# Patient Record
Sex: MALE | Race: WHITE | Employment: UNEMPLOYED | URBAN - METROPOLITAN AREA
[De-identification: names, ages, dates, MRNs, and addresses within clinical notes are randomized per-mention and may not be internally consistent; named-entity substitution may affect disease eponyms.]

---

## 2017-01-27 DIAGNOSIS — E78.5 HYPERLIPIDEMIA WITH TARGET LDL LESS THAN 130: ICD-10-CM

## 2017-01-27 RX ORDER — PRAVASTATIN SODIUM 20 MG
TABLET ORAL
Qty: 30 TABLET | Refills: 0 | Status: SHIPPED | OUTPATIENT
Start: 2017-01-27 | End: 2017-02-08 | Stop reason: SDUPTHER

## 2017-02-06 ENCOUNTER — HOSPITAL ENCOUNTER (OUTPATIENT)
Dept: PHYSICAL THERAPY | Age: 47
Setting detail: THERAPIES SERIES
Discharge: HOME | End: 2017-02-06
Payer: MEDICARE

## 2017-02-06 PROCEDURE — 97113 AQUATIC THERAPY/EXERCISES: CPT

## 2017-02-06 ASSESSMENT — PAIN DESCRIPTION - ORIENTATION: ORIENTATION: RIGHT

## 2017-02-06 ASSESSMENT — PAIN SCALES - GENERAL: PAINLEVEL_OUTOF10: 5

## 2017-02-06 ASSESSMENT — PAIN DESCRIPTION - PAIN TYPE: TYPE: CHRONIC PAIN

## 2017-02-06 ASSESSMENT — PAIN DESCRIPTION - LOCATION: LOCATION: ANKLE;SHOULDER

## 2017-02-08 ENCOUNTER — OFFICE VISIT (OUTPATIENT)
Dept: FAMILY MEDICINE CLINIC | Facility: CLINIC | Age: 47
End: 2017-02-08

## 2017-02-08 VITALS
OXYGEN SATURATION: 97 % | HEART RATE: 114 BPM | TEMPERATURE: 98.1 F | HEIGHT: 71 IN | BODY MASS INDEX: 27.38 KG/M2 | SYSTOLIC BLOOD PRESSURE: 131 MMHG | DIASTOLIC BLOOD PRESSURE: 81 MMHG | WEIGHT: 195.6 LBS

## 2017-02-08 DIAGNOSIS — M25.511 CHRONIC RIGHT SHOULDER PAIN: ICD-10-CM

## 2017-02-08 DIAGNOSIS — E78.5 HYPERLIPIDEMIA WITH TARGET LDL LESS THAN 130: ICD-10-CM

## 2017-02-08 DIAGNOSIS — R73.9 HYPERGLYCEMIA: ICD-10-CM

## 2017-02-08 DIAGNOSIS — M25.571 CHRONIC PAIN OF RIGHT ANKLE: ICD-10-CM

## 2017-02-08 DIAGNOSIS — G89.29 CHRONIC RIGHT SHOULDER PAIN: ICD-10-CM

## 2017-02-08 DIAGNOSIS — K59.03 CONSTIPATION DUE TO PAIN MEDICATION: ICD-10-CM

## 2017-02-08 DIAGNOSIS — M79.7 FIBROMYALGIA MUSCLE PAIN: ICD-10-CM

## 2017-02-08 DIAGNOSIS — R00.0 TACHYCARDIA: ICD-10-CM

## 2017-02-08 DIAGNOSIS — R63.5 UNINTENDED WEIGHT GAIN: Primary | ICD-10-CM

## 2017-02-08 DIAGNOSIS — G89.29 CHRONIC PAIN OF RIGHT ANKLE: ICD-10-CM

## 2017-02-08 LAB — HBA1C MFR BLD: 5.3 %

## 2017-02-08 PROCEDURE — 83036 HEMOGLOBIN GLYCOSYLATED A1C: CPT | Performed by: FAMILY MEDICINE

## 2017-02-08 PROCEDURE — 99214 OFFICE O/P EST MOD 30 MIN: CPT | Performed by: FAMILY MEDICINE

## 2017-02-08 RX ORDER — PRAVASTATIN SODIUM 20 MG
TABLET ORAL
Qty: 90 TABLET | Refills: 3 | Status: SHIPPED | OUTPATIENT
Start: 2017-02-08 | End: 2017-11-13 | Stop reason: SDUPTHER

## 2017-02-08 RX ORDER — ARIPIPRAZOLE 20 MG/1
TABLET ORAL
Refills: 0 | COMMUNITY
Start: 2017-02-02 | End: 2018-11-12 | Stop reason: ALTCHOICE

## 2017-02-08 RX ORDER — ALPRAZOLAM 0.5 MG/1
TABLET ORAL
Refills: 0 | COMMUNITY
Start: 2017-01-11 | End: 2017-05-08 | Stop reason: ALTCHOICE

## 2017-02-08 RX ORDER — DOCUSATE SODIUM 100 MG/1
100 CAPSULE, LIQUID FILLED ORAL 2 TIMES DAILY PRN
Qty: 60 CAPSULE | Refills: 3 | Status: SHIPPED | OUTPATIENT
Start: 2017-02-08 | End: 2018-12-26 | Stop reason: SDUPTHER

## 2017-02-08 RX ORDER — ERYTHROMYCIN 20 MG/ML
SOLUTION TOPICAL
Refills: 0 | COMMUNITY
Start: 2017-01-23

## 2017-02-08 RX ORDER — ADAPALENE 3 MG/G
GEL TOPICAL
Refills: 1 | COMMUNITY
Start: 2017-01-24

## 2017-02-08 RX ORDER — BENZTROPINE MESYLATE 1 MG/1
1 TABLET ORAL DAILY
Refills: 0 | COMMUNITY
Start: 2016-12-19 | End: 2022-01-19 | Stop reason: ALTCHOICE

## 2017-02-08 RX ORDER — SULFAMETHOXAZOLE AND TRIMETHOPRIM 800; 160 MG/1; MG/1
TABLET ORAL
Refills: 0 | COMMUNITY
Start: 2017-01-23 | End: 2017-05-08

## 2017-02-08 RX ORDER — CYCLOBENZAPRINE HCL 10 MG
10 TABLET ORAL 3 TIMES DAILY PRN
Qty: 90 TABLET | Refills: 3 | Status: SHIPPED | OUTPATIENT
Start: 2017-02-08 | End: 2017-11-13 | Stop reason: SDUPTHER

## 2017-02-08 ASSESSMENT — ENCOUNTER SYMPTOMS
NAUSEA: 0
CHEST TIGHTNESS: 0
BACK PAIN: 1
ABDOMINAL DISTENTION: 0
SHORTNESS OF BREATH: 1
COUGH: 0
DIARRHEA: 0
ABDOMINAL PAIN: 0
CONSTIPATION: 1
VOMITING: 0
WHEEZING: 0

## 2017-02-08 ASSESSMENT — PATIENT HEALTH QUESTIONNAIRE - PHQ9
4. FEELING TIRED OR HAVING LITTLE ENERGY: 3
2. FEELING DOWN, DEPRESSED OR HOPELESS: 3
9. THOUGHTS THAT YOU WOULD BE BETTER OFF DEAD, OR OF HURTING YOURSELF: 0
7. TROUBLE CONCENTRATING ON THINGS, SUCH AS READING THE NEWSPAPER OR WATCHING TELEVISION: 1
6. FEELING BAD ABOUT YOURSELF - OR THAT YOU ARE A FAILURE OR HAVE LET YOURSELF OR YOUR FAMILY DOWN: 2
SUM OF ALL RESPONSES TO PHQ9 QUESTIONS 1 & 2: 6
1. LITTLE INTEREST OR PLEASURE IN DOING THINGS: 3
10. IF YOU CHECKED OFF ANY PROBLEMS, HOW DIFFICULT HAVE THESE PROBLEMS MADE IT FOR YOU TO DO YOUR WORK, TAKE CARE OF THINGS AT HOME, OR GET ALONG WITH OTHER PEOPLE: 1
5. POOR APPETITE OR OVEREATING: 3
3. TROUBLE FALLING OR STAYING ASLEEP: 1
SUM OF ALL RESPONSES TO PHQ QUESTIONS 1-9: 17
8. MOVING OR SPEAKING SO SLOWLY THAT OTHER PEOPLE COULD HAVE NOTICED. OR THE OPPOSITE, BEING SO FIGETY OR RESTLESS THAT YOU HAVE BEEN MOVING AROUND A LOT MORE THAN USUAL: 1

## 2017-02-08 ASSESSMENT — ANXIETY QUESTIONNAIRES
GAD7 TOTAL SCORE: 9
3. WORRYING TOO MUCH ABOUT DIFFERENT THINGS: 1-SEVERAL DAYS
1. FEELING NERVOUS, ANXIOUS, OR ON EDGE: 3-NEARLY EVERY DAY
2. NOT BEING ABLE TO STOP OR CONTROL WORRYING: 2-OVER HALF THE DAYS
6. BECOMING EASILY ANNOYED OR IRRITABLE: 1-SEVERAL DAYS
5. BEING SO RESTLESS THAT IT IS HARD TO SIT STILL: 0-NOT AT ALL SURE
7. FEELING AFRAID AS IF SOMETHING AWFUL MIGHT HAPPEN: 1-SEVERAL DAYS
4. TROUBLE RELAXING: 1-SEVERAL DAYS

## 2017-02-09 ENCOUNTER — HOSPITAL ENCOUNTER (OUTPATIENT)
Dept: PHYSICAL THERAPY | Age: 47
Setting detail: THERAPIES SERIES
Discharge: HOME | End: 2017-02-09
Payer: MEDICARE

## 2017-02-09 PROCEDURE — 97110 THERAPEUTIC EXERCISES: CPT

## 2017-02-09 ASSESSMENT — PAIN DESCRIPTION - LOCATION: LOCATION: SHOULDER;ANKLE

## 2017-02-09 ASSESSMENT — PAIN DESCRIPTION - FREQUENCY: FREQUENCY: CONTINUOUS

## 2017-02-09 ASSESSMENT — PAIN SCALES - GENERAL: PAINLEVEL_OUTOF10: 8

## 2017-02-09 ASSESSMENT — PAIN DESCRIPTION - ORIENTATION: ORIENTATION: RIGHT

## 2017-02-12 PROBLEM — R00.0 TACHYCARDIA: Status: ACTIVE | Noted: 2017-02-12

## 2017-02-12 PROBLEM — K59.03 CONSTIPATION DUE TO PAIN MEDICATION: Status: ACTIVE | Noted: 2017-02-12

## 2017-02-13 ENCOUNTER — HOSPITAL ENCOUNTER (OUTPATIENT)
Dept: PHYSICAL THERAPY | Age: 47
Setting detail: THERAPIES SERIES
Discharge: HOME | End: 2017-02-13
Payer: MEDICARE

## 2017-02-13 PROCEDURE — 97113 AQUATIC THERAPY/EXERCISES: CPT

## 2017-02-13 ASSESSMENT — PAIN DESCRIPTION - FREQUENCY: FREQUENCY: CONTINUOUS

## 2017-02-13 ASSESSMENT — PAIN DESCRIPTION - PAIN TYPE
TYPE: CHRONIC PAIN
TYPE_2: CHRONIC PAIN

## 2017-02-13 ASSESSMENT — PAIN DESCRIPTION - INTENSITY: RATING_2: 2

## 2017-02-13 ASSESSMENT — PAIN DESCRIPTION - LOCATION
LOCATION: ANKLE
LOCATION_2: SHOULDER

## 2017-02-13 ASSESSMENT — PAIN DESCRIPTION - ORIENTATION
ORIENTATION: RIGHT
ORIENTATION_2: RIGHT

## 2017-02-13 ASSESSMENT — PAIN SCALES - GENERAL: PAINLEVEL_OUTOF10: 8

## 2017-02-16 ENCOUNTER — HOSPITAL ENCOUNTER (OUTPATIENT)
Dept: PHYSICAL THERAPY | Age: 47
Setting detail: THERAPIES SERIES
Discharge: HOME | End: 2017-02-16
Payer: MEDICARE

## 2017-02-16 PROCEDURE — 97110 THERAPEUTIC EXERCISES: CPT

## 2017-02-16 ASSESSMENT — PAIN DESCRIPTION - LOCATION
LOCATION_2: SHOULDER
LOCATION: ANKLE

## 2017-02-16 ASSESSMENT — PAIN SCALES - GENERAL: PAINLEVEL_OUTOF10: 7

## 2017-02-16 ASSESSMENT — PAIN DESCRIPTION - FREQUENCY: FREQUENCY: CONTINUOUS

## 2017-02-16 ASSESSMENT — PAIN DESCRIPTION - INTENSITY: RATING_2: 4

## 2017-02-16 ASSESSMENT — PAIN DESCRIPTION - ORIENTATION
ORIENTATION: RIGHT
ORIENTATION_2: RIGHT

## 2017-02-16 ASSESSMENT — PAIN DESCRIPTION - PAIN TYPE
TYPE_2: CHRONIC PAIN
TYPE: CHRONIC PAIN

## 2017-02-16 ASSESSMENT — PAIN DESCRIPTION - DESCRIPTORS: DESCRIPTORS: ACHING;SHARP

## 2017-02-24 ENCOUNTER — HOSPITAL ENCOUNTER (OUTPATIENT)
Dept: PHYSICAL THERAPY | Age: 47
Setting detail: THERAPIES SERIES
Discharge: HOME | End: 2017-02-24
Payer: MEDICARE

## 2017-02-24 PROCEDURE — 97110 THERAPEUTIC EXERCISES: CPT

## 2017-02-24 ASSESSMENT — PAIN DESCRIPTION - FREQUENCY: FREQUENCY: CONTINUOUS

## 2017-02-24 ASSESSMENT — PAIN DESCRIPTION - ORIENTATION: ORIENTATION: RIGHT

## 2017-02-24 ASSESSMENT — PAIN SCALES - GENERAL: PAINLEVEL_OUTOF10: 5

## 2017-02-24 ASSESSMENT — PAIN DESCRIPTION - LOCATION: LOCATION: SHOULDER;ANKLE

## 2017-02-24 ASSESSMENT — PAIN DESCRIPTION - DESCRIPTORS: DESCRIPTORS: ACHING;DULL;CONSTANT

## 2017-05-01 ENCOUNTER — HOSPITAL ENCOUNTER (OUTPATIENT)
Age: 47
Discharge: HOME | End: 2017-05-01
Payer: MEDICARE

## 2017-05-01 DIAGNOSIS — Z51.81 MEDICATION MONITORING ENCOUNTER: ICD-10-CM

## 2017-05-01 DIAGNOSIS — R73.01 IFG (IMPAIRED FASTING GLUCOSE): ICD-10-CM

## 2017-05-01 DIAGNOSIS — Z11.4 SCREENING FOR HIV (HUMAN IMMUNODEFICIENCY VIRUS): ICD-10-CM

## 2017-05-01 DIAGNOSIS — R25.3 MUSCLE TWITCHING: ICD-10-CM

## 2017-05-01 DIAGNOSIS — E78.5 HYPERLIPIDEMIA WITH TARGET LDL LESS THAN 130: ICD-10-CM

## 2017-05-01 DIAGNOSIS — R73.9 HYPERGLYCEMIA: ICD-10-CM

## 2017-05-01 DIAGNOSIS — R63.5 UNINTENDED WEIGHT GAIN: ICD-10-CM

## 2017-05-01 LAB
ALBUMIN SERPL-MCNC: 4.7 G/DL (ref 3.5–5.2)
ALBUMIN/GLOBULIN RATIO: NORMAL (ref 1–2.5)
ALP BLD-CCNC: 68 U/L (ref 40–129)
ALT SERPL-CCNC: 37 U/L (ref 5–41)
ANION GAP SERPL CALCULATED.3IONS-SCNC: 15 MMOL/L (ref 9–17)
AST SERPL-CCNC: 29 U/L
BILIRUB SERPL-MCNC: 0.83 MG/DL (ref 0.3–1.2)
BUN BLDV-MCNC: 15 MG/DL (ref 6–20)
BUN/CREAT BLD: NORMAL (ref 9–20)
CALCIUM SERPL-MCNC: 9.2 MG/DL (ref 8.6–10.4)
CHLORIDE BLD-SCNC: 102 MMOL/L (ref 98–107)
CHOLESTEROL/HDL RATIO: 4.6
CHOLESTEROL: 204 MG/DL
CO2: 26 MMOL/L (ref 20–31)
CREAT SERPL-MCNC: 0.97 MG/DL (ref 0.7–1.2)
GFR AFRICAN AMERICAN: >60 ML/MIN
GFR NON-AFRICAN AMERICAN: >60 ML/MIN
GFR SERPL CREATININE-BSD FRML MDRD: NORMAL ML/MIN/{1.73_M2}
GFR SERPL CREATININE-BSD FRML MDRD: NORMAL ML/MIN/{1.73_M2}
GLUCOSE BLD-MCNC: 83 MG/DL (ref 70–99)
HDLC SERPL-MCNC: 44 MG/DL
HIV AG/AB: NONREACTIVE
LDL CHOLESTEROL: 124 MG/DL (ref 0–130)
POTASSIUM SERPL-SCNC: 4.3 MMOL/L (ref 3.7–5.3)
SODIUM BLD-SCNC: 143 MMOL/L (ref 135–144)
TOTAL CK: 134 U/L (ref 39–308)
TOTAL PROTEIN: 7.7 G/DL (ref 6.4–8.3)
TRIGL SERPL-MCNC: 179 MG/DL
TSH SERPL DL<=0.05 MIU/L-ACNC: 0.71 MIU/L (ref 0.3–5)
VLDLC SERPL CALC-MCNC: ABNORMAL MG/DL (ref 1–30)

## 2017-05-01 PROCEDURE — 82550 ASSAY OF CK (CPK): CPT

## 2017-05-01 PROCEDURE — 93005 ELECTROCARDIOGRAM TRACING: CPT

## 2017-05-01 PROCEDURE — 84443 ASSAY THYROID STIM HORMONE: CPT

## 2017-05-01 PROCEDURE — 36415 COLL VENOUS BLD VENIPUNCTURE: CPT

## 2017-05-01 PROCEDURE — 87389 HIV-1 AG W/HIV-1&-2 AB AG IA: CPT

## 2017-05-01 PROCEDURE — 80053 COMPREHEN METABOLIC PANEL: CPT

## 2017-05-01 PROCEDURE — 80061 LIPID PANEL: CPT

## 2017-05-02 LAB
EKG ATRIAL RATE: 96 BPM
EKG P AXIS: 40 DEGREES
EKG P-R INTERVAL: 138 MS
EKG Q-T INTERVAL: 346 MS
EKG QRS DURATION: 72 MS
EKG QTC CALCULATION (BAZETT): 437 MS
EKG R AXIS: 25 DEGREES
EKG T AXIS: 48 DEGREES
EKG VENTRICULAR RATE: 96 BPM

## 2017-05-08 ENCOUNTER — OFFICE VISIT (OUTPATIENT)
Dept: FAMILY MEDICINE CLINIC | Age: 47
End: 2017-05-08
Payer: MEDICARE

## 2017-05-08 VITALS
OXYGEN SATURATION: 98 % | TEMPERATURE: 97.7 F | HEART RATE: 94 BPM | HEIGHT: 71 IN | DIASTOLIC BLOOD PRESSURE: 84 MMHG | SYSTOLIC BLOOD PRESSURE: 137 MMHG | BODY MASS INDEX: 25.4 KG/M2 | WEIGHT: 181.4 LBS

## 2017-05-08 DIAGNOSIS — E78.5 HYPERLIPIDEMIA WITH TARGET LDL LESS THAN 130: ICD-10-CM

## 2017-05-08 DIAGNOSIS — J02.9 SORE THROAT: Primary | ICD-10-CM

## 2017-05-08 DIAGNOSIS — Z13.31 POSITIVE DEPRESSION SCREENING: ICD-10-CM

## 2017-05-08 DIAGNOSIS — F31.75 BIPOLAR DISORDER, IN PARTIAL REMISSION, MOST RECENT EPISODE DEPRESSED (HCC): ICD-10-CM

## 2017-05-08 DIAGNOSIS — H61.21 EXCESSIVE EAR WAX, RIGHT: ICD-10-CM

## 2017-05-08 DIAGNOSIS — K76.0 FATTY LIVER DISEASE, NONALCOHOLIC: ICD-10-CM

## 2017-05-08 LAB — S PYO AG THROAT QL: NORMAL

## 2017-05-08 PROCEDURE — G8431 POS CLIN DEPRES SCRN F/U DOC: HCPCS | Performed by: FAMILY MEDICINE

## 2017-05-08 PROCEDURE — 99214 OFFICE O/P EST MOD 30 MIN: CPT | Performed by: FAMILY MEDICINE

## 2017-05-08 PROCEDURE — 87880 STREP A ASSAY W/OPTIC: CPT | Performed by: FAMILY MEDICINE

## 2017-05-08 RX ORDER — PHENYLEPHRINE HCL 1 %
SPRAY, NON-AEROSOL (ML) NASAL
Refills: 0 | COMMUNITY
Start: 2017-03-14 | End: 2017-11-13 | Stop reason: SDUPTHER

## 2017-05-08 RX ORDER — DOXYCYCLINE HYCLATE 100 MG/1
CAPSULE ORAL
Refills: 0 | COMMUNITY
Start: 2017-04-27 | End: 2017-11-13 | Stop reason: ALTCHOICE

## 2017-05-08 RX ORDER — FLUTICASONE PROPIONATE 50 MCG
2 SPRAY, SUSPENSION (ML) NASAL DAILY
Qty: 1 BOTTLE | Refills: 3 | Status: SHIPPED | OUTPATIENT
Start: 2017-05-08 | End: 2020-01-10 | Stop reason: SDUPTHER

## 2017-05-08 RX ORDER — OXYCODONE HYDROCHLORIDE AND ACETAMINOPHEN 5; 325 MG/1; MG/1
TABLET ORAL
Refills: 0 | COMMUNITY
Start: 2017-04-12 | End: 2019-04-03 | Stop reason: ALTCHOICE

## 2017-05-08 RX ORDER — FEXOFENADINE HCL 180 MG/1
180 TABLET ORAL DAILY
Qty: 30 TABLET | Refills: 3 | Status: SHIPPED | OUTPATIENT
Start: 2017-05-08 | End: 2019-06-27 | Stop reason: SDUPTHER

## 2017-05-08 ASSESSMENT — ENCOUNTER SYMPTOMS
CONSTIPATION: 0
TROUBLE SWALLOWING: 1
COUGH: 0
ABDOMINAL PAIN: 0
VOMITING: 0
SHORTNESS OF BREATH: 0
RHINORRHEA: 0
CHEST TIGHTNESS: 0
SORE THROAT: 1
ABDOMINAL DISTENTION: 0
NAUSEA: 0
WHEEZING: 0
DIARRHEA: 0

## 2017-05-08 ASSESSMENT — PATIENT HEALTH QUESTIONNAIRE - PHQ9
1. LITTLE INTEREST OR PLEASURE IN DOING THINGS: 1
SUM OF ALL RESPONSES TO PHQ9 QUESTIONS 1 & 2: 2
8. MOVING OR SPEAKING SO SLOWLY THAT OTHER PEOPLE COULD HAVE NOTICED. OR THE OPPOSITE, BEING SO FIGETY OR RESTLESS THAT YOU HAVE BEEN MOVING AROUND A LOT MORE THAN USUAL: 0
4. FEELING TIRED OR HAVING LITTLE ENERGY: 1
6. FEELING BAD ABOUT YOURSELF - OR THAT YOU ARE A FAILURE OR HAVE LET YOURSELF OR YOUR FAMILY DOWN: 1
9. THOUGHTS THAT YOU WOULD BE BETTER OFF DEAD, OR OF HURTING YOURSELF: 0
10. IF YOU CHECKED OFF ANY PROBLEMS, HOW DIFFICULT HAVE THESE PROBLEMS MADE IT FOR YOU TO DO YOUR WORK, TAKE CARE OF THINGS AT HOME, OR GET ALONG WITH OTHER PEOPLE: 1
2. FEELING DOWN, DEPRESSED OR HOPELESS: 1
3. TROUBLE FALLING OR STAYING ASLEEP: 1
5. POOR APPETITE OR OVEREATING: 2
7. TROUBLE CONCENTRATING ON THINGS, SUCH AS READING THE NEWSPAPER OR WATCHING TELEVISION: 1
SUM OF ALL RESPONSES TO PHQ QUESTIONS 1-9: 8

## 2017-05-11 ENCOUNTER — TELEPHONE (OUTPATIENT)
Dept: FAMILY MEDICINE CLINIC | Age: 47
End: 2017-05-11

## 2017-05-11 DIAGNOSIS — J01.80 OTHER ACUTE SINUSITIS, RECURRENCE NOT SPECIFIED: Primary | ICD-10-CM

## 2017-05-11 RX ORDER — AMOXICILLIN AND CLAVULANATE POTASSIUM 875; 125 MG/1; MG/1
1 TABLET, FILM COATED ORAL 2 TIMES DAILY
Qty: 20 TABLET | Refills: 0 | Status: SHIPPED | OUTPATIENT
Start: 2017-05-11 | End: 2017-05-21

## 2017-08-14 ENCOUNTER — HOSPITAL ENCOUNTER (OUTPATIENT)
Dept: PHYSICAL THERAPY | Age: 47
Setting detail: THERAPIES SERIES
Discharge: HOME | End: 2017-08-14
Payer: MEDICARE

## 2017-08-14 PROCEDURE — 97161 PT EVAL LOW COMPLEX 20 MIN: CPT

## 2017-08-14 ASSESSMENT — PAIN DESCRIPTION - PROGRESSION: CLINICAL_PROGRESSION: GRADUALLY WORSENING

## 2017-08-14 ASSESSMENT — PAIN DESCRIPTION - ONSET: ONSET: UNABLE TO TELL

## 2017-08-14 ASSESSMENT — PAIN DESCRIPTION - ORIENTATION: ORIENTATION: RIGHT;ANTERIOR;POSTERIOR

## 2017-08-14 ASSESSMENT — PAIN SCALES - GENERAL: PAINLEVEL_OUTOF10: 6

## 2017-08-14 ASSESSMENT — PAIN DESCRIPTION - FREQUENCY: FREQUENCY: CONTINUOUS

## 2017-08-14 ASSESSMENT — PAIN DESCRIPTION - DESCRIPTORS: DESCRIPTORS: CONSTANT;DULL;ACHING

## 2017-08-14 ASSESSMENT — PAIN DESCRIPTION - PAIN TYPE: TYPE: CHRONIC PAIN

## 2017-08-14 ASSESSMENT — PAIN DESCRIPTION - LOCATION: LOCATION: SHOULDER

## 2017-08-17 ENCOUNTER — HOSPITAL ENCOUNTER (OUTPATIENT)
Dept: PHYSICAL THERAPY | Age: 47
Setting detail: THERAPIES SERIES
Discharge: HOME | End: 2017-08-17
Payer: MEDICARE

## 2017-08-17 ASSESSMENT — PAIN DESCRIPTION - DESCRIPTORS: DESCRIPTORS: CONSTANT;STABBING

## 2017-08-17 ASSESSMENT — PAIN DESCRIPTION - FREQUENCY: FREQUENCY: CONTINUOUS

## 2017-08-17 ASSESSMENT — PAIN DESCRIPTION - ORIENTATION: ORIENTATION: RIGHT;POSTERIOR

## 2017-08-17 ASSESSMENT — PAIN SCALES - GENERAL: PAINLEVEL_OUTOF10: 5

## 2017-08-17 ASSESSMENT — PAIN DESCRIPTION - LOCATION: LOCATION: ANKLE

## 2017-08-17 ASSESSMENT — PAIN DESCRIPTION - ONSET: ONSET: UNABLE TO TELL

## 2017-08-17 ASSESSMENT — PAIN DESCRIPTION - PROGRESSION: CLINICAL_PROGRESSION: GRADUALLY WORSENING

## 2017-08-17 ASSESSMENT — PAIN DESCRIPTION - PAIN TYPE: TYPE: CHRONIC PAIN

## 2017-08-22 ENCOUNTER — HOSPITAL ENCOUNTER (OUTPATIENT)
Dept: PHYSICAL THERAPY | Age: 47
Setting detail: THERAPIES SERIES
Discharge: HOME | End: 2017-08-22
Payer: MEDICARE

## 2017-08-22 PROCEDURE — 97113 AQUATIC THERAPY/EXERCISES: CPT

## 2017-08-22 ASSESSMENT — PAIN SCALES - GENERAL: PAINLEVEL_OUTOF10: 4

## 2017-08-22 ASSESSMENT — PAIN DESCRIPTION - PAIN TYPE
TYPE: CHRONIC PAIN
TYPE_2: CHRONIC PAIN

## 2017-08-22 ASSESSMENT — PAIN DESCRIPTION - ORIENTATION
ORIENTATION_2: RIGHT
ORIENTATION: RIGHT;POSTERIOR

## 2017-08-22 ASSESSMENT — PAIN DESCRIPTION - FREQUENCY: FREQUENCY: CONTINUOUS

## 2017-08-22 ASSESSMENT — PAIN DESCRIPTION - LOCATION
LOCATION_2: SHOULDER
LOCATION: ANKLE

## 2017-08-22 ASSESSMENT — PAIN DESCRIPTION - INTENSITY: RATING_2: 0

## 2017-08-22 ASSESSMENT — PAIN DESCRIPTION - DESCRIPTORS: DESCRIPTORS: CONSTANT;STABBING

## 2017-08-25 ENCOUNTER — HOSPITAL ENCOUNTER (OUTPATIENT)
Dept: PHYSICAL THERAPY | Age: 47
Setting detail: THERAPIES SERIES
Discharge: HOME | End: 2017-08-25
Payer: MEDICARE

## 2017-08-25 PROCEDURE — 97113 AQUATIC THERAPY/EXERCISES: CPT

## 2017-08-25 ASSESSMENT — PAIN DESCRIPTION - ORIENTATION: ORIENTATION: RIGHT

## 2017-08-25 ASSESSMENT — PAIN DESCRIPTION - LOCATION: LOCATION: SHOULDER;ANKLE

## 2017-08-25 ASSESSMENT — PAIN DESCRIPTION - PAIN TYPE: TYPE: CHRONIC PAIN

## 2017-08-25 ASSESSMENT — PAIN SCALES - GENERAL: PAINLEVEL_OUTOF10: 6

## 2017-08-29 ENCOUNTER — HOSPITAL ENCOUNTER (OUTPATIENT)
Dept: PHYSICAL THERAPY | Age: 47
Setting detail: THERAPIES SERIES
Discharge: HOME | End: 2017-08-29
Payer: MEDICARE

## 2017-08-29 PROCEDURE — 97113 AQUATIC THERAPY/EXERCISES: CPT

## 2017-08-29 ASSESSMENT — PAIN DESCRIPTION - FREQUENCY: FREQUENCY: CONTINUOUS

## 2017-08-29 ASSESSMENT — PAIN DESCRIPTION - PAIN TYPE
TYPE_2: CHRONIC PAIN
TYPE: CHRONIC PAIN

## 2017-08-29 ASSESSMENT — PAIN DESCRIPTION - LOCATION
LOCATION_2: SHOULDER
LOCATION: ANKLE

## 2017-08-29 ASSESSMENT — PAIN DESCRIPTION - INTENSITY: RATING_2: 4

## 2017-08-29 ASSESSMENT — PAIN DESCRIPTION - ORIENTATION
ORIENTATION_2: RIGHT
ORIENTATION: RIGHT

## 2017-08-29 ASSESSMENT — PAIN SCALES - GENERAL: PAINLEVEL_OUTOF10: 8

## 2017-08-29 ASSESSMENT — PAIN DESCRIPTION - DESCRIPTORS: DESCRIPTORS: CONSTANT;ACHING;SHARP;STABBING

## 2017-09-01 ENCOUNTER — HOSPITAL ENCOUNTER (OUTPATIENT)
Dept: PHYSICAL THERAPY | Age: 47
Setting detail: THERAPIES SERIES
Discharge: HOME | End: 2017-09-01
Payer: MEDICARE

## 2017-09-01 PROCEDURE — 97113 AQUATIC THERAPY/EXERCISES: CPT

## 2017-09-01 ASSESSMENT — PAIN DESCRIPTION - PAIN TYPE
TYPE: CHRONIC PAIN
TYPE_2: CHRONIC PAIN

## 2017-09-01 ASSESSMENT — PAIN DESCRIPTION - FREQUENCY: FREQUENCY: CONTINUOUS

## 2017-09-01 ASSESSMENT — PAIN DESCRIPTION - DESCRIPTORS: DESCRIPTORS: CONSTANT;ACHING

## 2017-09-01 ASSESSMENT — PAIN DESCRIPTION - LOCATION
LOCATION_2: SHOULDER
LOCATION: ANKLE

## 2017-09-01 ASSESSMENT — PAIN DESCRIPTION - ORIENTATION
ORIENTATION: RIGHT
ORIENTATION_2: RIGHT

## 2017-09-01 ASSESSMENT — PAIN DESCRIPTION - INTENSITY: RATING_2: 3

## 2017-09-01 ASSESSMENT — PAIN SCALES - GENERAL: PAINLEVEL_OUTOF10: 3

## 2017-09-05 ENCOUNTER — HOSPITAL ENCOUNTER (OUTPATIENT)
Dept: PHYSICAL THERAPY | Age: 47
Setting detail: THERAPIES SERIES
Discharge: HOME | End: 2017-09-05
Payer: MEDICARE

## 2017-09-05 PROCEDURE — 97113 AQUATIC THERAPY/EXERCISES: CPT

## 2017-09-05 ASSESSMENT — PAIN DESCRIPTION - PAIN TYPE
TYPE_2: CHRONIC PAIN
TYPE: CHRONIC PAIN

## 2017-09-05 ASSESSMENT — PAIN SCALES - GENERAL: PAINLEVEL_OUTOF10: 7

## 2017-09-05 ASSESSMENT — PAIN DESCRIPTION - INTENSITY: RATING_2: 4

## 2017-09-05 ASSESSMENT — PAIN DESCRIPTION - DESCRIPTORS: DESCRIPTORS: CONSTANT;ACHING

## 2017-09-05 ASSESSMENT — PAIN DESCRIPTION - ORIENTATION
ORIENTATION_2: RIGHT
ORIENTATION: RIGHT

## 2017-09-05 ASSESSMENT — PAIN DESCRIPTION - LOCATION
LOCATION_2: SHOULDER
LOCATION: ANKLE

## 2017-09-05 ASSESSMENT — PAIN DESCRIPTION - FREQUENCY: FREQUENCY: CONTINUOUS

## 2017-09-07 ENCOUNTER — APPOINTMENT (OUTPATIENT)
Dept: PHYSICAL THERAPY | Age: 47
End: 2017-09-07
Payer: MEDICARE

## 2017-09-12 ENCOUNTER — HOSPITAL ENCOUNTER (OUTPATIENT)
Dept: PHYSICAL THERAPY | Age: 47
Setting detail: THERAPIES SERIES
Discharge: HOME | End: 2017-09-12
Payer: MEDICARE

## 2017-09-12 PROCEDURE — 97113 AQUATIC THERAPY/EXERCISES: CPT

## 2017-09-12 ASSESSMENT — PAIN DESCRIPTION - LOCATION
LOCATION: FOOT
LOCATION_2: SHOULDER

## 2017-09-12 ASSESSMENT — PAIN DESCRIPTION - ORIENTATION
ORIENTATION_2: RIGHT
ORIENTATION: RIGHT

## 2017-09-12 ASSESSMENT — PAIN DESCRIPTION - PAIN TYPE
TYPE: CHRONIC PAIN
TYPE_2: CHRONIC PAIN

## 2017-09-12 ASSESSMENT — PAIN SCALES - GENERAL: PAINLEVEL_OUTOF10: 4

## 2017-09-12 ASSESSMENT — PAIN DESCRIPTION - INTENSITY: RATING_2: 2

## 2017-09-14 ENCOUNTER — HOSPITAL ENCOUNTER (OUTPATIENT)
Dept: PHYSICAL THERAPY | Age: 47
Setting detail: THERAPIES SERIES
Discharge: HOME | End: 2017-09-14
Payer: MEDICARE

## 2017-09-14 ENCOUNTER — APPOINTMENT (OUTPATIENT)
Dept: PHYSICAL THERAPY | Age: 47
End: 2017-09-14
Payer: MEDICARE

## 2017-09-14 PROCEDURE — 97113 AQUATIC THERAPY/EXERCISES: CPT

## 2017-09-14 PROCEDURE — 97110 THERAPEUTIC EXERCISES: CPT

## 2017-09-14 ASSESSMENT — PAIN DESCRIPTION - ORIENTATION
ORIENTATION: RIGHT
ORIENTATION_2: RIGHT
ORIENTATION: RIGHT
ORIENTATION_2: RIGHT

## 2017-09-14 ASSESSMENT — PAIN DESCRIPTION - DESCRIPTORS
DESCRIPTORS_2: CONSTANT;SHARP
DESCRIPTORS: ACHING;CONSTANT

## 2017-09-14 ASSESSMENT — PAIN DESCRIPTION - INTENSITY
RATING_2: 2
RATING_2: 7

## 2017-09-14 ASSESSMENT — PAIN DESCRIPTION - FREQUENCY: FREQUENCY: CONTINUOUS

## 2017-09-14 ASSESSMENT — PAIN DESCRIPTION - LOCATION
LOCATION: SHOULDER
LOCATION_2: ANKLE
LOCATION: FOOT
LOCATION_2: SHOULDER

## 2017-09-14 ASSESSMENT — PAIN SCALES - GENERAL
PAINLEVEL_OUTOF10: 5
PAINLEVEL_OUTOF10: 6

## 2017-09-14 ASSESSMENT — PAIN DESCRIPTION - PAIN TYPE
TYPE: CHRONIC PAIN
TYPE_2: CHRONIC PAIN

## 2017-09-14 ASSESSMENT — PAIN DESCRIPTION - DURATION: DURATION_2: CONTINUOUS

## 2017-09-19 ENCOUNTER — APPOINTMENT (OUTPATIENT)
Dept: PHYSICAL THERAPY | Age: 47
End: 2017-09-19
Payer: MEDICARE

## 2017-09-19 ENCOUNTER — HOSPITAL ENCOUNTER (OUTPATIENT)
Dept: PHYSICAL THERAPY | Age: 47
Setting detail: THERAPIES SERIES
Discharge: HOME | End: 2017-09-19
Payer: MEDICARE

## 2017-09-19 PROCEDURE — 97110 THERAPEUTIC EXERCISES: CPT

## 2017-09-19 ASSESSMENT — PAIN DESCRIPTION - ORIENTATION
ORIENTATION_2: RIGHT
ORIENTATION: RIGHT

## 2017-09-19 ASSESSMENT — PAIN SCALES - GENERAL: PAINLEVEL_OUTOF10: 6

## 2017-09-19 ASSESSMENT — PAIN DESCRIPTION - LOCATION
LOCATION: SHOULDER
LOCATION_2: ANKLE

## 2017-09-19 ASSESSMENT — PAIN DESCRIPTION - FREQUENCY: FREQUENCY: CONTINUOUS

## 2017-09-19 ASSESSMENT — PAIN DESCRIPTION - DESCRIPTORS
DESCRIPTORS_2: SHARP;CONSTANT
DESCRIPTORS: ACHING;DULL;CONSTANT

## 2017-09-19 ASSESSMENT — PAIN DESCRIPTION - DURATION: DURATION_2: CONTINUOUS

## 2017-09-19 ASSESSMENT — PAIN DESCRIPTION - INTENSITY: RATING_2: 4

## 2017-09-21 ENCOUNTER — HOSPITAL ENCOUNTER (OUTPATIENT)
Dept: PHYSICAL THERAPY | Age: 47
Setting detail: THERAPIES SERIES
Discharge: HOME | End: 2017-09-21
Payer: MEDICARE

## 2017-09-21 ENCOUNTER — APPOINTMENT (OUTPATIENT)
Dept: PHYSICAL THERAPY | Age: 47
End: 2017-09-21
Payer: MEDICARE

## 2017-09-21 PROCEDURE — 97110 THERAPEUTIC EXERCISES: CPT

## 2017-09-21 ASSESSMENT — PAIN DESCRIPTION - DURATION: DURATION_2: CONTINUOUS

## 2017-09-21 ASSESSMENT — PAIN DESCRIPTION - ORIENTATION
ORIENTATION_2: RIGHT
ORIENTATION: RIGHT

## 2017-09-21 ASSESSMENT — PAIN DESCRIPTION - LOCATION
LOCATION_2: ANKLE
LOCATION: SHOULDER

## 2017-09-21 ASSESSMENT — PAIN SCALES - GENERAL: PAINLEVEL_OUTOF10: 5

## 2017-09-21 ASSESSMENT — PAIN DESCRIPTION - FREQUENCY: FREQUENCY: CONTINUOUS

## 2017-09-21 ASSESSMENT — PAIN DESCRIPTION - INTENSITY: RATING_2: 3

## 2017-09-21 ASSESSMENT — PAIN DESCRIPTION - DESCRIPTORS
DESCRIPTORS_2: SHARP;CONSTANT
DESCRIPTORS: ACHING

## 2017-09-26 ENCOUNTER — HOSPITAL ENCOUNTER (OUTPATIENT)
Dept: PHYSICAL THERAPY | Age: 47
Setting detail: THERAPIES SERIES
Discharge: HOME | End: 2017-09-26
Payer: MEDICARE

## 2017-09-26 PROCEDURE — 97110 THERAPEUTIC EXERCISES: CPT

## 2017-09-26 ASSESSMENT — PAIN DESCRIPTION - DURATION: DURATION_2: CONTINUOUS

## 2017-09-26 ASSESSMENT — PAIN DESCRIPTION - LOCATION
LOCATION: ANKLE
LOCATION_2: SHOULDER

## 2017-09-26 ASSESSMENT — PAIN DESCRIPTION - ORIENTATION
ORIENTATION: RIGHT
ORIENTATION_2: RIGHT

## 2017-09-26 ASSESSMENT — PAIN SCALES - GENERAL: PAINLEVEL_OUTOF10: 4

## 2017-09-26 ASSESSMENT — PAIN DESCRIPTION - FREQUENCY: FREQUENCY: CONTINUOUS

## 2017-09-26 ASSESSMENT — PAIN DESCRIPTION - DESCRIPTORS
DESCRIPTORS: SHARP;CONSTANT
DESCRIPTORS_2: SHARP;CONSTANT

## 2017-09-26 ASSESSMENT — PAIN DESCRIPTION - INTENSITY: RATING_2: 6

## 2017-09-28 ENCOUNTER — HOSPITAL ENCOUNTER (OUTPATIENT)
Dept: PHYSICAL THERAPY | Age: 47
Setting detail: THERAPIES SERIES
Discharge: HOME | End: 2017-09-28
Payer: MEDICARE

## 2017-09-28 PROCEDURE — 97110 THERAPEUTIC EXERCISES: CPT

## 2017-09-28 ASSESSMENT — PAIN DESCRIPTION - INTENSITY: RATING_2: 4

## 2017-09-28 ASSESSMENT — PAIN DESCRIPTION - LOCATION
LOCATION_2: ANKLE
LOCATION: ANKLE

## 2017-09-28 ASSESSMENT — PAIN DESCRIPTION - ORIENTATION
ORIENTATION: RIGHT
ORIENTATION_2: RIGHT

## 2017-09-28 ASSESSMENT — PAIN DESCRIPTION - DESCRIPTORS
DESCRIPTORS_2: SHARP;CONSTANT
DESCRIPTORS: SHARP;CONSTANT

## 2017-09-28 ASSESSMENT — PAIN DESCRIPTION - DURATION: DURATION_2: CONTINUOUS

## 2017-09-28 ASSESSMENT — PAIN DESCRIPTION - FREQUENCY: FREQUENCY: CONTINUOUS

## 2017-09-28 ASSESSMENT — PAIN SCALES - GENERAL: PAINLEVEL_OUTOF10: 7

## 2017-10-05 ENCOUNTER — HOSPITAL ENCOUNTER (OUTPATIENT)
Dept: PHYSICAL THERAPY | Age: 47
Setting detail: THERAPIES SERIES
Discharge: HOME | End: 2017-10-05
Payer: MEDICARE

## 2017-10-05 PROCEDURE — 97110 THERAPEUTIC EXERCISES: CPT

## 2017-10-05 ASSESSMENT — PAIN DESCRIPTION - FREQUENCY: FREQUENCY: CONTINUOUS

## 2017-10-05 ASSESSMENT — PAIN DESCRIPTION - DESCRIPTORS: DESCRIPTORS: ACHING;DULL;CONSTANT

## 2017-10-05 ASSESSMENT — PAIN DESCRIPTION - ORIENTATION: ORIENTATION: RIGHT

## 2017-10-05 ASSESSMENT — PAIN SCALES - GENERAL: PAINLEVEL_OUTOF10: 3

## 2017-10-05 ASSESSMENT — PAIN DESCRIPTION - LOCATION: LOCATION: SHOULDER;ANKLE

## 2017-10-06 ENCOUNTER — HOSPITAL ENCOUNTER (OUTPATIENT)
Dept: PHYSICAL THERAPY | Age: 47
Setting detail: THERAPIES SERIES
Discharge: HOME | End: 2017-10-06
Payer: MEDICARE

## 2017-10-06 PROCEDURE — 97110 THERAPEUTIC EXERCISES: CPT

## 2017-10-06 ASSESSMENT — PAIN DESCRIPTION - ORIENTATION
ORIENTATION_2: RIGHT
ORIENTATION: RIGHT

## 2017-10-06 ASSESSMENT — PAIN DESCRIPTION - DESCRIPTORS
DESCRIPTORS: ACHING;DULL
DESCRIPTORS_2: SHARP;CONSTANT

## 2017-10-06 ASSESSMENT — PAIN DESCRIPTION - LOCATION
LOCATION_2: ANKLE
LOCATION: SHOULDER

## 2017-10-06 ASSESSMENT — PAIN DESCRIPTION - INTENSITY: RATING_2: 8

## 2017-10-06 ASSESSMENT — PAIN DESCRIPTION - FREQUENCY: FREQUENCY: INTERMITTENT

## 2017-10-06 ASSESSMENT — PAIN DESCRIPTION - DURATION: DURATION_2: CONTINUOUS

## 2017-10-06 ASSESSMENT — PAIN SCALES - GENERAL: PAINLEVEL_OUTOF10: 4

## 2017-10-06 NOTE — PROGRESS NOTES
Physical Therapy  Daily Treatment Note  Date: 10/6/2017  Patient Name: Tha Miranda  MRN: [id number]     :   1970    Subjective:      PT Visit Information  Onset Date: 08/15/17  PT Insurance Information: [place] Advantage   Total # of Visits Approved: 24  Total # of Visits to Date: 14  Subjective  Subjective: pain on R ankle/cassidy today  Pain Screening  Patient Currently in Pain: Yes  Pain Assessment  Pain Assessment: 0-10  Pain Level: 4  Pain Location: Shoulder  Pain Orientation: Right  Pain Descriptors: Aching;Dull  Pain Frequency: Intermittent  Pain 2  Pain Rating 2: 8  Pain Location 2: Ankle  Pain Orientation 2: Right  Pain Descriptors 2: Sharp; Constant  Pain Duration 2: Continuous  Vital Signs  Patient Currently in Pain: Yes       Treatment Activities:   Exercises  Exercise 1: walking 20# 3x  Exercise 2: slant board 3x30\"  Exercise 3: slant board DF/PF/Inv/Ever 1' each  Exercise 4: BAPS board L3 C/CC/DF/PF/Inv/ever 1'each  Exercise 5: step up 8'F/L 10x  Exercise 6: 23# rows/pull down 3x10  Exercise 7: chest press front/back 25# 3x10  Exercise 8: lat pull down 25# 3x10  Exercise 9: miguel ángel's R 10x 4 ways 2#  Exercise 10:  Total Gym L10 squat/heel raises 3x10  Exercise 11: towel stretch R 3x30\"  Exercise 12: leg press 35# BLE 3x10  Exercise 13: Green Tband BLE 4 way hip 10x     Assessment:   Conditions Requiring Skilled Therapeutic Intervention  Assessment: progress with ex  REQUIRES PT FOLLOW UP: Yes       Plan:    Plan  Times per week: 2X/WEEK  Current Treatment Recommendations: Strengthening, Patient/Caregiver Education & Training, Pain Management, Modalities, Home Exercise Program, ROM  Plan Comment: to continue PT per POC  Timed Code Treatment Minutes: 45 Minutes     Therapy Time   Individual Concurrent Group Co-treatment   Time In 0950         Time Out 1035         Minutes 45         Timed Code Treatment Minutes: 45 Minutes     Electronically signed by: Shelly Jara PT

## 2017-10-10 ENCOUNTER — HOSPITAL ENCOUNTER (OUTPATIENT)
Dept: PHYSICAL THERAPY | Age: 47
Setting detail: THERAPIES SERIES
Discharge: HOME | End: 2017-10-10
Payer: MEDICARE

## 2017-10-10 PROCEDURE — 97110 THERAPEUTIC EXERCISES: CPT

## 2017-10-10 ASSESSMENT — PAIN DESCRIPTION - DURATION: DURATION_2: CONTINUOUS

## 2017-10-10 ASSESSMENT — PAIN DESCRIPTION - LOCATION
LOCATION_2: ANKLE
LOCATION: SHOULDER

## 2017-10-10 ASSESSMENT — PAIN DESCRIPTION - DESCRIPTORS
DESCRIPTORS: ACHING;DULL
DESCRIPTORS_2: CONSTANT;SHARP

## 2017-10-10 ASSESSMENT — PAIN SCALES - GENERAL: PAINLEVEL_OUTOF10: 3

## 2017-10-10 ASSESSMENT — PAIN DESCRIPTION - FREQUENCY: FREQUENCY: CONTINUOUS

## 2017-10-10 ASSESSMENT — PAIN DESCRIPTION - ORIENTATION
ORIENTATION: RIGHT
ORIENTATION_2: RIGHT

## 2017-10-10 ASSESSMENT — PAIN DESCRIPTION - INTENSITY: RATING_2: 7

## 2017-10-10 NOTE — PROGRESS NOTES
Physical Therapy  Daily Treatment Note  Date: 10/10/2017  Patient Name: Valentina Wagner  MRN: [id number]     :   1970    Subjective:   General  Referring Practitioner: Delicia Tai MD  PT Visit Information  Onset Date: 08/15/17  PT Insurance Information: [place] Advantage   Total # of Visits Approved: 24  Total # of Visits to Date: 15  Subjective  Subjective: pain on R ankle/cassidy today  Pain Screening  Patient Currently in Pain: Yes  Pain Assessment  Pain Assessment: 0-10  Pain Level: 3  Pain Location: Shoulder  Pain Orientation: Right  Pain Descriptors: Aching;Dull  Pain Frequency: Continuous  Pain 2  Pain Rating 2: 7  Pain Location 2: Ankle  Pain Orientation 2: Right  Pain Descriptors 2: Constant; Sharp  Pain Duration 2: Continuous  Vital Signs  Patient Currently in Pain: Yes       Treatment Activities:      Exercises  Exercise 1: walking 20# 3x  Exercise 2: slant board 3x30\"  Exercise 3: slant board DF/PF/Inv/Ever 1' each  Exercise 4: BAPS board L3 C/CC/DF/PF/Inv/ever 1'each  Exercise 5: step up 8'F/L 10x  Exercise 6: 25# rows/pull down 3x10  Exercise 7: chest press front/back 35# 3x10  Exercise 8: lat pull down 35# 3x10  Exercise 9: miguel ángel's R 10x 4 ways 2#  Exercise 11: HEP-towel stretch R 3x30\"  Exercise 12: leg press/calf raise 35# BLE 3x10  Exercise 13:  HEP-Green Tband BLE 4 way hip 10x       Treatment Charges: Minutes Units   []  Ultrasound     []  Electrical-Stim     []  Iontophoresis     []  Traction     []  Massage       []  Eval     []  Gait     [x]  Ther Exercise 35  2   []  Manual Therapy       []  Ther Activities       []  Aquatics     []  Neuro Re-Ed       []  Other       Total Treatment Time: 35 2                   Assessment:   Conditions Requiring Skilled Therapeutic Intervention  Assessment: SHORT TERM GOALS MET 2/4 LONG TERM GOALS MET 1/4  Treatment Diagnosis: Difficulty Walking/(R) ankle apin   REQUIRES PT FOLLOW UP: Yes                                     Therapy Time   Individual Concurrent Group Co-treatment   Time In [address] [place]         Time Out 0940         Minutes [address] [facility] Elizabeth Sanchez, PT

## 2017-10-12 ENCOUNTER — HOSPITAL ENCOUNTER (OUTPATIENT)
Dept: PHYSICAL THERAPY | Age: 47
Setting detail: THERAPIES SERIES
Discharge: HOME | End: 2017-10-12
Payer: MEDICARE

## 2017-10-12 PROCEDURE — 97110 THERAPEUTIC EXERCISES: CPT

## 2017-10-12 ASSESSMENT — PAIN DESCRIPTION - INTENSITY: RATING_2: 3

## 2017-10-12 ASSESSMENT — PAIN DESCRIPTION - LOCATION
LOCATION_2: ANKLE
LOCATION: SHOULDER

## 2017-10-12 ASSESSMENT — PAIN DESCRIPTION - ORIENTATION
ORIENTATION: RIGHT
ORIENTATION_2: RIGHT

## 2017-10-12 ASSESSMENT — PAIN DESCRIPTION - DURATION: DURATION_2: CONTINUOUS

## 2017-10-12 ASSESSMENT — PAIN SCALES - GENERAL: PAINLEVEL_OUTOF10: 3

## 2017-10-12 ASSESSMENT — PAIN DESCRIPTION - FREQUENCY: FREQUENCY: CONTINUOUS

## 2017-10-12 ASSESSMENT — PAIN DESCRIPTION - DESCRIPTORS
DESCRIPTORS: ACHING;DULL
DESCRIPTORS_2: CONSTANT;SHARP

## 2017-10-12 NOTE — PROGRESS NOTES
Physical Therapy   [address]   Outpatient Physical Therapy  [address] [facility]. [address]  Phone: [phone]  Fax: [phone]  Daily Progress Note    Date: 10/12/17    Patient Name: Mitzi Bowman        MRN: [id number]  Account: [de-identified] : 1970      General Information:  Referring Practitioner: Cleo Anderson MD  Referral Date : 17  Diagnosis: Achilles tendinitis , (R) leg   Onset Date: 08/15/17  PT Insurance Information: [place] [employer]age   Total # of Visits Approved: 24  Total # of Visits to Date:     Subjective:  Subjective: Feeling better today     Pain:  Patient Currently in Pain: Yes  Pain Assessment: 0-10  Pain Level: 3  Pain Location: Shoulder  Pain Orientation: Right  Pain Descriptors: Aching;Dull  Pain Frequency: Continuous  Pain Rating 2: 3  Pain Location 2: Ankle  Pain Orientation 2: Right  Pain Descriptors 2: Constant; Sharp  Pain Duration 2: Continuous    Objective:  Exercise 1: walking 20# 3x  Exercise 2: slant board 3x30\"  Exercise 3: slant board DF/PF/Inv/Ever 1' each  Exercise 4: BAPS board L3 C/CC/DF/PF/Inv/ever 1'each  Exercise 5: step up 8'F/L 10x  Exercise 6: 35# rows/pull down 3x10  Exercise 7: chest press front/back 35# 3x10  Exercise 8: lat pull down 35# 3x10  Exercise 9: houghston's R 2x10 4 ways 2#  Exercise 12: leg press/calf raise 35# BLE 3x10    Assessment:     Activity Tolerance: Patient Tolerated treatment well  Comments: No problem with second set of Eliana's ex    Plan:  Plan: Continue with current plan    Therapy Time:  Time In: 930  Time Out: 1005  Minutes: 35       Treatment Charges: Minutes Units   []  Ultrasound     []  Electrical-Stim     []  Iontophoresis     []  Traction     []  Massage       []  Eval     []  Gait     [x]  Ther Exercise 35  2   []  Manual Therapy       []  Ther Activities       []  Aquatics     []  Neuro Re-Ed       []  Other       Total Treatment Time: 28 2        Edenilson Gatica, PT

## 2017-10-17 ENCOUNTER — HOSPITAL ENCOUNTER (OUTPATIENT)
Dept: PHYSICAL THERAPY | Age: 47
Setting detail: THERAPIES SERIES
Discharge: HOME | End: 2017-10-17
Payer: MEDICARE

## 2017-10-17 PROCEDURE — 97110 THERAPEUTIC EXERCISES: CPT

## 2017-10-17 ASSESSMENT — PAIN DESCRIPTION - LOCATION
LOCATION: SHOULDER
LOCATION_2: ANKLE

## 2017-10-17 ASSESSMENT — PAIN SCALES - GENERAL: PAINLEVEL_OUTOF10: 4

## 2017-10-17 ASSESSMENT — PAIN DESCRIPTION - DURATION: DURATION_2: CONTINUOUS

## 2017-10-17 ASSESSMENT — PAIN SCALES - WONG BAKER: WONGBAKER_NUMERICALRESPONSE: 0

## 2017-10-17 ASSESSMENT — PAIN DESCRIPTION - ORIENTATION
ORIENTATION_2: RIGHT
ORIENTATION: RIGHT

## 2017-10-17 ASSESSMENT — PAIN DESCRIPTION - DESCRIPTORS
DESCRIPTORS_2: ACHING
DESCRIPTORS: ACHING

## 2017-10-17 ASSESSMENT — PAIN DESCRIPTION - FREQUENCY: FREQUENCY: CONTINUOUS

## 2017-10-17 ASSESSMENT — PAIN DESCRIPTION - INTENSITY: RATING_2: 7

## 2017-10-17 NOTE — FLOWSHEET NOTE
[address]   Outpatient Physical Therapy  [address] [facility]. [address]  Phone: [phone]  Fax: [phone]  Daily Progress Note    Date: 10/17/17    Patient Name: Valentina Wagner        MRN: [id number]  Account: [de-identified] : 1970      General Information:  Referring Practitioner: Delicia Tai MD  Referral Date : 17  Diagnosis: Achilles tendinitis , (R) leg   Onset Date: 08/15/17  PT Insurance Information: [place] Advantage   Total # of Visits Approved: 24  Total # of Visits to Date:     Subjective:  Subjective: Patient unaware of activity that increased achilles pain      Pain:  Patient Currently in Pain: Yes  Pain Assessment: 0-10  Pain Level: 4  Elliott-Baker Pain Rating: No hurt  Pain Location: Shoulder  Pain Orientation: Right  Pain Descriptors: Aching  Pain Frequency: Continuous  Pain Rating 2: 7  Pain Location 2: Ankle  Pain Orientation 2: Right  Pain Descriptors 2: Aching  Pain Duration 2: Continuous    Objective:  Exercise 2: slant board 3x30\"  Exercise 3: slant board DF/PF/Inv/Ever 1' each  Exercise 4: BAPS board L3 C/CC/DF/PF/Inv/ever 1'each  Exercise 5: step up 8'F/L 10x  Exercise 6: 35# rows/pull down 3x10  Exercise 7: chest press front/back 35# 2x15  Exercise 8: lat pull down 35# 2x15  Exercise 9: houghston's R 2x15 4 ways 2#  Exercise 12: leg press/calf raise 35# BLE 3x10  Exercise 14: NuStep 6' L4  Exercise 15: SLS on foam B 3x30 sec            Assessment:     Activity Tolerance: Patient Tolerated treatment well  Comments: NuStep to increase blood flow. Advanced reps to 2 sets of 15 to increase muscular endurance with UE. SLS on foam pad introduced to improve balance and increase intrinsic muscles of foot.     Plan:  Plan: Continue with current plan    Therapy Time:    Time In: 1335  Time Out: 1410  Minutes: 35       Treatment Charges: Minutes Units   []  Ultrasound     []  Electrical-Stim     []  Iontophoresis     []  Traction     []  Massage       []  Eval

## 2017-10-19 ENCOUNTER — HOSPITAL ENCOUNTER (OUTPATIENT)
Dept: PHYSICAL THERAPY | Age: 47
Setting detail: THERAPIES SERIES
Discharge: HOME | End: 2017-10-19
Payer: MEDICARE

## 2017-10-19 PROCEDURE — 97110 THERAPEUTIC EXERCISES: CPT

## 2017-10-19 ASSESSMENT — PAIN DESCRIPTION - LOCATION: LOCATION: ANKLE;SHOULDER

## 2017-10-19 ASSESSMENT — PAIN DESCRIPTION - FREQUENCY: FREQUENCY: CONTINUOUS

## 2017-10-19 ASSESSMENT — PAIN SCALES - GENERAL: PAINLEVEL_OUTOF10: 3

## 2017-10-19 ASSESSMENT — PAIN DESCRIPTION - DESCRIPTORS: DESCRIPTORS: CONSTANT

## 2017-10-19 ASSESSMENT — PAIN DESCRIPTION - ORIENTATION: ORIENTATION: RIGHT

## 2017-10-19 NOTE — PROGRESS NOTES
Physical Therapy  Daily Treatment Note  Date: 10/19/2017  Patient Name: Jessica Farias  MRN: [id number]     :   1970    Subjective:      PT Visit Information  Onset Date: 08/15/17  PT Insurance Information: [place] Advantage   Total # of Visits Approved: 24  Total # of Visits to Date: 18  Subjective  Subjective: complains of pain on R cassidy/ankle today  Pain Screening  Patient Currently in Pain: Yes  Pain Assessment  Pain Assessment: 0-10  Pain Level: 3  Pain Location: Ankle; Shoulder  Pain Orientation: Right  Pain Descriptors: Constant  Pain Frequency: Continuous  Vital Signs  Patient Currently in Pain: Yes       Treatment Activities:   Exercises  Exercise 2: slant board 3x30\"  Exercise 3: slant board DF/PF/Inv/Ever 1' each  Exercise 4: BAPS board L3 C/CC/DF/PF/Inv/ever 1'each  Exercise 5: step up 8'F/L 10x  Exercise 6: 35# rows/pull down 3x10  Exercise 7: chest press front/back 35# 2x15  Exercise 8: lat pull down 35# 2x15  Exercise 9: houghston's R 2x15 4 ways 2#  Exercise 12: leg press/calf raise 35# BLE 3x10  Exercise 14: NuStep 6' L4  Exercise 15: SLS on foam B 3x30 sec     Assessment:   Conditions Requiring Skilled Therapeutic Intervention  REQUIRES PT FOLLOW UP: Yes                     Plan:    Plan  Times per week: 2X/WEEK  Current Treatment Recommendations: Strengthening, Patient/Caregiver Education & Training, Pain Management, Modalities, Home Exercise Program, ROM  Plan Comment: to continue PT per POC  Timed Code Treatment Minutes: 30 Minutes     Therapy Time   Individual Concurrent Group Co-treatment   Time In 0945         Time Out 1015         Minutes 30         Timed Code Treatment Minutes: 30 Minutes      Treatment Charges: Minutes Units   []  Ultrasound     []  Electrical-Stim     []  Iontophoresis     []  Traction     []  Massage       []  Eval     []  Gait     [x]  Ther Exercise 30  2    []  Manual Therapy       []  Ther Activities       []  Aquatics     []  Neuro Re-Ed       []  Other

## 2017-10-20 ENCOUNTER — HOSPITAL ENCOUNTER (OUTPATIENT)
Age: 47
Discharge: HOME | End: 2017-10-20
Payer: MEDICARE

## 2017-10-20 LAB
ALBUMIN SERPL-MCNC: 5.1 G/DL (ref 3.5–5.2)
ALBUMIN/GLOBULIN RATIO: ABNORMAL (ref 1–2.5)
ALP BLD-CCNC: 79 U/L (ref 40–129)
ALT SERPL-CCNC: 21 U/L (ref 5–41)
ANION GAP SERPL CALCULATED.3IONS-SCNC: 16 MMOL/L (ref 9–17)
AST SERPL-CCNC: 20 U/L
BILIRUB SERPL-MCNC: 0.59 MG/DL (ref 0.3–1.2)
BUN BLDV-MCNC: 12 MG/DL (ref 6–20)
BUN/CREAT BLD: ABNORMAL (ref 9–20)
CALCIUM SERPL-MCNC: 9.9 MG/DL (ref 8.6–10.4)
CHLORIDE BLD-SCNC: 98 MMOL/L (ref 98–107)
CHOLESTEROL/HDL RATIO: 5.1
CHOLESTEROL: 265 MG/DL
CO2: 23 MMOL/L (ref 20–31)
CREAT SERPL-MCNC: 0.94 MG/DL (ref 0.7–1.2)
ESTIMATED AVERAGE GLUCOSE: 100 MG/DL
GFR AFRICAN AMERICAN: >60 ML/MIN
GFR NON-AFRICAN AMERICAN: >60 ML/MIN
GFR SERPL CREATININE-BSD FRML MDRD: ABNORMAL ML/MIN/{1.73_M2}
GFR SERPL CREATININE-BSD FRML MDRD: ABNORMAL ML/MIN/{1.73_M2}
GLUCOSE BLD-MCNC: 116 MG/DL (ref 70–99)
HBA1C MFR BLD: 5.1 % (ref 4–6)
HCT VFR BLD CALC: 46.7 % (ref 41–53)
HDLC SERPL-MCNC: 52 MG/DL
HEMOGLOBIN: 16.1 G/DL (ref 13.5–17.5)
LDL CHOLESTEROL: 188 MG/DL (ref 0–130)
MCH RBC QN AUTO: 31.2 PG (ref 26–34)
MCHC RBC AUTO-ENTMCNC: 34.5 G/DL (ref 31–37)
MCV RBC AUTO: 90.5 FL (ref 80–100)
PDW BLD-RTO: 12.6 % (ref 11.5–14.9)
PLATELET # BLD: 209 K/UL (ref 150–450)
PMV BLD AUTO: 8.5 FL (ref 6–12)
POTASSIUM SERPL-SCNC: 4.5 MMOL/L (ref 3.7–5.3)
RBC # BLD: 5.17 M/UL (ref 4.5–5.9)
SODIUM BLD-SCNC: 137 MMOL/L (ref 135–144)
TOTAL PROTEIN: 8.4 G/DL (ref 6.4–8.3)
TRIGL SERPL-MCNC: 125 MG/DL
VLDLC SERPL CALC-MCNC: ABNORMAL MG/DL (ref 1–30)
WBC # BLD: 8.3 K/UL (ref 3.5–11)

## 2017-10-20 PROCEDURE — 83036 HEMOGLOBIN GLYCOSYLATED A1C: CPT

## 2017-10-20 PROCEDURE — 36415 COLL VENOUS BLD VENIPUNCTURE: CPT

## 2017-10-20 PROCEDURE — 85027 COMPLETE CBC AUTOMATED: CPT

## 2017-10-20 PROCEDURE — 80053 COMPREHEN METABOLIC PANEL: CPT

## 2017-10-20 PROCEDURE — 80061 LIPID PANEL: CPT

## 2017-10-24 ENCOUNTER — HOSPITAL ENCOUNTER (OUTPATIENT)
Dept: PHYSICAL THERAPY | Age: 47
Setting detail: THERAPIES SERIES
Discharge: HOME | End: 2017-10-24
Payer: MEDICARE

## 2017-10-24 PROCEDURE — 97110 THERAPEUTIC EXERCISES: CPT

## 2017-10-24 ASSESSMENT — PAIN DESCRIPTION - INTENSITY: RATING_2: 5

## 2017-10-24 ASSESSMENT — PAIN DESCRIPTION - LOCATION
LOCATION: ANKLE;SHOULDER
LOCATION_2: ANKLE

## 2017-10-24 ASSESSMENT — PAIN DESCRIPTION - ORIENTATION
ORIENTATION_2: RIGHT
ORIENTATION: RIGHT

## 2017-10-24 ASSESSMENT — PAIN DESCRIPTION - DURATION: DURATION_2: CONTINUOUS

## 2017-10-24 ASSESSMENT — PAIN DESCRIPTION - DESCRIPTORS
DESCRIPTORS_2: ACHING
DESCRIPTORS: CONSTANT

## 2017-10-24 ASSESSMENT — PAIN DESCRIPTION - FREQUENCY: FREQUENCY: CONTINUOUS

## 2017-10-24 ASSESSMENT — PAIN SCALES - GENERAL: PAINLEVEL_OUTOF10: 5

## 2017-10-24 NOTE — PROGRESS NOTES
Physical Therapy  Daily Treatment Note  Date: 10/24/2017  Patient Name: Will Sanabria  MRN: [id number]     :   1970    Subjective:   General  Referring Practitioner: Mayra Curry MD  PT Visit Information  Onset Date: 08/15/17  PT Insurance Information: [place] Advantage   Total # of Visits Approved: 24  Total # of Visits to Date:   Subjective  Subjective: Mild increased achiness in both ankle and shld  Pain Screening  Patient Currently in Pain: Yes  Pain Assessment  Pain Assessment: 0-10  Pain Level: 5  Pain Location: Ankle; Shoulder  Pain Orientation: Right  Pain Descriptors: Constant  Pain Frequency: Continuous  Pain 2  Pain Rating 2: 5  Pain Location 2: Ankle  Pain Orientation 2: Right  Pain Descriptors 2: Aching  Pain Duration 2: Continuous  Vital Signs  Patient Currently in Pain: Yes       Treatment Activities:      Exercises  Exercise 2: slant board 3x30\"  Exercise 3: slant board DF/PF/Inv/Ever 1' each  Exercise 4: BAPS board L3 C/CC/DF/PF/Inv/ever 1'each  Exercise 5: step up 8'F/L 10x  Exercise 6: 35# rows/pull down 3x10  Exercise 7: chest press front/back 35# 2x15  Exercise 8: lat pull down 35# 2x15  Exercise 9: houghston's R 2x15 4 ways 2#  Exercise 12: leg press/calf raise 35# BLE 3x10  Exercise 14: NuStep 6' L4  Exercise 15: SLS on foam B 3x30 sec      Assessment:   Conditions Requiring Skilled Therapeutic Intervention  REQUIRES PT FOLLOW UP: Yes  Activity Tolerance  Activity Tolerance: Patient Tolerated treatment well      Plan:  Cont with POC     Timed Code Treatment Minutes: 30 Minutes     Therapy Time   Individual Concurrent Group Co-treatment   Time In  9:50         Time Out  10:20         Minutes  30         Timed Code Treatment Minutes: Derrell Rolon 92, PTA

## 2017-10-28 ENCOUNTER — HOSPITAL ENCOUNTER (OUTPATIENT)
Dept: NON INVASIVE DIAGNOSTICS | Age: 47
Discharge: HOME | End: 2017-10-28
Payer: MEDICARE

## 2017-10-28 DIAGNOSIS — R00.0 TACHYCARDIA: ICD-10-CM

## 2017-10-28 LAB
LV EF: 55 %
LVEF MODALITY: NORMAL

## 2017-10-28 PROCEDURE — 93306 TTE W/DOPPLER COMPLETE: CPT

## 2017-10-31 ENCOUNTER — HOSPITAL ENCOUNTER (OUTPATIENT)
Dept: PHYSICAL THERAPY | Age: 47
Setting detail: THERAPIES SERIES
Discharge: HOME | End: 2017-10-31
Payer: MEDICARE

## 2017-10-31 PROCEDURE — 97110 THERAPEUTIC EXERCISES: CPT

## 2017-10-31 ASSESSMENT — PAIN DESCRIPTION - LOCATION
LOCATION: SHOULDER
LOCATION_2: ANKLE

## 2017-10-31 ASSESSMENT — PAIN DESCRIPTION - DESCRIPTORS
DESCRIPTORS_2: ACHING;CONSTANT
DESCRIPTORS: DULL;CONSTANT

## 2017-10-31 ASSESSMENT — PAIN SCALES - GENERAL: PAINLEVEL_OUTOF10: 8

## 2017-10-31 ASSESSMENT — PAIN DESCRIPTION - ORIENTATION
ORIENTATION_2: RIGHT
ORIENTATION: RIGHT

## 2017-10-31 ASSESSMENT — PAIN DESCRIPTION - FREQUENCY: FREQUENCY: CONTINUOUS

## 2017-10-31 ASSESSMENT — PAIN DESCRIPTION - INTENSITY: RATING_2: 3

## 2017-10-31 ASSESSMENT — PAIN DESCRIPTION - DURATION: DURATION_2: CONTINUOUS

## 2017-10-31 NOTE — PROGRESS NOTES
Physical Therapy Re-evaluation Note    Date: 10/31/2017  Patient Name: Jojo Garcia  MRN: [id number]  : 1970     Treatment Diagnosis: Difficulty Walking/(R) ankle apin     Subjective   General  Referring Practitioner: Gunjan Andres MD  Referral Date : 17  Diagnosis: Achilles tendinitis , (R) leg   PT Visit Information  Onset Date: 08/15/17  PT Insurance Information: [place] Advantage   Total # of Visits Approved: 24  Total # of Visits to Date: 20  Subjective  Subjective: increased pain R cassidy,less pain R ankle today  Pain Screening  Patient Currently in Pain: Yes  Pain Assessment  Pain Assessment: 0-10  Pain Level: 8  Pain Location: Shoulder  Pain Orientation: Right  Pain Descriptors: Dull;Constant  Pain Frequency: Continuous  Pain 2  Pain Rating 2: 3  Pain Location 2: Ankle  Pain Orientation 2: Right  Pain Descriptors 2: Aching;Constant  Pain Duration 2: Continuous  Vital Signs  Patient Currently in Pain: Yes  Objective  PROM RLE (degrees)  R Ankle Dorsiflexion 0-20: 20  R Ankle Plantar Flexion 0-45: 45  R Ankle Forefoot Inversion 0-40: 35  R Ankle Forefoot Eversion 0-20: 25  AROM RUE (degrees)  RUE General AROM: pain on end range R cassidy flex/ABD,tight on apley's IR  Strength RLE  R Ankle Dorsiflexion: 5/5  R Ankle Plantar flexion: 4+/5  R Ankle Inversion: 5/5  R Ankle Eversion: 5/5  Strength RUE  Comment: R scapular muscles 4-/5  R Shoulder Flexion: 5/5  R Shoulder Extension: 5/5  R Shoulder ABduction: 5/5  R Shoulder Internal Rotation: 5/5  R Shoulder External Rotation: 5/5  R Elbow Flexion: 5/5  R Elbow Extension: 5/5     Exercises  Exercise 1: walking 20# 3x  Exercise 2: slant board 3x30\"  Exercise 3: slant board DF/PF/Inv/Ever 1' each  Exercise 4: BAPS board L3 C/CC/DF/PF/Inv/ever 1'each  Exercise 5: step up 8'F/L 10x  Exercise 6: 35# rows/pull down 3x10  Exercise 7: chest press front/back 35# 2x15  Exercise 8: lat pull down 35# 2x15  Exercise 9: jimenez's R 2x15 4 ways 2#  Exercise 10: Total Gym L10 squat/heel raises 3x10  Exercise 12: leg press/calf raise 35# BLE 3x10  Exercise 14: NuStep 6' L4  Exercise 15: SLS on foam B 3x30 sec     Assessment   Conditions Requiring Skilled Therapeutic Intervention  Assessment: SHORT TERM GOALS MET 3/4 LONG TERM GOALS MET 1/4  Treatment Diagnosis: Difficulty Walking/(R) ankle apin   REQUIRES PT FOLLOW UP: Yes  Activity Tolerance  Activity Tolerance: Patient Tolerated treatment well         Plan   Plan  Times per week: 1X/WEEK  Current Treatment Recommendations: Strengthening, Patient/Caregiver Education & Training, Pain Management, Modalities, Home Exercise Program, ROM  Plan Comment: TO FINISH LAST 4 PT VISITS THEN DISCONTINUE PT WITH PATIENT TO CONTINUE HEP    Goals  Short term goals  Short term goal 1: No complaints of pain will be reported within the (R) shoulder/(R) ankle at rest/ADLs(not met)  Short term goal 2: Pt will be independent with his home exercise program (met)  Short term goal 3: Pt will be able to stand/ambulate 5-10 minutes without a change in symptoms (met)  Short term goal 4: Pt will demonstrate improved ROM within all involved planes of the ankle(met)   Long term goals  Long term goal 1: Pt will demonstrate a 5/5 MMT within all involved planes to assist with (I) function (partly met)  Long term goal 2: Pt will be able to perform all of his desired ADLs without pain/limitation. (not met)   Long term goal 3: Establish a normal gait pattern without a device (met)  Long term goal 4: Pt will demonstrate full ROM within the (R) ankle to assist with (I) function (partly met)     Treatment Charges: Minutes Units   []  Ultrasound     []  Electrical-Stim     []  Iontophoresis     []  Traction     []  Massage       []  Eval     []  Gait     [x]  Ther Exercise 45  3    []  Manual Therapy       []  Ther Activities       []  Aquatics     []  Neuro Re-Ed       []  Other       Total Treatment Time: 45 3        Therapy Time   Individual Concurrent Group Co-treatment   Time In 1000         Time Out 1045         Minutes 45         Timed Code Treatment Minutes: 39 Minutes    Electronically signed by: Wil Miranda, PT

## 2017-11-02 ENCOUNTER — HOSPITAL ENCOUNTER (OUTPATIENT)
Dept: PHYSICAL THERAPY | Age: 47
Setting detail: THERAPIES SERIES
Discharge: HOME | End: 2017-11-02
Payer: MEDICARE

## 2017-11-02 PROCEDURE — 97110 THERAPEUTIC EXERCISES: CPT

## 2017-11-02 ASSESSMENT — PAIN DESCRIPTION - FREQUENCY: FREQUENCY: CONTINUOUS

## 2017-11-02 ASSESSMENT — PAIN DESCRIPTION - LOCATION
LOCATION: SHOULDER
LOCATION_2: ANKLE

## 2017-11-02 ASSESSMENT — PAIN DESCRIPTION - ORIENTATION
ORIENTATION_2: RIGHT
ORIENTATION: RIGHT

## 2017-11-02 ASSESSMENT — PAIN DESCRIPTION - DESCRIPTORS
DESCRIPTORS_2: DULL;CONSTANT
DESCRIPTORS: CONSTANT;DULL

## 2017-11-02 ASSESSMENT — PAIN DESCRIPTION - INTENSITY: RATING_2: 3

## 2017-11-02 ASSESSMENT — PAIN DESCRIPTION - DURATION: DURATION_2: CONTINUOUS

## 2017-11-02 ASSESSMENT — PAIN SCALES - GENERAL: PAINLEVEL_OUTOF10: 5

## 2017-11-02 NOTE — PROGRESS NOTES
Physical Therapy  Daily Treatment Note  Date: 2017  Patient Name: Lc Rodriges  MRN: [id number]     :   1970    Subjective:      PT Visit Information  Onset Date: 08/15/17  PT Insurance Information: [place] Advantage   Total # of Visits Approved: 24  Total # of Visits to Date: 21  Subjective  Subjective: pain complained on R shoulder and ankle  Pain Screening  Patient Currently in Pain: Yes  Pain Assessment  Pain Assessment: 0-10  Pain Level: 5  Pain Location: Shoulder  Pain Orientation: Right  Pain Descriptors: Constant;Dull  Pain Frequency: Continuous  Pain 2  Pain Rating 2: 3  Pain Location 2: Ankle  Pain Orientation 2: Right  Pain Descriptors 2: Dull;Constant  Pain Duration 2: Continuous  Vital Signs  Patient Currently in Pain: Yes       Treatment Activities:   Exercises  Exercise 1: walking 20# 3x  Exercise 2: slant board 3x30\"  Exercise 3: slant board DF/PF/Inv/Ever 1' each  Exercise 4: BAPS board L3 C/CC/DF/PF/Inv/ever 1'each  Exercise 5: step up 8'F/L 10x  Exercise 6: 35# rows/pull down 3x10  Exercise 7: chest press front/back 35# 2x15  Exercise 8: lat pull down 35# 2x15  Exercise 9: houghston's R 2x15 4 ways 2#  Exercise 10:  Total Gym L10 squat/heel raises 3x10  Exercise 11: towel stretch R 3x30\"  Exercise 12: leg press/calf raise 35# BLE 3x10  Exercise 13: Green Tband BLE 4 way hip 10x  Exercise 14: NuStep 6' L4  Exercise 15: SLS on foam B 3x30 sec     Assessment:   Conditions Requiring Skilled Therapeutic Intervention  Assessment: progress with ex  REQUIRES PT FOLLOW UP: Yes      Plan:    Plan  Times per week: 1X/WEEK  Current Treatment Recommendations: Strengthening, Patient/Caregiver Education & Training, Pain Management, Modalities, Home Exercise Program, ROM  Plan Comment: to continue PT per POC  Timed Code Treatment Minutes: 45 Minutes     Therapy Time   Individual Concurrent Group Co-treatment   Time In 1000         Time Out 1045         Minutes 45         Timed Code Treatment Minutes:

## 2017-11-07 ENCOUNTER — HOSPITAL ENCOUNTER (OUTPATIENT)
Dept: PHYSICAL THERAPY | Age: 47
Setting detail: THERAPIES SERIES
Discharge: HOME | End: 2017-11-07
Payer: MEDICARE

## 2017-11-07 PROCEDURE — 97110 THERAPEUTIC EXERCISES: CPT

## 2017-11-07 ASSESSMENT — PAIN DESCRIPTION - ORIENTATION
ORIENTATION: RIGHT
ORIENTATION_2: RIGHT

## 2017-11-07 ASSESSMENT — PAIN DESCRIPTION - PAIN TYPE
TYPE_2: CHRONIC PAIN
TYPE: CHRONIC PAIN

## 2017-11-07 ASSESSMENT — PAIN DESCRIPTION - LOCATION
LOCATION: SHOULDER
LOCATION_2: ANKLE

## 2017-11-07 ASSESSMENT — PAIN DESCRIPTION - INTENSITY: RATING_2: 3

## 2017-11-07 ASSESSMENT — PAIN SCALES - GENERAL: PAINLEVEL_OUTOF10: 6

## 2017-11-07 NOTE — PROGRESS NOTES
Physical Therapy  [address]   Outpatient Physical Therapy  [address] [facility]  Phone: [phone]  Fax: [phone]  Daily Progress Note    Date: 17    Patient Name: Yusef Song        MRN: [id number]  Account: [de-identified] : 1970      General Information:  Referring Practitioner: Melissa Juarez MD  Referral Date : 17  Diagnosis: Achilles tendinitis , (R) leg   Other (Comment): To see  17  Onset Date: 08/15/17  PT Insurance Information: [place] Advantage   Total # of Visits Approved: 24  Total # of Visits to Date:     Subjective:  Subjective: Increased R shoulder pain today- feels he may have slept wrong. Does not recall any new activity. R Ankle is not feeling too bad today. Reports compliance with stretching at home. Difficulty with reaching overhead/lifting & carrying due to R shoulder pain. Feels limited with walking due to foot- can tolerated about 15-20' before pain increases     Pain:  Patient Currently in Pain: Yes  Pain Assessment: 0-10  Pain Level: 6  Pain Type: Chronic pain  Pain Location: Shoulder  Pain Orientation: Right  Pain Rating 2: 3  Pain Type 2: Chronic Pain  Pain Location 2: Ankle  Pain Orientation 2: Right    Objective:  Exercise 1: Free Motion 4-way Walking 20# 4x  Exercise 2: Slant Board Gastroc Stretch 3x30\"  Exercise 3: Slant board [place] DF/PF/Inv/Ever 1' each  Exercise 4: BAPS board L3 CW/CCW/DF/PF/Inv/ever 10x each (Both feet on board to increase WB with R LE)  Exercise 5: Step Up 8\" F/L 10x  Exercise 6: 35# rows/pull down 3x10  Exercise 7: Chest Press/Row Seated 35# 2x15 each  Exercise 8: Lat Pull Down Seated 35# 2x15  Exercise 9: miguel ángel's R 2x15 4 ways 2#  Exercise 10:  Total Gym L10 squat/heel raises 3x10  Exercise 11: Apley IR towel stretch R 3x30\"  Exercise 12: Leg Press/Calf Raise 35# BLE 3x10  Exercise 13: FreeMotion B LE 4 way hip 7# 10x each  Exercise 14: NuStep 6' L4  Exercise 15: SLS on Foam Eyes Closed B 3x30 sec  Exercise 16: Add Lateral Dips 6\"    Assessment: Body structures, Functions, Activity limitations: Decreased functional mobility ; Decreased ADL status; Decreased strength;Decreased ROM  Treatment Diagnosis: Difficulty Walking/(R) ankle apin   Prognosis: Excellent  REQUIRES PT FOLLOW UP: Yes  Activity Tolerance: Patient Tolerated treatment well  Comments: Emphasis on technique with exercise- progressed WB with R LE exercise on BAPS and with 4-way hip. Also challenged balance with eyes closed when SL on foam- increased difficulty.  Patient denied any increased pain    Plan:  Plan: Continue with current plan (Progress shoulder exercise/mobility overhead)    Therapy Time:  Time In: 1002  Time Out: 1057  Minutes: 55  Timed Code Treatment Minutes: 40 Minutes    Treatment Charges: Minutes Units   []  Ultrasound     []  Electrical-Stim     []  Iontophoresis     []  Traction     []  Massage       []  Eval     []  Gait     [x]  Ther Exercise 40  3   []  Manual Therapy       []  Ther Activities       []  Aquatics     []  Neuro Re-Ed       []  Other       Total Treatment Time: 36 [facility] [place]

## 2017-11-13 ENCOUNTER — OFFICE VISIT (OUTPATIENT)
Dept: FAMILY MEDICINE CLINIC | Age: 47
End: 2017-11-13
Payer: MEDICARE

## 2017-11-13 VITALS
DIASTOLIC BLOOD PRESSURE: 94 MMHG | WEIGHT: 175.8 LBS | SYSTOLIC BLOOD PRESSURE: 148 MMHG | TEMPERATURE: 98.6 F | BODY MASS INDEX: 24.61 KG/M2 | HEIGHT: 71 IN | OXYGEN SATURATION: 100 % | HEART RATE: 115 BPM

## 2017-11-13 DIAGNOSIS — R00.0 TACHYCARDIA: ICD-10-CM

## 2017-11-13 DIAGNOSIS — E78.5 HYPERLIPIDEMIA WITH TARGET LDL LESS THAN 130: ICD-10-CM

## 2017-11-13 DIAGNOSIS — M25.571 CHRONIC PAIN OF RIGHT ANKLE: ICD-10-CM

## 2017-11-13 DIAGNOSIS — Z23 NEEDS FLU SHOT: ICD-10-CM

## 2017-11-13 DIAGNOSIS — M79.7 FIBROMYALGIA MUSCLE PAIN: ICD-10-CM

## 2017-11-13 DIAGNOSIS — I10 ESSENTIAL HYPERTENSION: Primary | ICD-10-CM

## 2017-11-13 DIAGNOSIS — M75.21 BICIPITAL TENDINITIS, RIGHT SHOULDER: ICD-10-CM

## 2017-11-13 DIAGNOSIS — Z23 PNEUMOCOCCAL VACCINATION ADMINISTERED AT CURRENT VISIT: ICD-10-CM

## 2017-11-13 DIAGNOSIS — G89.29 CHRONIC PAIN OF RIGHT ANKLE: ICD-10-CM

## 2017-11-13 PROCEDURE — G8427 DOCREV CUR MEDS BY ELIG CLIN: HCPCS | Performed by: FAMILY MEDICINE

## 2017-11-13 PROCEDURE — 90471 IMMUNIZATION ADMIN: CPT | Performed by: FAMILY MEDICINE

## 2017-11-13 PROCEDURE — 1036F TOBACCO NON-USER: CPT | Performed by: FAMILY MEDICINE

## 2017-11-13 PROCEDURE — 90688 IIV4 VACCINE SPLT 0.5 ML IM: CPT | Performed by: FAMILY MEDICINE

## 2017-11-13 PROCEDURE — G8420 CALC BMI NORM PARAMETERS: HCPCS | Performed by: FAMILY MEDICINE

## 2017-11-13 PROCEDURE — G8484 FLU IMMUNIZE NO ADMIN: HCPCS | Performed by: FAMILY MEDICINE

## 2017-11-13 PROCEDURE — 90732 PPSV23 VACC 2 YRS+ SUBQ/IM: CPT | Performed by: FAMILY MEDICINE

## 2017-11-13 PROCEDURE — 99214 OFFICE O/P EST MOD 30 MIN: CPT | Performed by: FAMILY MEDICINE

## 2017-11-13 PROCEDURE — 90472 IMMUNIZATION ADMIN EACH ADD: CPT | Performed by: FAMILY MEDICINE

## 2017-11-13 RX ORDER — PRAVASTATIN SODIUM 40 MG
TABLET ORAL
Qty: 90 TABLET | Refills: 3 | Status: SHIPPED | OUTPATIENT
Start: 2017-11-13 | End: 2018-01-25 | Stop reason: SDUPTHER

## 2017-11-13 RX ORDER — BLOOD PRESSURE TEST KIT
KIT MISCELLANEOUS
Qty: 1 KIT | Refills: 0 | Status: SHIPPED | OUTPATIENT
Start: 2017-11-13 | End: 2018-01-19 | Stop reason: ALTCHOICE

## 2017-11-13 RX ORDER — CYCLOBENZAPRINE HCL 10 MG
10 TABLET ORAL 3 TIMES DAILY PRN
Qty: 90 TABLET | Refills: 3 | Status: SHIPPED | OUTPATIENT
Start: 2017-11-13 | End: 2018-07-24 | Stop reason: SDUPTHER

## 2017-11-13 RX ORDER — CLONIDINE HYDROCHLORIDE 0.1 MG/1
0.1 TABLET ORAL DAILY
Qty: 30 TABLET | Refills: 0 | Status: SHIPPED | OUTPATIENT
Start: 2017-11-13 | End: 2017-11-20 | Stop reason: SDUPTHER

## 2017-11-13 RX ORDER — FEXOFENADINE HCL 180 MG/1
180 TABLET ORAL DAILY
Qty: 30 TABLET | Refills: 3 | Status: CANCELLED | OUTPATIENT
Start: 2017-11-13

## 2017-11-13 ASSESSMENT — ENCOUNTER SYMPTOMS
DIARRHEA: 0
WHEEZING: 0
NAUSEA: 0
CONSTIPATION: 0
ABDOMINAL DISTENTION: 0
VOMITING: 0
CHEST TIGHTNESS: 0
ABDOMINAL PAIN: 0
COUGH: 0
SHORTNESS OF BREATH: 0

## 2017-11-13 NOTE — PATIENT INSTRUCTIONS
heartbeat that is faster than normal.  · A hard time focusing. Phobias  Symptoms may include:  · More fear than most people of being around an object, being in a situation, or doing an activity. You might also be stressed about the chance of being around the thing you fear. · Worry about losing control, panicking, fainting, or having physical symptoms like a faster heartbeat when you are around the situation or object. How are these disorders treated? Anxiety disorders can be treated with medicines or counseling. A combination of both may be used. Medicines may include:  · Antidepressants. These may help your symptoms by keeping chemicals in your brain in balance. · Benzodiazepines. These may give you short-term relief of your symptoms. Some people use cognitive-behavioral therapy. A therapist helps you learn to change stressful or bad thoughts into helpful thoughts. Lead a healthy lifestyle  A healthy lifestyle may help you feel better. · Get at least 30 minutes of exercise on most days of the week. Walking is a good choice. · Eat a healthy diet. Include fruits, vegetables, lean proteins, and whole grains in your diet each day. · Try to go to bed at the same time every night. Try for 8 hours of sleep a night. · Find ways to manage stress. Try relaxation exercises. · Avoid alcohol and illegal drugs. Follow-up care is a key part of your treatment and safety. Be sure to make and go to all appointments, and call your doctor if you are having problems. It's also a good idea to know your test results and keep a list of the medicines you take. Where can you learn more? Go to https://humphrey.[employer]. org and sign in to your [employer] account. Enter Y322 in the Spacebar box to learn more about \"Learning About Anxiety Disorders. \"     If you do not have an account, please click on the \"Sign Up Now\" link. Current as of:  May 3, 2017  Content Version: 11.3  © 2163-7048 [employer]. Care instructions adapted under license by [facility] ([facility]). If you have questions about a medical condition or this instruction, always ask your healthcare professional. [address] any warranty or liability for your use of this information. Patient Education        Fibromyalgia: Care Instructions  Your Care Instructions  Fibromyalgia is a painful condition that is not completely understood by medical experts. The cause of fibromyalgia is not known. It can make you feel tired and ache all over. It causes tender spots at specific points of the body that hurt only when you press on them. You may have trouble sleeping, as well as other symptoms. These problems can upset your work and home life. Symptoms tend to come and go, although they may never go away completely. Fibromyalgia does not harm your muscles, joints, or organs. Follow-up care is a key part of your treatment and safety. Be sure to make and go to all appointments, and call your doctor if you are having problems. It's also a good idea to know your test results and keep a list of the medicines you take. How can you care for yourself at home? · Exercise often. Walk, swim, or bike to help with pain and sleep problems and to make you feel better. · Try to get a good night's sleep. Go to bed and get up at the same time each day, whether you feel rested or not. Make sure you have a good mattress and pillow. · Reduce stress. Avoid things that cause you stress, if you can. If not, work at making them less stressful. Learn to use biofeedback, guided imagery, meditation, or other methods to relax. · Make healthy changes. Eat a balanced diet, quit smoking, and limit alcohol and caffeine. · Use a heating pad set on low or take warm baths or showers for pain. Using cold packs for up to 20 minutes at a time can also relieve pain. Put a thin cloth between the cold pack and your skin. A gentle massage might help too.   · Be canned, prepared, and packaged foods. Fast food and restaurant meals also are very high in sodium. Your doctor will probably limit your sodium to less than 2,000 milligrams (mg) a day. This limit counts all the sodium in prepared and packaged foods and any salt you add to your food. Follow-up care is a key part of your treatment and safety. Be sure to make and go to all appointments, and call your doctor if you are having problems. It's also a good idea to know your test results and keep a list of the medicines you take. How can you care for yourself at home? Read food labels  · Read labels on cans and food packages. The labels tell you how much sodium is in each serving. Make sure that you look at the serving size. If you eat more than the serving size, you have eaten more sodium. · Food labels also tell you the Percent Daily Value for sodium. Choose products with low Percent Daily Values for sodium. · Be aware that sodium can come in forms other than salt, including monosodium glutamate (MSG), sodium citrate, and sodium bicarbonate (baking soda). MSG is often added to Asian food. When you eat out, you can sometimes ask for food without MSG or added salt. Buy low-sodium foods  · Buy foods that are labeled \"unsalted\" (no salt added), \"sodium-free\" (less than 5 mg of sodium per serving), or \"low-sodium\" (less than 140 mg of sodium per serving). Foods labeled \"reduced-sodium\" and \"light sodium\" may still have too much sodium. Be sure to read the label to see how much sodium you are getting. · Buy fresh vegetables, or frozen vegetables without added sauces. Buy low-sodium versions of canned vegetables, soups, and other canned goods. Prepare low-sodium meals  · Cut back on the amount of salt you use in cooking. This will help you adjust to the taste. Do not add salt after cooking. One teaspoon of salt has about 2,300 mg of sodium. · Take the salt shaker off the table.   · Flavor your food with garlic, lemon Care instructions adapted under license by [facility] ([facility]). If you have questions about a medical condition or this instruction, always ask your healthcare professional. [address] any warranty or liability for your use of this information. Patient Education        How to Read a Food Label to Limit Sodium: Care Instructions  Your Care Instructions  Sodium causes your body to hold on to extra water. This can raise your blood pressure and force your heart and kidneys to work harder. In very serious cases, this could cause you to be put in the hospital. It might even be life-threatening. By limiting sodium, you will feel better and lower your risk of serious problems. Processed foods, fast food, and restaurant foods are the major sources of dietary sodium. The most common name for sodium is salt. Try to limit how much sodium you eat to less than 2,300 milligrams (mg) a day. If you limit your sodium to 1,500 mg a day, you can lower your blood pressure even more. This limit counts all the salt that you eat in foods you cook or in packaged foods. Keep a list of everything you eat and drink. Follow-up care is a key part of your treatment and safety. Be sure to make and go to all appointments, and call your doctor if you are having problems. It's also a good idea to know your test results and keep a list of the medicines you take. How can you care for yourself at home? Read ingredient lists on food labels  · Read the list of ingredients on food labels to help you find how much sodium is in a food. The label lists the ingredients in a food in descending order (from the most to the least). If salt or sodium is high on the list, there may be a lot of sodium in the food. · Know that sodium has different names. Sodium is also called monosodium glutamate (MSG, common in [employer] food), sodium citrate, sodium alginate, sodium hydroxide, and sodium phosphate.   Read Nutrition Facts labels  · On most foods, there is a Nutrition Facts label. This will tell you how much sodium is in one serving of food. Look at both the serving size and the sodium amount. The serving size is located at the top of the label, usually right under the \"Nutrition Facts\" title. The amount of sodium is given in the list under the title. It is given in milligrams (mg). ¨ Check the serving size carefully. A single serving is often very small, and you may eat more than one serving. If this is the case, you will eat more sodium than listed on the label. For example, if the serving size for a canned soup is 1 cup and the sodium amount is 470 mg, if you have 2 cups you will eat 940 mg of sodium. · The nutrition facts for fresh fruits and vegetables are not listed on the food. They may be listed somewhere in the store. These foods usually have no sodium or low sodium. · The Nutrition Facts label also gives you the Percent Daily Value for sodium. This is how much of the recommended amount of sodium a serving contains. The daily value for sodium is less than 2,300 mg. So if the Percent Daily Value says 50%, this means one serving is giving you half of this, or 1,150 mg. Buy low-sodium foods  · Look for foods that are made with less sodium. Watch for the following words on the label. ¨ \"Unsalted\" means there is no sodium added to the food. But there may be sodium already in the food naturally. ¨ \"Sodium-free\" means a serving has less than 5 mg of sodium. ¨ \"Very low sodium\" means a serving has 35 mg or less of sodium. ¨ \"Low-sodium\" means a serving has 140 mg or less of sodium. · \"Reduced-sodium\" means that there is 25% less sodium than what the food normally has. This is still usually too much sodium. Try not to buy foods with this on the label. · Buy fresh vegetables, or frozen vegetables without added sauces. Buy low-sodium versions of canned vegetables, soups, and other canned goods. Where can you learn more?   Go to [email or web address]://[email or web address].[email or web address] org and sign in to your [employer] account. Enter 26 [id number] in the [facility] box to learn more about \"How to Read a Food Label to Limit Sodium: Care Instructions. \"     If you do not have an account, please click on the \"Sign Up Now\" link. Current as of: July 26, 2016  Content Version: 11.3  © 5046-7488 [employer]. Care instructions adapted under license by [facility] ([facility]). If you have questions about a medical condition or this instruction, always ask your healthcare professional. [address] any warranty or liability for your use of this information.

## 2017-11-13 NOTE — PROGRESS NOTES
Chief Complaint   Patient presents with    Hyperlipidemia    Anxiety    Tachycardia    Shoulder Pain     right    Ankle Pain     right         Tha Miranda  here today for follow up on chronic medical problems, go over labs and/or diagnostic studies, and medication refills. Hyperlipidemia; Anxiety; Tachycardia; Shoulder Pain (right); and Ankle Pain (right)      HPI      Hypertension-NEW:   he is exercising and is not adherent to low salt diet. Blood pressure is not well controlled at psych appointments. Cardiac symptoms fatigue. Patient denies chest pain, chest pressure/discomfort, claudication, dyspnea, exertional chest pressure/discomfort, irregular heart beat, lower extremity edema, near-syncope, orthopnea, palpitations, paroxysmal nocturnal dyspnea, syncope and tachypnea. Cardiovascular risk factors: dyslipidemia, hypertension and male gender. Use of agents associated with hypertension: none. History of target organ damage: none. Had recent Echo 2-D on 10/28/17 which was within normal limits    Summary  Normal left ventricle size, wall thickness and function with an estimated EF  > 55%. No segmental wall motion abnormalities seen. Normal right ventricular size and function. No significant valvular regurgitation or stenosis seen. No significant pericardial effusion is seen. Had EKG done on 5/1/17 which was within normal limits      Uncontrolled BP. BP Readings from Last 3 Encounters:   11/13/17 (!) 148/94   05/08/17 137/84   02/08/17 131/81      tachycardia . He admits to not controlled anxiety  Pulse Readings from Last 3 Encounters:   11/13/17 115   05/08/17 94   02/08/17 114     Has been able to lose weight, 20 pounds in 9 months.     Wt Readings from Last 3 Encounters:   11/13/17 175 lb 12.8 oz (79.7 kg)   05/08/17 181 lb 6.4 oz (82.3 kg)   02/08/17 195 lb 9.6 oz (88.7 kg)       high BP and tachycardia noted, otherwise vitals are within normal limits  BP (!) 148/94   Pulse 115   Temp 98.6 °F (37 °C) (Tympanic)   Ht 5' 11\" (1.803 m)   Wt 175 lb 12.8 oz (79.7 kg)   SpO2 100% Comment: ra @ rest  BMI 24.52 kg/m²   Body mass index is 24.52 kg/m². Patient has been having chronic pain mostly in the right shoulder pain, right Achilles tendon and left upper back. He has known fibromyalgia. Had ruptured right Achilles tendon done in 2014. He denies any pain today, but pain can go up to 6 out of 10  Patient tells me he has been doing physical therapy. Initially he had a blood therapy, but then he was switched to land physical therapy which has been helping. She noticed improved range of motion in the right shoulder and has been able to walk more on his feet. Patient would like extension of his physical therapy. He needs a new referral.  He also continues to follow with pain management for his pain medications. Says that due to change in the weather he is not able to ride the bike     He has known bipolar disorder, PTSD, anxiety, depression. He believes that his anxiety is not well controlled. He is currently following with psychiatrist at [facility] who is adjusting his Abilify and Zoloft. Looks for a job through [facility]. Denies suicidal ideation, plan or intent. Patient reports that he was noticed to have high pulse and high blood pressure at multiple times at psychiatrist.  He also had recent labs done by psychiatrist showing worsening lipids. He reports compliance with pravastatin. PHQ Scores 5/8/2017 2/8/2017 11/8/2016   PHQ2 Score 2 6 4   PHQ9 Score 8 17 15      []1-4 = Minimal depression   [x]5-9 = Mild depression   []10-14 = Moderate depression   []15-19 = Moderately severe depression   []20-27 = Severe depression    Discussed testing with the patient and all questions fully answered.   Mild hyperglycemia, worsening lipids     Hospital Outpatient Visit on 10/20/2017   Component Date Value Ref Range Status    WBC 10/20/2017 8.3  3.5 - 11.0 k/uL Final    RBC 10/20/2017 5.17 GFR Staging 10/20/2017 NOT REPORTED   Final    Hemoglobin A1C 10/20/2017 5.1  4.0 - 6.0 % Final    Estimated Avg Glucose 10/20/2017 100  mg/dL Final    Comment: The ADA and AACC recommend providing the estimated average glucose result to   permit better patient understanding of their HBA1c result. Performed at [facility] [address] [facility], [address] [place] [address] [phone]      Cholesterol 10/20/2017 265* <200 mg/dL Final    Comment:    Cholesterol Guidelines:      <200  Desirable   200-240  Borderline      >240  Undesirable         HDL 10/20/2017 52  >40 mg/dL Final    Comment:    HDL Guidelines:    <40     Undesirable   40-59    Borderline    >59     Desirable         LDL Cholesterol 10/20/2017 188* 0 - 130 mg/dL Final    Comment:    LDL Guidelines:     <100    Desirable   100-129   Near to/above Desirable   130-159   Borderline      >159   Undesirable     Direct (measured) LDL and calculated LDL are not interchangeable tests.  Chol/HDL Ratio 10/20/2017 5.1* <5 Final    Triglycerides 10/20/2017 125  <150 mg/dL Final    Comment:    Triglyceride Guidelines:     <150   Desirable   150-199  Borderline   200-499  High     >499   Very high   Based on AHA Guidelines for fasting triglyceride, October 2012. Performed at [facility]: LAURA COONEY [address] [facility]   [phone]      VLDL 10/20/2017 NOT REPORTED  1 - 30 mg/dL Final           Lab Results   Component Value Date    TSH 0.71 05/01/2017       Hyperlipidemia . he  is already on statin, tolerates it well. Reports compliance with statin.        Lab Results   Component Value Date    CHOL 265 (H) 10/20/2017    CHOL 204 (H) 05/01/2017    CHOL 219 (H) 04/06/2016     Lab Results   Component Value Date    TRIG 125 10/20/2017    TRIG 179 (H) 05/01/2017    TRIG 196 (H) 04/06/2016     Lab Results   Component Value Date    HDL 52 10/20/2017    HDL 44 05/01/2017    HDL 52 04/06/2016     Lab Results   Component Value Date LDLCHOLESTEROL 188 (H) 10/20/2017    LDLCHOLESTEROL 124 05/01/2017    LDLCHOLESTEROL 128 04/06/2016       Lab Results   Component Value Date    CHOLHDLRATIO 5.1 (H) 10/20/2017    CHOLHDLRATIO 4.6 05/01/2017    CHOLHDLRATIO 4.2 04/06/2016       Lab Results   Component Value Date    LABA1C 5.1 10/20/2017       No results found for: FBTPJEAQ79    No results found for: FOLATE    Lab Results   Component Value Date    FERRITIN 240 07/30/2014       No results found for: VITD25          Current Outpatient Prescriptions   Medication Sig Dispense Refill    oxyCODONE-acetaminophen (PERCOCET) 5-325 MG per tablet take 1 tablet by mouth four times a day if needed  0    fexofenadine (ALLEGRA ALLERGY) 180 MG tablet Take 1 tablet by mouth daily 30 tablet 3    fluticasone (FLONASE) 50 MCG/ACT nasal spray 2 sprays by Nasal route daily 1 Bottle 3    Adapalene 0.3 % GEL   1    ARIPiprazole (ABILIFY) 20 MG tablet   0    benztropine (COGENTIN) 1 MG tablet   0    erythromycin with ethanol (THERAMYCIN) 2 % external solution apply topically as directed every morning  0    docusate sodium (COLACE) 100 MG capsule Take 1 capsule by mouth 2 times daily as needed for Constipation 60 capsule 3    pravastatin (PRAVACHOL) 20 MG tablet TAKE 1 TABLET BY MOUTH IN THE EVENING 90 tablet 3    cyclobenzaprine (FLEXERIL) 10 MG tablet Take 1 tablet by mouth 3 times daily as needed for Muscle spasms 90 tablet 3    clindamycin (CLEOCIN T) 1 % external solution       ibuprofen (ADVIL;MOTRIN) 800 MG tablet Take 1 tablet by mouth every 8 hours as needed for Pain 90 tablet 0    busPIRone (BUSPAR) 10 MG tablet Take 1 tablet by mouth 2 times daily as needed 60 tablet 0    mirtazapine (REMERON) 45 MG tablet Take 45 mg by mouth daily as needed  0    sertraline (ZOLOFT) 100 MG tablet Take 100 mg by mouth daily      Garlic (GARLIQUE PO) Take by mouth daily      aluminum chloride (DRYSOL) 20 % external solution Apply topically nightly.  37.5 mL 3    Omega-3 Fatty Acids (OMEGA-3 FISH OIL) 1200 MG CAPS Take  by mouth 2 times daily. No current facility-administered medications for this visit. Past Medical History:   Diagnosis Date    Allergic rhinitis     Anxiety 2011    [facility] disorder ([facility]     Bipolar disorder, in partial remission, most recent episode depressed ([facility] 11/8/2016    Chronic kidney disease     Fatty liver 11/29/15    per CT    Hiatal hernia 11/29/15     small HH, per CT    Hyperlipidemia     Kidney stone 11/29/15    passed, calcium oxalate crystals    PTSD (post-traumatic stress disorder)     as a child           Social History     Social History    Marital status: Single     Spouse name: N/A    Number of children: N/A    Years of education: N/A     Occupational History    Not on file. Social History Main Topics    Smoking status: Former Smoker     Quit date: 8/1/2002    Smokeless tobacco: Never Used    Alcohol use No    Drug use: No    Sexual activity: Yes     Partners: Female      Comment:      Other Topics Concern    Not on file     Social History Narrative    No narrative on file     Counseling given: Yes        Family History   Problem Relation Age of Onset    Cancer Mother 34     leukemia    Heart Disease Father 61     triple bipass    Breast Cancer Maternal Grandmother              -rest of complaints with corresponding details per ROS    The patient's past medical, surgical, social, and family history as well as his current medications and allergies were reviewed as documented in today's encounter. Review of Systems   Constitutional: Positive for fatigue. Negative for activity change, appetite change, chills, diaphoresis, fever and unexpected weight change. Respiratory: Negative for cough, chest tightness, shortness of breath and wheezing. Cardiovascular: Negative for chest pain, palpitations and leg swelling.    Gastrointestinal: Negative for abdominal distention, abdominal pain, constipation, diarrhea, nausea and vomiting. Endocrine: Negative for cold intolerance, heat intolerance, polydipsia, polyphagia and polyuria. Musculoskeletal: Positive for arthralgias (right shoulder, right ankle) and myalgias (related to fibromyalgia). Psychiatric/Behavioral: Positive for decreased concentration, dysphoric mood and sleep disturbance. Negative for self-injury and suicidal ideas. The patient is nervous/anxious. Physical Exam   Constitutional: He is oriented to person, place, and time. He appears well-developed and well-nourished. No distress. HENT:   Head: Normocephalic and atraumatic. Right Ear: External ear normal.   Left Ear: External ear normal.   Nose: Nose normal.   Mouth/Throat: Oropharynx is clear and moist.   Eyes: Conjunctivae and EOM are normal. Right eye exhibits no discharge. Left eye exhibits no discharge. No scleral icterus. Neck: Normal range of motion. Neck supple. No thyromegaly present. Cardiovascular: Regular rhythm, normal heart sounds and intact distal pulses. Tachycardia present. No murmur heard. Pulmonary/Chest: Effort normal and breath sounds normal. No respiratory distress. He has no wheezes. He has no rales. He exhibits no tenderness. Abdominal: Soft. Bowel sounds are normal. He exhibits no distension. There is no tenderness. Musculoskeletal: He exhibits tenderness. He exhibits no edema. Right shoulder: He exhibits decreased range of motion (very mild), tenderness (anterior and on top) and pain. He exhibits no bony tenderness and no spasm. Right ankle: He exhibits normal range of motion. Tenderness. Achilles tendon exhibits pain. Thoracic back: He exhibits decreased range of motion and tenderness (left trapezium muscle). Left foot: There is tenderness. Feet:    Right heel pain     Lymphadenopathy:     He has no cervical adenopathy. Neurological: He is alert and oriented to person, place, and time.  He has normal reflexes. Skin: Skin is warm and dry. No rash noted. He is not diaphoretic. Psychiatric: He has a normal mood and affect. Nursing note and vitals reviewed. ASSESSMENT AND PLAN      1. Essential hypertension  NEW  High blood pressure in multiple situations, at the psychiatrist office and in our office.  -start cloNIDine (CATAPRES) 0.1 MG tablet; Take 1 tablet by mouth daily  Dispense: 30 tablet; Refill: 0  - Blood Pressure KIT; Diagnosis: HTN. Needs to check blood pressure 1-2 times a day until stable, then once a day. Goal blood pressure less than 135/85, and above 110/60. Dispense: 1 kit; Refill: 0  Discussed low salt diet and BP and pulse monitoring daily, BP log given  Needs nurse visit appointment for BP check in 1 week      2. Tachycardia  -start cloNIDine (CATAPRES) 0.1 MG tablet; Take 1 tablet by mouth daily  Dispense: 30 tablet; Refill: 0  - Blood Pressure KIT; Diagnosis: HTN. Needs to check blood pressure 1-2 times a day until stable, then once a day. Goal blood pressure less than 135/85, and above 110/60. Dispense: 1 kit; Refill: 0    3. Fibromyalgia muscle pain  - cyclobenzaprine (FLEXERIL) 10 MG tablet; Take 1 tablet by mouth 3 times daily as needed for Muscle spasms  Dispense: 90 tablet; Refill: 3  - [facility]-I think he would benefit from having more physical therapy    4. Bicipital tendinitis, right shoulder  Improved from prior  Continue home exercises as well  - [address]    5. Chronic pain of right ankle  Improved from prior  Continue home exercises as well  - [address]    6. Hyperlipidemia with target LDL less than 130  Worsening  Patient reports compliance with pravastatin  He is on a high dose of Abilify, which was recently decreased from 25 to 20 mg  - Dose increased  pravastatin (PRAVACHOL) 40 MG tablet; TAKE 1 TABLET BY MOUTH IN THE EVENING  Dispense: 90 tablet; Refill: 3    7.  Needs flu shot    - INFLUENZA, QUADV, 3 YRS AND OLDER, IM, MDV, 0.5ML (FLUZONE QUADV)    8. Pneumococcal vaccination administered at current visit    - Pneumococcal polysaccharide vaccine 23-valent greater than or equal to 1yo subcutaneous/IM      Orders Placed This Encounter   Procedures    INFLUENZA, QUADV, 3 YRS AND OLDER, IM, MDV, 0.5ML (FLUZONE QUADV)    Pneumococcal polysaccharide vaccine 23-valent greater than or equal to 1yo subcutaneous/IM   [address]     Referral Priority:   Routine     Referral Type:   Eval and Treat     Referral Reason:   Specialty Services Required     Requested Specialty:   Physical Therapy     Number of Visits Requested:   1         Medications Discontinued During This Encounter   Medication Reason    doxycycline hyclate (VIBRAMYCIN) 100 MG capsule Discontinued by another clinician    Magic Mouthwash (MIRACLE MOUTHWASH) Therapy completed    RA ALLERGY 25 MG tablet Duplicate Order    cyclobenzaprine (FLEXERIL) 10 MG tablet Reorder    pravastatin (PRAVACHOL) 20 MG tablet Reorder       Izabela Loose received counseling on the following healthy behaviors: nutrition, exercise and medication adherence  Reviewed prior labs and health maintenance  Continue current medications, diet and exercise. Discussed use, benefit, and side effects of prescribed medications. Barriers to medication compliance addressed. Patient given educational materials - see patient instructions  Was a self-tracking handout given in paper form or via dateIITianst? Yes    Requested Prescriptions     Signed Prescriptions Disp Refills    cyclobenzaprine (FLEXERIL) 10 MG tablet 90 tablet 3     Sig: Take 1 tablet by mouth 3 times daily as needed for Muscle spasms    pravastatin (PRAVACHOL) 40 MG tablet 90 tablet 3     Sig: TAKE 1 TABLET BY MOUTH IN THE EVENING    cloNIDine (CATAPRES) 0.1 MG tablet 30 tablet 0     Sig: Take 1 tablet by mouth daily    Blood Pressure KIT 1 kit 0     Sig: Diagnosis: HTN.  Needs to check blood pressure 1-2 times a day until stable, then once a day. Goal blood pressure less than 135/85, and above 110/60. All patient questions answered. Patient voiced understanding. Quality Measures    Body mass index is 24.52 kg/m². Normal. Weight control planned discussed Healthy diet and regular exercise. BP: (!) 148/94 Blood pressure is high. Treatment plan consists of DASH Eating Plan, Dietary Sodium Restriction, Increased Physical Activity, Patient In-home Blood Pressure Monitoring and Antihypertensive Medication Started. Needs nurse visit appointment for BP check in 1 week . Hyperlipidemia worsening, dose of statin increased today  Lab Results   Component Value Date    LDLCHOLESTEROL 188 (H) 10/20/2017    (goal LDL reduction with dx if diabetes is 50% LDL reduction)        Patient has known bipolar disorder, Continue current treatment and follow up with psychiatrist and psychologist as scheduled. PHQ Scores 5/8/2017 2/8/2017 11/8/2016   PHQ2 Score 2 6 4   PHQ9 Score 8 17 15     Interpretation of Total Score Depression Severity: 1-4 = Minimal depression, 5-9 = Mild depression, 10-14 = Moderate depression, 15-19 = Moderately severe depression, 20-27 = Severe depression      The patient's past medical, surgical, social, and family history as well as his   current medications and allergies were reviewed as documented in today's encounter. Medications, labs, diagnostic studies, consultations and follow-up as documented in this encounter. Return in about 4 weeks (around 12/11/2017) for anxiety, FMG, HTN, tachycardia. Patient was seen with total face to face time of  25 minutes. More than 50% of this visit was counseling and education.        Future Appointments  Date Time Provider Maribell Duran   11/14/2017 11:30 AM BIANCA Meza PT   11/20/2017 1:15 PM SCHEDULE, MOE Denny RAFAELMONA   11/21/2017 11:30 AM Marlyn Montanez PT [facility]   12/5/2017 11:30 AM Ramonito Mansi Nissen, PT [facility]   12/13/2017 8:45 AM Neena Reyna MD [facility]   12/19/2017 11:30 AM Ramonito Mansi Nissen, PT [facility]     This note was completed by using the assistance of a speech-recognition program. However, inadvertent computerized transcription errors may be present. Although every effort was made to ensure accuracy, no guarantees can be provided that every mistake has been identified and corrected by editing.   Electronically signed by Neena Reyna MD on 11/13/2017  6:49 PM

## 2017-11-14 ENCOUNTER — HOSPITAL ENCOUNTER (OUTPATIENT)
Dept: PHYSICAL THERAPY | Age: 47
Setting detail: THERAPIES SERIES
Discharge: HOME | End: 2017-11-14
Payer: MEDICARE

## 2017-11-14 PROCEDURE — 97110 THERAPEUTIC EXERCISES: CPT

## 2017-11-14 ASSESSMENT — PAIN DESCRIPTION - FREQUENCY: FREQUENCY: CONTINUOUS

## 2017-11-14 ASSESSMENT — PAIN DESCRIPTION - DURATION: DURATION_2: CONTINUOUS

## 2017-11-14 ASSESSMENT — PAIN DESCRIPTION - LOCATION
LOCATION_2: ANKLE
LOCATION: SHOULDER

## 2017-11-14 ASSESSMENT — PAIN DESCRIPTION - INTENSITY: RATING_2: 4

## 2017-11-14 ASSESSMENT — PAIN DESCRIPTION - DESCRIPTORS
DESCRIPTORS: DULL;CONSTANT
DESCRIPTORS_2: DULL;CONSTANT

## 2017-11-14 ASSESSMENT — PAIN DESCRIPTION - ORIENTATION
ORIENTATION: RIGHT
ORIENTATION_2: RIGHT

## 2017-11-14 ASSESSMENT — PAIN SCALES - GENERAL: PAINLEVEL_OUTOF10: 7

## 2017-11-14 NOTE — PROGRESS NOTES
Minutes   Treatment Charges: Minutes Units   []  Ultrasound     []  Electrical-Stim     []  Iontophoresis     []  Traction     []  Massage       []  Eval     []  Gait     [x]  Ther Exercise 45  3    []  Manual Therapy       []  Ther Activities       []  Aquatics     []  Neuro Re-Ed       []  Other       Total Treatment Time: 45  3       Therapy Time   Individual Concurrent Group Co-treatment   Time In 1125         Time Out 1210         Minutes 45         Timed Code Treatment Minutes: 39 Minutes     Electronically signed by: Josefina Sung PT

## 2017-11-20 ENCOUNTER — NURSE ONLY (OUTPATIENT)
Dept: FAMILY MEDICINE CLINIC | Age: 47
End: 2017-11-20
Payer: MEDICARE

## 2017-11-20 VITALS — HEART RATE: 106 BPM | SYSTOLIC BLOOD PRESSURE: 132 MMHG | DIASTOLIC BLOOD PRESSURE: 88 MMHG

## 2017-11-20 DIAGNOSIS — I10 ESSENTIAL HYPERTENSION: Primary | ICD-10-CM

## 2017-11-20 DIAGNOSIS — R00.0 TACHYCARDIA: ICD-10-CM

## 2017-11-20 PROCEDURE — 99211 OFF/OP EST MAY X REQ PHY/QHP: CPT | Performed by: FAMILY MEDICINE

## 2017-11-20 RX ORDER — CLONIDINE HYDROCHLORIDE 0.1 MG/1
0.1 TABLET ORAL 2 TIMES DAILY
Qty: 60 TABLET | Refills: 1 | Status: SHIPPED | OUTPATIENT
Start: 2017-11-20 | End: 2018-01-19 | Stop reason: SDUPTHER

## 2017-11-20 NOTE — PROGRESS NOTES
Patient is here for blood pressure check. 1. Essential hypertension    - cloNIDine (CATAPRES) 0.1 MG tablet; Take 1 tablet by mouth 2 times daily  Dispense: 60 tablet; Refill: 1    2. Tachycardia    - cloNIDine (CATAPRES) 0.1 MG tablet; Take 1 tablet by mouth 2 times daily  Dispense: 60 tablet;  Refill: 1        UNcontrolled BP and tachycardia     Patient was advised by Ashley Gallegos to Increased frequency of  Clonidine to twice a day     BP Readings from Last 3 Encounters:   11/20/17 132/88   11/13/17 (!) 148/94   05/08/17 137/84       Pulse Readings from Last 3 Encounters:   11/20/17 106   11/13/17 115   05/08/17 94       Needs nurse visit appointment for BP check in 1 week      Future Appointments  Date Time Provider Maribell Duran   11/21/2017 11:30 AM Marlyn Reeves PT [facility] Daniels   11/28/2017 11:15 AM [facility]   12/5/2017 11:30 AM Marlyn Reeves PT WILMAR Daniels   12/13/2017 8:45 AM [facility]   12/19/2017 11:30 AM Marlyn Reeves PT WILMAR Daniels

## 2017-11-21 ENCOUNTER — HOSPITAL ENCOUNTER (OUTPATIENT)
Dept: PHYSICAL THERAPY | Age: 47
Setting detail: THERAPIES SERIES
Discharge: HOME | End: 2017-11-21
Payer: MEDICARE

## 2017-11-21 PROCEDURE — 97110 THERAPEUTIC EXERCISES: CPT

## 2017-11-21 ASSESSMENT — PAIN SCALES - GENERAL: PAINLEVEL_OUTOF10: 2

## 2017-11-21 ASSESSMENT — PAIN DESCRIPTION - DESCRIPTORS
DESCRIPTORS_2: DULL;CONSTANT
DESCRIPTORS: DULL;CONSTANT

## 2017-11-21 ASSESSMENT — PAIN DESCRIPTION - ORIENTATION
ORIENTATION: RIGHT
ORIENTATION_2: RIGHT

## 2017-11-21 ASSESSMENT — PAIN DESCRIPTION - LOCATION
LOCATION_2: ANKLE
LOCATION: SHOULDER

## 2017-11-21 ASSESSMENT — PAIN DESCRIPTION - DURATION: DURATION_2: CONTINUOUS

## 2017-11-21 ASSESSMENT — PAIN DESCRIPTION - FREQUENCY: FREQUENCY: CONTINUOUS

## 2017-11-21 ASSESSMENT — PAIN DESCRIPTION - INTENSITY: RATING_2: 7

## 2017-11-21 NOTE — PROGRESS NOTES
Treatment Charges: Minutes Units   []  Ultrasound     []  Electrical-Stim     []  Iontophoresis     []  Traction     []  Massage       []  Eval     []  Gait     [x]  Ther Exercise 42  3    []  Manual Therapy       []  Ther Activities       []  Aquatics     []  Neuro Re-Ed       []  Other       Total Treatment Time: 42 3        Therapy Time   Individual Concurrent Group Co-treatment   Time In 4891         Time Out 1228         Minutes 47         Timed Code Treatment Minutes: 43 Minutes     Electronically signed by: Kimberly Lopez PT

## 2017-11-28 ENCOUNTER — NURSE ONLY (OUTPATIENT)
Dept: FAMILY MEDICINE CLINIC | Age: 47
End: 2017-11-28
Payer: MEDICARE

## 2017-11-28 VITALS — DIASTOLIC BLOOD PRESSURE: 72 MMHG | SYSTOLIC BLOOD PRESSURE: 116 MMHG | HEART RATE: 96 BPM

## 2017-11-28 DIAGNOSIS — I10 ESSENTIAL HYPERTENSION: Primary | ICD-10-CM

## 2017-11-28 PROCEDURE — 99211 OFF/OP EST MAY X REQ PHY/QHP: CPT | Performed by: FAMILY MEDICINE

## 2017-11-28 NOTE — PROGRESS NOTES
HYPERTENSION visit     BP Readings from Last 3 Encounters:   11/28/17 116/72   11/20/17 132/88   11/13/17 (!) 148/94

## 2017-12-05 ENCOUNTER — HOSPITAL ENCOUNTER (OUTPATIENT)
Dept: PHYSICAL THERAPY | Age: 47
Setting detail: THERAPIES SERIES
Discharge: HOME | End: 2017-12-05
Payer: MEDICARE

## 2017-12-05 PROCEDURE — 97110 THERAPEUTIC EXERCISES: CPT

## 2017-12-05 ASSESSMENT — PAIN SCALES - GENERAL: PAINLEVEL_OUTOF10: 2

## 2017-12-05 ASSESSMENT — PAIN DESCRIPTION - ORIENTATION
ORIENTATION_2: RIGHT
ORIENTATION: RIGHT

## 2017-12-05 ASSESSMENT — PAIN DESCRIPTION - LOCATION
LOCATION_2: ANKLE
LOCATION: SHOULDER

## 2017-12-05 ASSESSMENT — PAIN DESCRIPTION - FREQUENCY: FREQUENCY: CONTINUOUS

## 2017-12-05 ASSESSMENT — PAIN DESCRIPTION - DESCRIPTORS
DESCRIPTORS_2: CONSTANT
DESCRIPTORS: CONSTANT

## 2017-12-05 ASSESSMENT — PAIN DESCRIPTION - DURATION: DURATION_2: CONTINUOUS

## 2017-12-05 ASSESSMENT — PAIN DESCRIPTION - INTENSITY: RATING_2: 5

## 2017-12-05 NOTE — PROGRESS NOTES
[]  Iontophoresis     []  Traction     []  Massage       []  Eval     []  Gait     [x]  Ther Exercise 45  3    []  Manual Therapy       []  Ther Activities       []  Aquatics     []  Neuro Re-Ed       []  Other       Total Treatment Time: 45 3        Therapy Time   Individual Concurrent Group Co-treatment   Time In 1130         Time Out 1215         Minutes 45         Timed Code Treatment Minutes: 39 Minutes     Electronically signed by: Alla Kemp PT

## 2017-12-06 ENCOUNTER — OFFICE VISIT (OUTPATIENT)
Dept: FAMILY MEDICINE CLINIC | Age: 47
End: 2017-12-06
Payer: MEDICARE

## 2017-12-06 VITALS
DIASTOLIC BLOOD PRESSURE: 78 MMHG | WEIGHT: 172 LBS | HEIGHT: 71 IN | TEMPERATURE: 97 F | BODY MASS INDEX: 24.08 KG/M2 | RESPIRATION RATE: 15 BRPM | HEART RATE: 86 BPM | SYSTOLIC BLOOD PRESSURE: 127 MMHG

## 2017-12-06 DIAGNOSIS — G89.29 CHRONIC PAIN OF RIGHT ANKLE: ICD-10-CM

## 2017-12-06 DIAGNOSIS — M75.21 BICIPITAL TENDINITIS, RIGHT SHOULDER: ICD-10-CM

## 2017-12-06 DIAGNOSIS — M25.511 CHRONIC RIGHT SHOULDER PAIN: Primary | ICD-10-CM

## 2017-12-06 DIAGNOSIS — G89.29 CHRONIC RIGHT SHOULDER PAIN: Primary | ICD-10-CM

## 2017-12-06 DIAGNOSIS — I10 ESSENTIAL HYPERTENSION: ICD-10-CM

## 2017-12-06 DIAGNOSIS — M79.7 FIBROMYALGIA MUSCLE PAIN: ICD-10-CM

## 2017-12-06 DIAGNOSIS — F31.75 BIPOLAR DISORDER, IN PARTIAL REMISSION, MOST RECENT EPISODE DEPRESSED (HCC): ICD-10-CM

## 2017-12-06 DIAGNOSIS — M25.571 CHRONIC PAIN OF RIGHT ANKLE: ICD-10-CM

## 2017-12-06 DIAGNOSIS — Z13.31 POSITIVE DEPRESSION SCREENING: ICD-10-CM

## 2017-12-06 DIAGNOSIS — S86.001S INJURY OF RIGHT ACHILLES TENDON, SEQUELA: ICD-10-CM

## 2017-12-06 PROCEDURE — G8431 POS CLIN DEPRES SCRN F/U DOC: HCPCS | Performed by: FAMILY MEDICINE

## 2017-12-06 PROCEDURE — G8420 CALC BMI NORM PARAMETERS: HCPCS | Performed by: FAMILY MEDICINE

## 2017-12-06 PROCEDURE — 99214 OFFICE O/P EST MOD 30 MIN: CPT | Performed by: FAMILY MEDICINE

## 2017-12-06 PROCEDURE — G8484 FLU IMMUNIZE NO ADMIN: HCPCS | Performed by: FAMILY MEDICINE

## 2017-12-06 PROCEDURE — G8427 DOCREV CUR MEDS BY ELIG CLIN: HCPCS | Performed by: FAMILY MEDICINE

## 2017-12-06 PROCEDURE — G0444 DEPRESSION SCREEN ANNUAL: HCPCS | Performed by: FAMILY MEDICINE

## 2017-12-06 PROCEDURE — 1036F TOBACCO NON-USER: CPT | Performed by: FAMILY MEDICINE

## 2017-12-06 ASSESSMENT — ENCOUNTER SYMPTOMS
ABDOMINAL PAIN: 0
VOMITING: 0
CONSTIPATION: 0
COUGH: 0
WHEEZING: 0
DIARRHEA: 0
ABDOMINAL DISTENTION: 0
CHEST TIGHTNESS: 0
NAUSEA: 0
SHORTNESS OF BREATH: 0

## 2017-12-06 ASSESSMENT — ANXIETY QUESTIONNAIRES
1. FEELING NERVOUS, ANXIOUS, OR ON EDGE: 2-OVER HALF THE DAYS
GAD7 TOTAL SCORE: 6
2. NOT BEING ABLE TO STOP OR CONTROL WORRYING: 2-OVER HALF THE DAYS
3. WORRYING TOO MUCH ABOUT DIFFERENT THINGS: 2-OVER HALF THE DAYS

## 2017-12-06 ASSESSMENT — PATIENT HEALTH QUESTIONNAIRE - PHQ9
10. IF YOU CHECKED OFF ANY PROBLEMS, HOW DIFFICULT HAVE THESE PROBLEMS MADE IT FOR YOU TO DO YOUR WORK, TAKE CARE OF THINGS AT HOME, OR GET ALONG WITH OTHER PEOPLE: 2
4. FEELING TIRED OR HAVING LITTLE ENERGY: 1
9. THOUGHTS THAT YOU WOULD BE BETTER OFF DEAD, OR OF HURTING YOURSELF: 1
7. TROUBLE CONCENTRATING ON THINGS, SUCH AS READING THE NEWSPAPER OR WATCHING TELEVISION: 0
2. FEELING DOWN, DEPRESSED OR HOPELESS: 2
3. TROUBLE FALLING OR STAYING ASLEEP: 2
1. LITTLE INTEREST OR PLEASURE IN DOING THINGS: 1
5. POOR APPETITE OR OVEREATING: 1
SUM OF ALL RESPONSES TO PHQ QUESTIONS 1-9: 10
8. MOVING OR SPEAKING SO SLOWLY THAT OTHER PEOPLE COULD HAVE NOTICED. OR THE OPPOSITE, BEING SO FIGETY OR RESTLESS THAT YOU HAVE BEEN MOVING AROUND A LOT MORE THAN USUAL: 1
6. FEELING BAD ABOUT YOURSELF - OR THAT YOU ARE A FAILURE OR HAVE LET YOURSELF OR YOUR FAMILY DOWN: 1
SUM OF ALL RESPONSES TO PHQ9 QUESTIONS 1 & 2: 3

## 2017-12-06 NOTE — LETTER
[address] [place] [address]. [facility]  Phone: [phone]  Fax: [phone]    MD Angelica Allison MD Reuel Sachs, KHANG                    December 6, 2017    [facility] [facility] [id number]      To whom it may concern:    Ayala Sands will have Functional capacity evaluation and psychiatry evaluation to determine if he is unemployable or not. Needs 3 weeks, which will be past the deadline. Please allow patient the needed time to have these assessments completed. If you have any questions or concerns, please don't hesitate to call.     Sincerely,        eSrge Mukherjee MD

## 2017-12-06 NOTE — PROGRESS NOTES
tablet Take 1 tablet by mouth 3 times daily as needed for Muscle spasms 90 tablet 3    pravastatin (PRAVACHOL) 40 MG tablet TAKE 1 TABLET BY MOUTH IN THE EVENING 90 tablet 3    Blood Pressure KIT Diagnosis: HTN. Needs to check blood pressure 1-2 times a day until stable, then once a day. Goal blood pressure less than 135/85, and above 110/60. 1 kit 0    oxyCODONE-acetaminophen (PERCOCET) 5-325 MG per tablet take 1 tablet by mouth four times a day if needed  0    fexofenadine (ALLEGRA ALLERGY) 180 MG tablet Take 1 tablet by mouth daily 30 tablet 3    fluticasone (FLONASE) 50 MCG/ACT nasal spray 2 sprays by Nasal route daily 1 Bottle 3    Adapalene 0.3 % GEL   1    ARIPiprazole (ABILIFY) 20 MG tablet   0    benztropine (COGENTIN) 1 MG tablet   0    erythromycin with ethanol (THERAMYCIN) 2 % external solution apply topically as directed every morning  0    docusate sodium (COLACE) 100 MG capsule Take 1 capsule by mouth 2 times daily as needed for Constipation 60 capsule 3    clindamycin (CLEOCIN T) 1 % external solution       ibuprofen (ADVIL;MOTRIN) 800 MG tablet Take 1 tablet by mouth every 8 hours as needed for Pain 90 tablet 0    busPIRone (BUSPAR) 10 MG tablet Take 1 tablet by mouth 2 times daily as needed 60 tablet 0    mirtazapine (REMERON) 45 MG tablet Take 45 mg by mouth daily as needed  0    sertraline (ZOLOFT) 100 MG tablet Take 100 mg by mouth daily      Garlic (GARLIQUE PO) Take by mouth daily      aluminum chloride (DRYSOL) 20 % external solution Apply topically nightly. 37.5 mL 3    Omega-3 Fatty Acids (OMEGA-3 FISH OIL) 1200 MG CAPS Take  by mouth 2 times daily. No current facility-administered medications for this visit.       Past Medical History:   Diagnosis Date    Allergic rhinitis     Anxiety 2011    Bipolar disorder ([facility] [address]     Bipolar disorder, in partial remission, most recent episode depressed ([facility] [address] 11/8/2016    Chronic kidney disease     Essential hypertension given in paper form or via [employer]? Yes    Requested Prescriptions      No prescriptions requested or ordered in this encounter       All patient questions answered. Patient voiced understanding. Quality Measures    Body mass index is 23.99 kg/m². Normal. Weight control planned discussed conventional weight loss. BP: 127/78 Blood pressure is normal. Treatment plan consists of No treatment change needed. Hyperlipidemia, I increased Pravastatin dose recently; psychiatrist is aware      Lab Results   Component Value Date    LDLCHOLESTEROL 188 (H) 10/20/2017    (goal LDL reduction with dx if diabetes is 50% LDL reduction)      Moderate depression . Has known  Bipolar Disorder. Continue current treatment and follow up with psychiatrist and psychologist as scheduled. PHQ Scores 12/6/2017 5/8/2017 2/8/2017 11/8/2016   PHQ2 Score 3 2 6 4   PHQ9 Score 10 8 17 15     Interpretation of Total Score Depression Severity: 1-4 = Minimal depression, 5-9 = Mild depression, 10-14 = Moderate depression, 15-19 = Moderately severe depression, 20-27 = Severe depression        The patient's past medical, surgical, social, and family history as well as his   current medications and allergies were reviewed as documented in today's encounter. Medications, labs, diagnostic studies, consultations and follow-up as documented in this encounter. Return in about 3 months (around 3/6/2018) for [address] PB. Patient was seen with total face to face time of  25 minutes. More than 50% of this visit was counseling and education.        Future Appointments  Date Time Provider Maribell Duran   12/12/2017 10:30 AM Ramonito Emogene Rajeev, PT [facility]   12/19/2017 11:30 AM Ramonito Emogene Rajeev, PT [facility]   12/27/2017 12:00 PM Ramonito Emogene Rajeev, PT [facility]   2/13/2018 8:30 AM Theo Dennis, PT [facility]   3/7/2018 7:30 AM Nickie Peters MD fp [facility] This note was completed by using the assistance of a speech-recognition program. However, inadvertent computerized transcription errors may be present. Although every effort was made to ensure accuracy, no guarantees can be provided that every mistake has been identified and corrected by editing.     Electronically signed by Maryan Kraft MD on 12/10/2017 at 5:52 PM

## 2017-12-11 ENCOUNTER — HOSPITAL ENCOUNTER (OUTPATIENT)
Age: 47
Discharge: HOME | End: 2017-12-11
Payer: MEDICARE

## 2017-12-11 LAB
ABSOLUTE EOS #: 0.1 K/UL (ref 0–0.4)
ABSOLUTE IMMATURE GRANULOCYTE: NORMAL K/UL (ref 0–0.3)
ABSOLUTE LYMPH #: 1.7 K/UL (ref 1–4.8)
ABSOLUTE MONO #: 0.4 K/UL (ref 0.1–1.3)
BASOPHILS # BLD: 1 % (ref 0–2)
BASOPHILS ABSOLUTE: 0 K/UL (ref 0–0.2)
DIFFERENTIAL TYPE: NORMAL
EOSINOPHILS RELATIVE PERCENT: 2 % (ref 0–4)
HCT VFR BLD CALC: 43 % (ref 41–53)
HEMOGLOBIN: 14.6 G/DL (ref 13.5–17.5)
IMMATURE GRANULOCYTES: NORMAL %
LYMPHOCYTES # BLD: 29 % (ref 24–44)
MCH RBC QN AUTO: 31.8 PG (ref 26–34)
MCHC RBC AUTO-ENTMCNC: 34 G/DL (ref 31–37)
MCV RBC AUTO: 93.4 FL (ref 80–100)
MONOCYTES # BLD: 7 % (ref 1–7)
PDW BLD-RTO: 13.5 % (ref 11.5–14.9)
PLATELET # BLD: 183 K/UL (ref 150–450)
PLATELET ESTIMATE: NORMAL
PMV BLD AUTO: 8.7 FL (ref 6–12)
RBC # BLD: 4.6 M/UL (ref 4.5–5.9)
RBC # BLD: NORMAL 10*6/UL
SEG NEUTROPHILS: 61 % (ref 36–66)
SEGMENTED NEUTROPHILS ABSOLUTE COUNT: 3.5 K/UL (ref 1.3–9.1)
WBC # BLD: 5.8 K/UL (ref 3.5–11)
WBC # BLD: NORMAL 10*3/UL

## 2017-12-11 PROCEDURE — 36415 COLL VENOUS BLD VENIPUNCTURE: CPT

## 2017-12-11 PROCEDURE — 85025 COMPLETE CBC W/AUTO DIFF WBC: CPT

## 2017-12-12 ENCOUNTER — HOSPITAL ENCOUNTER (OUTPATIENT)
Dept: PHYSICAL THERAPY | Age: 47
Setting detail: THERAPIES SERIES
Discharge: HOME | End: 2017-12-12
Payer: MEDICARE

## 2017-12-19 ENCOUNTER — HOSPITAL ENCOUNTER (OUTPATIENT)
Dept: PHYSICAL THERAPY | Age: 47
Setting detail: THERAPIES SERIES
Discharge: HOME | End: 2017-12-19
Payer: MEDICARE

## 2017-12-19 ENCOUNTER — TELEPHONE (OUTPATIENT)
Dept: FAMILY MEDICINE CLINIC | Age: 47
End: 2017-12-19

## 2017-12-19 DIAGNOSIS — Z98.890 HISTORY OF ACHILLES TENDON REPAIR: ICD-10-CM

## 2017-12-19 DIAGNOSIS — M25.571 CHRONIC PAIN OF RIGHT ANKLE: Primary | ICD-10-CM

## 2017-12-19 DIAGNOSIS — G89.29 CHRONIC PAIN OF RIGHT ANKLE: Primary | ICD-10-CM

## 2017-12-19 PROCEDURE — 97110 THERAPEUTIC EXERCISES: CPT

## 2017-12-19 ASSESSMENT — PAIN DESCRIPTION - DESCRIPTORS
DESCRIPTORS_2: CONSTANT
DESCRIPTORS: CONSTANT

## 2017-12-19 ASSESSMENT — PAIN SCALES - GENERAL: PAINLEVEL_OUTOF10: 7

## 2017-12-19 ASSESSMENT — PAIN DESCRIPTION - INTENSITY: RATING_2: 10

## 2017-12-19 ASSESSMENT — PAIN DESCRIPTION - ORIENTATION
ORIENTATION_2: RIGHT
ORIENTATION: RIGHT

## 2017-12-19 ASSESSMENT — PAIN DESCRIPTION - LOCATION
LOCATION: SHOULDER
LOCATION_2: ANKLE

## 2017-12-19 ASSESSMENT — PAIN DESCRIPTION - DURATION: DURATION_2: CONTINUOUS

## 2017-12-19 ASSESSMENT — PAIN DESCRIPTION - FREQUENCY: FREQUENCY: CONTINUOUS

## 2017-12-19 NOTE — TELEPHONE ENCOUNTER
Patient called and said that he pulled his achilles tendon on his right leg last week. He did go to P.T. Today and he could not do the therapy. The Physcial therpist said he should get a MRI.   Please advise

## 2017-12-19 NOTE — PROGRESS NOTES
Physical Therapy  Daily Treatment Note  Date: 2017  Patient Name: Ambar Lees  MRN: [id number]     :   1970    Subjective:      PT Visit Information  Onset Date: 08/15/17  PT Insurance Information: [employer]   Total # of Visits Approved: 30  Total # of Visits to Date: 26  Subjective  Subjective: complains of increased pain R ankle since 17  General Comment  Comments: no reported injury to R ankle,advised to see MD  Pain Screening  Patient Currently in Pain: Yes  Pain Assessment  Pain Assessment: 0-10  Pain Level: 7  Pain Location: Shoulder  Pain Orientation: Right  Pain Descriptors: Constant  Pain Frequency: Continuous  Pain 2  Pain Rating 2: 10  Pain Location 2: Ankle  Pain Orientation 2: Right  Pain Descriptors 2: Constant  Pain Duration 2: Continuous  Vital Signs  Patient Currently in Pain: Yes       Treatment Activities:   Exercises  Exercise 1: Free Motion 4-way Walking 20# 3x  Exercise 4: BAPS board L3 CW/CCW/DF/PF/Inv/ever 10x each  Exercise 6: 35# rows/pull down 3x10  Exercise 7: Chest Press/Row Seated 35# 2x15 each  Exercise 8: Lat Pull Down Seated 35# 2x15  Exercise 9: miguel ángel's R 2x15 4 ways 2#  Exercise 10:  Total Gym L10 squat 3x10  Exercise 11: Apley IR towel stretch R 3x30\"  Exercise 12: Leg Press/Calf Raise 35# BLE 3x10  Exercise 14: NuStep 6' L4    Assessment:   Conditions Requiring Skilled Therapeutic Intervention  Assessment: ex limited today due to increased pain R ankle  REQUIRES PT FOLLOW UP: Yes     Plan:    Plan  Current Treatment Recommendations: Strengthening, Patient/Caregiver Education & Training, Pain Management, Modalities, Home Exercise Program, ROM  Plan Comment: to continue PT as ordered by   Timed Code Treatment Minutes: 40 Minutes   Treatment Charges: Minutes Units   []  Ultrasound     []  Electrical-Stim     []  Iontophoresis     []  Traction     []  Massage       []  Eval     []  Gait     [x]  Ther Exercise 40  3    []  Manual Therapy     []  Ther Activities       []  Aquatics     []  Neuro Re-Ed       []  Other       Total Treatment Time: 40 3        Therapy Time   Individual Concurrent Group Co-treatment   Time In 1125         Time Out 1205         Minutes 40         Timed Code Treatment Minutes: 36 Minutes  Electronically signed by: Rebecca Hansen PT

## 2017-12-27 ENCOUNTER — APPOINTMENT (OUTPATIENT)
Dept: PHYSICAL THERAPY | Age: 47
End: 2017-12-27
Payer: MEDICARE

## 2017-12-29 ENCOUNTER — HOSPITAL ENCOUNTER (OUTPATIENT)
Age: 47
Discharge: HOME | End: 2017-12-29
Payer: MEDICARE

## 2017-12-29 LAB
ALBUMIN SERPL-MCNC: 4.3 G/DL (ref 3.5–5.2)
ALBUMIN/GLOBULIN RATIO: ABNORMAL (ref 1–2.5)
ALP BLD-CCNC: 72 U/L (ref 40–129)
ALT SERPL-CCNC: 18 U/L (ref 5–41)
ANION GAP SERPL CALCULATED.3IONS-SCNC: 15 MMOL/L (ref 9–17)
AST SERPL-CCNC: 24 U/L
BILIRUB SERPL-MCNC: 0.42 MG/DL (ref 0.3–1.2)
BUN BLDV-MCNC: 11 MG/DL (ref 6–20)
BUN/CREAT BLD: ABNORMAL (ref 9–20)
CALCIUM SERPL-MCNC: 9.2 MG/DL (ref 8.6–10.4)
CHLORIDE BLD-SCNC: 103 MMOL/L (ref 98–107)
CO2: 24 MMOL/L (ref 20–31)
CREAT SERPL-MCNC: 0.81 MG/DL (ref 0.7–1.2)
GFR AFRICAN AMERICAN: >60 ML/MIN
GFR NON-AFRICAN AMERICAN: >60 ML/MIN
GFR SERPL CREATININE-BSD FRML MDRD: ABNORMAL ML/MIN/{1.73_M2}
GFR SERPL CREATININE-BSD FRML MDRD: ABNORMAL ML/MIN/{1.73_M2}
GLUCOSE BLD-MCNC: 110 MG/DL (ref 70–99)
HCT VFR BLD CALC: 44.2 % (ref 41–53)
HEMOGLOBIN: 14.8 G/DL (ref 13.5–17.5)
MCH RBC QN AUTO: 31.6 PG (ref 26–34)
MCHC RBC AUTO-ENTMCNC: 33.5 G/DL (ref 31–37)
MCV RBC AUTO: 94.4 FL (ref 80–100)
PDW BLD-RTO: 13.3 % (ref 11.5–14.9)
PLATELET # BLD: 198 K/UL (ref 150–450)
PMV BLD AUTO: 9 FL (ref 6–12)
POTASSIUM SERPL-SCNC: 4.6 MMOL/L (ref 3.7–5.3)
RBC # BLD: 4.68 M/UL (ref 4.5–5.9)
SODIUM BLD-SCNC: 142 MMOL/L (ref 135–144)
TOTAL PROTEIN: 7.1 G/DL (ref 6.4–8.3)
VALPROIC ACID LEVEL: 63 UG/ML (ref 50–125)
VALPROIC DATE LAST DOSE: NORMAL
VALPROIC DOSE AMOUNT: NORMAL
VALPROIC TIME LAST DOSE: NORMAL
WBC # BLD: 7.2 K/UL (ref 3.5–11)

## 2017-12-29 PROCEDURE — 85027 COMPLETE CBC AUTOMATED: CPT

## 2017-12-29 PROCEDURE — 36415 COLL VENOUS BLD VENIPUNCTURE: CPT

## 2017-12-29 PROCEDURE — 80164 ASSAY DIPROPYLACETIC ACD TOT: CPT

## 2017-12-29 PROCEDURE — 80053 COMPREHEN METABOLIC PANEL: CPT

## 2018-01-02 ENCOUNTER — HOSPITAL ENCOUNTER (OUTPATIENT)
Dept: MRI IMAGING | Age: 48
Discharge: HOME | End: 2018-01-02
Payer: MEDICARE

## 2018-01-02 DIAGNOSIS — G89.29 CHRONIC PAIN OF RIGHT ANKLE: ICD-10-CM

## 2018-01-02 DIAGNOSIS — M25.571 CHRONIC PAIN OF RIGHT ANKLE: ICD-10-CM

## 2018-01-02 DIAGNOSIS — Z98.890 HISTORY OF ACHILLES TENDON REPAIR: ICD-10-CM

## 2018-01-02 PROCEDURE — 73721 MRI JNT OF LWR EXTRE W/O DYE: CPT

## 2018-01-16 ENCOUNTER — OFFICE VISIT (OUTPATIENT)
Dept: PODIATRY | Age: 48
End: 2018-01-16
Payer: MEDICARE

## 2018-01-16 VITALS
HEIGHT: 71 IN | BODY MASS INDEX: 23.8 KG/M2 | WEIGHT: 170 LBS | DIASTOLIC BLOOD PRESSURE: 81 MMHG | HEART RATE: 81 BPM | SYSTOLIC BLOOD PRESSURE: 101 MMHG

## 2018-01-16 DIAGNOSIS — G89.29 CHRONIC PAIN OF RIGHT ANKLE: ICD-10-CM

## 2018-01-16 DIAGNOSIS — M25.571 CHRONIC PAIN OF RIGHT ANKLE: ICD-10-CM

## 2018-01-16 DIAGNOSIS — M67.88 ACHILLES TENDONOSIS OF RIGHT LOWER EXTREMITY: Primary | ICD-10-CM

## 2018-01-16 PROCEDURE — G8484 FLU IMMUNIZE NO ADMIN: HCPCS | Performed by: PODIATRIST

## 2018-01-16 PROCEDURE — 99213 OFFICE O/P EST LOW 20 MIN: CPT | Performed by: PODIATRIST

## 2018-01-16 PROCEDURE — G8427 DOCREV CUR MEDS BY ELIG CLIN: HCPCS | Performed by: PODIATRIST

## 2018-01-16 PROCEDURE — 1036F TOBACCO NON-USER: CPT | Performed by: PODIATRIST

## 2018-01-16 PROCEDURE — G8420 CALC BMI NORM PARAMETERS: HCPCS | Performed by: PODIATRIST

## 2018-01-16 RX ORDER — DIVALPROEX SODIUM 250 MG/1
TABLET, EXTENDED RELEASE ORAL
Refills: 0 | COMMUNITY
Start: 2018-01-02 | End: 2018-02-16 | Stop reason: SDUPTHER

## 2018-01-16 RX ORDER — GABAPENTIN 300 MG/1
300 CAPSULE ORAL 3 TIMES DAILY
Refills: 0 | COMMUNITY
Start: 2018-01-08 | End: 2019-04-03 | Stop reason: ALTCHOICE

## 2018-01-16 ASSESSMENT — ENCOUNTER SYMPTOMS
COLOR CHANGE: 0
DIARRHEA: 0
NAUSEA: 0
BACK PAIN: 0
SHORTNESS OF BREATH: 0

## 2018-01-16 NOTE — PROGRESS NOTES
Subjective: Rishabh Qiu 41 y.o. male that presents with pain to the back of the leg just above his achilles tendon. Chief Complaint   Patient presents with    Foot Pain     Right foot, stood up from computer and felt sharp pain, still painful, he did have MRI done which is in [facility], PCP did state there was no tear    Patient states that he stood up from his desk and felt a sharp pain to the back of the leg. Patient states that he went to his PCP and an MRI was ordered. Patient states that the MRI was negative. Patient was referred to my office for care. Pain is rated 10 out of 10 and is described as intermittent. Patient states that the pain is greatest when walking a lot. Review of Systems   Constitutional: Negative for activity change, appetite change, chills, diaphoresis, fatigue and fever. Respiratory: Negative for shortness of breath. Cardiovascular: Negative for leg swelling. Gastrointestinal: Negative for diarrhea and nausea. Endocrine: Negative for cold intolerance, heat intolerance and polyuria. Musculoskeletal: Negative for arthralgias, back pain, gait problem, joint swelling and myalgias. Skin: Negative for color change, pallor, rash and wound. Allergic/Immunologic: Negative for environmental allergies and food allergies. Neurological: Negative for dizziness, weakness, light-headedness and numbness. Hematological: Does not bruise/bleed easily. Psychiatric/Behavioral: Negative for behavioral problems, confusion and self-injury. The patient is not nervous/anxious. Objective: Clinical evaluation of the patient reveals pain with palpation to the achilles tendon of the right lower extremity. There is also pain proximal to the right achilles tendon, but distal to the gastrocnemius muscles. There is a fullness to the achilles tendon secondary to a previous surgical repair. There is no edema noted to the right foot or ankle.  There is no pain with range of motion noted to the

## 2018-01-19 ENCOUNTER — OFFICE VISIT (OUTPATIENT)
Dept: FAMILY MEDICINE CLINIC | Age: 48
End: 2018-01-19
Payer: MEDICARE

## 2018-01-19 VITALS
HEART RATE: 87 BPM | SYSTOLIC BLOOD PRESSURE: 108 MMHG | BODY MASS INDEX: 24.36 KG/M2 | OXYGEN SATURATION: 96 % | WEIGHT: 174 LBS | DIASTOLIC BLOOD PRESSURE: 80 MMHG | HEIGHT: 71 IN

## 2018-01-19 DIAGNOSIS — M25.571 CHRONIC PAIN OF RIGHT ANKLE: ICD-10-CM

## 2018-01-19 DIAGNOSIS — M75.21 BICIPITAL TENDINITIS, RIGHT SHOULDER: ICD-10-CM

## 2018-01-19 DIAGNOSIS — G89.29 CHRONIC PAIN OF RIGHT KNEE: ICD-10-CM

## 2018-01-19 DIAGNOSIS — M25.561 CHRONIC PAIN OF RIGHT KNEE: ICD-10-CM

## 2018-01-19 DIAGNOSIS — E78.5 HYPERLIPIDEMIA WITH TARGET LDL LESS THAN 130: ICD-10-CM

## 2018-01-19 DIAGNOSIS — G89.29 CHRONIC RIGHT SHOULDER PAIN: ICD-10-CM

## 2018-01-19 DIAGNOSIS — I10 ESSENTIAL HYPERTENSION: Primary | ICD-10-CM

## 2018-01-19 DIAGNOSIS — G89.29 CHRONIC PAIN OF RIGHT ANKLE: ICD-10-CM

## 2018-01-19 DIAGNOSIS — F31.75 BIPOLAR DISORDER, IN PARTIAL REMISSION, MOST RECENT EPISODE DEPRESSED (HCC): ICD-10-CM

## 2018-01-19 DIAGNOSIS — M25.511 CHRONIC RIGHT SHOULDER PAIN: ICD-10-CM

## 2018-01-19 PROCEDURE — G8484 FLU IMMUNIZE NO ADMIN: HCPCS | Performed by: FAMILY MEDICINE

## 2018-01-19 PROCEDURE — G8420 CALC BMI NORM PARAMETERS: HCPCS | Performed by: FAMILY MEDICINE

## 2018-01-19 PROCEDURE — 99214 OFFICE O/P EST MOD 30 MIN: CPT | Performed by: FAMILY MEDICINE

## 2018-01-19 PROCEDURE — G8427 DOCREV CUR MEDS BY ELIG CLIN: HCPCS | Performed by: FAMILY MEDICINE

## 2018-01-19 PROCEDURE — 1036F TOBACCO NON-USER: CPT | Performed by: FAMILY MEDICINE

## 2018-01-19 RX ORDER — VENLAFAXINE HYDROCHLORIDE 37.5 MG/1
CAPSULE, EXTENDED RELEASE ORAL
Refills: 0 | COMMUNITY
Start: 2018-01-17 | End: 2018-02-16 | Stop reason: DRUGHIGH

## 2018-01-19 RX ORDER — CLONIDINE HYDROCHLORIDE 0.1 MG/1
0.1 TABLET ORAL 2 TIMES DAILY
Qty: 180 TABLET | Refills: 3 | Status: SHIPPED | OUTPATIENT
Start: 2018-01-19 | End: 2018-02-23 | Stop reason: ALTCHOICE

## 2018-01-19 ASSESSMENT — ENCOUNTER SYMPTOMS
CHEST TIGHTNESS: 0
SHORTNESS OF BREATH: 0
NAUSEA: 0
DIARRHEA: 0
WHEEZING: 0
COUGH: 0
CONSTIPATION: 0
VOMITING: 0
ABDOMINAL DISTENTION: 0
ABDOMINAL PAIN: 0

## 2018-01-19 NOTE — PROGRESS NOTES
2014.    Was seen pain management   Intensity of pain in Right shoulder is  7/10 today. Intensity of pain in right ankle is 4/10  Says he has trouble going shopping if he has to walk for more than 1 hr.   He had PT before and he says that helped, would like to restart PT  Saw podiatry and no intervention is planned, not even injections. Most recent MRI ankle done 1/2/18: showed tendinopathy of Achilles tendon     -vital signs stable and within normal limits   /80   Pulse 87   Ht 5' 11\" (1.803 m)   Wt 174 lb (78.9 kg)   SpO2 96%   BMI 24.27 kg/m²   Body mass index is 24.27 kg/m². Has known  Bipolar Disorder . Saw new psych yesterday who changed his meds an has next  follow up in July  Has counselor at [facility]. Says that the psychiatrist told him that she is not sure if he has what he was diagnosed with before ( Bipolar Disorder and PTSD), and she disagreed with the medications. Awaiting to do a personality test which is already scheduled. She is trying to cut down on Abilify  Started effexor and adjusted depakote yesterday , and he is off Zoloft and Remeron. Denies suicidal ideation, plan or intent. PHQ Scores 12/6/2017 5/8/2017 2/8/2017 11/8/2016   PHQ2 Score 3 2 6 4   PHQ9 Score 10 8 17 15      []1-4 = Minimal depression   []5-9 = Mild depression   [x]10-14 = Moderate depression   []15-19 = Moderately severe depression   []20-27 = Severe depression    Discussed testing with the patient and all questions fully answered.   hyperglycemia  hyperlipidemia  hypertriglyceridemia   Hospital Outpatient Visit on 12/29/2017   Component Date Value Ref Range Status    WBC 12/29/2017 7.2  3.5 - 11.0 k/uL Final    RBC 12/29/2017 4.68  4.5 - 5.9 m/uL Final    Hemoglobin 12/29/2017 14.8  13.5 - 17.5 g/dL Final    Hematocrit 12/29/2017 44.2  41 - 53 % Final    MCV 12/29/2017 94.4  80 - 100 fL Final    MCH 12/29/2017 31.6  26 - 34 pg Final    MCHC 12/29/2017 33.5  31 - 37 g/dL Final    RDW Comment: Performed at [facility]: [address] [facility]. [place], [address] [place] [address]   [phone]           Lab Results   Component Value Date    TSH 0.71 05/01/2017     Worsening Hyperlipidemia . he  is already on statin, tolerates it well. Reports compliance with statin. Plans is to be weaned off Abilify    Lab Results   Component Value Date    CHOL 265 (H) 10/20/2017    CHOL 204 (H) 05/01/2017    CHOL 219 (H) 04/06/2016     Lab Results   Component Value Date    TRIG 125 10/20/2017    TRIG 179 (H) 05/01/2017    TRIG 196 (H) 04/06/2016     Lab Results   Component Value Date    HDL 52 10/20/2017    HDL 44 05/01/2017    HDL 52 04/06/2016     Lab Results   Component Value Date    LDLCHOLESTEROL 188 (H) 10/20/2017    LDLCHOLESTEROL 124 05/01/2017    LDLCHOLESTEROL 128 04/06/2016       Lab Results   Component Value Date    CHOLHDLRATIO 5.1 (H) 10/20/2017    CHOLHDLRATIO 4.6 05/01/2017    CHOLHDLRATIO 4.2 04/06/2016       Lab Results   Component Value Date    LABA1C 5.1 10/20/2017       No results found for: XDBSYSUW19    No results found for: FOLATE    Lab Results   Component Value Date    FERRITIN 240 07/30/2014       No results found for: VITD25      No Known Allergies     Current Outpatient Prescriptions   Medication Sig Dispense Refill    venlafaxine (EFFEXOR XR) 37.5 MG extended release capsule take 1 capsule by mouth once daily for 7 days then 2 capsules once daily  0    divalproex (DEPAKOTE ER) 250 MG extended release tablet take 2 tablets by mouth once daily  0    gabapentin (NEURONTIN) 300 MG capsule   0    cloNIDine (CATAPRES) 0.1 MG tablet Take 1 tablet by mouth 2 times daily 60 tablet 1    cyclobenzaprine (FLEXERIL) 10 MG tablet Take 1 tablet by mouth 3 times daily as needed for Muscle spasms 90 tablet 3    pravastatin (PRAVACHOL) 40 MG tablet TAKE 1 TABLET BY MOUTH IN THE EVENING 90 tablet 3    Blood Pressure KIT Diagnosis: HTN.  Needs to check blood pressure 1-2 times a day until stable, then once a day. Goal blood pressure less than 135/85, and above 110/60. 1 kit 0    oxyCODONE-acetaminophen (PERCOCET) 5-325 MG per tablet take 1 tablet by mouth four times a day if needed  0    fexofenadine (ALLEGRA ALLERGY) 180 MG tablet Take 1 tablet by mouth daily 30 tablet 3    fluticasone (FLONASE) 50 MCG/ACT nasal spray 2 sprays by Nasal route daily 1 Bottle 3    Adapalene 0.3 % GEL   1    ARIPiprazole (ABILIFY) 20 MG tablet   0    benztropine (COGENTIN) 1 MG tablet   0    erythromycin with ethanol (THERAMYCIN) 2 % external solution apply topically as directed every morning  0    docusate sodium (COLACE) 100 MG capsule Take 1 capsule by mouth 2 times daily as needed for Constipation 60 capsule 3    clindamycin (CLEOCIN T) 1 % external solution       ibuprofen (ADVIL;MOTRIN) 800 MG tablet Take 1 tablet by mouth every 8 hours as needed for Pain 90 tablet 0    busPIRone (BUSPAR) 10 MG tablet Take 1 tablet by mouth 2 times daily as needed 60 tablet 0    mirtazapine (REMERON) 45 MG tablet Take 45 mg by mouth daily as needed  0    Garlic (GARLIQUE PO) Take by mouth daily      aluminum chloride (DRYSOL) 20 % external solution Apply topically nightly. 37.5 mL 3    Omega-3 Fatty Acids (OMEGA-3 FISH OIL) 1200 MG CAPS Take  by mouth 2 times daily.  sertraline (ZOLOFT) 100 MG tablet Take 100 mg by mouth daily       No current facility-administered medications for this visit. Social History     Social History    Marital status: Single     Spouse name: N/A    Number of children: N/A    Years of education: N/A     Occupational History    Not on file.      Social History Main Topics    Smoking status: Former Smoker     Quit date: 8/1/2002    Smokeless tobacco: Never Used    Alcohol use No    Drug use: No    Sexual activity: Yes     Partners: Female      Comment:      Other Topics Concern    Not on file     Social History Narrative    No narrative on file Counseling given: Yes          -rest of complaints with corresponding details per ROS    The patient's past medical, surgical, social, and family history as well as his current medications and allergies were reviewed as documented in today's encounter. Review of Systems   Constitutional: Positive for fatigue and unexpected weight change. Negative for activity change, appetite change, chills, diaphoresis and fever. Respiratory: Negative for cough, chest tightness, shortness of breath and wheezing. Cardiovascular: Negative for chest pain, palpitations and leg swelling. Gastrointestinal: Negative for abdominal distention, abdominal pain, constipation, diarrhea, nausea and vomiting. Endocrine: Negative for cold intolerance, heat intolerance, polydipsia, polyphagia and polyuria. Musculoskeletal: Positive for arthralgias (right shoulder, right ankle, right knee) and myalgias (related to fibromyalgia). Psychiatric/Behavioral: Positive for decreased concentration, dysphoric mood and sleep disturbance. Negative for self-injury and suicidal ideas. The patient is nervous/anxious. Physical Exam   Constitutional: He is oriented to person, place, and time. He appears well-developed and well-nourished. No distress. HENT:   Head: Normocephalic and atraumatic. Mouth/Throat: Oropharynx is clear and moist. No oropharyngeal exudate. Eyes: Conjunctivae and EOM are normal. Right eye exhibits no discharge. Left eye exhibits no discharge. No scleral icterus. Neck: Normal range of motion. Neck supple. No thyromegaly present. Cardiovascular: Normal rate, regular rhythm, normal heart sounds and intact distal pulses. No murmur heard. Pulmonary/Chest: Effort normal and breath sounds normal. No respiratory distress. He has no wheezes. He has no rales. He exhibits no tenderness. Abdominal: Soft. Bowel sounds are normal. He exhibits no distension. There is no tenderness.    Musculoskeletal: He exhibits tenderness. He exhibits no edema. Right shoulder: He exhibits decreased range of motion (very mild), tenderness (anterior and on top) and pain. He exhibits no bony tenderness and no spasm. Right knee: He exhibits normal range of motion. Tenderness found. Patellar tendon tenderness noted. Right ankle: He exhibits normal range of motion. Tenderness. Achilles tendon exhibits pain. Thoracic back: He exhibits decreased range of motion and tenderness (left trapezium muscle). Feet:    Right heel pain     Lymphadenopathy:     He has no cervical adenopathy. Neurological: He is alert and oriented to person, place, and time. He has normal reflexes. Skin: Skin is warm and dry. No rash noted. He is not diaphoretic. Psychiatric: His behavior is normal. Judgment and thought content normal. His mood appears anxious. His affect is labile. His speech is rapid and/or pressured. Nursing note and vitals reviewed. ASSESSMENT AND PLAN      1. Essential hypertension  Controlled  Discussed low salt diet and BP and pulse monitoring daily, BP log given    - cloNIDine (CATAPRES) 0.1 MG tablet; Take 1 tablet by mouth 2 times daily  Dispense: 180 tablet; Refill: 3  - TSH without Reflex; Future    2. Chronic right shoulder pain  - [facility]    3. Chronic pain of right ankle  - [facility], [address]    4. Hyperlipidemia with target LDL less than 130  - Lipid Panel; Future    5. Bicipital tendinitis, right shoulder    - [facility]    6. Chronic pain of right knee  - [facility]  Continue neurontin, percocet, Flexeril per pain management     7. Bipolar disorder, in partial remission, most recent episode depressed ([facility] [address]  Continue current treatment and follow up with psychiatrist and psychologist as scheduled.    Will have labs in 1 week-LFTs per psych  Will

## 2018-01-19 NOTE — PATIENT INSTRUCTIONS
A serving is 1 slice of bread, 1 ounce of dry cereal, or ½ cup of cooked rice, pasta, or cooked cereal. Try to choose whole-grain products as much as possible. · Limit lean meat, poultry, and fish to 2 servings each day. A serving is 3 ounces, about the size of a deck of cards. · Eat 4 to 5 servings of nuts, seeds, and legumes (cooked dried beans, lentils, and split peas) each week. A serving is 1/3 cup of nuts, 2 tablespoons of seeds, or ½ cup of cooked beans or peas. · Limit fats and oils to 2 to 3 servings each day. A serving is 1 teaspoon of vegetable oil or 2 tablespoons of salad dressing. · Limit sweets and added sugars to 5 servings or less a week. A serving is 1 tablespoon jelly or jam, ½ cup sorbet, or 1 cup of lemonade. · Eat less than 2,300 milligrams (mg) of sodium a day. If you limit your sodium to 1,500 mg a day, you can lower your blood pressure even more. Tips for success  · Start small. Do not try to make dramatic changes to your diet all at once. You might feel that you are missing out on your favorite foods and then be more likely to not follow the plan. Make small changes, and stick with them. Once those changes become habit, add a few more changes. · Try some of the following:  ¨ Make it a goal to eat a fruit or vegetable at every meal and at snacks. This will make it easy to get the recommended amount of fruits and vegetables each day. ¨ Try yogurt topped with fruit and nuts for a snack or healthy dessert. ¨ Add lettuce, tomato, cucumber, and onion to sandwiches. ¨ Combine a ready-made pizza crust with low-fat mozzarella cheese and lots of vegetable toppings. Try using tomatoes, squash, spinach, broccoli, carrots, cauliflower, and onions. ¨ Have a variety of cut-up vegetables with a low-fat dip as an appetizer instead of chips and dip. ¨ Sprinkle sunflower seeds or chopped almonds over salads. Or try adding chopped walnuts or almonds to cooked vegetables.   ¨ Try some vegetarian meals using beans and peas. Add garbanzo or kidney beans to salads. Make burritos and tacos with mashed coker beans or black beans. Where can you learn more? Go to https://chdorieeb.[facility]. org and sign in to your [employer] account. Enter H953 in the [employer] box to learn more about \"DASH Diet: Care Instructions. \"     If you do not have an account, please click on the \"Sign Up Now\" link. Current as of: September 21, 2016  Content Version: 11.5  © 1315-7901 [employer]. Care instructions adapted under license by [facility] ([facility]). If you have questions about a medical condition or this instruction, always ask your healthcare professional. [address] any warranty or liability for your use of this information.

## 2018-01-25 ENCOUNTER — HOSPITAL ENCOUNTER (OUTPATIENT)
Age: 48
Discharge: HOME | End: 2018-01-25
Payer: MEDICARE

## 2018-01-25 DIAGNOSIS — E78.5 HYPERLIPIDEMIA WITH TARGET LDL LESS THAN 130: ICD-10-CM

## 2018-01-25 DIAGNOSIS — I10 ESSENTIAL HYPERTENSION: ICD-10-CM

## 2018-01-25 LAB
ABSOLUTE EOS #: 0 K/UL (ref 0–0.4)
ABSOLUTE IMMATURE GRANULOCYTE: ABNORMAL K/UL (ref 0–0.3)
ABSOLUTE LYMPH #: 2 K/UL (ref 1–4.8)
ABSOLUTE MONO #: 0.4 K/UL (ref 0.1–1.3)
ALBUMIN SERPL-MCNC: 4.7 G/DL (ref 3.5–5.2)
ALBUMIN/GLOBULIN RATIO: NORMAL (ref 1–2.5)
ALP BLD-CCNC: 62 U/L (ref 40–129)
ALT SERPL-CCNC: 19 U/L (ref 5–41)
ANION GAP SERPL CALCULATED.3IONS-SCNC: 15 MMOL/L (ref 9–17)
AST SERPL-CCNC: 22 U/L
BASOPHILS # BLD: 1 % (ref 0–2)
BASOPHILS ABSOLUTE: 0.1 K/UL (ref 0–0.2)
BILIRUB SERPL-MCNC: 0.39 MG/DL (ref 0.3–1.2)
BUN BLDV-MCNC: 15 MG/DL (ref 6–20)
BUN/CREAT BLD: NORMAL (ref 9–20)
CALCIUM SERPL-MCNC: 9.5 MG/DL (ref 8.6–10.4)
CHLORIDE BLD-SCNC: 103 MMOL/L (ref 98–107)
CHOLESTEROL/HDL RATIO: 5.3
CHOLESTEROL: 243 MG/DL
CO2: 25 MMOL/L (ref 20–31)
CREAT SERPL-MCNC: 0.89 MG/DL (ref 0.7–1.2)
DIFFERENTIAL TYPE: ABNORMAL
EKG ATRIAL RATE: 91 BPM
EKG P AXIS: 46 DEGREES
EKG P-R INTERVAL: 132 MS
EKG Q-T INTERVAL: 346 MS
EKG QRS DURATION: 72 MS
EKG QTC CALCULATION (BAZETT): 425 MS
EKG R AXIS: 10 DEGREES
EKG T AXIS: 44 DEGREES
EKG VENTRICULAR RATE: 91 BPM
EOSINOPHILS RELATIVE PERCENT: 1 % (ref 0–4)
GFR AFRICAN AMERICAN: >60 ML/MIN
GFR NON-AFRICAN AMERICAN: >60 ML/MIN
GFR SERPL CREATININE-BSD FRML MDRD: NORMAL ML/MIN/{1.73_M2}
GFR SERPL CREATININE-BSD FRML MDRD: NORMAL ML/MIN/{1.73_M2}
GLUCOSE BLD-MCNC: 90 MG/DL (ref 70–99)
HCT VFR BLD CALC: 44 % (ref 41–53)
HDLC SERPL-MCNC: 46 MG/DL
HEMOGLOBIN: 15.1 G/DL (ref 13.5–17.5)
IMMATURE GRANULOCYTES: ABNORMAL %
LDL CHOLESTEROL: 160 MG/DL (ref 0–130)
LYMPHOCYTES # BLD: 25 % (ref 24–44)
MCH RBC QN AUTO: 32.5 PG (ref 26–34)
MCHC RBC AUTO-ENTMCNC: 34.4 G/DL (ref 31–37)
MCV RBC AUTO: 94.7 FL (ref 80–100)
MONOCYTES # BLD: 6 % (ref 1–7)
NRBC AUTOMATED: ABNORMAL PER 100 WBC
PDW BLD-RTO: 13 % (ref 11.5–14.9)
PLATELET # BLD: 193 K/UL (ref 150–450)
PLATELET ESTIMATE: ABNORMAL
PMV BLD AUTO: 9.3 FL (ref 6–12)
POTASSIUM SERPL-SCNC: 4.4 MMOL/L (ref 3.7–5.3)
RBC # BLD: 4.65 M/UL (ref 4.5–5.9)
RBC # BLD: ABNORMAL 10*6/UL
SEG NEUTROPHILS: 67 % (ref 36–66)
SEGMENTED NEUTROPHILS ABSOLUTE COUNT: 5.4 K/UL (ref 1.3–9.1)
SODIUM BLD-SCNC: 143 MMOL/L (ref 135–144)
TOTAL PROTEIN: 7.5 G/DL (ref 6.4–8.3)
TRIGL SERPL-MCNC: 183 MG/DL
TSH SERPL DL<=0.05 MIU/L-ACNC: 2.22 MIU/L (ref 0.3–5)
VALPROIC ACID LEVEL: 103 UG/ML (ref 50–125)
VALPROIC DATE LAST DOSE: NORMAL
VALPROIC DOSE AMOUNT: NORMAL
VALPROIC TIME LAST DOSE: NORMAL
VLDLC SERPL CALC-MCNC: ABNORMAL MG/DL (ref 1–30)
WBC # BLD: 8 K/UL (ref 3.5–11)
WBC # BLD: ABNORMAL 10*3/UL

## 2018-01-25 PROCEDURE — 36415 COLL VENOUS BLD VENIPUNCTURE: CPT

## 2018-01-25 PROCEDURE — 80053 COMPREHEN METABOLIC PANEL: CPT

## 2018-01-25 PROCEDURE — 93005 ELECTROCARDIOGRAM TRACING: CPT

## 2018-01-25 PROCEDURE — 85025 COMPLETE CBC W/AUTO DIFF WBC: CPT

## 2018-01-25 PROCEDURE — 80061 LIPID PANEL: CPT

## 2018-01-25 PROCEDURE — 80164 ASSAY DIPROPYLACETIC ACD TOT: CPT

## 2018-01-25 PROCEDURE — 84443 ASSAY THYROID STIM HORMONE: CPT

## 2018-01-25 RX ORDER — PRAVASTATIN SODIUM 80 MG/1
TABLET ORAL
Qty: 90 TABLET | Refills: 3 | Status: SHIPPED | OUTPATIENT
Start: 2018-01-25 | End: 2018-11-13 | Stop reason: SDUPTHER

## 2018-02-13 ENCOUNTER — HOSPITAL ENCOUNTER (EMERGENCY)
Age: 48
Discharge: HOME | End: 2018-02-13
Attending: EMERGENCY MEDICINE
Payer: MEDICARE

## 2018-02-13 ENCOUNTER — HOSPITAL ENCOUNTER (OUTPATIENT)
Dept: PHYSICAL THERAPY | Age: 48
Setting detail: THERAPIES SERIES
Discharge: HOME | End: 2018-02-13
Payer: MEDICARE

## 2018-02-13 VITALS
SYSTOLIC BLOOD PRESSURE: 114 MMHG | HEIGHT: 71 IN | WEIGHT: 175 LBS | HEART RATE: 88 BPM | TEMPERATURE: 97.6 F | OXYGEN SATURATION: 99 % | BODY MASS INDEX: 24.5 KG/M2 | DIASTOLIC BLOOD PRESSURE: 79 MMHG | RESPIRATION RATE: 16 BRPM

## 2018-02-13 DIAGNOSIS — I15.9 SECONDARY HYPERTENSION: Primary | ICD-10-CM

## 2018-02-13 LAB
EKG ATRIAL RATE: 109 BPM
EKG P AXIS: 23 DEGREES
EKG P-R INTERVAL: 138 MS
EKG Q-T INTERVAL: 328 MS
EKG QRS DURATION: 72 MS
EKG QTC CALCULATION (BAZETT): 441 MS
EKG R AXIS: -2 DEGREES
EKG T AXIS: 45 DEGREES
EKG VENTRICULAR RATE: 109 BPM

## 2018-02-13 PROCEDURE — 6370000000 HC RX 637 (ALT 250 FOR IP): Performed by: EMERGENCY MEDICINE

## 2018-02-13 PROCEDURE — 99283 EMERGENCY DEPT VISIT LOW MDM: CPT

## 2018-02-13 PROCEDURE — 93005 ELECTROCARDIOGRAM TRACING: CPT

## 2018-02-13 RX ORDER — VENLAFAXINE HYDROCHLORIDE 75 MG/1
75 CAPSULE, EXTENDED RELEASE ORAL DAILY
COMMUNITY
End: 2018-02-16 | Stop reason: HOSPADM

## 2018-02-13 RX ORDER — LANOLIN ALCOHOL/MO/W.PET/CERES
1000 CREAM (GRAM) TOPICAL DAILY
COMMUNITY

## 2018-02-13 RX ORDER — DIVALPROEX SODIUM 250 MG/1
750 TABLET, EXTENDED RELEASE ORAL DAILY
COMMUNITY
End: 2019-04-03 | Stop reason: ALTCHOICE

## 2018-02-13 RX ORDER — METOPROLOL TARTRATE 50 MG/1
50 TABLET, FILM COATED ORAL ONCE
Status: COMPLETED | OUTPATIENT
Start: 2018-02-13 | End: 2018-02-13

## 2018-02-13 RX ADMIN — METOPROLOL TARTRATE 50 MG: 50 TABLET ORAL at 09:41

## 2018-02-16 ENCOUNTER — OFFICE VISIT (OUTPATIENT)
Dept: FAMILY MEDICINE CLINIC | Age: 48
End: 2018-02-16
Payer: MEDICARE

## 2018-02-16 ENCOUNTER — HOSPITAL ENCOUNTER (OUTPATIENT)
Age: 48
Discharge: HOME | End: 2018-02-16
Payer: MEDICARE

## 2018-02-16 VITALS
WEIGHT: 176.8 LBS | OXYGEN SATURATION: 99 % | BODY MASS INDEX: 24.75 KG/M2 | HEART RATE: 158 BPM | SYSTOLIC BLOOD PRESSURE: 171 MMHG | HEIGHT: 71 IN | DIASTOLIC BLOOD PRESSURE: 94 MMHG | TEMPERATURE: 97.9 F

## 2018-02-16 DIAGNOSIS — R00.0 TACHYCARDIA: Primary | ICD-10-CM

## 2018-02-16 DIAGNOSIS — I15.9 SECONDARY HYPERTENSION: ICD-10-CM

## 2018-02-16 PROCEDURE — G8427 DOCREV CUR MEDS BY ELIG CLIN: HCPCS | Performed by: FAMILY MEDICINE

## 2018-02-16 PROCEDURE — G8420 CALC BMI NORM PARAMETERS: HCPCS | Performed by: FAMILY MEDICINE

## 2018-02-16 PROCEDURE — 1036F TOBACCO NON-USER: CPT | Performed by: FAMILY MEDICINE

## 2018-02-16 PROCEDURE — 93005 ELECTROCARDIOGRAM TRACING: CPT

## 2018-02-16 PROCEDURE — G8484 FLU IMMUNIZE NO ADMIN: HCPCS | Performed by: FAMILY MEDICINE

## 2018-02-16 PROCEDURE — 99214 OFFICE O/P EST MOD 30 MIN: CPT | Performed by: FAMILY MEDICINE

## 2018-02-16 ASSESSMENT — ENCOUNTER SYMPTOMS
COUGH: 0
ABDOMINAL DISTENTION: 0
NAUSEA: 0
ABDOMINAL PAIN: 0
WHEEZING: 0
CONSTIPATION: 0
VOMITING: 0
DIARRHEA: 0
SHORTNESS OF BREATH: 0
CHEST TIGHTNESS: 0

## 2018-02-16 NOTE — PROGRESS NOTES
Chief Complaint   Patient presents with    Hypertension    Other     Needs all clear to have Funtional capicity done.  Anxiety       Here for short follow up for after ED visit . Hypertension; Other (Needs all clear to have Funtional capicity done. ); and Anxiety    Patient reports he was seen in emergency room on 2/13/18 due to being sent from the functional capacity by PT, due to high blood pressure and tachycardia. In the emergency room, he was given metoprolol and he was discharged home. An EKG was done in emergency room showing sinus tachycardia, with a heart rate of 109. After receiving the metoprolol, his blood pressure was 114/79, and his heart rate was 88. He didn't have any blood work at that time. Currently patient complains of just feeling shaky, otherwise he feels fine. He denies chest pain, shortness of breath, palpitations, racing heart feelings,  Orthopnea, near syncope, lightheadedness. He denies headache, blurry vision. Patient has chronic fatigue which is at baseline  Cardiovascular risk factors of dyslipidemia, hypertension and male gender. Use of agents associated with hypertension possible Effexor. Patient reports his psychiatrist is changing his medications and Effexor dose was increased from 37.5 mg to 75 mg, about 2 weeks ago. Patient reports that just before this appointment he had some [facility]. He denies drinking alcohol. He does admit of feeling more shaky than usually, however has been sleeping more than usually and he believes this might be a side effect from Effexor.   She denies being significant pain    High BP  BP Readings from Last 3 Encounters:   02/16/18 (!) 171/94   02/13/18 114/79   01/19/18 108/80      tachycardia   Pulse Readings from Last 3 Encounters:   02/16/18 158   02/13/18 88   01/19/18 87     Weight has been stable      Wt Readings from Last 3 Encounters:   02/16/18 176 lb 12.8 oz (80.2 kg)   02/13/18 175 lb (79.4 kg)   01/19/18 174 265 (H) 10/20/2017    CHOL 204 (H) 05/01/2017     Lab Results   Component Value Date    TRIG 183 (H) 01/25/2018    TRIG 125 10/20/2017    TRIG 179 (H) 05/01/2017     Lab Results   Component Value Date    HDL 46 01/25/2018    HDL 52 10/20/2017    HDL 44 05/01/2017     Lab Results   Component Value Date    LDLCHOLESTEROL 160 (H) 01/25/2018    LDLCHOLESTEROL 188 (H) 10/20/2017    LDLCHOLESTEROL 124 05/01/2017       Lab Results   Component Value Date    CHOLHDLRATIO 5.3 (H) 01/25/2018    CHOLHDLRATIO 5.1 (H) 10/20/2017    CHOLHDLRATIO 4.6 05/01/2017       Lab Results   Component Value Date    LABA1C 5.1 10/20/2017           Current Outpatient Prescriptions   Medication Sig Dispense Refill    venlafaxine (EFFEXOR XR) 75 MG extended release capsule Take 75 mg by mouth daily      divalproex (DEPAKOTE ER) 250 MG extended release tablet Take 750 mg by mouth daily      vitamin B-12 (CYANOCOBALAMIN) 1000 MCG tablet Take 1,000 mcg by mouth daily      Ginkgo Biloba (GINKOBA PO) Take by mouth      pravastatin (PRAVACHOL) 80 MG tablet TAKE 1 TABLET BY MOUTH IN THE EVENING 90 tablet 3    cloNIDine (CATAPRES) 0.1 MG tablet Take 1 tablet by mouth 2 times daily 180 tablet 3    gabapentin (NEURONTIN) 300 MG capsule 300 mg 3 times daily .   0    cyclobenzaprine (FLEXERIL) 10 MG tablet Take 1 tablet by mouth 3 times daily as needed for Muscle spasms 90 tablet 3    oxyCODONE-acetaminophen (PERCOCET) 5-325 MG per tablet take 1 tablet by mouth four times a day if needed  0    fexofenadine (ALLEGRA ALLERGY) 180 MG tablet Take 1 tablet by mouth daily 30 tablet 3    fluticasone (FLONASE) 50 MCG/ACT nasal spray 2 sprays by Nasal route daily 1 Bottle 3    Adapalene 0.3 % GEL   1    ARIPiprazole (ABILIFY) 20 MG tablet   0    benztropine (COGENTIN) 1 MG tablet   0    erythromycin with ethanol (THERAMYCIN) 2 % external solution apply topically as directed every morning  0    docusate sodium (COLACE) 100 MG capsule Take 1 capsule by Fractionated     Standing Status:   Future     Standing Expiration Date:   2/17/2019    TSH without Reflex     Standing Status:   Future     Standing Expiration Date:   2/17/2019    Urinalysis with Microscopic     Standing Status:   Future     Standing Expiration Date:   2/17/2019     Order Specific Question:   SPECIFY(EX-CATH,MIDSTREAM,CYSTO,ETC)? Answer:   midstream    EKG 12 Lead     Standing Status:   Future     Standing Expiration Date:   2/16/2019     Scheduling Instructions:      If another rhythm then tachycardia, page me. [address]      [address] [facility]      [address] [facility] [address]      Dept: [phone]      Dept Fax: [phone]     Order Specific Question:   Reason for Exam?     Answer: Tachycardia       Orders Placed This Encounter   Medications    metoprolol tartrate (LOPRESSOR) 25 MG tablet     Sig: Take 1 tablet by mouth 2 times daily     Dispense:  2 tablet     Refill:  0       Medications Discontinued During This Encounter   Medication Reason    divalproex (DEPAKOTE ER) 250 MG extended release tablet Duplicate Order    Garlic (GARLIQUE PO) Patient Choice    venlafaxine (EFFEXOR XR) 37.5 MG extended release capsule Dose adjustment    venlafaxine (EFFEXOR XR) 75 MG extended release capsule Stop Taking at Discharge        Sadaf Canales received counseling on the following healthy behaviors: nutrition, exercise and medication adherence  Reviewed prior labs and health maintenance  Continue current medications, diet and exercise. Discussed use, benefit, and side effects of prescribed medications. Barriers to medication compliance addressed. Patient given educational materials - see patient instructions  Was a self-tracking handout given in paper form or via [employer]?  YES    Requested Prescriptions     Signed Prescriptions Disp Refills    metoprolol tartrate (LOPRESSOR) 25 MG tablet 2 tablet 0     Sig: Take 1 tablet by mouth 2 times daily       All patient questions answered. Patient voiced understanding. Quality Measures    Body mass index is 24.66 kg/m². Normal. Weight control planned discussed Healthy diet and regular exercise. BP: (!) 171/94 Blood pressure is high. Treatment plan consists of DASH Eating Plan, Dietary Sodium Restriction, Increased Physical Activity, Patient In-home Blood Pressure Monitoring, Antihypertensive Medication Started and Stop drinking any pop and especially [employer] [address] [place], stop Effexor. Hyperlipidemia, continue pravastatin  Lab Results   Component Value Date    LDLCHOLESTEROL 160 (H) 01/25/2018    (goal LDL reduction with dx if diabetes is 50% LDL reduction)        Moderate depression, he has known bipolar disorder. Follows with psychiatrist  [facility] 12/6/2017 5/8/2017 2/8/2017 11/8/2016   PHQ2 Score 3 2 6 4   PHQ9 Score 10 8 17 15     Interpretation of Total Score Depression Severity: 1-4 = Minimal depression, 5-9 = Mild depression, 10-14 = Moderate depression, 15-19 = Moderately severe depression, 20-27 = Severe depression        The patient's past medical, surgical, social, and family history as well as his   current medications and allergies were reviewed as documented in today's encounter. Medications, labs, diagnostic studies, consultations and follow-up as documented in this encounter. Return for KEEP APPT. Patient was seen with total face to face time of  25 minutes. More than 50% of this visit was counseling and education. Future Appointments  Date Time Provider Maribell Duran   2/19/2018 10:00 AM SCHEDULE, [facility] fp sc [facility]   2/20/2018 7:30 AM [facility] VASCULAR RM [address]   3/7/2018 7:30 AM [facility]       This note was completed by using the assistance of a speech-recognition Dragon program. However, inadvertent computerized transcription errors may be present.  Although every effort was made to ensure accuracy, no guarantees can be provided that every mistake has been identified and corrected by editing   Electronically signed by Belen Meza MD on 2/16/2018 at 5:19 PM

## 2018-02-16 NOTE — PROGRESS NOTES
HYPERTENSION visit     BP Readings from Last 3 Encounters:   02/13/18 114/79   01/19/18 108/80   01/16/18 101/81       LDL Cholesterol (mg/dL)   Date Value   01/25/2018 160 (H)     HDL (mg/dL)   Date Value   01/25/2018 46     BUN (mg/dL)   Date Value   01/25/2018 15     CREATININE (mg/dL)   Date Value   01/25/2018 0.89     Glucose (mg/dL)   Date Value   01/25/2018 90              Have you changed or started any medications since your last visit including any over-the-counter medicines, vitamins, or herbal medicines? no   Have you stopped taking any of your medications? Is so, why? -  no  Are you having any side effects from any of your medications? - no    Have you seen any other physician or provider since your last visit?  no   Have you had any other diagnostic tests since your last visit? NO   Have you been seen in the emergency room and/or had an admission in a hospital since we last saw you?  no   Have you had your routine dental cleaning in the past 6 months?  yes -      Do you have an active MyChart account? If no, what is the barrier?   Yes    Patient Care Team:  Que Lynn MD as PCP - General (Family Medicine)  Vasyl Lawson MD (Dermatology)    Medical History Review  Past Medical, Family, and Social History reviewed and does contribute to the patient presenting condition    Health Maintenance   Topic Date Due    Potassium monitoring  01/25/2019    Creatinine monitoring  01/25/2019    Lipid screen  01/25/2023    DTaP/Tdap/Td vaccine (2 - Td) 01/01/2024    Flu vaccine  Completed    Pneumococcal med risk  Completed    HIV screen  Completed

## 2018-02-16 NOTE — PATIENT INSTRUCTIONS
Patient Education        Supraventricular Tachycardia: Care Instructions  Your Care Instructions    Having supraventricular tachycardia (SVT) means that from time to time your heart beats abnormally fast. This fast rhythm is caused by changes in the electrical system of your heart. You may feel a fluttering in your chest (palpitations) and have a fast pulse. When your heart is beating fast, you may feel anxious and lightheaded, be short of breath, and feel discomfort in the chest.  Your doctor may prescribe medicines to help slow down your heartbeat. Your doctor may also suggest you try vagal maneuvers when having an episode of SVT. These are things, like bearing down, that might help slow your heart rate. Bearing down means that you try to breathe out with your stomach muscles but you don't let air out of your nose or mouth. Your doctor can show you how to do vagal maneuvers. He or she may suggest you lie down on your back to do them. In some cases, either cardioversion treatment or a procedure called catheter ablation is done to correct SVT. Your doctor may ask you to wear a small electronic device for 1 or 2 days to monitor your heart. It is called a Holter monitor. Follow-up care is a key part of your treatment and safety. Be sure to make and go to all appointments, and call your doctor if you are having problems. It's also a good idea to know your test results and keep a list of the medicines you take. How can you care for yourself at home? · Be safe with medicines. Take your medicines exactly as prescribed. Call your doctor if you think you are having a problem with your medicine. You will get more details on the specific medicines your doctor prescribes. · If your doctor showed you how to do vagal maneuvers, try them when you have an episode. These maneuvers include bearing down or putting an ice-cold, wet towel on your face. · Monitor your condition by keeping a diary of your SVT episodes.  Bring

## 2018-02-17 DIAGNOSIS — R94.31 ABNORMAL EKG: Primary | ICD-10-CM

## 2018-02-17 LAB
EKG ATRIAL RATE: 120 BPM
EKG ATRIAL RATE: 129 BPM
EKG P AXIS: 29 DEGREES
EKG P AXIS: 38 DEGREES
EKG P-R INTERVAL: 138 MS
EKG P-R INTERVAL: 140 MS
EKG Q-T INTERVAL: 278 MS
EKG Q-T INTERVAL: 280 MS
EKG QRS DURATION: 68 MS
EKG QRS DURATION: 68 MS
EKG QTC CALCULATION (BAZETT): 395 MS
EKG QTC CALCULATION (BAZETT): 407 MS
EKG R AXIS: 0 DEGREES
EKG R AXIS: 3 DEGREES
EKG T AXIS: 42 DEGREES
EKG T AXIS: 56 DEGREES
EKG VENTRICULAR RATE: 120 BPM
EKG VENTRICULAR RATE: 129 BPM

## 2018-02-19 ENCOUNTER — HOSPITAL ENCOUNTER (OUTPATIENT)
Age: 48
Discharge: HOME | End: 2018-02-19
Payer: MEDICARE

## 2018-02-19 ENCOUNTER — TELEPHONE (OUTPATIENT)
Dept: FAMILY MEDICINE CLINIC | Age: 48
End: 2018-02-19

## 2018-02-19 ENCOUNTER — NURSE ONLY (OUTPATIENT)
Dept: FAMILY MEDICINE CLINIC | Age: 48
End: 2018-02-19
Payer: MEDICARE

## 2018-02-19 VITALS — DIASTOLIC BLOOD PRESSURE: 100 MMHG | SYSTOLIC BLOOD PRESSURE: 143 MMHG | OXYGEN SATURATION: 99 % | HEART RATE: 150 BPM

## 2018-02-19 DIAGNOSIS — I10 ESSENTIAL HYPERTENSION: ICD-10-CM

## 2018-02-19 DIAGNOSIS — R00.0 TACHYCARDIA: ICD-10-CM

## 2018-02-19 DIAGNOSIS — R00.0 TACHYCARDIA: Primary | ICD-10-CM

## 2018-02-19 DIAGNOSIS — I15.9 SECONDARY HYPERTENSION: ICD-10-CM

## 2018-02-19 LAB
-: ABNORMAL
AMORPHOUS: ABNORMAL
BACTERIA: ABNORMAL
BILIRUBIN URINE: NEGATIVE
CASTS UA: ABNORMAL /LPF
COLOR: YELLOW
COMMENT UA: ABNORMAL
CORTISOL COLLECTION INFO: NORMAL
CORTISOL: 8.3 UG/DL (ref 2.7–18.4)
CRYSTALS, UA: ABNORMAL /HPF
EPITHELIAL CELLS UA: ABNORMAL /HPF
GLUCOSE URINE: NEGATIVE
KETONES, URINE: NEGATIVE
LEUKOCYTE ESTERASE, URINE: NEGATIVE
MUCUS: ABNORMAL
NITRITE, URINE: NEGATIVE
OTHER OBSERVATIONS UA: ABNORMAL
PH UA: 6.5 (ref 5–8)
PROTEIN UA: NEGATIVE
RBC UA: ABNORMAL /HPF
RENAL EPITHELIAL, UA: ABNORMAL /HPF
SPECIFIC GRAVITY UA: 1.01 (ref 1–1.03)
TRICHOMONAS: ABNORMAL
TSH SERPL DL<=0.05 MIU/L-ACNC: 2.36 MIU/L (ref 0.3–5)
TURBIDITY: CLEAR
URINE HGB: NEGATIVE
UROBILINOGEN, URINE: NORMAL
WBC UA: ABNORMAL /HPF
YEAST: ABNORMAL

## 2018-02-19 PROCEDURE — 99211 OFF/OP EST MAY X REQ PHY/QHP: CPT | Performed by: FAMILY MEDICINE

## 2018-02-19 PROCEDURE — 81001 URINALYSIS AUTO W/SCOPE: CPT

## 2018-02-19 PROCEDURE — 36415 COLL VENOUS BLD VENIPUNCTURE: CPT

## 2018-02-19 PROCEDURE — 84443 ASSAY THYROID STIM HORMONE: CPT

## 2018-02-19 PROCEDURE — 82533 TOTAL CORTISOL: CPT

## 2018-02-19 PROCEDURE — 83835 ASSAY OF METANEPHRINES: CPT

## 2018-02-19 PROCEDURE — 82088 ASSAY OF ALDOSTERONE: CPT

## 2018-02-19 PROCEDURE — 84244 ASSAY OF RENIN: CPT

## 2018-02-19 NOTE — TELEPHONE ENCOUNTER
Please call [facility] and try to have patient scheduled ASAP due to tachycardia, persistent-REFERRAL IS IN. Patient had a recent ED visit due to tachycardia and high blood pressure. He was seen today in the office for a nurse visit and he is still tachycardic. I started patient on Lopressor and continues with clonidine. I will order a Holter monitor right now, please let patient know. Please let patient know, if the heart rate stays above 100 by tomorrow at noon, to call us and I will increase the Lopressor dose  .   BP Readings from Last 3 Encounters:   02/19/18 (!) 143/100   02/16/18 (!) 171/94   02/13/18 114/79        Pulse Readings from Last 3 Encounters:   02/19/18 150   02/16/18 158   02/13/18 88       Wt Readings from Last 3 Encounters:   02/16/18 176 lb 12.8 oz (80.2 kg)   02/13/18 175 lb (79.4 kg)   01/19/18 174 lb (78.9 kg)     Future Appointments  Date Time Provider Maribell Duran   2/20/2018 7:30 AM [facility] VASCULAR  [address]   2/23/2018 10:00 AM SCHEDULE, [facility] fp sc [facility]   3/7/2018 7:30 AM MD jean paul Ross sc M[facility]OLPP   3/27/2018 8:30 AM Jackie Valenzuela, [address] [place] [address] 71

## 2018-02-19 NOTE — PROGRESS NOTES
Patient is here for blood pressure check. 1. Tachycardia    - metoprolol tartrate (LOPRESSOR) 25 MG tablet; Take 1 tablet by mouth 2 times daily  Dispense: 60 tablet; Refill: 3    2. Essential hypertension    - metoprolol tartrate (LOPRESSOR) 25 MG tablet; Take 1 tablet by mouth 2 times daily  Dispense: 60 tablet; Refill: 3    Continue clonidine 0.1 mg bid po      UNcontrolled BP     Patient was advised by me  to start daily Lopressor 25 mg and return in 3 days  He says his BP and heart rate where better when he was taking Lopressor and Clonidine together    Hamilton White was referred to cardiology and Roberth Rice will refax referral.        BP Readings from Last 3 Encounters:   02/19/18 (!) 143/100   02/16/18 (!) 171/94   02/13/18 114/79       Pulse Readings from Last 3 Encounters:   02/19/18 150   02/16/18 158   02/13/18 88       Needs nurse visit appointment for BP check in 1 week      Future Appointments  Date Time Provider Maribell Duran   2/20/2018 7:30 AM [facility] VASCULAR RM [address]   2/23/2018 10:00 AM SCHEDULE, [facility] FP  [facility]   3/7/2018 7:30 AM MD jean paul Goodman sc M[facility]OLPMONA   3/27/2018 8:30 AM Esme Edouard PT [facility] PT [facility]  Rosaline Mloina, [address] [place] [address]  Rajani Cho MD             Pt was advise to check pharmacy for Lopressor 25 mg BID. Pt states will return in 3 days for BP Check.

## 2018-02-20 ENCOUNTER — TELEPHONE (OUTPATIENT)
Dept: FAMILY MEDICINE CLINIC | Age: 48
End: 2018-02-20

## 2018-02-20 ENCOUNTER — HOSPITAL ENCOUNTER (OUTPATIENT)
Dept: VASCULAR LAB | Age: 48
Discharge: HOME | End: 2018-02-20
Payer: MEDICARE

## 2018-02-20 DIAGNOSIS — R00.0 TACHYCARDIA: ICD-10-CM

## 2018-02-20 DIAGNOSIS — I15.9 SECONDARY HYPERTENSION: ICD-10-CM

## 2018-02-20 PROCEDURE — 93975 VASCULAR STUDY: CPT

## 2018-02-20 NOTE — TELEPHONE ENCOUNTER
Ankita Hudson          2/20/18 8:58 AM   Note      appt made for Franklin Rehman today at 2 pm [place] office   Patient informed   [id number] [place]                 2/20/18 9:10 AM   Beata Pollack routed this conversation to Me   Me          2/20/18 12:22 PM   Note      Noted   Great!!!

## 2018-02-21 LAB
ALDOSTERONE COMMENT: NORMAL
ALDOSTERONE: 14.5 NG/DL
RENIN ACTIVITY: 0.4 NG/ML/HR
RENIN COMMENT: NORMAL

## 2018-02-22 LAB
METANEPH/PLASMA INTERP: NORMAL
METANEPHRINE: 0.11 NMOL/L (ref 0–0.49)
NORMETANEPHRINE PLASMA: 0.26 NMOL/L (ref 0–0.89)

## 2018-02-23 ENCOUNTER — HOSPITAL ENCOUNTER (OUTPATIENT)
Dept: NON INVASIVE DIAGNOSTICS | Age: 48
Discharge: HOME | End: 2018-02-23
Payer: MEDICARE

## 2018-02-23 ENCOUNTER — NURSE ONLY (OUTPATIENT)
Dept: FAMILY MEDICINE CLINIC | Age: 48
End: 2018-02-23
Payer: MEDICARE

## 2018-02-23 VITALS — HEART RATE: 123 BPM | SYSTOLIC BLOOD PRESSURE: 118 MMHG | DIASTOLIC BLOOD PRESSURE: 90 MMHG

## 2018-02-23 DIAGNOSIS — R00.0 TACHYCARDIA: ICD-10-CM

## 2018-02-23 DIAGNOSIS — I10 ESSENTIAL HYPERTENSION: ICD-10-CM

## 2018-02-23 PROCEDURE — 99211 OFF/OP EST MAY X REQ PHY/QHP: CPT | Performed by: FAMILY MEDICINE

## 2018-02-23 PROCEDURE — 93226 XTRNL ECG REC<48 HR SCAN A/R: CPT

## 2018-02-23 PROCEDURE — 93225 XTRNL ECG REC<48 HRS REC: CPT

## 2018-02-23 RX ORDER — METOPROLOL TARTRATE 50 MG/1
50 TABLET, FILM COATED ORAL 2 TIMES DAILY
Qty: 60 TABLET | Refills: 1 | Status: SHIPPED | OUTPATIENT
Start: 2018-02-23 | End: 2018-02-26 | Stop reason: SDUPTHER

## 2018-02-23 RX ORDER — AMLODIPINE BESYLATE 10 MG/1
10 TABLET ORAL DAILY
Qty: 30 TABLET | Refills: 0
Start: 2018-02-23 | End: 2019-06-27 | Stop reason: SINTOL

## 2018-02-23 NOTE — PROGRESS NOTES
Patient here for a bp and pulse check he did take his medicationLopressor 25 mg and Norvasc 10 mg which was ordered by his Cardiologist Dr. Gregory Deleon.  Also did take him off of the clonidine 0.1 mg . Patient coming in for a nurse visit on Monday 2/26/2018 for a bp and pulse check.   I did inform him that the Lopresson was increased to 50 mg daily and a new script was sent to his pharmacy

## 2018-02-26 ENCOUNTER — NURSE ONLY (OUTPATIENT)
Dept: FAMILY MEDICINE CLINIC | Age: 48
End: 2018-02-26
Payer: MEDICARE

## 2018-02-26 VITALS — SYSTOLIC BLOOD PRESSURE: 130 MMHG | HEART RATE: 109 BPM | DIASTOLIC BLOOD PRESSURE: 88 MMHG

## 2018-02-26 DIAGNOSIS — R00.0 TACHYCARDIA: ICD-10-CM

## 2018-02-26 DIAGNOSIS — I10 ESSENTIAL HYPERTENSION: ICD-10-CM

## 2018-02-26 PROCEDURE — 99211 OFF/OP EST MAY X REQ PHY/QHP: CPT | Performed by: FAMILY MEDICINE

## 2018-02-26 RX ORDER — METOPROLOL TARTRATE 50 MG/1
75 TABLET, FILM COATED ORAL 2 TIMES DAILY
Qty: 90 TABLET | Refills: 0 | Status: SHIPPED | OUTPATIENT
Start: 2018-02-26 | End: 2018-03-07 | Stop reason: ALTCHOICE

## 2018-02-26 NOTE — PROGRESS NOTES
Patient is here for blood pressure check. 1. Tachycardia    - metoprolol tartrate (LOPRESSOR) 50 MG tablet; Take 1.5 tablets by mouth 2 times daily Dose increased 2/26/2018  Dispense: 90 tablet; Refill: 0    2. Essential hypertension    - metoprolol tartrate (LOPRESSOR) 50 MG tablet; Take 1.5 tablets by mouth 2 times daily Dose increased 2/26/2018  Dispense: 90 tablet;  Refill: 0        UNcontrolled BP     Patient was advised by Annalee Collet to Increased dose of  Lopressor from 50 mg to 75 mg BID       BP Readings from Last 3 Encounters:   02/26/18 130/88   02/23/18 (!) 118/90   02/19/18 (!) 143/100       Pulse Readings from Last 3 Encounters:   02/26/18 109   02/23/18 123   02/19/18 150       Needs nurse visit appointment for BP check in 1 week      Future Appointments  Date Time Provider Maribell Duran   2/28/2018 10:30 AM Marlyn Painting PT [facility] PT Berta Gifford   3/7/2018 7:30 AM Yakov Noble MD fp sc [facility]OLPP   3/27/2018 8:30 AM Umberto Peraza, [address] [place] [address]

## 2018-02-28 ENCOUNTER — HOSPITAL ENCOUNTER (OUTPATIENT)
Dept: PHYSICAL THERAPY | Age: 48
Setting detail: THERAPIES SERIES
Discharge: HOME | End: 2018-02-28
Payer: MEDICARE

## 2018-02-28 LAB
ACQUISITION DURATION: NORMAL S
AVERAGE HEART RATE: 94 BPM
HOOKUP DATE: NORMAL
HOOKUP TIME: NORMAL
MAX HEART RATE TIME/DATE: NORMAL
MAX HEART RATE: 151 BPM
MIN HEART RATE TIME/DATE: NORMAL
MIN HEART RATE: 67 BPM
NUMBER OF QRS COMPLEXES: NORMAL
NUMBER OF SUPRAVENTRICULAR BEATS IN RUNS: 0
NUMBER OF SUPRAVENTRICULAR COUPLETS: 0
NUMBER OF SUPRAVENTRICULAR ECTOPICS: 1
NUMBER OF SUPRAVENTRICULAR ISOLATED BEATS: 1
NUMBER OF SUPRAVENTRICULAR RUNS: 0
NUMBER OF VENTRICULAR BEATS IN RUNS: 0
NUMBER OF VENTRICULAR BIGEMINAL CYCLES: 0
NUMBER OF VENTRICULAR COUPLETS: 0
NUMBER OF VENTRICULAR ECTOPICS: 3
NUMBER OF VENTRICULAR ISOLATED BEATS: 3
NUMBER OF VENTRICULAR RUNS: 0

## 2018-02-28 PROCEDURE — 97161 PT EVAL LOW COMPLEX 20 MIN: CPT

## 2018-02-28 PROCEDURE — 97110 THERAPEUTIC EXERCISES: CPT

## 2018-02-28 ASSESSMENT — PAIN SCALES - GENERAL: PAINLEVEL_OUTOF10: 5

## 2018-02-28 ASSESSMENT — PAIN DESCRIPTION - FREQUENCY: FREQUENCY: CONTINUOUS

## 2018-02-28 ASSESSMENT — PAIN DESCRIPTION - LOCATION: LOCATION: SHOULDER;ANKLE

## 2018-02-28 ASSESSMENT — PAIN DESCRIPTION - ORIENTATION: ORIENTATION: RIGHT

## 2018-02-28 ASSESSMENT — PAIN DESCRIPTION - DESCRIPTORS: DESCRIPTORS: CONSTANT;ACHING;DULL

## 2018-02-28 NOTE — PROGRESS NOTES
achilles surgery,R cassidy pain since 2003  Exam: limited ROM and strength R ankle and cassidy  Clinical Presentation: optimal instrument 12/15  Patient Education: ROM and strengthening ex R ankle and cassidy  Barriers to Learning: none  REQUIRES PT FOLLOW UP: Yes  Treatment Initiated : therapeutic ex to R cassidy and ankle  Discharge Recommendations: Home independently  Activity Tolerance  Activity Tolerance: Patient Tolerated treatment well         Plan   Plan  Times per week: 1-2x/week(allowed only 30 visits/yr  Current Treatment Recommendations: Strengthening, ROM, Home Exercise Program  Plan Comment: instructed in HEP              Goals  Short term goals  Time Frame for Short term goals: 6 visits  Short term goal 1: decrease pain R ankle and R cassidy by 1-2 level so patient can do ADL easier  Short term goal 2: increase AROM R ankle by 5-10  Short term goal 3: increase strength R ankle and cassidy by 1/2 grade  Short term goal 4: indep with HEP  Long term goals  Time Frame for Long term goals : 30 visits  Long term goal 1: full AROM R cassidy and ankle   Long term goal 2: 5/5 strength R ankle and cassidy  Long term goal 3: improve optimal instrument from 12/15 to 3/15(walk long distance 4 stairs 4 lifting 4)  Patient Goals   Patient goals : increase ROM and strength R ankle and Cassidy     Treatment Charges: Minutes Units   []  Ultrasound     []  Electrical-Stim     []  Iontophoresis     []  Traction     []  Massage       [x]  Eval 20 1   []  Gait     [x]  Ther Exercise 45  3    []  Manual Therapy       []  Ther Activities       []  Aquatics     []  Neuro Re-Ed       []  Other       Total Treatment Time: 65 4        Therapy Time   Individual Concurrent Group Co-treatment   Time In 1030         Time Out 1135         Minutes 65         Timed Code Treatment Minutes: 45 Minutes    Patient Goals:increase strength and ROM R ankle and cassidy    Comments/Assessment:    Rehab Potential:  [x] Good  [] Fair  [] Poor   Suggested Professional Referral:  [x] No

## 2018-03-02 ENCOUNTER — NURSE ONLY (OUTPATIENT)
Dept: FAMILY MEDICINE CLINIC | Age: 48
End: 2018-03-02
Payer: MEDICARE

## 2018-03-02 VITALS — SYSTOLIC BLOOD PRESSURE: 128 MMHG | HEART RATE: 97 BPM | DIASTOLIC BLOOD PRESSURE: 93 MMHG

## 2018-03-02 DIAGNOSIS — R00.0 TACHYCARDIA: ICD-10-CM

## 2018-03-02 DIAGNOSIS — I10 ESSENTIAL HYPERTENSION: Primary | ICD-10-CM

## 2018-03-02 PROCEDURE — 99211 OFF/OP EST MAY X REQ PHY/QHP: CPT | Performed by: FAMILY MEDICINE

## 2018-03-07 ENCOUNTER — OFFICE VISIT (OUTPATIENT)
Dept: FAMILY MEDICINE CLINIC | Age: 48
End: 2018-03-07
Payer: MEDICARE

## 2018-03-07 VITALS
BODY MASS INDEX: 25.11 KG/M2 | HEART RATE: 99 BPM | SYSTOLIC BLOOD PRESSURE: 134 MMHG | OXYGEN SATURATION: 98 % | HEIGHT: 71 IN | DIASTOLIC BLOOD PRESSURE: 88 MMHG | WEIGHT: 179.4 LBS | TEMPERATURE: 97.5 F

## 2018-03-07 DIAGNOSIS — R73.9 HYPERGLYCEMIA: ICD-10-CM

## 2018-03-07 DIAGNOSIS — M79.7 FIBROMYALGIA MUSCLE PAIN: ICD-10-CM

## 2018-03-07 DIAGNOSIS — R00.0 SINUS TACHYCARDIA: Primary | ICD-10-CM

## 2018-03-07 DIAGNOSIS — E78.5 HYPERLIPIDEMIA WITH TARGET LDL LESS THAN 130: ICD-10-CM

## 2018-03-07 DIAGNOSIS — I10 ESSENTIAL HYPERTENSION: ICD-10-CM

## 2018-03-07 DIAGNOSIS — F41.9 ANXIETY: ICD-10-CM

## 2018-03-07 PROCEDURE — G8427 DOCREV CUR MEDS BY ELIG CLIN: HCPCS | Performed by: FAMILY MEDICINE

## 2018-03-07 PROCEDURE — G8419 CALC BMI OUT NRM PARAM NOF/U: HCPCS | Performed by: FAMILY MEDICINE

## 2018-03-07 PROCEDURE — 1036F TOBACCO NON-USER: CPT | Performed by: FAMILY MEDICINE

## 2018-03-07 PROCEDURE — G8484 FLU IMMUNIZE NO ADMIN: HCPCS | Performed by: FAMILY MEDICINE

## 2018-03-07 PROCEDURE — 99214 OFFICE O/P EST MOD 30 MIN: CPT | Performed by: FAMILY MEDICINE

## 2018-03-07 ASSESSMENT — ENCOUNTER SYMPTOMS
ABDOMINAL PAIN: 0
DIARRHEA: 0
CHEST TIGHTNESS: 0
ABDOMINAL DISTENTION: 0
COUGH: 0
NAUSEA: 0
WHEEZING: 0
CONSTIPATION: 0
VOMITING: 0
SHORTNESS OF BREATH: 0

## 2018-03-07 NOTE — PATIENT INSTRUCTIONS
your doctor if you think you are having a problem with your medicine. When should you call for help? Call 911 anytime you think you may need emergency care. For example, call if:  ? · You passed out (lost consciousness). ? · You have symptoms of a heart attack. These may include:  ¨ Chest pain or pressure, or a strange feeling in the chest.  ¨ Sweating. ¨ Shortness of breath. ¨ Pain, pressure, or a strange feeling in the back, neck, jaw, or upper belly or in one or both shoulders or arms. ¨ Lightheadedness or sudden weakness. ¨ A fast or irregular heartbeat. After you call 911, the  may tell you to chew 1 adult-strength or 2 to 4 low-dose aspirin. Wait for an ambulance. Do not try to drive yourself. ? · You have symptoms of a stroke. These may include:  ¨ Sudden numbness, tingling, weakness, or loss of movement in your face, arm, or leg, especially on only one side of your body. ¨ Sudden vision changes. ¨ Sudden trouble speaking. ¨ Sudden confusion or trouble understanding simple statements. ¨ Sudden problems with walking or balance. ¨ A sudden, severe headache that is different from past headaches. ?Call your doctor now or seek immediate medical care if:  ? · You have heart palpitations and:  ¨ Are dizzy or lightheaded, or you feel like you may faint. ¨ Have new or increased shortness of breath. ? Watch closely for changes in your health, and be sure to contact your doctor if:  ? · You continue to have heart palpitations. Where can you learn more? Go to https://[employer]. org and sign in to your [employer] account. Enter R508 in the [facility] box to learn more about \"Palpitations: Care Instructions. \"     If you do not have an account, please click on the \"Sign Up Now\" link. Current as of: September 21, 2016  Content Version: 11.5  © 7264-2189 [employer]. Care instructions adapted under license by [facility] ([facility]).  If you have questions about a

## 2018-03-07 NOTE — PROGRESS NOTES
Chief Complaint   Patient presents with    Hypertension     May need refill on lopressor, dosage was changed?  Tachycardia    Hyperlipidemia    Chronic Pain     fibromyalgia       Aidee Hernandez  here today for follow up on chronic medical problems, go over labs and/or diagnostic studies, and medication refills. Hypertension (May need refill on lopressor, dosage was changed? ); Tachycardia; Hyperlipidemia; and Chronic Pain (fibromyalgia)      Hypertension: Patient here for follow-up of elevated blood pressure. He is exercising and is adherent to low salt diet. Blood pressure is well controlled at home 130/85  Pulse at home low 90's  Cardiac symptoms fatigue. Patient denies chest pain, chest pressure/discomfort, claudication, dyspnea, exertional chest pressure/discomfort, irregular heart beat, lower extremity edema, near-syncope, orthopnea, palpitations, paroxysmal nocturnal dyspnea, syncope and tachypnea. Cardiovascular risk factors: dyslipidemia, hypertension and male gender. Use of agents associated with hypertension: none and Effexor XR. History of target organ damage: none. Hypertension workup was negative  Stopped effexor, but OK per cardio to restart, he is currently taking 75 mg ER. Was drinking a lot of pop, but he stopped drinking pop and and all caffeine-based beverages   Has known fibromyalgia with chronic right shoulder pain and right Achilles tendon pain, Reports the pain is at baseline. Pain level 3/10 today, as he just took his medications. Pain is better controlled. I received a note from cardiologist saying that it might have been rebound tachycardia and hypertension from clonidine, from missing a dose or two.     Secondary hypertension workup was normal, with normal cortisol, renin in, metanephrine, aldosterone, TSH and urine    BP is borderline high    BP Readings from Last 3 Encounters:   03/07/18 134/88   03/02/18 (!) 128/93   02/26/18 130/88      Mild tachycardia improved from prior  Pulse Readings from Last 3 Encounters:   03/07/18 99   03/02/18 97   02/26/18 109     Weight has been stable  Wt Readings from Last 3 Encounters:   03/07/18 179 lb 6.4 oz (81.4 kg)   02/16/18 176 lb 12.8 oz (80.2 kg)   02/13/18 175 lb (79.4 kg)     HolterOn 2/23/18-Was significant just for sinus tachycardia no significant arrhythmia    BASIC RHYTHM NSR  MIN HR 67, AVE [address] , /MIN. (SINUS TACHYCARDIA )  1 ISOLATED APC .   3 ISOLATED VPC . NO SVT , VT .   NO SYMPTOM DIARY . Echo2d on 10/28/17-was within normal limits  Summary  Normal left ventricle size, wall thickness and function with an estimated EF  > 55%. No segmental wall motion abnormalities seen. Normal right ventricular size and function. No significant valvular regurgitation or stenosis seen. No significant pericardial effusion is seen.      -vital signs stable and within normal limits except  borderline high blood pressure and pulse  /88   Pulse 99   Temp 97.5 °F (36.4 °C) (Tympanic)   Ht 5' 11\" (1.803 m)   Wt 179 lb 6.4 oz (81.4 kg)   SpO2 98% Comment: ra @ rest  BMI 25.02 kg/m²   Body mass index is 25.02 kg/m². Discussed testing with the patient and all questions fully answered.   Holter significant just for sinus tachycardia  Hospital Outpatient Visit on 02/23/2018   Component Date Value Ref Range Status    Hookup Date 02/23/2018 Feb 23 2018    Final    Hookup Time 02/23/2018 13:14:00   Final    Acquisition Duration 02/23/2018 117168  S Final    Number Of QRS Complexes 02/23/2018 [id number]   Final    Number Of Ventricular Ectopics 02/23/2018 3   Final    Number Of Ventricular Isolated Leilani* 02/23/2018 3   Final    Number Of Ventricular Bigeminal Cy* 02/23/2018 0   Final    Number Of Ventricular Couplets 02/23/2018 0   Final    Number Of Ventricular Runs 02/23/2018 0   Final    Number Of Ventricular Beats In Runs 02/23/2018 0   Final    Number Of Superventricular Ectopics 02/23/2018 1   Final    Number Of Suptaventricular Isolate* 02/23/2018 1   Final    Number Of Supraventricular Couplets 02/23/2018 0   Final    Number Of Supraventricular Runs 02/23/2018 0   Final    Number Of Supraventricular Beats I* 02/23/2018 0   Final    Average Heart Rate 02/23/2018 94  BPM Final    Max Heart Rate 02/23/2018 151  BPM Final    Min Heart Rate 02/23/2018 67  BPM Final    Max Heart Rate Time/Date 02/23/2018 Feb 23 2018 20:52:02   Final    Min Heart Rate Time/Date 02/23/2018 Feb 24 2018 07:23:14   Final           Lab Results   Component Value Date    WBC 8.0 01/25/2018    HGB 15.1 01/25/2018    HCT 44.0 01/25/2018    MCV 94.7 01/25/2018     01/25/2018       Lab Results   Component Value Date     01/25/2018    K 4.4 01/25/2018     01/25/2018    CO2 25 01/25/2018    BUN 15 01/25/2018    CREATININE 0.89 01/25/2018    GLUCOSE 90 01/25/2018    CALCIUM 9.5 01/25/2018        Lab Results   Component Value Date    ALT 19 01/25/2018    AST 22 01/25/2018    ALKPHOS 62 01/25/2018    BILITOT 0.39 01/25/2018       Lab Results   Component Value Date    TSH 2.36 02/19/2018     Hyperlipidemia . he  is already on statin, tolerates it well, denies significant muscle cramps. Reports compliance with statin.      Lab Results   Component Value Date    CHOL 243 (H) 01/25/2018    CHOL 265 (H) 10/20/2017    CHOL 204 (H) 05/01/2017     Lab Results   Component Value Date    TRIG 183 (H) 01/25/2018    TRIG 125 10/20/2017    TRIG 179 (H) 05/01/2017     Lab Results   Component Value Date    HDL 46 01/25/2018    HDL 52 10/20/2017    HDL 44 05/01/2017     Lab Results   Component Value Date    LDLCHOLESTEROL 160 (H) 01/25/2018    LDLCHOLESTEROL 188 (H) 10/20/2017    LDLCHOLESTEROL 124 05/01/2017       Lab Results   Component Value Date    CHOLHDLRATIO 5.3 (H) 01/25/2018    CHOLHDLRATIO 5.1 (H) 10/20/2017    CHOLHDLRATIO 4.6 05/01/2017         No results found for: [id number]  No results found for: FOLATE  No results found for: AYAZ41        No Known Allergies     Current Outpatient Prescriptions   Medication Sig Dispense Refill    metoprolol tartrate (LOPRESSOR) 50 MG tablet Take 1.5 tablets by mouth 2 times daily Dose increased 2/26/2018 90 tablet 0    amLODIPine (NORVASC) 10 MG tablet Take 1 tablet by mouth daily Per cardio 30 tablet 0    divalproex (DEPAKOTE ER) 250 MG extended release tablet Take 750 mg by mouth daily      vitamin B-12 (CYANOCOBALAMIN) 1000 MCG tablet Take 1,000 mcg by mouth daily      Ginkgo Biloba (GINKOBA PO) Take by mouth      pravastatin (PRAVACHOL) 80 MG tablet TAKE 1 TABLET BY MOUTH IN THE EVENING 90 tablet 3    gabapentin (NEURONTIN) 300 MG capsule 300 mg 3 times daily . 0    cyclobenzaprine (FLEXERIL) 10 MG tablet Take 1 tablet by mouth 3 times daily as needed for Muscle spasms 90 tablet 3    oxyCODONE-acetaminophen (PERCOCET) 5-325 MG per tablet take 1 tablet by mouth four times a day if needed  0    fexofenadine (ALLEGRA ALLERGY) 180 MG tablet Take 1 tablet by mouth daily 30 tablet 3    fluticasone (FLONASE) 50 MCG/ACT nasal spray 2 sprays by Nasal route daily 1 Bottle 3    Adapalene 0.3 % GEL   1    ARIPiprazole (ABILIFY) 20 MG tablet   0    benztropine (COGENTIN) 1 MG tablet   0    erythromycin with ethanol (THERAMYCIN) 2 % external solution apply topically as directed every morning  0    docusate sodium (COLACE) 100 MG capsule Take 1 capsule by mouth 2 times daily as needed for Constipation 60 capsule 3    clindamycin (CLEOCIN T) 1 % external solution       ibuprofen (ADVIL;MOTRIN) 800 MG tablet Take 1 tablet by mouth every 8 hours as needed for Pain 90 tablet 0    busPIRone (BUSPAR) 10 MG tablet Take 1 tablet by mouth 2 times daily as needed 60 tablet 0    aluminum chloride (DRYSOL) 20 % external solution Apply topically nightly. 37.5 mL 3    Omega-3 Fatty Acids (OMEGA-3 FISH OIL) 1200 MG CAPS Take  by mouth 2 times daily.        No current facility-administered medications for this visit. Social History     Social History    Marital status: Single     Spouse name: N/A    Number of children: N/A    Years of education: N/A     Occupational History    Not on file. Social History Main Topics    Smoking status: Former Smoker     Quit date: 8/1/2002    Smokeless tobacco: Never Used    Alcohol use No    Drug use: No    Sexual activity: Yes     Partners: Female      Comment:      Other Topics Concern    Not on file     Social History Narrative    No narrative on file     Counseling given: Yes        Family History   Problem Relation Age of Onset    Cancer Mother 34     leukemia    Heart Disease Father 61     triple bipass    Breast Cancer Maternal Grandmother              He has known bipolar disorder and depression, stable from prior. He follows a psychiatrist  [facility] Scores 12/6/2017 5/8/2017 2/8/2017 11/8/2016   PHQ2 Score 3 2 6 4   PHQ9 Score 10 8 17 15     Interpretation of Total Score Depression Severity: 1-4 = Minimal depression, 5-9 = Mild depression, 10-14 = Moderate depression, 15-19 = Moderately severe depression, 20-27 = Severe depression    The patient's past medical, surgical, social, and family history as well as his current medications and allergies were reviewed as documented in today's encounter. Rest of complaints with corresponding details per ROS    Review of Systems   Constitutional: Positive for fatigue. Negative for activity change, appetite change, chills, diaphoresis, fever and unexpected weight change. Respiratory: Negative for cough, chest tightness, shortness of breath and wheezing. Cardiovascular: Negative for chest pain, palpitations and leg swelling. Gastrointestinal: Negative for abdominal distention, abdominal pain, constipation, diarrhea, nausea and vomiting. Endocrine: Negative for cold intolerance, heat intolerance, polydipsia, polyphagia and polyuria.    Musculoskeletal: Positive for arthralgias (right shoulder, right Chyna? Yes    Requested Prescriptions     Signed Prescriptions Disp Refills    metoprolol tartrate (LOPRESSOR) 25 MG tablet 540 tablet 3     Sig: Take 3 tablets by mouth 2 times daily       All patient questions answered. Patient voiced understanding. Quality Measures    Body mass index is 25.02 kg/m². Normal. Weight control planned discussed Healthy diet and regular exercise. BP: 134/88 Blood pressure is normal. Treatment plan consists of DASH Eating Plan, Dietary Sodium Restriction, Increased Physical Activity, Moderation in Alcohol Consumption, Patient In-home Blood Pressure Monitoring and No treatment change needed. Lab Results   Component Value Date    LDLCHOLESTEROL 160 (H) 01/25/2018    (goal LDL reduction with dx if diabetes is 50% LDL reduction)        Mild depression, has known bipolar disorder and severe anxiety,Continue current treatment and follow up with psychiatrist and psychologist as scheduled, possibly change Effexor X R to Effexor short-acting   PHQ Scores 12/6/2017 5/8/2017 2/8/2017 11/8/2016   PHQ2 Score 3 2 6 4   PHQ9 Score 10 8 17 15     Interpretation of Total Score Depression Severity: 1-4 = Minimal depression, 5-9 = Mild depression, 10-14 = Moderate depression, 15-19 = Moderately severe depression, 20-27 = Severe depression      The patient's past medical, surgical, social, and family history as well as his   current medications and allergies were reviewed as documented in today's encounter. Medications, labs, diagnostic studies, consultations and follow-up as documented in this encounter. Return in about 3 months (around 6/7/2018) for HTN, LABS F/U, anxiety. Patient was seen with total face to face time of  25 minutes. More than 50% of this visit was counseling and education.      Future Appointments  Date Time Provider Maribell Duran   3/8/2018 10:00 AM Marlyn Sanchez, PT [facility] Dennise Young   3/27/2018 8:30 AM Fidel Edwards 35 [facility] PT [facility]   6/7/2018 10:30 AM Oneyda Kuhn MD [email or web address]        This note was completed by using the assistance of a speech-recognition program. However, inadvertent computerized transcription errors may be present. Although every effort was made to ensure accuracy, no guarantees can be provided that every mistake has been identified and corrected by editing.     Electronically signed by Oneyda Kuhn MD on 3/8/2018 at 7:40 AM

## 2018-03-08 ENCOUNTER — HOSPITAL ENCOUNTER (OUTPATIENT)
Dept: PHYSICAL THERAPY | Age: 48
Setting detail: THERAPIES SERIES
Discharge: HOME | End: 2018-03-08
Payer: MEDICARE

## 2018-03-08 PROCEDURE — 97110 THERAPEUTIC EXERCISES: CPT

## 2018-03-08 ASSESSMENT — PAIN DESCRIPTION - FREQUENCY: FREQUENCY: CONTINUOUS

## 2018-03-08 ASSESSMENT — PAIN SCALES - GENERAL: PAINLEVEL_OUTOF10: 6

## 2018-03-08 ASSESSMENT — PAIN DESCRIPTION - ORIENTATION
ORIENTATION_2: RIGHT
ORIENTATION: RIGHT

## 2018-03-08 ASSESSMENT — PAIN DESCRIPTION - LOCATION
LOCATION: ANKLE
LOCATION_2: SHOULDER

## 2018-03-08 ASSESSMENT — PAIN DESCRIPTION - DESCRIPTORS
DESCRIPTORS: SHARP;STABBING;CONSTANT
DESCRIPTORS_2: DULL;CONSTANT

## 2018-03-08 ASSESSMENT — PAIN DESCRIPTION - DURATION: DURATION_2: CONTINUOUS

## 2018-03-08 ASSESSMENT — PAIN DESCRIPTION - INTENSITY: RATING_2: 7

## 2018-03-08 NOTE — PROGRESS NOTES
Physical Therapy  Daily Treatment Note  Date: 3/8/2018  Patient Name: Naomi Zaidi  MRN: [id number]     :   1970    Subjective:      PT Visit Information  Onset Date: 13  PT Insurance Information: [employer]   Total # of Visits Approved: 30  Total # of Visits to Date: 2  Subjective  Subjective: pain R ankle and R cassidy  Pain Screening  Patient Currently in Pain: Yes  Pain Assessment  Pain Assessment: 0-10  Pain Level: 6  Pain Location: Ankle  Pain Orientation: Right  Pain Descriptors: Malachy Kerbs; Stabbing;Constant  Pain Frequency: Continuous  Pain 2  Pain Rating 2: 7  Pain Location 2: Shoulder  Pain Orientation 2: Right  Pain Descriptors 2: Dull;Constant  Pain Duration 2: Continuous  Vital Signs  Patient Currently in Pain: Yes       Treatment Activities:   Exercises  Exercise 1: Free Motion 4-way Walking 20# 3x  Exercise 2: Slant Board Gastroc Stretch 3x30\"  Exercise 3: Slant board Rock DF/PF/Inv/Ever 1' each  Exercise 4: BAPS board L3 CW/CCW/DF/PF/Inv/ever 10x each  Exercise 5: Step Up 6\" F/L 10x  Exercise 6: 35# rows/pull down 3x10  Exercise 7: Chest Press/Row Seated 35# 2x15 each  Exercise 8: Lat Pull Down Seated 35# 2x15  Exercise 9: miguel ángel's R 2x15 4 ways 2#  Exercise 10:  Total Gym L10 squat 3x10  Exercise 11: Apley IR towel stretch R 3x30\"  Exercise 12: Leg Press/Calf Raise 35# BLE 3x10  Exercise 13: FreeMotion B LE 4 way hip 7# 10x each  Exercise 14: NuStep 6' L4  Exercise 15: SLS on Foam Eyes open B 3x30 sec  Exercise 16:  Lateral Dips 6\"10x B     Assessment:   Conditions Requiring Skilled Therapeutic Intervention  REQUIRES PT FOLLOW UP: Yes      Plan:    Plan  Times per week: 1-2x/week(allowed only 30 visits/yr  Current Treatment Recommendations: Strengthening, ROM, Home Exercise Program  Plan Comment: to continue PT per POC  Timed Code Treatment Minutes: 45 Minutes   Treatment Charges: Minutes Units   []  Ultrasound     []  Electrical-Stim     []  Iontophoresis     []  Traction     []

## 2018-03-13 ENCOUNTER — APPOINTMENT (OUTPATIENT)
Dept: GENERAL RADIOLOGY | Age: 48
End: 2018-03-13
Payer: COMMERCIAL

## 2018-03-13 ENCOUNTER — APPOINTMENT (OUTPATIENT)
Dept: CT IMAGING | Age: 48
End: 2018-03-13
Payer: COMMERCIAL

## 2018-03-13 ENCOUNTER — HOSPITAL ENCOUNTER (EMERGENCY)
Age: 48
Discharge: HOME | End: 2018-03-13
Attending: EMERGENCY MEDICINE
Payer: COMMERCIAL

## 2018-03-13 VITALS
DIASTOLIC BLOOD PRESSURE: 83 MMHG | HEIGHT: 71 IN | HEART RATE: 102 BPM | RESPIRATION RATE: 14 BRPM | WEIGHT: 175 LBS | TEMPERATURE: 98.1 F | BODY MASS INDEX: 24.5 KG/M2 | OXYGEN SATURATION: 98 % | SYSTOLIC BLOOD PRESSURE: 125 MMHG

## 2018-03-13 DIAGNOSIS — S80.02XA CONTUSION OF LEFT KNEE, INITIAL ENCOUNTER: ICD-10-CM

## 2018-03-13 DIAGNOSIS — S46.912A STRAIN OF LEFT SHOULDER, INITIAL ENCOUNTER: ICD-10-CM

## 2018-03-13 DIAGNOSIS — S16.1XXA ACUTE STRAIN OF NECK MUSCLE, INITIAL ENCOUNTER: Primary | ICD-10-CM

## 2018-03-13 PROCEDURE — 73562 X-RAY EXAM OF KNEE 3: CPT

## 2018-03-13 PROCEDURE — 99284 EMERGENCY DEPT VISIT MOD MDM: CPT

## 2018-03-13 PROCEDURE — 73030 X-RAY EXAM OF SHOULDER: CPT

## 2018-03-13 PROCEDURE — 72125 CT NECK SPINE W/O DYE: CPT

## 2018-03-13 RX ORDER — CYCLOBENZAPRINE HCL 10 MG
10 TABLET ORAL 3 TIMES DAILY PRN
Qty: 15 TABLET | Refills: 0 | Status: SHIPPED | OUTPATIENT
Start: 2018-03-13 | End: 2018-03-14 | Stop reason: SDUPTHER

## 2018-03-13 ASSESSMENT — ENCOUNTER SYMPTOMS
VOMITING: 0
SHORTNESS OF BREATH: 0
COUGH: 0
NAUSEA: 0
TROUBLE SWALLOWING: 0

## 2018-03-13 ASSESSMENT — PAIN DESCRIPTION - LOCATION: LOCATION: NECK;SHOULDER;LEG

## 2018-03-13 ASSESSMENT — PAIN DESCRIPTION - ORIENTATION: ORIENTATION: LEFT

## 2018-03-13 ASSESSMENT — PAIN SCALES - GENERAL: PAINLEVEL_OUTOF10: 8
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
## 2018-03-14 ENCOUNTER — OFFICE VISIT (OUTPATIENT)
Dept: FAMILY MEDICINE CLINIC | Age: 48
End: 2018-03-14
Payer: MEDICARE

## 2018-03-14 VITALS
HEART RATE: 94 BPM | HEIGHT: 71 IN | WEIGHT: 184.2 LBS | OXYGEN SATURATION: 100 % | BODY MASS INDEX: 25.79 KG/M2 | SYSTOLIC BLOOD PRESSURE: 126 MMHG | DIASTOLIC BLOOD PRESSURE: 90 MMHG
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
## 2018-03-14 DIAGNOSIS — M79.7 FIBROMYALGIA MUSCLE PAIN: ICD-10-CM

## 2018-03-14 DIAGNOSIS — M25.512 ACUTE PAIN OF LEFT SHOULDER: Primary | ICD-10-CM

## 2018-03-14 DIAGNOSIS — M54.2 CERVICAL SPINE PAIN: ICD-10-CM

## 2018-03-14 PROCEDURE — 1036F TOBACCO NON-USER: CPT | Performed by: NURSE PRACTITIONER

## 2018-03-14 PROCEDURE — G8427 DOCREV CUR MEDS BY ELIG CLIN: HCPCS | Performed by: NURSE PRACTITIONER

## 2018-03-14 PROCEDURE — 99213 OFFICE O/P EST LOW 20 MIN: CPT | Performed by: NURSE PRACTITIONER

## 2018-03-14 PROCEDURE — G8482 FLU IMMUNIZE ORDER/ADMIN: HCPCS | Performed by: NURSE PRACTITIONER

## 2018-03-14 PROCEDURE — G8419 CALC BMI OUT NRM PARAM NOF/U: HCPCS | Performed by: NURSE PRACTITIONER

## 2018-03-14 RX ORDER — CYCLOBENZAPRINE HCL 10 MG
10 TABLET ORAL 3 TIMES DAILY PRN
Qty: 90 TABLET | Refills: 3 | Status: CANCELLED | OUTPATIENT
Start: 2018-03-14

## 2018-03-14 ASSESSMENT — ENCOUNTER SYMPTOMS
SHORTNESS OF BREATH: 0
COUGH: 0
ABDOMINAL PAIN: 0
EYE DISCHARGE: 0
BLOOD IN STOOL: 0
BACK PAIN: 1
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
## 2018-03-15 ENCOUNTER — HOSPITAL ENCOUNTER (OUTPATIENT)
Dept: PHYSICAL THERAPY | Age: 48
Setting detail: THERAPIES SERIES
Discharge: HOME | End: 2018-03-15
Payer: MEDICARE

## 2018-03-27 ENCOUNTER — HOSPITAL ENCOUNTER (OUTPATIENT)
Dept: PHYSICAL THERAPY | Age: 48
Setting detail: THERAPIES SERIES
Discharge: HOME | End: 2018-03-27
Payer: MEDICARE

## 2018-03-27 PROCEDURE — 97750 PHYSICAL PERFORMANCE TEST: CPT

## 2018-07-24 DIAGNOSIS — M79.7 FIBROMYALGIA MUSCLE PAIN: ICD-10-CM

## 2018-07-24 RX ORDER — CYCLOBENZAPRINE HCL 10 MG
TABLET ORAL
Qty: 90 TABLET | Refills: 3 | Status: SHIPPED | OUTPATIENT
Start: 2018-07-24 | End: 2018-12-13 | Stop reason: SDUPTHER

## 2018-08-20 ENCOUNTER — HOSPITAL ENCOUNTER (OUTPATIENT)
Dept: PHYSICAL THERAPY | Age: 48
Setting detail: THERAPIES SERIES
Discharge: HOME | End: 2018-08-20
Payer: MEDICARE

## 2018-08-20 PROCEDURE — 97110 THERAPEUTIC EXERCISES: CPT

## 2018-08-20 PROCEDURE — 97161 PT EVAL LOW COMPLEX 20 MIN: CPT

## 2018-08-20 ASSESSMENT — PAIN DESCRIPTION - LOCATION: LOCATION: SHOULDER

## 2018-08-20 ASSESSMENT — PAIN DESCRIPTION - FREQUENCY: FREQUENCY: CONTINUOUS

## 2018-08-20 ASSESSMENT — PAIN DESCRIPTION - DESCRIPTORS: DESCRIPTORS: ACHING;DULL;CONSTANT

## 2018-08-20 ASSESSMENT — PAIN DESCRIPTION - ORIENTATION: ORIENTATION: RIGHT

## 2018-08-20 ASSESSMENT — PAIN SCALES - GENERAL: PAINLEVEL_OUTOF10: 7

## 2018-08-20 NOTE — PROGRESS NOTES
term goal 2: increase AROM R cassidy to full  Short term goal 3: increase strength R cassidy and scap muscles by 1/2 grade or better  Short term goal 4: indep with HEP  Long term goals  Time Frame for Long term goals : 12 visits  Long term goal 1: improve UEFS from 39 to 61 or better  Patient Goals   Patient goals : pain and numbness to R cassidy to stop     Treatment Charges: Minutes Units   []  Ultrasound     []  Electrical-Stim     []  Iontophoresis     []  Traction     []  Massage       [x]  Eval 20 1   []  Gait     [x]  Ther Exercise 25  1    []  Manual Therapy       []  Ther Activities       []  Aquatics     []  Vasopneumatic Device     []  Neuro Re-Ed       []  Other       Total Treatment Time: 45 2        Therapy Time   Individual Concurrent Group Co-treatment   Time In 1535         Time Out 1620         Minutes 45         Timed Code Treatment Minutes: 15 Minutes    Patient Goals:pain and numbness on R cassidy to stop    Comments/Assessment:    Rehab Potential:  [x] Good  [] Fair  [] Poor   Suggested Professional Referral:  [x] No  [] Yes:  Barriers to Goal Achievement:  [x] No  [] Yes:  Domestic Concerns:  [x] No  [] Yes:    Treatment Plan:  [x] Therapeutic Exercise    [] Modalities:  [] Therapeutic Activity    [] Ultrasound  [] Electrical Stimulation  [] Gait Training     []Massage       [] Lumbar/Cervical Traction  [] Neuromuscular Re-education [] Cold/hotpack [] Other:  [x] Instruction in HEP     [] Work Conditioning                                         [] Manual Therapy             [] Aquatic Therapy               [] Iontophoresis: 4 mg/mL      Dexamethasone Sodium      Phosphate 40-80mAmin (Allergies Reviewed)     Frequency:        2   X/wk x       6   wk's      [x] Plans/Goals, Risk/Benefits discussed with pt/family  Comprehension of Education [x] yes  [] Needs Review  Pt/Family Education: [x] Verbal  [x] Demo  [] Written    More objective information is available upon request.  Thank you for this

## 2018-08-30 ENCOUNTER — HOSPITAL ENCOUNTER (OUTPATIENT)
Dept: PHYSICAL THERAPY | Age: 48
Setting detail: THERAPIES SERIES
Discharge: HOME | End: 2018-08-30
Payer: MEDICARE

## 2018-08-30 PROCEDURE — 97110 THERAPEUTIC EXERCISES: CPT

## 2018-08-30 PROCEDURE — G0283 ELEC STIM OTHER THAN WOUND: HCPCS

## 2018-08-30 ASSESSMENT — PAIN DESCRIPTION - DESCRIPTORS: DESCRIPTORS: ACHING;DULL;CONSTANT

## 2018-08-30 ASSESSMENT — PAIN SCALES - GENERAL: PAINLEVEL_OUTOF10: 6

## 2018-08-30 ASSESSMENT — PAIN DESCRIPTION - ORIENTATION: ORIENTATION: RIGHT

## 2018-08-30 ASSESSMENT — PAIN DESCRIPTION - LOCATION: LOCATION: SHOULDER

## 2018-08-30 ASSESSMENT — PAIN DESCRIPTION - FREQUENCY: FREQUENCY: CONTINUOUS

## 2018-09-04 ENCOUNTER — HOSPITAL ENCOUNTER (OUTPATIENT)
Dept: PHYSICAL THERAPY | Age: 48
Setting detail: THERAPIES SERIES
Discharge: HOME | End: 2018-09-04
Payer: MEDICARE

## 2018-09-04 PROCEDURE — 97110 THERAPEUTIC EXERCISES: CPT

## 2018-09-04 PROCEDURE — G0283 ELEC STIM OTHER THAN WOUND: HCPCS

## 2018-09-04 ASSESSMENT — PAIN DESCRIPTION - DESCRIPTORS: DESCRIPTORS: CONSTANT

## 2018-09-04 ASSESSMENT — PAIN DESCRIPTION - ORIENTATION: ORIENTATION: RIGHT

## 2018-09-04 ASSESSMENT — PAIN DESCRIPTION - FREQUENCY: FREQUENCY: CONTINUOUS

## 2018-09-04 ASSESSMENT — PAIN SCALES - GENERAL: PAINLEVEL_OUTOF10: 8

## 2018-09-04 ASSESSMENT — PAIN DESCRIPTION - LOCATION: LOCATION: SHOULDER

## 2018-09-04 NOTE — PROGRESS NOTES
Physical Therapy  Daily Treatment Note  Date: 2018  Patient Name: Juan Boucher  MRN: [id number]     :   1970    Subjective:      PT Visit Information  Onset Date: 06/15/18  PT Insurance Information: paramount advantage  Total # of Visits Approved: 12  Total # of Visits to Date: 3  Subjective  Subjective: pain R cassidy today  Pain Screening  Patient Currently in Pain: Yes  Pain Assessment  Pain Assessment: 0-10  Pain Level: 8  Pain Location: Shoulder  Pain Orientation: Right  Pain Descriptors: Constant  Pain Frequency: Continuous  Vital Signs  Patient Currently in Pain: Yes       Treatment Activities:   Exercises  Exercise 1: cane ex flex/Abd/ER 10x  Exercise 2: towel stretch IR R 3x30\"  Exercise 3: 25# rows/pull down 30x  Exercise 4: R cassidy flex/scaption/ext 2# 15x  Exercise 5: R upper trap stretch 3x30\"  Exercise 6: R levator scap stretch 3x30\"  Exercise 7: Biodex 10'L1  Exercise 8: miguel ángel's R 4 ways 1#10x  Exercise 9: lat pull down 15# 3x10  Exercise 10: chest press front/back 15# 3x10  Exercise 11: R cassidy flex/ext/scaption 1# 15x     Modalities  Moist heat: hot pack 20 min R cassidy sitting  E-stim (parameters): IFC R cassidy 20 min sitting     Assessment:   Conditions Requiring Skilled Therapeutic Intervention  Assessment: progress with ex  REQUIRES PT FOLLOW UP: Yes     Plan:    Plan  Times per week: 2x/week  Current Treatment Recommendations: Strengthening, ROM, Home Exercise Program, Modalities  Plan Comment: to continue PT per POC  Timed Code Treatment Minutes: 30 Minutes   Treatment Charges: Minutes Units   []  Ultrasound     [x]  Electrical-Stim 20 1   []  Iontophoresis     []  Traction     []  Massage       []  Eval     []  Gait     [x]  Ther Exercise 30  2    []  Manual Therapy       []  Ther Activities       []  Aquatics     []  Vasopneumatic Device     []  Neuro Re-Ed       []  Other       Total Treatment Time: 50 3        Therapy Time   Individual Concurrent Group Co-treatment   Time In 1250 Time Out 1340         Minutes 50         Timed Code Treatment Minutes: 30 Minutes     Electronically signed by: Walter Omer PT

## 2018-09-07 ENCOUNTER — HOSPITAL ENCOUNTER (OUTPATIENT)
Dept: PHYSICAL THERAPY | Age: 48
Setting detail: THERAPIES SERIES
Discharge: HOME | End: 2018-09-07
Payer: MEDICARE

## 2018-09-07 PROCEDURE — 97110 THERAPEUTIC EXERCISES: CPT

## 2018-09-07 PROCEDURE — G0283 ELEC STIM OTHER THAN WOUND: HCPCS

## 2018-09-07 ASSESSMENT — PAIN DESCRIPTION - ORIENTATION: ORIENTATION: RIGHT

## 2018-09-07 ASSESSMENT — PAIN DESCRIPTION - DESCRIPTORS: DESCRIPTORS: CONSTANT

## 2018-09-07 ASSESSMENT — PAIN DESCRIPTION - LOCATION: LOCATION: SHOULDER

## 2018-09-07 ASSESSMENT — PAIN SCALES - GENERAL: PAINLEVEL_OUTOF10: 5

## 2018-09-07 NOTE — PROGRESS NOTES
Physical Therapy   [facility]   Outpatient Physical Therapy  [address]  [address]  Phone: [phone]  Fax: [phone]  Daily Progress Note    Date: 18    Patient Name: Malik Desai        MRN: [id number]  Account: [de-identified] : 1970      General Information:     Onset Date: 06/15/18  PT Insurance Information: paramount advantage  Total # of Visits Approved: 12  Total # of Visits to Date: 4    Subjective:  Subjective: Pain in shoulder isn't too bad     Pain:  Patient Currently in Pain: Yes  Pain Assessment: 0-10  Pain Level: 5  Pain Location: Shoulder  Pain Orientation: Right  Pain Descriptors: Constant       Objective:  Exercise 1: cane ex flex/Abd/ER 10x  Exercise 2: towel stretch IR R 3x30\"  Exercise 3: 25# rows/pull down 30x  Exercise 4: R cassidy flex/scaption/ext 2# 30x  Exercise 5: R upper trap stretch 3x30\"  Exercise 6: R levator scap stretch 3x30\"  Exercise 7: Biodex 10'L1  Exercise 8: miguel ángel's R 4 ways 1#10x  Exercise 9: lat pull down 15# 3x10  Exercise 10: chest press front/back 15# 3x10     Moist heat: hot pack 15 min R cassidy sitting  E-stim (parameters): IFC R cassidy 15 min sitting      Plan:  Plan: Continue with current plan    Therapy Time:  Time In: 930  Time Out: 1020  Minutes: 50  Timed Code Treatment Minutes: 45 Minutes    Treatment Charges: Minutes Units   []  Ultrasound     [x]  Electrical-Stim 15 1   []  Iontophoresis     []  Traction     []  Massage       []  Eval     []  Gait     []  Vasopneumatic Device     [x]  Ther Exercise 30  2   []  Manual Therapy       []  Ther Activities       []  Aquatics     []  Neuro Re-Ed       []  Other       Total Treatment Time: 39 3       Olgakalia Mott, PT

## 2018-09-11 ENCOUNTER — HOSPITAL ENCOUNTER (OUTPATIENT)
Dept: PHYSICAL THERAPY | Age: 48
Setting detail: THERAPIES SERIES
Discharge: HOME | End: 2018-09-11
Payer: MEDICARE

## 2018-09-11 PROCEDURE — 97110 THERAPEUTIC EXERCISES: CPT

## 2018-09-11 ASSESSMENT — PAIN DESCRIPTION - FREQUENCY: FREQUENCY: CONTINUOUS

## 2018-09-11 ASSESSMENT — PAIN DESCRIPTION - LOCATION: LOCATION: SHOULDER

## 2018-09-11 ASSESSMENT — PAIN DESCRIPTION - ORIENTATION: ORIENTATION: RIGHT

## 2018-09-11 ASSESSMENT — PAIN DESCRIPTION - DESCRIPTORS: DESCRIPTORS: CONSTANT

## 2018-09-11 ASSESSMENT — PAIN SCALES - GENERAL: PAINLEVEL_OUTOF10: 3

## 2018-09-11 NOTE — PROGRESS NOTES
Physical Therapy  Daily Treatment Note  Date: 2018  Patient Name: Eugenie Cordova  MRN: [id number]     :   1970    Subjective:   General  Chart Reviewed: Yes  PT Visit Information  Onset Date: 06/15/18  PT Insurance Information: paramount advantage  Total # of Visits Approved: 12  Total # of Visits to Date: 5  Subjective  Subjective: Shoulder pain continues. Still have numbness in the R anterior shoulder.    Pain Screening  Patient Currently in Pain: Yes  Pain Assessment  Pain Assessment: 0-10  Pain Level: 3  Pain Location: Shoulder  Pain Orientation: Right  Pain Descriptors: Constant  Pain Frequency: Continuous  Vital Signs  Patient Currently in Pain: Yes       Treatment Activities:      Exercises  Exercise 1: cane ex flex/Abd/ER 10x  Exercise 2: towel stretch IR R 3x30\"  Exercise 3: 25# rows/pull down 30x  Exercise 4: R cassidy flex/scaption/ext 2# 30x  Exercise 5: HEP R upper trap stretch 3x30\"  Exercise 6: HEP- R lev scap stretch  Exercise 7: Biodex 5f/5r @ L1.5  Exercise 8: miguel ángel's R 4 ways 1#10x  Exercise 9: lat pull down 25# 3x10  Exercise 10: chest press front/back 25# 3x10     Modalities  Other: skipped hp/stim today      Assessment:   Activity Tolerance  Activity Tolerance: Patient Tolerated treatment well  Comments: INc wts on some ex's      Treatment Charges: Minutes Units   []  Ultrasound     []  Electrical-Stim     []  Iontophoresis     []  Traction     []  Massage       []  Eval     []  Gait     [x]  Ther Exercise 50  3   []  Manual Therapy       []  Ther Activities       []  Aquatics     []  Vasopneumatic Device     []  Neuro Re-Ed       []  Other       Total Treatment Time: 50 3         Goals:  Short term goals  Time Frame for Short term goals: 6 visits  Short term goal 1: decrease R cassidy pain by 50% so patient can sleep better at night  Short term goal 2: increase AROM R cassidy to full  Short term goal 3: increase strength R cassidy and scap muscles by 1/2 grade or better  Short term goal 4: indep with HEP  Long term goals  Time Frame for Long term goals : 12 visits  Long term goal 1: improve UEFS from 39 to 60 or better    Plan:       Timed Code Treatment Minutes: 45 Minutes  Total Treatment Time: 45     Therapy Time   Individual Concurrent Group Co-treatment   Time In 1320         Time Out 1410         Minutes 50         Timed Code Treatment Minutes: [address], PT

## 2018-09-14 ENCOUNTER — HOSPITAL ENCOUNTER (OUTPATIENT)
Dept: PHYSICAL THERAPY | Age: 48
Setting detail: THERAPIES SERIES
Discharge: HOME | End: 2018-09-14
Payer: MEDICARE

## 2018-09-14 PROCEDURE — G0283 ELEC STIM OTHER THAN WOUND: HCPCS

## 2018-09-14 PROCEDURE — 97110 THERAPEUTIC EXERCISES: CPT

## 2018-09-14 ASSESSMENT — PAIN DESCRIPTION - DESCRIPTORS: DESCRIPTORS: CONSTANT

## 2018-09-14 ASSESSMENT — PAIN DESCRIPTION - LOCATION: LOCATION: SHOULDER

## 2018-09-14 ASSESSMENT — PAIN DESCRIPTION - FREQUENCY: FREQUENCY: CONTINUOUS

## 2018-09-14 ASSESSMENT — PAIN DESCRIPTION - ORIENTATION: ORIENTATION: RIGHT

## 2018-09-14 ASSESSMENT — PAIN SCALES - GENERAL: PAINLEVEL_OUTOF10: 6

## 2018-09-14 NOTE — PROGRESS NOTES
Physical Therapy  Daily Treatment Note  Date: 2018  Patient Name: Maximo Sexton  MRN: [id number]     :   1970    Subjective:   General  Chart Reviewed: Yes  PT Visit Information  Onset Date: 06/15/18  PT Insurance Information: paramount advantage  Total # of Visits Approved: 12  Total # of Visits to Date: 6  Subjective  Subjective: A little worse today. Not sure why. It just gets worse sometimes. General Comment  Comments: Patient states he will be unavailable next week. Will call Thursday to set up appt's for the following week. Pain Screening  Patient Currently in Pain: Yes  Pain Assessment  Pain Assessment: 0-10  Pain Level: 6  Pain Location: Shoulder  Pain Orientation: Right  Pain Descriptors: Constant  Pain Frequency: Continuous  Vital Signs  Patient Currently in Pain: Yes       Treatment Activities:      Exercises  Exercise 1: cane ex flex/Abd/ER 10x  Exercise 2: towel stretch IR R 3x30\"  Exercise 3: 25# rows/pull down 30x  Exercise 4: Full range-R cassidy flex/scaption/ext 2# 30x (inc pain with full ABD in POS)  Exercise 7: Biodex 5f/5r @ L1.5  Exercise 8: miguel ángel's R 4 ways 1#10x  Exercise 9: lat pull down 25# 3x10  Exercise 10: chest press front/back 25# 3x10     Modalities  Moist heat: hot pack 20 min R cassidy sitting  E-stim (parameters): IFC R cassidy 20 min sitting   Assessment:   Conditions Requiring Skilled Therapeutic Intervention  Assessment: Progressed ex's with fair tolerance.    Activity Tolerance  Activity Tolerance: Patient Tolerated treatment well                           Treatment Charges: Minutes Units   []  Ultrasound     [x]  Electrical-Stim 20 1   []  Iontophoresis     []  Traction     []  Massage       []  Eval     []  Gait     [x]  Ther Exercise 25  2   []  Manual Therapy       []  Ther Activities       []  Aquatics     []  Vasopneumatic Device     []  Neuro Re-Ed       []  Other       Total Treatment Time: 45 3       Goals:  Short term goals  Time Frame for Short term goals: 6

## 2018-10-01 ENCOUNTER — OFFICE VISIT (OUTPATIENT)
Dept: FAMILY MEDICINE CLINIC | Age: 48
End: 2018-10-01
Payer: MEDICARE

## 2018-10-01 VITALS
SYSTOLIC BLOOD PRESSURE: 120 MMHG | WEIGHT: 185 LBS | TEMPERATURE: 98.2 F | OXYGEN SATURATION: 96 % | HEART RATE: 90 BPM | HEIGHT: 71 IN | DIASTOLIC BLOOD PRESSURE: 82 MMHG | BODY MASS INDEX: 25.9 KG/M2

## 2018-10-01 DIAGNOSIS — K59.03 DRUG-INDUCED CONSTIPATION: Primary | ICD-10-CM

## 2018-10-01 DIAGNOSIS — M75.41 IMPINGEMENT SYNDROME OF RIGHT SHOULDER: ICD-10-CM

## 2018-10-01 DIAGNOSIS — G89.4 CHRONIC PAIN SYNDROME: ICD-10-CM

## 2018-10-01 DIAGNOSIS — M76.60 ACHILLES TENDON PAIN: ICD-10-CM

## 2018-10-01 DIAGNOSIS — Z23 NEED FOR INFLUENZA VACCINATION: ICD-10-CM

## 2018-10-01 PROCEDURE — 90686 IIV4 VACC NO PRSV 0.5 ML IM: CPT | Performed by: NURSE PRACTITIONER

## 2018-10-01 PROCEDURE — G8419 CALC BMI OUT NRM PARAM NOF/U: HCPCS | Performed by: NURSE PRACTITIONER

## 2018-10-01 PROCEDURE — 99214 OFFICE O/P EST MOD 30 MIN: CPT | Performed by: NURSE PRACTITIONER

## 2018-10-01 PROCEDURE — 90471 IMMUNIZATION ADMIN: CPT | Performed by: NURSE PRACTITIONER

## 2018-10-01 PROCEDURE — 1036F TOBACCO NON-USER: CPT | Performed by: NURSE PRACTITIONER

## 2018-10-01 PROCEDURE — G8482 FLU IMMUNIZE ORDER/ADMIN: HCPCS | Performed by: NURSE PRACTITIONER

## 2018-10-01 PROCEDURE — G8427 DOCREV CUR MEDS BY ELIG CLIN: HCPCS | Performed by: NURSE PRACTITIONER

## 2018-10-01 RX ORDER — IBUPROFEN 600 MG/1
600 TABLET ORAL EVERY 6 HOURS PRN
Qty: 90 TABLET | Refills: 0 | Status: SHIPPED | OUTPATIENT
Start: 2018-10-01 | End: 2020-12-02 | Stop reason: ALTCHOICE

## 2018-10-01 RX ORDER — SIMETHICONE 80 MG
80 TABLET,CHEWABLE ORAL 4 TIMES DAILY PRN
Qty: 28 TABLET | Refills: 0 | Status: SHIPPED | OUTPATIENT
Start: 2018-10-01 | End: 2018-11-12

## 2018-10-01 RX ORDER — POLYETHYLENE GLYCOL 3350 17 G/17G
17 POWDER, FOR SOLUTION ORAL DAILY
Qty: 510 G | Refills: 0 | Status: SHIPPED | OUTPATIENT
Start: 2018-10-01 | End: 2018-10-01 | Stop reason: CLARIF

## 2018-10-01 ASSESSMENT — ENCOUNTER SYMPTOMS
ABDOMINAL PAIN: 1
ANAL BLEEDING: 1
CONSTIPATION: 1
DIARRHEA: 0
EYE DISCHARGE: 0
BLOOD IN STOOL: 0
SHORTNESS OF BREATH: 0
COUGH: 0

## 2018-10-01 NOTE — PATIENT INSTRUCTIONS
learn more about \"High-Fiber Diet: Care Instructions. \"     If you do not have an account, please click on the \"Sign Up Now\" link. Current as of: May 12, 2017  Content Version: 11.7  © 9983-4518 [employer]. Care instructions adapted under license by [facility] ([facility]). If you have questions about a medical condition or this instruction, always ask your healthcare professional. [address] any warranty or liability for your use of this information. Patient Education        High-Fiber Diet: Care Instructions  Your Care Instructions    A high-fiber diet may help you relieve constipation and feel less bloated. Your doctor and dietitian will help you make a high-fiber eating plan based on your personal needs. The plan will include the things you like to eat. It will also make sure that you get 30 grams of fiber a day. Before you make changes to the way you eat, be sure to talk with your doctor or dietitian. Follow-up care is a key part of your treatment and safety. Be sure to make and go to all appointments, and call your doctor if you are having problems. It's also a good idea to know your test results and keep a list of the medicines you take. How can you care for yourself at home? · You can increase how much fiber you get if you eat more of certain foods. These foods include:  ¨ Whole-grain breads and cereals. ¨ Fruits, such as pears, apples, and peaches. Eat the skins, peels, and seeds, if you can. ¨ Vegetables, such as broccoli, cabbage, spinach, carrots, asparagus, and squash. ¨ Starchy vegetables. These include potatoes with skins, kidney beans, and lima beans. · Take a fiber supplement every day if your doctor recommends it. Examples are Benefiber, Citrucel, FiberCon, and Metamucil. Ask your doctor how much to take. · Drink plenty of fluids, enough so that your urine is light yellow or clear like water.  If you have kidney, heart, or liver disease and have to limit fluids, talk with your doctor before you increase the amount of fluids you drink. · Get some exercise every day. Exercise helps stool move through the colon. It also helps prevent constipation. · Keep a food diary. Try to notice and write down what foods cause gas, pain, or other symptoms. Then you can avoid these foods. Where can you learn more? Go to https://chpepiceweb.[employer]. org and sign in to your Access[employer] account. Enter H601 in the Calorics box to learn more about \"High-Fiber Diet: Care Instructions. \"     If you do not have an account, please click on the \"Sign Up Now\" link. Current as of: May 12, 2017  Content Version: 11.7  © 6264-2663 [employer]. Care instructions adapted under license by [facility] ([facility]). If you have questions about a medical condition or this instruction, always ask your healthcare professional. [address] any warranty or liability for your use of this information.

## 2018-10-04 ENCOUNTER — HOSPITAL ENCOUNTER (OUTPATIENT)
Dept: GENERAL RADIOLOGY | Facility: CLINIC | Age: 48
Discharge: HOME | End: 2018-10-06
Payer: MEDICARE

## 2018-10-04 ENCOUNTER — HOSPITAL ENCOUNTER (OUTPATIENT)
Facility: CLINIC | Age: 48
Discharge: HOME | End: 2018-10-06
Payer: MEDICARE

## 2018-10-04 DIAGNOSIS — K59.03 DRUG-INDUCED CONSTIPATION: ICD-10-CM

## 2018-10-04 PROCEDURE — 74019 RADEX ABDOMEN 2 VIEWS: CPT

## 2018-10-09 ENCOUNTER — HOSPITAL ENCOUNTER (OUTPATIENT)
Dept: MRI IMAGING | Facility: CLINIC | Age: 48
Discharge: HOME | End: 2018-10-11
Payer: MEDICARE

## 2018-10-09 DIAGNOSIS — M75.41 IMPINGEMENT SYNDROME OF RIGHT SHOULDER: ICD-10-CM

## 2018-10-09 PROCEDURE — 73221 MRI JOINT UPR EXTREM W/O DYE: CPT

## 2018-10-11 ENCOUNTER — OFFICE VISIT (OUTPATIENT)
Dept: FAMILY MEDICINE CLINIC | Age: 48
End: 2018-10-11
Payer: MEDICARE

## 2018-10-11 VITALS
OXYGEN SATURATION: 98 % | WEIGHT: 183 LBS | HEART RATE: 99 BPM | SYSTOLIC BLOOD PRESSURE: 132 MMHG | RESPIRATION RATE: 16 BRPM | HEIGHT: 71 IN | DIASTOLIC BLOOD PRESSURE: 92 MMHG | BODY MASS INDEX: 25.62 KG/M2 | TEMPERATURE: 98.7 F

## 2018-10-11 DIAGNOSIS — M75.41 IMPINGEMENT SYNDROME OF RIGHT SHOULDER: ICD-10-CM

## 2018-10-11 DIAGNOSIS — S86.001D INJURY OF RIGHT ACHILLES TENDON, SUBSEQUENT ENCOUNTER: ICD-10-CM

## 2018-10-11 DIAGNOSIS — K59.03 DRUG INDUCED CONSTIPATION: ICD-10-CM

## 2018-10-11 PROBLEM — M75.101 TEAR OF RIGHT SUPRASPINATUS TENDON: Status: ACTIVE | Noted: 2018-10-11

## 2018-10-11 PROCEDURE — 1036F TOBACCO NON-USER: CPT | Performed by: NURSE PRACTITIONER

## 2018-10-11 PROCEDURE — G8419 CALC BMI OUT NRM PARAM NOF/U: HCPCS | Performed by: NURSE PRACTITIONER

## 2018-10-11 PROCEDURE — 99214 OFFICE O/P EST MOD 30 MIN: CPT | Performed by: NURSE PRACTITIONER

## 2018-10-11 PROCEDURE — G8427 DOCREV CUR MEDS BY ELIG CLIN: HCPCS | Performed by: NURSE PRACTITIONER

## 2018-10-11 PROCEDURE — G8482 FLU IMMUNIZE ORDER/ADMIN: HCPCS | Performed by: NURSE PRACTITIONER

## 2018-10-11 RX ORDER — AMOXICILLIN 250 MG
1 CAPSULE ORAL 2 TIMES DAILY PRN
Qty: 20 TABLET | Refills: 0 | Status: SHIPPED | OUTPATIENT
Start: 2018-10-11 | End: 2018-11-12 | Stop reason: SDUPTHER

## 2018-10-11 ASSESSMENT — ENCOUNTER SYMPTOMS
EYE DISCHARGE: 0
ABDOMINAL PAIN: 0
BLOOD IN STOOL: 0
SHORTNESS OF BREATH: 0
CONSTIPATION: 1
COUGH: 0

## 2018-10-11 NOTE — PROGRESS NOTES
Attending Provider: Misael Reaves    Report Copy To: Signs ^ Symptoms: M25.519 Pain in unspecified shoulder I10   History: [place]   Comments: , Views (X-RAY, SHOULDER): AP, Grashey, Y-Lateral, [place] , Views (X-RAY, SHOULDER): AP, Grashey, Y-Lateral, [place] ,  , ========== , Ordering Provider - Abbe Rob MD , ==========   Exam: SHOULDER RIGHT   Accession #: [id number]   ==========================================================================   SHOULDER RIGHT  10/5/2018 9:34 AM EDT       SIGNS AND SYMPTOMS: M25.519 Pain in unspecified shoulder I10       TECHNOLOGIST COMMENTS: patient states pain in anterior proximal right    humerus with numbness and radiating cervical pain x 2 years. ortho    check of right shoulder       QUESTION FOR THE RADIOLOGIST: , Views (X-RAY, SHOULDER): AP, Grashey,    Y-Lateral, [place] , Views (X-RAY, SHOULDER): AP, Grashey,    Y-Lateral, [place] ,  , ========== , Ordering Provider - Edvin Salmon MD , ==========       PROTOCOL: AP,Grashey and Scapular Y views were obtained. Caresse Ramp was obtained.       COMPARISON: April, 2017       FINDINGS:        Soft tissues:   Small calcification adjacent to the humeral tuberosity       Bones:   Intact       Joints:   Minor spurring                 IMPRESSION:       1.  Minor shoulder arthritis   2.  Likely calcific tendinitis at the rotator cuff insertion       Electronically signed by:Rashel Jalloh.               Transcribed by:  Campbell, User    Resident:    Electronically Signed by: Shauna Odom @ 10/05/2018 12:45 PM     11:13 AM Abe, Mhpn Incoming Radiant Results From [place] [address], APRN - CNP Results   Radiation Dose Estimates     No radiation information found for this patient   Narrative   EXAMINATION:   MRI OF THE RIGHT SHOULDER WITHOUT CONTRAST   10/9/2018 10:43 am       TECHNIQUE:   Multiplanar multisequence MRI of the right shoulder was performed without the   administration of

## 2018-10-17 ENCOUNTER — HOSPITAL ENCOUNTER (OUTPATIENT)
Dept: PHYSICAL THERAPY | Age: 48
Setting detail: THERAPIES SERIES
Discharge: HOME | End: 2018-10-17
Payer: MEDICARE

## 2018-10-17 PROCEDURE — 97161 PT EVAL LOW COMPLEX 20 MIN: CPT

## 2018-10-17 PROCEDURE — 97110 THERAPEUTIC EXERCISES: CPT

## 2018-10-17 PROCEDURE — G0283 ELEC STIM OTHER THAN WOUND: HCPCS

## 2018-10-17 ASSESSMENT — PAIN DESCRIPTION - ORIENTATION: ORIENTATION: RIGHT

## 2018-10-17 ASSESSMENT — PAIN DESCRIPTION - FREQUENCY: FREQUENCY: CONTINUOUS

## 2018-10-17 ASSESSMENT — PAIN SCALES - GENERAL: PAINLEVEL_OUTOF10: 8

## 2018-10-17 ASSESSMENT — PAIN DESCRIPTION - DESCRIPTORS: DESCRIPTORS: CONSTANT

## 2018-10-17 ASSESSMENT — PAIN DESCRIPTION - LOCATION: LOCATION: SHOULDER

## 2018-10-22 ENCOUNTER — HOSPITAL ENCOUNTER (OUTPATIENT)
Dept: PHYSICAL THERAPY | Age: 48
Setting detail: THERAPIES SERIES
Discharge: HOME | End: 2018-10-22
Payer: MEDICARE

## 2018-10-22 PROCEDURE — 97110 THERAPEUTIC EXERCISES: CPT

## 2018-10-22 PROCEDURE — G0283 ELEC STIM OTHER THAN WOUND: HCPCS

## 2018-10-22 ASSESSMENT — PAIN SCALES - GENERAL: PAINLEVEL_OUTOF10: 10

## 2018-10-22 ASSESSMENT — PAIN DESCRIPTION - DESCRIPTORS: DESCRIPTORS: ACHING;CONSTANT

## 2018-10-22 ASSESSMENT — PAIN DESCRIPTION - ORIENTATION: ORIENTATION: RIGHT

## 2018-10-22 ASSESSMENT — PAIN DESCRIPTION - FREQUENCY: FREQUENCY: CONTINUOUS

## 2018-10-22 ASSESSMENT — PAIN DESCRIPTION - LOCATION: LOCATION: SHOULDER

## 2018-10-26 ENCOUNTER — HOSPITAL ENCOUNTER (OUTPATIENT)
Dept: PHYSICAL THERAPY | Age: 48
Setting detail: THERAPIES SERIES
Discharge: HOME | End: 2018-10-26
Payer: MEDICARE

## 2018-10-29 ENCOUNTER — HOSPITAL ENCOUNTER (OUTPATIENT)
Dept: PHYSICAL THERAPY | Age: 48
Setting detail: THERAPIES SERIES
Discharge: HOME | End: 2018-10-29
Payer: MEDICARE

## 2018-10-29 PROCEDURE — G0283 ELEC STIM OTHER THAN WOUND: HCPCS

## 2018-10-29 PROCEDURE — 97110 THERAPEUTIC EXERCISES: CPT

## 2018-10-29 ASSESSMENT — PAIN DESCRIPTION - ORIENTATION: ORIENTATION: RIGHT

## 2018-10-29 ASSESSMENT — PAIN DESCRIPTION - FREQUENCY: FREQUENCY: CONTINUOUS

## 2018-10-29 ASSESSMENT — PAIN DESCRIPTION - LOCATION: LOCATION: SHOULDER

## 2018-10-29 ASSESSMENT — PAIN DESCRIPTION - DESCRIPTORS: DESCRIPTORS: CONSTANT;ACHING

## 2018-10-29 ASSESSMENT — PAIN SCALES - GENERAL: PAINLEVEL_OUTOF10: 8

## 2018-10-29 NOTE — PROGRESS NOTES
Physical Therapy  Daily Treatment Note  Date: 10/29/2018  Patient Name: Piero Morris  MRN: [id number]     :   1970    Subjective:      PT Visit Information  Onset Date: 06/15/18  PT Insurance Information: paramount advantage  Total # of Visits Approved: 12  Total # of Visits to Date: 3  Subjective  Subjective: increased pain R cassidy today  Pain Screening  Patient Currently in Pain: Yes  Pain Assessment  Pain Assessment: 0-10  Pain Level: 8  Pain Location: Shoulder  Pain Orientation: Right  Pain Descriptors: Constant; Aching  Pain Frequency: Continuous  Vital Signs  Patient Currently in Pain: Yes       Treatment Activities:   Exercises  Exercise 1: supine cane ex-flex/scaption/press up/side to side 10x  Exercise 2: towel stretch IR R 3x30\"  Exercise 3: L S/L R cassidy ER 2# 30x  Exercise 4:  pull prone 2# 30x  Exercise 5: finger ladder R 10x10\" hold  Exercise 6: R cassidy flex/ext/scaption 1# 10x  Exercise 7: hot pack and EStim to R cassidy 20 min sitting after ex today  Exercise 8: R upper trap stretch 3x30\"  Exercise 9: clothespin yel-3 green R  Exercise 10: 1/2 hula hoop #5 R    Assessment:   Conditions Requiring Skilled Therapeutic Intervention  REQUIRES PT FOLLOW UP: Yes     Plan:    Plan  Times per week: 2x/week  Current Treatment Recommendations: Strengthening, ROM, Home Exercise Program, Modalities  Plan Comment: to continue PT per POC  Timed Code Treatment Minutes: 30 Minutes   Treatment Charges: Minutes Units   []  Ultrasound     [x]  Electrical-Stim 20 1   []  Iontophoresis     []  Traction     []  Massage       []  Eval     []  Gait     [x]  Ther Exercise 30  2    []  Manual Therapy       []  Ther Activities       []  Aquatics     []  Vasopneumatic Device     []  Neuro Re-Ed       []  Other       Total Treatment Time: 50 3        Therapy Time   Individual Concurrent Group Co-treatment   Time In 0900         Time Out 0950         Minutes 50         Timed Code Treatment Minutes: 30 Minutes

## 2018-11-02 ENCOUNTER — HOSPITAL ENCOUNTER (OUTPATIENT)
Dept: PHYSICAL THERAPY | Age: 48
Setting detail: THERAPIES SERIES
Discharge: HOME | End: 2018-11-02
Payer: MEDICARE

## 2018-11-02 PROCEDURE — 97110 THERAPEUTIC EXERCISES: CPT

## 2018-11-02 PROCEDURE — G0283 ELEC STIM OTHER THAN WOUND: HCPCS

## 2018-11-02 ASSESSMENT — PAIN SCALES - GENERAL: PAINLEVEL_OUTOF10: 8

## 2018-11-02 ASSESSMENT — PAIN DESCRIPTION - DESCRIPTORS: DESCRIPTORS: ACHING;CONSTANT

## 2018-11-02 ASSESSMENT — PAIN DESCRIPTION - LOCATION: LOCATION: SHOULDER

## 2018-11-02 ASSESSMENT — PAIN DESCRIPTION - FREQUENCY: FREQUENCY: CONTINUOUS

## 2018-11-02 ASSESSMENT — PAIN DESCRIPTION - ORIENTATION: ORIENTATION: RIGHT

## 2018-11-02 NOTE — PROGRESS NOTES
Physical Therapy  Daily Treatment Note  Date: 2018  Patient Name: Narda Harkins  MRN: [id number]     :   1970    Subjective:      PT Visit Information  Onset Date: 06/15/18  PT Insurance Information: paramount advantage  Total # of Visits Approved: 12  Total # of Visits to Date: 4  Subjective  Subjective: increased pain R cassidy today  Pain Screening  Patient Currently in Pain: Yes  Pain Assessment  Pain Assessment: 0-10  Pain Level: 8  Pain Location: Shoulder  Pain Orientation: Right  Pain Descriptors: Aching;Constant  Pain Frequency: Continuous  Vital Signs  Patient Currently in Pain: Yes       Treatment Activities:   Exercises  Exercise 1: supine cane ex-flex/scaption/press up/side to side 10x  Exercise 2: towel stretch IR R 3x30\"  Exercise 3: L S/L R cassidy ER 2# 30x  Exercise 4:  pull prone 2# 30x  Exercise 5: finger ladder R 10x10\" hold  Exercise 6: R cassidy flex/ext/scaption 1# 10x  Exercise 7: hot pack and EStim to R cassidy 20 min sitting after ex today  Exercise 8: R upper trap stretch 3x30\"  Exercise 9: clothespin yel-all green R  Exercise 10: 1/2 hula hoop #5 R    Assessment:   Conditions Requiring Skilled Therapeutic Intervention  REQUIRES PT FOLLOW UP: Yes      Plan:    Plan  Times per week: 2x/week  Current Treatment Recommendations: Strengthening, ROM, Home Exercise Program, Modalities  Plan Comment: to continue PT per POC  Timed Code Treatment Minutes: 30 Minutes   Treatment Charges: Minutes Units   []  Ultrasound     [x]  Electrical-Stim 20 1   []  Iontophoresis     []  Traction     []  Massage       []  Eval     []  Gait     [x]  Ther Exercise 30  2    []  Manual Therapy       []  Ther Activities       []  Aquatics     []  Vasopneumatic Device     []  Neuro Re-Ed       []  Other       Total Treatment Time: 50 3        Therapy Time   Individual Concurrent Group Co-treatment   Time In 0855         Time Out 0945         Minutes 50         Timed Code Treatment Minutes: 30 Minutes

## 2018-11-07 ENCOUNTER — HOSPITAL ENCOUNTER (OUTPATIENT)
Dept: PHYSICAL THERAPY | Age: 48
Setting detail: THERAPIES SERIES
Discharge: HOME | End: 2018-11-07
Payer: MEDICARE

## 2018-11-07 PROCEDURE — 97110 THERAPEUTIC EXERCISES: CPT

## 2018-11-07 PROCEDURE — G0283 ELEC STIM OTHER THAN WOUND: HCPCS

## 2018-11-07 ASSESSMENT — PAIN SCALES - GENERAL: PAINLEVEL_OUTOF10: 7

## 2018-11-07 ASSESSMENT — PAIN DESCRIPTION - ORIENTATION: ORIENTATION: RIGHT

## 2018-11-07 ASSESSMENT — PAIN DESCRIPTION - FREQUENCY: FREQUENCY: CONTINUOUS

## 2018-11-07 ASSESSMENT — PAIN DESCRIPTION - DESCRIPTORS: DESCRIPTORS: ACHING;CONSTANT

## 2018-11-07 ASSESSMENT — PAIN DESCRIPTION - LOCATION: LOCATION: SHOULDER

## 2018-11-07 NOTE — PROGRESS NOTES
Physical Therapy  Daily Treatment Note  Date: 2018  Patient Name: Adolfo Cole  MRN: [id number]     :   1970    Subjective:      PT Visit Information  Onset Date: 06/15/18  PT Insurance Information: paramount advantage  Total # of Visits Approved: 12  Total # of Visits to Date: 5  Subjective  Subjective:  pain R cassidy today  Pain Screening  Patient Currently in Pain: Yes  Pain Assessment  Pain Assessment: 0-10  Pain Level: 7  Pain Location: Shoulder  Pain Orientation: Right  Pain Descriptors: Aching;Constant  Pain Frequency: Continuous  Vital Signs  Patient Currently in Pain: Yes       Treatment Activities:   Exercises  Exercise 1: supine cane ex-flex/scaption/press up/side to side 10x  Exercise 2: towel stretch IR R 3x30\"  Exercise 3: L S/L R cassidy ER 2# 30x  Exercise 4:  pull prone 2# 30x  Exercise 5: finger ladder R 10x10\" hold  Exercise 6: R cassidy flex/ext/scaption 1# 10x  Exercise 8: R upper trap stretch 3x30\"  Exercise 9: clothespin yel-all green R  Exercise 10: 1/2 hula hoop #5 R  Exercise 11: marco a with dowel R   hot pack and EStim to R cassidy after ex today  Assessment:   Conditions Requiring Skilled Therapeutic Intervention  Assessment: progress with ex  REQUIRES PT FOLLOW UP: Yes     Plan:    Plan  Times per week: 2x/week  Current Treatment Recommendations: Strengthening, ROM, Home Exercise Program, Modalities  Plan Comment: to continue PT per POC  Timed Code Treatment Minutes: 30 Minutes   Treatment Charges: Minutes Units   []  Ultrasound     [x]  Electrical-Stim 15 1   []  Iontophoresis     []  Traction     []  Massage       []  Eval     []  Gait     [x]  Ther Exercise 30  2    []  Manual Therapy       []  Ther Activities       []  Aquatics     []  Vasopneumatic Device     []  Neuro Re-Ed       []  Other       Total Treatment Time: 45 3        Therapy Time   Individual Concurrent Group Co-treatment   Time In 915         Time Out 1000         Minutes 45         Timed Code Treatment Minutes:

## 2018-11-09 ENCOUNTER — HOSPITAL ENCOUNTER (OUTPATIENT)
Dept: PHYSICAL THERAPY | Age: 48
Setting detail: THERAPIES SERIES
Discharge: HOME | End: 2018-11-09
Payer: MEDICARE

## 2018-11-09 PROCEDURE — G0283 ELEC STIM OTHER THAN WOUND: HCPCS

## 2018-11-09 PROCEDURE — 97110 THERAPEUTIC EXERCISES: CPT

## 2018-11-09 ASSESSMENT — PAIN SCALES - GENERAL: PAINLEVEL_OUTOF10: 4

## 2018-11-09 ASSESSMENT — PAIN DESCRIPTION - DESCRIPTORS: DESCRIPTORS: ACHING;CONSTANT

## 2018-11-09 ASSESSMENT — PAIN DESCRIPTION - FREQUENCY: FREQUENCY: CONTINUOUS

## 2018-11-09 ASSESSMENT — PAIN DESCRIPTION - LOCATION: LOCATION: SHOULDER

## 2018-11-09 ASSESSMENT — PAIN DESCRIPTION - ORIENTATION: ORIENTATION: RIGHT

## 2018-11-09 NOTE — PROGRESS NOTES
Out 0935         Minutes 45         Timed Code Treatment Minutes: 30 Minutes     Electronically signed by: Loulou Knight PT

## 2018-11-12 ENCOUNTER — OFFICE VISIT (OUTPATIENT)
Dept: FAMILY MEDICINE CLINIC | Age: 48
End: 2018-11-12
Payer: MEDICARE

## 2018-11-12 VITALS
SYSTOLIC BLOOD PRESSURE: 137 MMHG | HEIGHT: 71 IN | DIASTOLIC BLOOD PRESSURE: 92 MMHG | HEART RATE: 90 BPM | BODY MASS INDEX: 26.54 KG/M2 | WEIGHT: 189.6 LBS | OXYGEN SATURATION: 97 %

## 2018-11-12 DIAGNOSIS — F41.9 ANXIETY: ICD-10-CM

## 2018-11-12 DIAGNOSIS — K59.03 DRUG INDUCED CONSTIPATION: ICD-10-CM

## 2018-11-12 DIAGNOSIS — E78.5 HYPERLIPIDEMIA WITH TARGET LDL LESS THAN 130: ICD-10-CM

## 2018-11-12 DIAGNOSIS — R03.0 ELEVATED BLOOD PRESSURE READING: Primary | ICD-10-CM

## 2018-11-12 DIAGNOSIS — I15.9 SECONDARY HYPERTENSION: ICD-10-CM

## 2018-11-12 DIAGNOSIS — F31.75 BIPOLAR DISORDER, IN PARTIAL REMISSION, MOST RECENT EPISODE DEPRESSED (HCC): ICD-10-CM

## 2018-11-12 DIAGNOSIS — Z79.899 LONG TERM USE OF DRUG: ICD-10-CM

## 2018-11-12 PROCEDURE — 1036F TOBACCO NON-USER: CPT | Performed by: NURSE PRACTITIONER

## 2018-11-12 PROCEDURE — G8482 FLU IMMUNIZE ORDER/ADMIN: HCPCS | Performed by: NURSE PRACTITIONER

## 2018-11-12 PROCEDURE — G8427 DOCREV CUR MEDS BY ELIG CLIN: HCPCS | Performed by: NURSE PRACTITIONER

## 2018-11-12 PROCEDURE — 99214 OFFICE O/P EST MOD 30 MIN: CPT | Performed by: NURSE PRACTITIONER

## 2018-11-12 PROCEDURE — G8419 CALC BMI OUT NRM PARAM NOF/U: HCPCS | Performed by: NURSE PRACTITIONER

## 2018-11-12 RX ORDER — LITHIUM CARBONATE 450 MG
450 TABLET, EXTENDED RELEASE ORAL 2 TIMES DAILY
COMMUNITY
End: 2020-01-10 | Stop reason: SINTOL

## 2018-11-12 RX ORDER — AMOXICILLIN 250 MG
1 CAPSULE ORAL 2 TIMES DAILY PRN
Qty: 20 TABLET | Refills: 1 | Status: SHIPPED | OUTPATIENT
Start: 2018-11-12 | End: 2018-12-26 | Stop reason: SDUPTHER

## 2018-11-12 ASSESSMENT — ENCOUNTER SYMPTOMS
EYE DISCHARGE: 0
COUGH: 0
ABDOMINAL PAIN: 0
SHORTNESS OF BREATH: 0
BLOOD IN STOOL: 0
CONSTIPATION: 0

## 2018-11-12 NOTE — PROGRESS NOTES
Used    Alcohol use No      Current Outpatient Prescriptions   Medication Sig Dispense Refill    lithium (ESKALITH) 450 MG extended release tablet Take 450 mg by mouth 2 times daily      senna-docusate (SENNA S) 8.6-50 MG per tablet Take 1 tablet by mouth 2 times daily as needed for Constipation 20 tablet 1    ibuprofen (ADVIL;MOTRIN) 600 MG tablet Take 1 tablet by mouth every 6 hours as needed for Pain 90 tablet 0    cyclobenzaprine (FLEXERIL) 10 MG tablet take 1 tablet by mouth three times a day if needed muscle spasm 90 tablet 3    metoprolol tartrate (LOPRESSOR) 25 MG tablet Take 3 tablets by mouth 2 times daily 540 tablet 3    amLODIPine (NORVASC) 10 MG tablet Take 1 tablet by mouth daily Per cardio 30 tablet 0    divalproex (DEPAKOTE ER) 250 MG extended release tablet Take 750 mg by mouth daily      vitamin B-12 (CYANOCOBALAMIN) 1000 MCG tablet Take 1,000 mcg by mouth daily      Ginkgo Biloba (GINKOBA PO) Take by mouth      pravastatin (PRAVACHOL) 80 MG tablet TAKE 1 TABLET BY MOUTH IN THE EVENING 90 tablet 3    gabapentin (NEURONTIN) 300 MG capsule 300 mg 3 times daily . 0    oxyCODONE-acetaminophen (PERCOCET) 5-325 MG per tablet take 1 tablet by mouth four times a day if needed  0    fexofenadine (ALLEGRA ALLERGY) 180 MG tablet Take 1 tablet by mouth daily 30 tablet 3    Adapalene 0.3 % GEL   1    benztropine (COGENTIN) 1 MG tablet   0    erythromycin with ethanol (THERAMYCIN) 2 % external solution apply topically as directed every morning  0    docusate sodium (COLACE) 100 MG capsule Take 1 capsule by mouth 2 times daily as needed for Constipation 60 capsule 3    clindamycin (CLEOCIN T) 1 % external solution       busPIRone (BUSPAR) 10 MG tablet Take 1 tablet by mouth 2 times daily as needed 60 tablet 0    aluminum chloride (DRYSOL) 20 % external solution Apply topically nightly. 37.5 mL 3    Omega-3 Fatty Acids (OMEGA-3 FISH OIL) 1200 MG CAPS Take  by mouth 2 times daily.      

## 2018-11-13 ENCOUNTER — HOSPITAL ENCOUNTER (OUTPATIENT)
Age: 48
Discharge: HOME | End: 2018-11-13
Payer: MEDICARE

## 2018-11-13 ENCOUNTER — TELEPHONE (OUTPATIENT)
Dept: FAMILY MEDICINE CLINIC | Age: 48
End: 2018-11-13

## 2018-11-13 DIAGNOSIS — Z79.899 LONG TERM USE OF DRUG: ICD-10-CM

## 2018-11-13 DIAGNOSIS — I15.9 SECONDARY HYPERTENSION: ICD-10-CM

## 2018-11-13 DIAGNOSIS — E78.5 HYPERLIPIDEMIA WITH TARGET LDL LESS THAN 130: Primary | ICD-10-CM

## 2018-11-13 DIAGNOSIS — E78.5 HYPERLIPIDEMIA WITH TARGET LDL LESS THAN 130: ICD-10-CM

## 2018-11-13 LAB
ALBUMIN SERPL-MCNC: 4.6 G/DL (ref 3.5–5.2)
ALBUMIN/GLOBULIN RATIO: ABNORMAL (ref 1–2.5)
ALP BLD-CCNC: 79 U/L (ref 40–129)
ALT SERPL-CCNC: 55 U/L (ref 5–41)
ANION GAP SERPL CALCULATED.3IONS-SCNC: 13 MMOL/L (ref 9–17)
AST SERPL-CCNC: 26 U/L
BILIRUB SERPL-MCNC: 0.46 MG/DL (ref 0.3–1.2)
BUN BLDV-MCNC: 16 MG/DL (ref 6–20)
BUN/CREAT BLD: ABNORMAL (ref 9–20)
CALCIUM SERPL-MCNC: 9.6 MG/DL (ref 8.6–10.4)
CHLORIDE BLD-SCNC: 101 MMOL/L (ref 98–107)
CHOLESTEROL/HDL RATIO: 4.4
CHOLESTEROL: 229 MG/DL
CO2: 29 MMOL/L (ref 20–31)
CREAT SERPL-MCNC: 0.87 MG/DL (ref 0.7–1.2)
GFR AFRICAN AMERICAN: >60 ML/MIN
GFR NON-AFRICAN AMERICAN: >60 ML/MIN
GFR SERPL CREATININE-BSD FRML MDRD: ABNORMAL ML/MIN/{1.73_M2}
GFR SERPL CREATININE-BSD FRML MDRD: ABNORMAL ML/MIN/{1.73_M2}
GLUCOSE BLD-MCNC: 85 MG/DL (ref 70–99)
HDLC SERPL-MCNC: 52 MG/DL
LDL CHOLESTEROL: 123 MG/DL (ref 0–130)
POTASSIUM SERPL-SCNC: 4.1 MMOL/L (ref 3.7–5.3)
SODIUM BLD-SCNC: 143 MMOL/L (ref 135–144)
TOTAL PROTEIN: 7.7 G/DL (ref 6.4–8.3)
TRIGL SERPL-MCNC: 268 MG/DL
VLDLC SERPL CALC-MCNC: ABNORMAL MG/DL (ref 1–30)

## 2018-11-13 PROCEDURE — 80061 LIPID PANEL: CPT

## 2018-11-13 PROCEDURE — 36415 COLL VENOUS BLD VENIPUNCTURE: CPT

## 2018-11-13 PROCEDURE — 80053 COMPREHEN METABOLIC PANEL: CPT

## 2018-11-13 RX ORDER — PRAVASTATIN SODIUM 80 MG/1
TABLET ORAL
Qty: 90 TABLET | Refills: 1 | Status: SHIPPED | OUTPATIENT
Start: 2018-11-13 | End: 2019-04-05 | Stop reason: SDUPTHER

## 2018-12-13 DIAGNOSIS — M79.7 FIBROMYALGIA MUSCLE PAIN: ICD-10-CM

## 2018-12-13 RX ORDER — CYCLOBENZAPRINE HCL 10 MG
TABLET ORAL
Qty: 90 TABLET | Refills: 3 | Status: SHIPPED | OUTPATIENT
Start: 2018-12-13 | End: 2019-04-13 | Stop reason: SDUPTHER

## 2018-12-26 ENCOUNTER — OFFICE VISIT (OUTPATIENT)
Dept: FAMILY MEDICINE CLINIC | Age: 48
End: 2018-12-26
Payer: MEDICARE

## 2018-12-26 VITALS
WEIGHT: 196.2 LBS | BODY MASS INDEX: 27.47 KG/M2 | HEIGHT: 71 IN | SYSTOLIC BLOOD PRESSURE: 130 MMHG | DIASTOLIC BLOOD PRESSURE: 80 MMHG | HEART RATE: 69 BPM | OXYGEN SATURATION: 98 %

## 2018-12-26 DIAGNOSIS — Z00.00 ANNUAL PHYSICAL EXAM: Primary | ICD-10-CM

## 2018-12-26 DIAGNOSIS — K59.03 CONSTIPATION DUE TO PAIN MEDICATION: ICD-10-CM

## 2018-12-26 DIAGNOSIS — K76.0 FATTY LIVER DISEASE, NONALCOHOLIC: ICD-10-CM

## 2018-12-26 DIAGNOSIS — I10 ESSENTIAL HYPERTENSION: ICD-10-CM

## 2018-12-26 DIAGNOSIS — E78.5 HYPERLIPIDEMIA WITH TARGET LDL LESS THAN 100: ICD-10-CM

## 2018-12-26 DIAGNOSIS — F31.75 BIPOLAR DISORDER, IN PARTIAL REMISSION, MOST RECENT EPISODE DEPRESSED (HCC): ICD-10-CM

## 2018-12-26 DIAGNOSIS — M79.7 FIBROMYALGIA MUSCLE PAIN: ICD-10-CM

## 2018-12-26 PROCEDURE — 99396 PREV VISIT EST AGE 40-64: CPT | Performed by: FAMILY MEDICINE

## 2018-12-26 PROCEDURE — G8482 FLU IMMUNIZE ORDER/ADMIN: HCPCS | Performed by: FAMILY MEDICINE

## 2018-12-26 RX ORDER — DOCUSATE SODIUM 100 MG/1
100 CAPSULE, LIQUID FILLED ORAL 2 TIMES DAILY
Qty: 60 CAPSULE | Refills: 3 | Status: SHIPPED | OUTPATIENT
Start: 2018-12-26 | End: 2019-11-19 | Stop reason: SDUPTHER

## 2018-12-26 RX ORDER — AMOXICILLIN 250 MG
1 CAPSULE ORAL 2 TIMES DAILY PRN
Qty: 20 TABLET | Refills: 1 | Status: SHIPPED | OUTPATIENT
Start: 2018-12-26 | End: 2020-07-28 | Stop reason: ALTCHOICE

## 2018-12-26 ASSESSMENT — ENCOUNTER SYMPTOMS
COUGH: 0
ABDOMINAL PAIN: 0
VOMITING: 0
BACK PAIN: 1
CHEST TIGHTNESS: 0
WHEEZING: 0
CONSTIPATION: 1
ABDOMINAL DISTENTION: 0
NAUSEA: 0
DIARRHEA: 0
SHORTNESS OF BREATH: 0
SORE THROAT: 0

## 2018-12-26 ASSESSMENT — PATIENT HEALTH QUESTIONNAIRE - PHQ9
2. FEELING DOWN, DEPRESSED OR HOPELESS: 0
1. LITTLE INTEREST OR PLEASURE IN DOING THINGS: 0
SUM OF ALL RESPONSES TO PHQ QUESTIONS 1-9: 0
SUM OF ALL RESPONSES TO PHQ9 QUESTIONS 1 & 2: 0
SUM OF ALL RESPONSES TO PHQ QUESTIONS 1-9: 0

## 2018-12-26 ASSESSMENT — VISUAL ACUITY
OS_CC: 20/25
OD_CC: 20/20

## 2018-12-26 NOTE — PROGRESS NOTES
LDLCHOLESTEROL 160 (H) 01/25/2018    LDLCHOLESTEROL 188 (H) 10/20/2017     Lab Results   Component Value Date    VLDL NOT REPORTED 11/13/2018    VLDL NOT REPORTED 01/25/2018    VLDL NOT REPORTED 10/20/2017     Lab Results   Component Value Date    CHOLHDLRATIO 4.4 11/13/2018    CHOLHDLRATIO 5.3 (H) 01/25/2018    CHOLHDLRATIO 5.1 (H) 10/20/2017       Aspirin to prevent CVD- The USPSTF recommends the use of aspirin for men ages 39 to 78 years when the potential benefit of a reduction in myocardial infarction outweighs the potential harm of an increase in gastrointestinal hemorrhage.-not taking aspirin yet    Grade: A recommendation. The 10-year ASCVD risk score (David Vazquez, et al., 2013) is: 3.2%    Values used to calculate the score:      Age: 57 years      Sex: Male      Is Non- : No      Diabetic: No      Tobacco smoker: No      Systolic Blood Pressure: 443 mmHg      Is BP treated: Yes      HDL Cholesterol: 52 mg/dL      Total Cholesterol: 229 mg/dL    Hepatitis C screening-adults at high risk and adults born between [id number]   Lab Results   Component Value Date    HEPAIGM NONREACTIVE 12/02/2015    HEPBIGM NONREACTIVE 12/02/2015    HEPCAB NONREACTIVE 12/02/2015         Negative depression screening. Patient has bipolar depression. He follows a psychiatrist, he is currently on Depakote and lithium, which she feels it controls his symptoms better. Denies suicidal ideation, plan or intent. He denies depression or anhedonia at this time. PHQ Scores 12/26/2018 12/6/2017 5/8/2017 2/8/2017 11/8/2016   PHQ2 Score 0 3 2 6 4   PHQ9 Score 0 10 8 17 15     Interpretation of Total Score Depression Severity: 1-4 = Minimal depression, 5-9 = Mild depression, 10-14 = Moderate depression, 15-19 = Moderately severedepression, 20-27 = Severe depression      Health Maintenance reviewed - updated labs  . There are no preventive care reminders to display for this patient.       Patient Thoracic back: He exhibits tenderness, pain and spasm. Right upper extremity in sling. Hypersensitivity noted upper back   Neurological: He is alert and oriented to person, place, and time. He displays normal reflexes. No cranial nerve deficit or sensory deficit. He exhibits normal muscle tone. Coordination normal.   Skin: Skin is warm and dry. Capillary refill takes less than 2 seconds. No rash noted. He is not diaphoretic. Psychiatric: His behavior is normal. Judgment and thought content normal. His mood appears anxious. His speech is rapid and/or pressured. Nursing note and vitals reviewed.     Increased ALT, related to known fatty liver   CT abdomen pelvis 11/29/15 showed fatty liver    Improved hyperlipidemia      Lab Results   Component Value Date    WBC 8.0 01/25/2018    HGB 15.1 01/25/2018    HCT 44.0 01/25/2018    MCV 94.7 01/25/2018     01/25/2018       Lab Results   Component Value Date     11/13/2018    K 4.1 11/13/2018     11/13/2018    CO2 29 11/13/2018    BUN 16 11/13/2018    CREATININE 0.87 11/13/2018    GLUCOSE 85 11/13/2018    CALCIUM 9.6 11/13/2018        Lab Results   Component Value Date    ALT 55 (H) 11/13/2018    AST 26 11/13/2018    ALKPHOS 79 11/13/2018    BILITOT 0.46 11/13/2018     Lab Results   Component Value Date    HEPAIGM NONREACTIVE 12/02/2015    HEPBIGM NONREACTIVE 12/02/2015    HEPCAB NONREACTIVE 12/02/2015        Lab Results   Component Value Date    TSH 2.36 02/19/2018       Lab Results   Component Value Date    CHOL 229 (H) 11/13/2018    CHOL 243 (H) 01/25/2018    CHOL 265 (H) 10/20/2017     Lab Results   Component Value Date    TRIG 268 (H) 11/13/2018    TRIG 183 (H) 01/25/2018    TRIG 125 10/20/2017     Lab Results   Component Value Date    HDL 52 11/13/2018    HDL 46 01/25/2018    HDL 52 10/20/2017     Lab Results   Component Value Date    LDLCHOLESTEROL 123 11/13/2018    LDLCHOLESTEROL 160 (H) 01/25/2018    LDLCHOLESTEROL 188 (H) 10/20/2017     Lab depression    - TSH without Reflex; Future    Continue current treatment and follow up with psychiatrist and psychologist as scheduled. 5. Fatty liver disease, nonalcoholic  recheck labs    - CBC; Future  - Hepatitis Panel, Acute; Future  - Comprehensive Metabolic Panel; Future  If liver tests are still high, we will consider doing ultrasound of the liver    6. Hyperlipidemia with target LDL less than 100  - Lipid Panel; Future  Improved  Continue pravastatin 80 MG daily    7. Fibromyalgia muscle pain  Stable       Education Reviewed and Recommended: Safe Sex/STD Prevention, Low Fat, Low Cholesterol Diet  Follow up: 3 months   Orders Placed This Encounter   Procedures    CBC     Standing Status:   Future     Standing Expiration Date:   12/26/2019    Hepatitis Panel, Acute     Standing Status:   Future     Standing Expiration Date:   12/26/2019    Comprehensive Metabolic Panel     Standing Status:   Future     Standing Expiration Date:   12/26/2019    TSH without Reflex     Standing Status:   Future     Standing Expiration Date:   12/26/2019    Lipid Panel     Standing Status:   Future     Standing Expiration Date:   12/26/2019     Order Specific Question:   Is Patient Fasting?/# of Hours     Answer:   8-10 Hours       Medications Discontinued During This Encounter   Medication Reason    busPIRone (BUSPAR) 10 MG tablet LIST CLEANUP    docusate sodium (COLACE) 100 MG capsule REORDER    senna-docusate (SENNA S) 8.6-50 MG per tablet [place] Likes received counseling on the following healthy behaviors: nutrition, exercise and medication adherence  Reviewed prior labs and health maintenance  Continue current medications, diet and exercise. Discussed use, benefit, and side effects of prescribed medications. Barriers to medication compliance addressed. Patient given educational materials - see patient instructions  Was a self-tracking handout given in paper form or via [employer]?  Yes    Requested Prescriptions     Signed Prescriptions Disp Refills    docusate sodium (COLACE) 100 MG capsule 60 capsule 3     Sig: Take 1 capsule by mouth 2 times daily    senna-docusate (SENNA S) 8.6-50 MG per tablet 20 tablet 1     Sig: Take 1 tablet by mouth 2 times daily as needed for Constipation       All patient questions answered. Patient voiced understanding. Quality Measures    Body mass index is 27.36 kg/m². Elevated. Weight control planned discussed conventional weight loss and Healthy diet and regular exercise. BP: 130/80 Blood pressure is normal. Treatment plan consists of Weight Reduction, DASH Eating Plan, Dietary Sodium Restriction, Increased Physical Activity, Patient In-home Blood Pressure Monitoring and No treatment change needed. Lab Results   Component Value Date    LDLCHOLESTEROL 123 11/13/2018    (goal LDL reduction with dx if diabetes is 50% LDL reduction)      PHQ Scores 12/26/2018 12/6/2017 5/8/2017 2/8/2017 11/8/2016   PHQ2 Score 0 3 2 6 4   PHQ9 Score 0 10 8 17 15     Interpretation of Total Score Depression Severity: 1-4 = Minimal depression, 5-9 = Mild depression, 10-14 = Moderate depression, 15-19 = Moderately severe depression, 20-27 = Severe depression      Thepatient's past medical, surgical, social, and family history as well as his   current medications and allergies were reviewed as documented in today's encounter. Medications, labs, diagnostic studies,consultations and follow-up as documented in this encounter. Return in about 3 months (around 3/26/2019) for LABS F/U, HTN. Patient was seen with total face to face time of  30 minutes. More than 50% of this visit was counseling and education. Future Appointments  Date Time Provider Maribell Duran   4/3/2019 8:00 AM [facility]       This note was completed by using the assistance of a speech-recognition program. However, inadvertent computerized transcription errors may be present.

## 2018-12-26 NOTE — PROGRESS NOTES
Visit Information    Have you changed or started any medications since your last visit including any over-the-counter medicines, vitamins, or herbal medicines? no   Are you having any side effects from any of your medications? -  no  Have you stopped taking any of your medications? Is so, why? -  no    Have you seen any other physician or provider since your last visit? No  Have you had any other diagnostic tests since your last visit? No  Have you been seen in the emergency room and/or had an admission to a hospital since we last saw you? No  Have you had your routine dental cleaning in the past 6 months? no    Have you activated your [employer] account? If not, what are your barriers?  Yes     Patient Care Team:  Juliano Walton MD as PCP - General (Family Medicine)  DENNIS Sheikh CNP as PCP - S Attributed Provider  Alfa Martinez MD (Dermatology)  Vickie Haas MD as Surgeon (Cardiology)    Medical History Review  Past Medical, Family, and Social History reviewed and does contribute to the patient presenting condition    Health Maintenance   Topic Date Due    Potassium monitoring  11/13/2019    Creatinine monitoring  11/13/2019    Lipid screen  11/13/2023    DTaP/Tdap/Td vaccine (2 - Td) 01/01/2024    Flu vaccine  Completed    HIV screen  Completed

## 2018-12-26 NOTE — PATIENT INSTRUCTIONS
meals using beans and peas. Add garbanzo or kidney beans to salads. Make burritos and tacos with mashed coker beans or black beans. Where can you learn more? Go to [email or web address].[email or web address] org and sign in to your [employer] account. Enter D449 in the [facility] box to learn more about \"DASH Diet: Care Instructions. \"     If you do not have an account, please click on the \"Sign Up Now\" link. Current as of: December 6, 2017  Content Version: 11.8  © 2476-9815 [employer]. Care instructions adapted under license by [facility] ([facility]). If you have questions about a medical condition or this instruction, always ask your healthcare professional. [address] any warranty or liability for your use of this information. Patient Education        Well Visit, Ages 25 to 48: Care Instructions  Your Care Instructions    Physical exams can help you stay healthy. Your doctor has checked your overall health and may have suggested ways to take good care of yourself. He or she also may have recommended tests. At home, you can help prevent illness with healthy eating, regular exercise, and other steps. Follow-up care is a key part of your treatment and safety. Be sure to make and go to all appointments, and call your doctor if you are having problems. It's also a good idea to know your test results and keep a list of the medicines you take. How can you care for yourself at home? · Reach and stay at a healthy weight. This will lower your risk for many problems, such as obesity, diabetes, heart disease, and high blood pressure. · Get at least 30 minutes of physical activity on most days of the week. Walking is a good choice. You also may want to do other activities, such as running, swimming, cycling, or playing tennis or team sports. Discuss any changes in your exercise program with your doctor. · Do not smoke or allow others to smoke around you.  If you need help

## 2018-12-30 PROBLEM — M79.671 PAIN OF RIGHT HEEL: Status: ACTIVE | Noted: 2017-07-31

## 2018-12-30 PROBLEM — M75.40 SUBACROMIAL IMPINGEMENT: Status: ACTIVE | Noted: 2018-05-11

## 2018-12-30 PROBLEM — M76.60 ACHILLES TENDINITIS: Status: ACTIVE | Noted: 2018-05-11

## 2019-01-08 ENCOUNTER — OFFICE VISIT (OUTPATIENT)
Dept: FAMILY MEDICINE CLINIC | Age: 49
End: 2019-01-08
Payer: MEDICARE

## 2019-01-08 VITALS
WEIGHT: 191.6 LBS | BODY MASS INDEX: 26.82 KG/M2 | DIASTOLIC BLOOD PRESSURE: 84 MMHG | HEART RATE: 88 BPM | TEMPERATURE: 97.6 F | SYSTOLIC BLOOD PRESSURE: 138 MMHG | HEIGHT: 71 IN

## 2019-01-08 DIAGNOSIS — J32.0 MAXILLARY SINUSITIS, UNSPECIFIED CHRONICITY: Primary | ICD-10-CM

## 2019-01-08 DIAGNOSIS — I10 ESSENTIAL HYPERTENSION: ICD-10-CM

## 2019-01-08 PROCEDURE — G8482 FLU IMMUNIZE ORDER/ADMIN: HCPCS | Performed by: FAMILY MEDICINE

## 2019-01-08 PROCEDURE — G8419 CALC BMI OUT NRM PARAM NOF/U: HCPCS | Performed by: FAMILY MEDICINE

## 2019-01-08 PROCEDURE — G8427 DOCREV CUR MEDS BY ELIG CLIN: HCPCS | Performed by: FAMILY MEDICINE

## 2019-01-08 PROCEDURE — 99213 OFFICE O/P EST LOW 20 MIN: CPT | Performed by: FAMILY MEDICINE

## 2019-01-08 PROCEDURE — 1036F TOBACCO NON-USER: CPT | Performed by: FAMILY MEDICINE

## 2019-01-08 RX ORDER — AMOXICILLIN 500 MG/1
500 CAPSULE ORAL 3 TIMES DAILY
Qty: 30 CAPSULE | Refills: 0 | Status: SHIPPED | OUTPATIENT
Start: 2019-01-08 | End: 2019-01-18

## 2019-01-08 ASSESSMENT — ENCOUNTER SYMPTOMS
CONSTIPATION: 0
SORE THROAT: 1
VOICE CHANGE: 1
DIARRHEA: 0
COUGH: 1
WHEEZING: 0
ABDOMINAL PAIN: 0
NAUSEA: 0
SINUS PRESSURE: 1
SHORTNESS OF BREATH: 0

## 2019-01-10 ENCOUNTER — HOSPITAL ENCOUNTER (OUTPATIENT)
Dept: PHYSICAL THERAPY | Age: 49
Setting detail: THERAPIES SERIES
Discharge: HOME | End: 2019-01-10
Payer: MEDICARE

## 2019-01-10 PROCEDURE — 97110 THERAPEUTIC EXERCISES: CPT

## 2019-01-10 PROCEDURE — 97161 PT EVAL LOW COMPLEX 20 MIN: CPT

## 2019-01-10 PROCEDURE — G0283 ELEC STIM OTHER THAN WOUND: HCPCS

## 2019-01-10 ASSESSMENT — PAIN DESCRIPTION - DESCRIPTORS: DESCRIPTORS: SHARP;CONSTANT;ACHING

## 2019-01-10 ASSESSMENT — PAIN SCALES - GENERAL: PAINLEVEL_OUTOF10: 6

## 2019-01-10 ASSESSMENT — PAIN DESCRIPTION - LOCATION: LOCATION: SHOULDER

## 2019-01-10 ASSESSMENT — PAIN DESCRIPTION - ORIENTATION: ORIENTATION: RIGHT

## 2019-01-10 ASSESSMENT — PAIN DESCRIPTION - FREQUENCY: FREQUENCY: CONTINUOUS

## 2019-01-15 ENCOUNTER — APPOINTMENT (OUTPATIENT)
Dept: PHYSICAL THERAPY | Age: 49
End: 2019-01-15
Payer: MEDICARE

## 2019-01-15 ENCOUNTER — HOSPITAL ENCOUNTER (OUTPATIENT)
Dept: PHYSICAL THERAPY | Age: 49
Setting detail: THERAPIES SERIES
Discharge: HOME | End: 2019-01-15
Payer: MEDICARE

## 2019-01-15 PROCEDURE — 97110 THERAPEUTIC EXERCISES: CPT

## 2019-01-15 PROCEDURE — G0283 ELEC STIM OTHER THAN WOUND: HCPCS

## 2019-01-15 ASSESSMENT — PAIN DESCRIPTION - FREQUENCY: FREQUENCY: INTERMITTENT

## 2019-01-15 ASSESSMENT — PAIN DESCRIPTION - ORIENTATION: ORIENTATION: RIGHT

## 2019-01-15 ASSESSMENT — PAIN DESCRIPTION - LOCATION: LOCATION: SHOULDER

## 2019-01-15 ASSESSMENT — PAIN DESCRIPTION - DESCRIPTORS: DESCRIPTORS: DULL

## 2019-01-15 ASSESSMENT — PAIN SCALES - GENERAL: PAINLEVEL_OUTOF10: 3

## 2019-01-17 ENCOUNTER — HOSPITAL ENCOUNTER (OUTPATIENT)
Dept: PHYSICAL THERAPY | Age: 49
Setting detail: THERAPIES SERIES
Discharge: HOME | End: 2019-01-17
Payer: MEDICARE

## 2019-01-17 PROCEDURE — 97110 THERAPEUTIC EXERCISES: CPT

## 2019-01-17 ASSESSMENT — PAIN DESCRIPTION - ORIENTATION: ORIENTATION: RIGHT

## 2019-01-17 ASSESSMENT — PAIN DESCRIPTION - FREQUENCY: FREQUENCY: INTERMITTENT

## 2019-01-17 ASSESSMENT — PAIN DESCRIPTION - LOCATION: LOCATION: SHOULDER

## 2019-01-17 ASSESSMENT — PAIN SCALES - GENERAL: PAINLEVEL_OUTOF10: 4

## 2019-01-21 ENCOUNTER — HOSPITAL ENCOUNTER (OUTPATIENT)
Dept: PHYSICAL THERAPY | Age: 49
Setting detail: THERAPIES SERIES
Discharge: HOME | End: 2019-01-21
Payer: MEDICARE

## 2019-01-21 PROCEDURE — G0283 ELEC STIM OTHER THAN WOUND: HCPCS

## 2019-01-21 PROCEDURE — 97110 THERAPEUTIC EXERCISES: CPT

## 2019-01-21 ASSESSMENT — PAIN DESCRIPTION - LOCATION: LOCATION: SHOULDER

## 2019-01-21 ASSESSMENT — PAIN DESCRIPTION - FREQUENCY: FREQUENCY: CONTINUOUS

## 2019-01-21 ASSESSMENT — PAIN SCALES - GENERAL: PAINLEVEL_OUTOF10: 6

## 2019-01-21 ASSESSMENT — PAIN DESCRIPTION - ORIENTATION: ORIENTATION: RIGHT

## 2019-01-21 ASSESSMENT — PAIN DESCRIPTION - DESCRIPTORS: DESCRIPTORS: CONSTANT

## 2019-01-23 ENCOUNTER — HOSPITAL ENCOUNTER (OUTPATIENT)
Dept: PHYSICAL THERAPY | Age: 49
Setting detail: THERAPIES SERIES
Discharge: HOME | End: 2019-01-23
Payer: MEDICARE

## 2019-01-23 ENCOUNTER — APPOINTMENT (OUTPATIENT)
Dept: PHYSICAL THERAPY | Age: 49
End: 2019-01-23
Payer: MEDICARE

## 2019-01-23 PROCEDURE — G0283 ELEC STIM OTHER THAN WOUND: HCPCS

## 2019-01-23 PROCEDURE — 97110 THERAPEUTIC EXERCISES: CPT

## 2019-01-23 ASSESSMENT — PAIN DESCRIPTION - FREQUENCY: FREQUENCY: CONTINUOUS

## 2019-01-23 ASSESSMENT — PAIN DESCRIPTION - LOCATION: LOCATION: SHOULDER

## 2019-01-23 ASSESSMENT — PAIN DESCRIPTION - DESCRIPTORS: DESCRIPTORS: CONSTANT

## 2019-01-23 ASSESSMENT — PAIN DESCRIPTION - ORIENTATION: ORIENTATION: RIGHT

## 2019-01-23 ASSESSMENT — PAIN SCALES - GENERAL: PAINLEVEL_OUTOF10: 6

## 2019-01-29 ENCOUNTER — HOSPITAL ENCOUNTER (OUTPATIENT)
Age: 49
Discharge: HOME | End: 2019-01-29
Payer: MEDICARE

## 2019-01-29 LAB
ANION GAP SERPL CALCULATED.3IONS-SCNC: 13 MMOL/L (ref 9–17)
BUN BLDV-MCNC: 11 MG/DL (ref 6–20)
BUN/CREAT BLD: ABNORMAL (ref 9–20)
CALCIUM SERPL-MCNC: 10 MG/DL (ref 8.6–10.4)
CHLORIDE BLD-SCNC: 103 MMOL/L (ref 98–107)
CO2: 24 MMOL/L (ref 20–31)
CREAT SERPL-MCNC: 1.07 MG/DL (ref 0.7–1.2)
GFR AFRICAN AMERICAN: >60 ML/MIN
GFR NON-AFRICAN AMERICAN: >60 ML/MIN
GFR SERPL CREATININE-BSD FRML MDRD: ABNORMAL ML/MIN/{1.73_M2}
GFR SERPL CREATININE-BSD FRML MDRD: ABNORMAL ML/MIN/{1.73_M2}
GLUCOSE BLD-MCNC: 102 MG/DL (ref 70–99)
LITHIUM DATE LAST DOSE: NORMAL
LITHIUM DOSE AMOUNT: NORMAL
LITHIUM DOSE TIME: NORMAL
LITHIUM LEVEL: 1.2 MMOL/L (ref 0.6–1.2)
POTASSIUM SERPL-SCNC: 4.7 MMOL/L (ref 3.7–5.3)
SODIUM BLD-SCNC: 140 MMOL/L (ref 135–144)
TSH SERPL DL<=0.05 MIU/L-ACNC: 3.27 MIU/L (ref 0.3–5)

## 2019-01-29 PROCEDURE — 80178 ASSAY OF LITHIUM: CPT

## 2019-01-29 PROCEDURE — 36415 COLL VENOUS BLD VENIPUNCTURE: CPT

## 2019-01-29 PROCEDURE — 84443 ASSAY THYROID STIM HORMONE: CPT

## 2019-01-29 PROCEDURE — 80048 BASIC METABOLIC PNL TOTAL CA: CPT

## 2019-01-31 ENCOUNTER — HOSPITAL ENCOUNTER (OUTPATIENT)
Dept: PHYSICAL THERAPY | Age: 49
Setting detail: THERAPIES SERIES
Discharge: HOME | End: 2019-01-31
Payer: MEDICARE

## 2019-01-31 PROCEDURE — 97110 THERAPEUTIC EXERCISES: CPT

## 2019-01-31 ASSESSMENT — PAIN DESCRIPTION - ORIENTATION: ORIENTATION: RIGHT

## 2019-01-31 ASSESSMENT — PAIN DESCRIPTION - FREQUENCY: FREQUENCY: CONTINUOUS

## 2019-01-31 ASSESSMENT — PAIN SCALES - GENERAL: PAINLEVEL_OUTOF10: 4

## 2019-01-31 ASSESSMENT — PAIN DESCRIPTION - LOCATION: LOCATION: SHOULDER

## 2019-01-31 ASSESSMENT — PAIN DESCRIPTION - DESCRIPTORS: DESCRIPTORS: CONSTANT

## 2019-02-04 ENCOUNTER — HOSPITAL ENCOUNTER (OUTPATIENT)
Dept: PHYSICAL THERAPY | Age: 49
Setting detail: THERAPIES SERIES
Discharge: HOME | End: 2019-02-04
Payer: MEDICARE

## 2019-02-04 PROCEDURE — 97110 THERAPEUTIC EXERCISES: CPT

## 2019-02-04 PROCEDURE — G0283 ELEC STIM OTHER THAN WOUND: HCPCS

## 2019-02-04 ASSESSMENT — PAIN SCALES - GENERAL: PAINLEVEL_OUTOF10: 3

## 2019-02-04 ASSESSMENT — PAIN DESCRIPTION - ORIENTATION: ORIENTATION: RIGHT

## 2019-02-04 ASSESSMENT — PAIN DESCRIPTION - FREQUENCY: FREQUENCY: CONTINUOUS

## 2019-02-04 ASSESSMENT — PAIN DESCRIPTION - DESCRIPTORS: DESCRIPTORS: CONSTANT

## 2019-02-04 ASSESSMENT — PAIN DESCRIPTION - LOCATION: LOCATION: SHOULDER

## 2019-02-06 ENCOUNTER — APPOINTMENT (OUTPATIENT)
Dept: PHYSICAL THERAPY | Age: 49
End: 2019-02-06
Payer: MEDICARE

## 2019-02-07 ENCOUNTER — HOSPITAL ENCOUNTER (OUTPATIENT)
Dept: PHYSICAL THERAPY | Age: 49
Setting detail: THERAPIES SERIES
Discharge: HOME | End: 2019-02-07
Payer: MEDICARE

## 2019-02-07 PROCEDURE — 97110 THERAPEUTIC EXERCISES: CPT

## 2019-02-07 ASSESSMENT — PAIN DESCRIPTION - FREQUENCY: FREQUENCY: INTERMITTENT

## 2019-02-07 ASSESSMENT — PAIN DESCRIPTION - ORIENTATION: ORIENTATION: RIGHT

## 2019-02-07 ASSESSMENT — PAIN SCALES - GENERAL: PAINLEVEL_OUTOF10: 3

## 2019-02-07 ASSESSMENT — PAIN DESCRIPTION - LOCATION: LOCATION: SHOULDER

## 2019-02-20 ENCOUNTER — HOSPITAL ENCOUNTER (OUTPATIENT)
Dept: PHYSICAL THERAPY | Age: 49
Setting detail: THERAPIES SERIES
Discharge: HOME | End: 2019-02-20
Payer: MEDICARE

## 2019-02-20 PROCEDURE — G0283 ELEC STIM OTHER THAN WOUND: HCPCS

## 2019-02-20 PROCEDURE — 97110 THERAPEUTIC EXERCISES: CPT

## 2019-02-20 ASSESSMENT — PAIN DESCRIPTION - LOCATION: LOCATION: SHOULDER

## 2019-02-20 ASSESSMENT — PAIN SCALES - GENERAL: PAINLEVEL_OUTOF10: 7

## 2019-02-20 ASSESSMENT — PAIN DESCRIPTION - FREQUENCY: FREQUENCY: CONTINUOUS

## 2019-02-20 ASSESSMENT — PAIN DESCRIPTION - ORIENTATION: ORIENTATION: RIGHT

## 2019-02-20 ASSESSMENT — PAIN DESCRIPTION - DESCRIPTORS: DESCRIPTORS: CONSTANT

## 2019-02-25 ENCOUNTER — HOSPITAL ENCOUNTER (OUTPATIENT)
Dept: PHYSICAL THERAPY | Age: 49
Setting detail: THERAPIES SERIES
Discharge: HOME | End: 2019-02-25
Payer: MEDICARE

## 2019-02-25 PROCEDURE — 97110 THERAPEUTIC EXERCISES: CPT

## 2019-02-25 ASSESSMENT — PAIN DESCRIPTION - LOCATION: LOCATION: SHOULDER

## 2019-02-25 ASSESSMENT — PAIN DESCRIPTION - DESCRIPTORS: DESCRIPTORS: CONSTANT

## 2019-02-25 ASSESSMENT — PAIN DESCRIPTION - ORIENTATION: ORIENTATION: RIGHT

## 2019-02-25 ASSESSMENT — PAIN SCALES - GENERAL: PAINLEVEL_OUTOF10: 5

## 2019-02-25 ASSESSMENT — PAIN DESCRIPTION - FREQUENCY: FREQUENCY: CONTINUOUS

## 2019-02-28 ENCOUNTER — HOSPITAL ENCOUNTER (OUTPATIENT)
Dept: PHYSICAL THERAPY | Age: 49
Setting detail: THERAPIES SERIES
Discharge: HOME | End: 2019-02-28
Payer: MEDICARE

## 2019-02-28 PROCEDURE — G0283 ELEC STIM OTHER THAN WOUND: HCPCS

## 2019-02-28 PROCEDURE — 97110 THERAPEUTIC EXERCISES: CPT

## 2019-02-28 ASSESSMENT — PAIN DESCRIPTION - FREQUENCY: FREQUENCY: CONTINUOUS

## 2019-02-28 ASSESSMENT — PAIN DESCRIPTION - ORIENTATION: ORIENTATION: RIGHT

## 2019-02-28 ASSESSMENT — PAIN DESCRIPTION - DESCRIPTORS: DESCRIPTORS: CONSTANT

## 2019-02-28 ASSESSMENT — PAIN SCALES - GENERAL: PAINLEVEL_OUTOF10: 4

## 2019-02-28 ASSESSMENT — PAIN DESCRIPTION - LOCATION: LOCATION: SHOULDER

## 2019-03-06 ENCOUNTER — HOSPITAL ENCOUNTER (OUTPATIENT)
Dept: PHYSICAL THERAPY | Age: 49
Setting detail: THERAPIES SERIES
Discharge: HOME | End: 2019-03-06
Payer: MEDICARE

## 2019-03-08 ENCOUNTER — HOSPITAL ENCOUNTER (OUTPATIENT)
Dept: PHYSICAL THERAPY | Age: 49
Setting detail: THERAPIES SERIES
Discharge: HOME | End: 2019-03-08
Payer: MEDICARE

## 2019-03-08 PROCEDURE — G0283 ELEC STIM OTHER THAN WOUND: HCPCS

## 2019-03-08 PROCEDURE — 97110 THERAPEUTIC EXERCISES: CPT

## 2019-03-08 ASSESSMENT — PAIN DESCRIPTION - LOCATION: LOCATION: SHOULDER

## 2019-03-08 ASSESSMENT — PAIN DESCRIPTION - ORIENTATION: ORIENTATION: RIGHT

## 2019-03-08 ASSESSMENT — PAIN DESCRIPTION - FREQUENCY: FREQUENCY: CONTINUOUS

## 2019-03-08 ASSESSMENT — PAIN SCALES - GENERAL: PAINLEVEL_OUTOF10: 5

## 2019-03-08 ASSESSMENT — PAIN DESCRIPTION - DESCRIPTORS: DESCRIPTORS: CONSTANT

## 2019-03-13 ENCOUNTER — HOSPITAL ENCOUNTER (OUTPATIENT)
Dept: PHYSICAL THERAPY | Age: 49
Setting detail: THERAPIES SERIES
Discharge: HOME | End: 2019-03-13
Payer: MEDICARE

## 2019-03-13 PROCEDURE — 97110 THERAPEUTIC EXERCISES: CPT

## 2019-03-13 ASSESSMENT — PAIN DESCRIPTION - FREQUENCY: FREQUENCY: CONTINUOUS

## 2019-03-13 ASSESSMENT — PAIN DESCRIPTION - LOCATION: LOCATION: SHOULDER

## 2019-03-13 ASSESSMENT — PAIN DESCRIPTION - DESCRIPTORS: DESCRIPTORS: CONSTANT

## 2019-03-13 ASSESSMENT — PAIN DESCRIPTION - ORIENTATION: ORIENTATION: RIGHT

## 2019-03-13 ASSESSMENT — PAIN SCALES - GENERAL: PAINLEVEL_OUTOF10: 4

## 2019-03-15 ENCOUNTER — HOSPITAL ENCOUNTER (OUTPATIENT)
Dept: PHYSICAL THERAPY | Age: 49
Setting detail: THERAPIES SERIES
Discharge: HOME | End: 2019-03-15
Payer: MEDICARE

## 2019-04-02 ENCOUNTER — HOSPITAL ENCOUNTER (OUTPATIENT)
Dept: PHYSICAL THERAPY | Age: 49
Setting detail: THERAPIES SERIES
Discharge: HOME | End: 2019-04-02
Payer: MEDICARE

## 2019-04-02 PROCEDURE — G0283 ELEC STIM OTHER THAN WOUND: HCPCS

## 2019-04-02 PROCEDURE — 97110 THERAPEUTIC EXERCISES: CPT

## 2019-04-02 ASSESSMENT — PAIN DESCRIPTION - FREQUENCY: FREQUENCY: CONTINUOUS

## 2019-04-02 ASSESSMENT — PAIN DESCRIPTION - LOCATION: LOCATION: SHOULDER

## 2019-04-02 ASSESSMENT — PAIN DESCRIPTION - DESCRIPTORS: DESCRIPTORS: CONSTANT

## 2019-04-02 ASSESSMENT — PAIN SCALES - GENERAL: PAINLEVEL_OUTOF10: 5

## 2019-04-02 ASSESSMENT — PAIN DESCRIPTION - ORIENTATION: ORIENTATION: RIGHT

## 2019-04-02 NOTE — PROGRESS NOTES
Physical Therapy Re-evaluation Note    Date: 2019  Patient Name: Perla Felix  MRN: [id number]  : 1970     Treatment Diagnosis: stiffness/weakness R cassidy    Subjective   General  Referring Practitioner: Dr Duran Yeung  Referral Date : 01/10/19  Diagnosis: pain R cassidy M25.519  General Comment  Comments: new script received to continue PT,had 2 cortisone shot already,to see Dr Princess Patel 21  PT Visit Information  Onset Date: 06/15/18  PT Insurance Information: paramount advantage  Total # of Visits Approved: 24  Total # of Visits to Date: 14  Subjective  Subjective: complains of pain on R cassidy today  Pain Screening  Patient Currently in Pain: Yes  Pain Assessment  Pain Assessment: 0-10  Pain Level: 5  Pain Location: Shoulder  Pain Orientation: Right  Pain Descriptors: Constant  Pain Frequency: Continuous  Vital Signs  Patient Currently in Pain: Yes    Objective  AROM RUE (degrees)  R Shoulder Flexion 0-180: 0-132  R Shoulder Extension 0-45: 0-56  R Shoulder ABduction 0-180: 0-125  R Shoulder Int Rotation  0-70: 0-60  R Shoulder Ext Rotation 0-90: 0-70  Strength RUE  Comment: R cassidy 4-/5 scapular muscles 3/5 distal 5/5  101#  Strength LUE  Comment:  110#     Exercises  Exercise 1: finger ladder R 10x  Exercise 2: towel stretch IR R 3x30\"  Exercise 3: R upper trap stretch 3x30\"  Exercise 4: cane ex-flex/ABD/ER/ext 10x  Exercise 5: ball on wall 10x10\"  Exercise 6: 1# R cassidy flex/ext/scaption 15x  Exercise 7: hot pack and EStim to R cassidy 20 min sitting after ex today  Exercise 8: rows/pull down Green Tband 30x  Exercise 9: clothespin R all  Exercise 10: 1/2 hula hoop #4 R  Exercise 11: marco a with dowel R  Exercise 12: 1 strand Green Tband  6 way R cassidy 10x  Exercise 13: miguel ángel's R 0# 10x  Exercise 14: R elbow curls 3# 3x10  Exercise 15: shapes with 4# wt 5x    Assessment   Conditions Requiring Skilled Therapeutic Intervention  Assessment: SHORT TERM GOALS MET 2/4 LONG TERM GOAL MET 0/1  Treatment Diagnosis: stiffness/weakness R cassidy  REQUIRES PT FOLLOW UP: Yes  Discharge Recommendations: Home independently  Activity Tolerance  Activity Tolerance: Patient Tolerated treatment well         Plan   Plan  Times per week: 2x/week  Current Treatment Recommendations: Strengthening, ROM, Home Exercise Program, Modalities  Plan Comment: to continue PT 2x/week to work on reaching short and long term goals    OutComes Score  QuickDASH Total Score: 34       QuickDASH Disability/Symptom Score : 52.27 %    Goals  Short term goals  Short term goal 1: decrease R cassidy pain by 50% so patient  can use R arm better(not met per patient only 20% relief)  Short term goal 2: increase AROM R cassidy to full(partly met)  Short term goal 3: increase strength R cassidy muscles by 1/2 grade or better(met)  Short term goal 4: indep with HEP(met)  Long term goals  Long term goal 1: improve quick dash score from 29 to 19 or better( not met Quick dash score 52%)     Treatment Charges: Minutes Units   []  Ultrasound     [x]  Electrical-Stim 20 1   []  Iontophoresis     []  Traction     []  Massage       []  Eval     []  Gait     [x]  Ther Exercise 40  3    []  Manual Therapy       []  Ther Activities       []  Aquatics     []  Vasopneumatic Device     []  Neuro Re-Ed       []  Other       Total Treatment Time: 60  4       Therapy Time   Individual Concurrent Group Co-treatment   Time In 0830         Time Out 0930         Minutes 60         Timed Code Treatment Minutes: 40 Minutes    Electronically signed by: Jacob Kauffman, PT

## 2019-04-03 ENCOUNTER — OFFICE VISIT (OUTPATIENT)
Dept: FAMILY MEDICINE CLINIC | Age: 49
End: 2019-04-03
Payer: MEDICARE

## 2019-04-03 VITALS
SYSTOLIC BLOOD PRESSURE: 129 MMHG | HEIGHT: 71 IN | OXYGEN SATURATION: 97 % | BODY MASS INDEX: 26.1 KG/M2 | DIASTOLIC BLOOD PRESSURE: 92 MMHG | HEART RATE: 99 BPM | WEIGHT: 186.4 LBS

## 2019-04-03 DIAGNOSIS — K76.0 FATTY LIVER DISEASE, NONALCOHOLIC: ICD-10-CM

## 2019-04-03 DIAGNOSIS — M75.21 BICIPITAL TENDINITIS, RIGHT SHOULDER: ICD-10-CM

## 2019-04-03 DIAGNOSIS — Z98.890 HISTORY OF ACHILLES TENDON REPAIR: ICD-10-CM

## 2019-04-03 DIAGNOSIS — M76.61 ACHILLES TENDINITIS OF RIGHT LOWER EXTREMITY: ICD-10-CM

## 2019-04-03 DIAGNOSIS — M79.7 FIBROMYALGIA MUSCLE PAIN: ICD-10-CM

## 2019-04-03 DIAGNOSIS — E78.5 HYPERLIPIDEMIA WITH TARGET LDL LESS THAN 100: ICD-10-CM

## 2019-04-03 DIAGNOSIS — F31.75 BIPOLAR DISORDER, IN PARTIAL REMISSION, MOST RECENT EPISODE DEPRESSED (HCC): ICD-10-CM

## 2019-04-03 DIAGNOSIS — R00.0 SINUS TACHYCARDIA: ICD-10-CM

## 2019-04-03 DIAGNOSIS — I10 ESSENTIAL HYPERTENSION: Primary | ICD-10-CM

## 2019-04-03 DIAGNOSIS — R73.9 HYPERGLYCEMIA: ICD-10-CM

## 2019-04-03 LAB — HBA1C MFR BLD: 5.3 %

## 2019-04-03 PROCEDURE — 1036F TOBACCO NON-USER: CPT | Performed by: FAMILY MEDICINE

## 2019-04-03 PROCEDURE — G8427 DOCREV CUR MEDS BY ELIG CLIN: HCPCS | Performed by: FAMILY MEDICINE

## 2019-04-03 PROCEDURE — 99214 OFFICE O/P EST MOD 30 MIN: CPT | Performed by: FAMILY MEDICINE

## 2019-04-03 PROCEDURE — G8419 CALC BMI OUT NRM PARAM NOF/U: HCPCS | Performed by: FAMILY MEDICINE

## 2019-04-03 PROCEDURE — 83036 HEMOGLOBIN GLYCOSYLATED A1C: CPT | Performed by: FAMILY MEDICINE

## 2019-04-03 RX ORDER — ASPIRIN 81 MG/1
81 TABLET ORAL DAILY
Qty: 90 TABLET | Refills: 1 | Status: SHIPPED | OUTPATIENT
Start: 2019-04-03 | End: 2019-09-20 | Stop reason: SDUPTHER

## 2019-04-03 RX ORDER — LIDOCAINE 40 MG/G
CREAM TOPICAL
Qty: 120 G | Refills: 3 | Status: SHIPPED | OUTPATIENT
Start: 2019-04-03 | End: 2021-03-12 | Stop reason: ALTCHOICE

## 2019-04-03 ASSESSMENT — PATIENT HEALTH QUESTIONNAIRE - PHQ9
2. FEELING DOWN, DEPRESSED OR HOPELESS: 0
SUM OF ALL RESPONSES TO PHQ9 QUESTIONS 1 & 2: 0
1. LITTLE INTEREST OR PLEASURE IN DOING THINGS: 0
SUM OF ALL RESPONSES TO PHQ QUESTIONS 1-9: 0
SUM OF ALL RESPONSES TO PHQ QUESTIONS 1-9: 0

## 2019-04-03 ASSESSMENT — ENCOUNTER SYMPTOMS
DIARRHEA: 0
SHORTNESS OF BREATH: 0
CHEST TIGHTNESS: 0
VOMITING: 0
COUGH: 0
CONSTIPATION: 0
ABDOMINAL PAIN: 0
ABDOMINAL DISTENTION: 0
NAUSEA: 0
WHEEZING: 0

## 2019-04-03 NOTE — PATIENT INSTRUCTIONS
Try Cymbalta,Gabapentin -discuss with psychiatrist  Gabapentin with psychiatrist, or podiatry or pain management   Salonpas GEL, camphor- menthol- methyl salicylate use it as needed for pain, every 6- 8 hrs  Lidocaine cream every 8 hrs  Aromatherapy: Lavender, peppermint , P[place], BLACK PEPPER oils. Patient Education        Heart-Healthy Diet: Care Instructions  Your Care Instructions    A heart-healthy diet has lots of vegetables, fruits, nuts, beans, and whole grains, and is low in salt. It limits foods that are high in saturated fat, such as meats, cheeses, and fried foods. It may be hard to change your diet, but even small changes can lower your risk of heart attack and heart disease. Follow-up care is a key part of your treatment and safety. Be sure to make and go to all appointments, and call your doctor if you are having problems. It's also a good idea to know your test results and keep a list of the medicines you take. How can you care for yourself at home? Watch your portions  · Learn what a serving is. A \"serving\" and a \"portion\" are not always the same thing. Make sure that you are not eating larger portions than are recommended. For example, a serving of pasta is ½ cup. A serving size of meat is 2 to 3 ounces. A 3-ounce serving is about the size of a deck of cards. Measure serving sizes until you are good at [place]" them. Keep in mind that restaurants often serve portions that are 2 or 3 times the size of one serving. · To keep your energy level up and keep you from feeling hungry, eat often but in smaller portions. · Eat only the number of calories you need to stay at a healthy weight. If you need to lose weight, eat fewer calories than your body burns (through exercise and other physical activity). Eat more fruits and vegetables  · Eat a variety of fruit and vegetables every day. Dark green, deep orange, red, or yellow fruits and vegetables are especially good for you.  Examples include spinach, carrots, peaches, and berries. · Keep carrots, celery, and other veggies handy for snacks. Buy fruit that is in season and store it where you can see it so that you will be tempted to eat it. · Cook dishes that have a lot of veggies in them, such as stir-fries and soups. Limit saturated and trans fat  · Read food labels, and try to avoid saturated and trans fats. They increase your risk of heart disease. Trans fat is found in many processed foods such as cookies and crackers. · Use olive or canola oil when you cook. Try cholesterol-lowering spreads, such as Benecol or Take Control. · Bake, broil, grill, or steam foods instead of frying them. · Choose lean meats instead of high-fat meats such as hot dogs and sausages. Cut off all visible fat when you prepare meat. · Eat fish, skinless poultry, and meat alternatives such as soy products instead of high-fat meats. Soy products, such as tofu, may be especially good for your heart. · Choose low-fat or fat-free milk and dairy products. Eat fish  · Eat at least two servings of fish a week. Certain fish, such as salmon and tuna, contain omega-3 fatty acids, which may help reduce your risk of heart attack. Eat foods high in fiber  · Eat a variety of grain products every day. Include whole-grain foods that have lots of fiber and nutrients. Examples of whole-grain foods include oats, whole wheat bread, and brown rice. · Buy whole-grain breads and cereals, instead of white bread or pastries. Limit salt and sodium  · Limit how much salt and sodium you eat to help lower your blood pressure. · Taste food before you salt it. Add only a little salt when you think you need it. With time, your taste buds will adjust to less salt. · Eat fewer snack items, fast foods, and other high-salt, processed foods. Check food labels for the amount of sodium in packaged foods.   · Choose low-sodium versions of canned goods (such as soups, vegetables, and beans). Limit sugar  · Limit drinks and foods with added sugar. These include candy, desserts, and soda pop. Limit alcohol  · Limit alcohol to no more than 2 drinks a day for men and 1 drink a day for women. Too much alcohol can cause health problems. When should you call for help? Watch closely for changes in your health, and be sure to contact your doctor if:    · You would like help planning heart-healthy meals. Where can you learn more? Go to https://[employer]. org and sign in to your [employer] account. Enter V137 in the [employer] box to learn more about \"Heart-Healthy Diet: Care Instructions. \"     If you do not have an account, please click on the \"Sign Up Now\" link. Current as of: July 22, 2018  Content Version: 11.9  © 2164-0929 [employer]. Care instructions adapted under license by [facility] ([facility]). If you have questions about a medical condition or this instruction, always ask your healthcare professional. [address] any warranty or liability for your use of this information.

## 2019-04-03 NOTE — PROGRESS NOTES
Visit Information    Have you changed or started any medications since your last visit including any over-the-counter medicines, vitamins, or herbal medicines? no   Are you having any side effects from any of your medications? -  no  Have you stopped taking any of your medications? Is so, why? -  no    Have you seen any other physician or provider since your last visit? Yes - Records Obtained  Have you had any other diagnostic tests since your last visit? No  Have you been seen in the emergency room and/or had an admission to a hospital since we last saw you? No  Have you had your routine dental cleaning in the past 6 months? yes -     Have you activated your [employer] account? If not, what are your barriers?  Yes     Patient Care Team:  Yoel Harper MD as PCP - General (Family Medicine)  DENNIS Guerra - CNP as PCP - [facility] Attributed Provider  Clarence Bob MD (Dermatology)  Binta Moreno MD as Surgeon (Cardiology)    Medical History Review  Past Medical, Family, and Social History reviewed and does contribute to the patient presenting condition    Health Maintenance   Topic Date Due    Potassium monitoring  01/29/2020    Creatinine monitoring  01/29/2020    Diabetes screen  10/20/2020    Lipid screen  11/13/2023    DTaP/Tdap/Td vaccine (2 - Td) 01/01/2024    Flu vaccine  Completed    HIV screen  Completed

## 2019-04-03 NOTE — PROGRESS NOTES
Chief Complaint   Patient presents with    Hypertension    Hyperlipidemia    Manic Behavior    Discuss Labs     not completed, reprinted     [address] [place] here today for follow up on chronic medical problems, go over labs and/or diagnostic studies, and medication refills. Hypertension; Hyperlipidemia; Manic Behavior; Discuss Labs (not completed, reprinted ); and Anxiety      Hypertension: Patient here for follow-up of elevated blood pressure. He is not exercising , but staying active, and is adherent to low salt diet. Blood pressure is well controlled at home. Cardiac symptoms fatigue. Patient denies chest pain, chest pressure/discomfort, claudication, dyspnea, exertional chest pressure/discomfort, irregular heart beat, lower extremity edema, near-syncope, orthopnea, palpitations, paroxysmal nocturnal dyspnea, syncope and tachypnea. Cardiovascular risk factors: dyslipidemia, hypertension and male gender. Use of agents associated with hypertension: NSAIDS. History of target organ damage: none. Secondary hypertension workup was negative in the past.    Patient was evaluated by cardiology, found to have rebound hypertension secondary to missing medications, and clonidine was changed to metoprolol    Holter monitor on 2/23/18 showed sinus tachycardia  Echo 2-D on 10/20/17 showed EF greater than 55% otherwise within normal limits. Run out of lopressor . I encouraged Jyoti Driscoll to call me for refills anytime. The patient verbalizes understanding and agrees with the plan. Taking only Norvasc    Patient had prior tachycardia, he was evaluated by cardiologist and started on metoprolol 75 MG twice a day.   He says sometimes the blood pressure drops low       BP high today  BP Readings from Last 3 Encounters:   04/03/19 (!) 129/92   01/08/19 138/84   12/26/18 130/80        pulse borderline high    Pulse Readings from Last 3 Encounters:   04/03/19 99   01/08/19 88   12/26/18 69 Intentionally lost 5 pounds since the last appointment, 3 months ago     Wt Readings from Last 3 Encounters:   04/03/19 186 lb 6.4 oz (84.6 kg)   01/08/19 191 lb 9.6 oz (86.9 kg)   12/26/18 196 lb 3.2 oz (89 kg)         Hyperlipidemia:  No new myalgias or GI upset on pravastatin (Pravachol) and fish oil. Medication compliance: compliant all of the time. Patient is  following a low fat, low cholesterol diet. He is not exercising regularly. Lab Results   Component Value Date    CHOL 229 (H) 11/13/2018    TRIG 268 (H) 11/13/2018    HDL 52 11/13/2018     Lab Results   Component Value Date    ALT 55 (H) 11/13/2018    AST 26 11/13/2018          Lab Results   Component Value Date    LDLCHOLESTEROL 123 11/13/2018       Patient is very concerned about persistent and uncontrolled pain, in the right shoulder and right Achilles tendon. He also has fibromyalgia with widespread pain. He says the pain is worse in the right shoulder and right Achilles tendon. He is concerned that he changed pain management , seen by Dr. De Beckett, and  Before that was seen at [facility]. They almost took him off of all his pain medications. He has been off of benzodiazepines per his psychiatrist as well. Again, he says he was taken off meds, and he is off gabapentin, and percocet dose was cut at 50% by Dr. De Beckett. Doing PT. Saw pain psychologist, and he is unhappy about the pain level he is in    She reports he still has Right shoulder pain, getting injections from orthopedics , has decreased range of motion as well. He did have rotator cuff surgery 12/12/18. He doesn't continue to do the exercises. He doesn't use any creams. Intensity of pain is 5/10  Pain is worse with activity. Also has Right Achilles pain for about 5-6 yrs  Pain is constant  Had Injections, 5 surgeries for Achilles tendon, and wearing orthotics  Intensity of pain is 5/10, goes up to 7-8/10 when walking   He cannot exercise much because of the pain.   His capsule Take 1 capsule by mouth 2 times daily 60 capsule 3    senna-docusate (SENNA S) 8.6-50 MG per tablet Take 1 tablet by mouth 2 times daily as needed for Constipation 20 tablet 1    cyclobenzaprine (FLEXERIL) 10 MG tablet take 1 tablet by mouth three times a day if needed for muscle spasm 90 tablet 3    pravastatin (PRAVACHOL) 80 MG tablet TAKE 1 TABLET BY MOUTH IN THE EVENING 90 tablet 1    metoprolol tartrate (LOPRESSOR) 25 MG tablet Take 3 tablets by mouth 2 times daily 540 tablet 3    amLODIPine (NORVASC) 10 MG tablet Take 1 tablet by mouth daily Per cardio 30 tablet 0    vitamin B-12 (CYANOCOBALAMIN) 1000 MCG tablet Take 1,000 mcg by mouth daily      Ginkgo Biloba (GINKOBA PO) Take by mouth      oxyCODONE-acetaminophen (PERCOCET) 5-325 MG per tablet take 1 tablet by mouth four times a day if needed  0    fexofenadine (ALLEGRA ALLERGY) 180 MG tablet Take 1 tablet by mouth daily 30 tablet 3    Adapalene 0.3 % GEL   1    benztropine (COGENTIN) 1 MG tablet   0    erythromycin with ethanol (THERAMYCIN) 2 % external solution apply topically as directed every morning  0    clindamycin (CLEOCIN T) 1 % external solution       aluminum chloride (DRYSOL) 20 % external solution Apply topically nightly. 37.5 mL 3    Omega-3 Fatty Acids (OMEGA-3 FISH OIL) 1200 MG CAPS Take  by mouth 2 times daily.  lithium (ESKALITH) 450 MG extended release tablet Take 450 mg by mouth 2 times daily      ibuprofen (ADVIL;MOTRIN) 600 MG tablet Take 1 tablet by mouth every 6 hours as needed for Pain 90 tablet 0    divalproex (DEPAKOTE ER) 250 MG extended release tablet Take 750 mg by mouth daily      gabapentin (NEURONTIN) 300 MG capsule 300 mg 3 times daily . 0    fluticasone (FLONASE) 50 MCG/ACT nasal spray 2 sprays by Nasal route daily 1 Bottle 3     No current facility-administered medications for this visit.           Social History     Socioeconomic History    Marital status: Single     Spouse name: Not on file    Number of children: Not on file    Years of education: Not on file    Highest education level: Not on file   Occupational History    Not on file   Social Needs    Financial resource strain: Not on file    Food insecurity:     Worry: Not on file     Inability: Not on file    Transportation needs:     Medical: Not on file     Non-medical: Not on file   Tobacco Use    Smoking status: Former Smoker     Packs/day: 1.50     Years: 12.00     Pack years: 18.00     Last attempt to quit: 2002     Years since quittin.6    Smokeless tobacco: Never Used   Substance and Sexual Activity    Alcohol use: No     Alcohol/week: 0.0 oz    Drug use: No    Sexual activity: Yes     Partners: Female     Comment:    Lifestyle    Physical activity:     Days per week: Not on file     Minutes per session: Not on file    Stress: Not on file   Relationships    Social connections:     Talks on phone: Not on file     Gets together: Not on file     Attends Baptism service: Not on file     Active member of club or organization: Not on file     Attends meetings of clubs or organizations: Not on file     Relationship status: Not on file    Intimate partner violence:     Fear of current or ex partner: Not on file     Emotionally abused: Not on file     Physically abused: Not on file     Forced sexual activity: Not on file   Other Topics Concern    Not on file   Social History Narrative    Not on file     Counseling given: Yes                    The patient's past medical, surgical, social, and family history as well as his current medications and allergies were reviewed as documented in today's encounter. Restof complaints with corresponding details per ROS    Review of Systems   Constitutional: Positive for fatigue. Negative for activity change, appetite change, chills, diaphoresis, fever and unexpected weight change. Respiratory: Negative for cough, chest tightness, shortness of breath and wheezing. Cardiovascular: Negative for chest pain, palpitations and leg swelling. Gastrointestinal: Negative for abdominal distention, abdominal pain, constipation, diarrhea, nausea and vomiting. Endocrine: Positive for heat intolerance. Negative for cold intolerance, polydipsia, polyphagia and polyuria. Musculoskeletal: Positive for arthralgias (right shoulder, right ankle) and myalgias (fibromyalgia). Allergic/Immunologic: Positive for environmental allergies. Neurological: Negative for weakness and numbness. Psychiatric/Behavioral: Positive for sleep disturbance. Negative for dysphoric mood, hallucinations and self-injury. The patient is nervous/anxious.          -vital signs stable and within normal limits except Uncontrolled BP, borderline high blood pressure, Overweight per BMI. BP (!) 129/92   Pulse 99   Ht 5' 11\" (1.803 m)   Wt 186 lb 6.4 oz (84.6 kg)   SpO2 97%   BMI 26.00 kg/m²        Physical Exam   Constitutional: He is oriented to person, place, and time. He appears well-developed and well-nourished. No distress. HENT:   Head: Normocephalic and atraumatic. Right Ear: Hearing and external ear normal.   Left Ear: Hearing and external ear normal.   Nose: Nose normal.   Mouth/Throat: Oropharynx is clear and moist. No oropharyngeal exudate. Eyes: Conjunctivae and EOM are normal. Right eye exhibits no discharge. Left eye exhibits no discharge. No scleral icterus. Neck: Normal range of motion. Neck supple. No thyromegaly present. Cardiovascular: Regular rhythm, normal heart sounds and intact distal pulses. Tachycardia present. No murmur heard. Pulmonary/Chest: Effort normal and breath sounds normal. No respiratory distress. He has no wheezes. He has no rales. He exhibits no tenderness. Abdominal: Soft. Bowel sounds are normal. He exhibits no distension. There is no tenderness. Musculoskeletal: He exhibits tenderness. He exhibits no edema.         Right shoulder: He exhibits decreased range of motion, tenderness, bony tenderness, spasm and decreased strength. Right ankle: He exhibits decreased range of motion. He exhibits no swelling. Tenderness. Achilles tendon exhibits pain. Cervical back: He exhibits tenderness, pain and spasm. Thoracic back: He exhibits tenderness, pain and spasm. Lumbar back: He exhibits tenderness, pain and spasm. Hypersensitivity noted generalized in the back, multiple tender points   Neurological: He is alert and oriented to person, place, and time. He displays normal reflexes. No cranial nerve deficit or sensory deficit. He exhibits normal muscle tone. Coordination normal.   Skin: Skin is warm and dry. Capillary refill takes less than 2 seconds. No rash noted. He is not diaphoretic. Psychiatric: His behavior is normal. Judgment and thought content normal. His mood appears anxious. His speech is rapid and/or pressured. Nursing note and vitals reviewed. Discussed testing with the patient and all questions fully answered. I personally reviewed testing with patient.     Hyperglycemia  Increased LFTs consistent with fatty liver by prior imaging    Hyperlipidemia improving    Otherwise labs within normal limits    Hospital Outpatient Visit on 01/29/2019   Component Date Value Ref Range Status    Glucose 01/29/2019 102* 70 - 99 mg/dL Final    BUN 01/29/2019 11  6 - 20 mg/dL Final    CREATININE 01/29/2019 1.07  0.70 - 1.20 mg/dL Final    Bun/Cre Ratio 01/29/2019 NOT REPORTED  9 - 20 Final    Calcium 01/29/2019 10.0  8.6 - 10.4 mg/dL Final    Sodium 01/29/2019 140  135 - 144 mmol/L Final    Potassium 01/29/2019 4.7  3.7 - 5.3 mmol/L Final    Chloride 01/29/2019 103  98 - 107 mmol/L Final    CO2 01/29/2019 24  20 - 31 mmol/L Final    Anion Gap 01/29/2019 13  9 - 17 mmol/L Final    GFR Non- 01/29/2019 >60  >60 mL/min Final    GFR  01/29/2019 >60  >60 mL/min Final    GFR Comment 01/29/2019 fatty liver     ASSESSMENT AND PLAN      1. Essential hypertension  Inadequately controlled  BP Readings from Last 3 Encounters:   04/03/19 (!) 129/92   01/08/19 138/84   12/26/18 130/80      - start aspirin EC 81 MG EC tablet; Take 1 tablet by mouth daily  Dispense: 90 tablet; Refill: 1  - restart metoprolol tartrate (LOPRESSOR) 25 MG tablet; Take 3 tablets by mouth 2 times daily  Dispense: 540 tablet; Refill: 3  Continue Norvasc 10 mg    If BP less than 115/65, could decrease dosage of metoprolol to 50 MG twice a day    - CBC; Future  - Comprehensive Metabolic Panel; Future  Discussed low salt diet and BP and pulse monitoring daily, BP log given  Needs nurse visit appointment for BP check in 1 week    2. Hyperlipidemia with target LDL less than 100  Improving  Continue pravastatin 80 MG daily  - Lipid Panel; Future    3. Bicipital tendinitis, right shoulder  Failing to change as expected. - lidocaine (LMX) 4 % cream; Apply 2-3 times a day as needed for pain  Dispense: 120 g; Refill: 3  - Camphor-Menthol-Methyl Sal 3.1-16-10 % GEL; Apply every 8 hours as needed for pain. OK to substitute  Dispense: 1 Tube; Refill: 1  -Home exercises advised, given patient instructions; defers PT at this time  Continue to follow up with orthopedics for steroid injections    4. Achilles tendinitis of right lower extremity  Failing to change as expected. Jose Diaz, ERICKA, Podiatry, [place]  - lidocaine (LMX) 4 % cream; Apply 2-3 times a day as needed for pain  Dispense: 120 g; Refill: 3  - Camphor-Menthol-Methyl Sal 3.1-16-10 % GEL; Apply every 8 hours as needed for pain. OK to substitute  Dispense: 1 Tube; Refill: 1  \"Alphabet letters\"-foot drawing exercises advised. 5. Fibromyalgia muscle pain  Failing to change as expected. - lidocaine (LMX) 4 % cream; Apply 2-3 times a day as needed for pain  Dispense: 120 g; Refill: 3  - Camphor-Menthol-Methyl Sal 3.1-16-10 % GEL; Apply every 8 hours as needed for pain.  OK to substitute  Dispense: 1 Tube; Refill: 1    Discussed other options for pain control:  Try Cymbalta,Gabapentin -discuss with psychiatrist  Gabapentin with psychiatrist, or podiatry or pain management   Salonpas GEL, camphor- menthol- methyl salicylate use it as needed for pain, every 6- 8 hrs  Lidocaine cream every 8 hrs  Aromatherapy: Lavender, peppermint , PACHOULI, BLACK PEPPER oils. Continue Flexeril, Percocet, ibuprofen as needed , and follow up with pain management, I advised him to ask for another provider in the same group instead of changing the pain management     6. Bipolar disorder, in partial remission, most recent episode depressed ([facility] [address]  Stable  Continue Lithium and weaning off Depakote    PHQ Scores 4/3/2019 12/26/2018 12/6/2017 5/8/2017 2/8/2017 11/8/2016   PHQ2 Score 0 0 3 2 6 4   PHQ9 Score 0 0 10 8 17 15     Interpretation of Total Score Depression Severity: 1-4 = Minimal depression, 5-9 = Mild depression, 10-14 = Moderate depression, 15-19 = Moderately severe depression, 20-27 = Severe depression      7. Hyperglycemia  - POCT glycosylated hemoglobin (Hb A1C)      Lab Results   Component Value Date    LABA1C 5.3 04/03/2019    LABA1C 5.1 10/20/2017    LABA1C 5.3 02/08/2017     Low carb, low fat diet, increase fruits and vegetables, and exercise 4-5 times a week 30-40 minutes a day, or walk 1-2 hours per day, or wear a pedometer and get at least 10,000 steps per day. 8. Sinus tachycardia  Inadequately controlled   -restart metoprolol tartrate (LOPRESSOR) 25 MG tablet; Take 3 tablets by mouth 2 times daily  Dispense: 540 tablet; Refill: 3    9. Fatty liver disease, nonalcoholic  worsening based on ALT  - Hepatitis Panel, Acute; Future    10.  History of Achilles tendon repair  Inadequately controlled pain  Referred to another podiatrist       Try Cymbalta,Gabapentin -discuss with psychiatrist  Gabapentin with psychiatrist, or podiatry or pain management   Salonpas GEL, camphor- menthol- methyl salicylate use it as needed for pain, every 6- 8 hrs  Lidocaine cream every 8 hrs  Aromatherapy: Lavender, peppermint , PACHOULI, BLACK PEPPER oils. Continue to follow-up with pain management as scheduled and try to see another provider in the same group    Annual on 12/26/18      Orders Placed This Encounter   Procedures    CBC     Standing Status:   Future     Standing Expiration Date:   4/3/2020    Comprehensive Metabolic Panel     Standing Status:   Future     Standing Expiration Date:   4/3/2020    Lipid Panel     Standing Status:   Future     Standing Expiration Date:   4/3/2020     Order Specific Question:   Is Patient Fasting?/# of Hours     Answer:   8-10 Hours, water ok to drink    Hepatitis Panel, Acute     Standing Status:   Future     Standing Expiration Date:   4/2/2020    Dennis Pool DPM, Podiatry, [place]     Referral Priority:   Routine     Referral Type:   Eval and Treat     Referral Reason:   Specialty Services Required     Referred to Provider:   Jennifer Linder DPM     Requested Specialty:   Podiatry     Number of Visits Requested:   1    POCT glycosylated hemoglobin (Hb A1C)         Medications Discontinued During This Encounter   Medication Reason    oxyCODONE-acetaminophen (PERCOCET) 5-325 MG per tablet DISCONTINUED BY ANOTHER CLINICIAN    gabapentin (NEURONTIN) 300 MG capsule DISCONTINUED BY ANOTHER CLINICIAN    divalproex (DEPAKOTE ER) 250 MG extended release tablet DISCONTINUED BY ANOTHER CLINICIAN    metoprolol tartrate (LOPRESSOR) 25 MG tablet Diana Robles received counseling on the following healthy behaviors: nutrition, exercise, medication adherence and weight loss   Reviewed prior labs and health maintenance  Continue current medications, diet and exercise. Discussed use, benefit, and side effects of prescribed medications. Barriers to medication compliance addressed.    Patient given educational materials - see patient instructions  Was a self-tracking Future Appointments   Date Time Provider Maribell Nunezi   4/9/2019  9:15 AM Marlyn Menendez, PT [facility]   7/16/2019  2:15 PM Eliel Petit MD fp erika Sims Daily        This note was completed by using the assistance of a speech-recognition program. However, inadvertent computerized transcription errors may be present. Although every effort was made to ensure accuracy, no guarantees can be provided that every mistake has been identified and corrected by editing.     Electronically signed by Eliel Petit MD on 4/3/2019 at 6:44 AM

## 2019-04-04 ENCOUNTER — TELEPHONE (OUTPATIENT)
Dept: FAMILY MEDICINE CLINIC | Age: 49
End: 2019-04-04

## 2019-04-04 PROBLEM — M79.671 PAIN OF RIGHT HEEL: Status: RESOLVED | Noted: 2017-07-31 | Resolved: 2019-04-04

## 2019-04-04 PROBLEM — M75.40 SUBACROMIAL IMPINGEMENT: Status: RESOLVED | Noted: 2018-05-11 | Resolved: 2019-04-04

## 2019-04-04 PROBLEM — K59.03 CONSTIPATION DUE TO PAIN MEDICATION: Status: RESOLVED | Noted: 2017-02-12 | Resolved: 2019-04-04

## 2019-04-05 ENCOUNTER — HOSPITAL ENCOUNTER (OUTPATIENT)
Age: 49
Discharge: HOME | End: 2019-04-05
Payer: MEDICARE

## 2019-04-05 DIAGNOSIS — E78.5 HYPERLIPIDEMIA WITH TARGET LDL LESS THAN 130: ICD-10-CM

## 2019-04-05 DIAGNOSIS — K76.0 FATTY LIVER DISEASE, NONALCOHOLIC: Primary | ICD-10-CM

## 2019-04-05 DIAGNOSIS — K76.0 FATTY LIVER DISEASE, NONALCOHOLIC: ICD-10-CM

## 2019-04-05 DIAGNOSIS — I10 ESSENTIAL HYPERTENSION: ICD-10-CM

## 2019-04-05 DIAGNOSIS — E78.5 HYPERLIPIDEMIA WITH TARGET LDL LESS THAN 100: ICD-10-CM

## 2019-04-05 LAB
ALBUMIN SERPL-MCNC: 4.4 G/DL (ref 3.5–5.2)
ALBUMIN/GLOBULIN RATIO: ABNORMAL (ref 1–2.5)
ALP BLD-CCNC: 86 U/L (ref 40–129)
ALT SERPL-CCNC: 54 U/L (ref 5–41)
ANION GAP SERPL CALCULATED.3IONS-SCNC: 10 MMOL/L (ref 9–17)
AST SERPL-CCNC: 32 U/L
BILIRUB SERPL-MCNC: 0.54 MG/DL (ref 0.3–1.2)
BUN BLDV-MCNC: 11 MG/DL (ref 6–20)
BUN/CREAT BLD: ABNORMAL (ref 9–20)
CALCIUM SERPL-MCNC: 9.7 MG/DL (ref 8.6–10.4)
CHLORIDE BLD-SCNC: 105 MMOL/L (ref 98–107)
CHOLESTEROL/HDL RATIO: 3.9
CHOLESTEROL: 209 MG/DL
CO2: 27 MMOL/L (ref 20–31)
CREAT SERPL-MCNC: 0.9 MG/DL (ref 0.7–1.2)
GFR AFRICAN AMERICAN: >60 ML/MIN
GFR NON-AFRICAN AMERICAN: >60 ML/MIN
GFR SERPL CREATININE-BSD FRML MDRD: ABNORMAL ML/MIN/{1.73_M2}
GFR SERPL CREATININE-BSD FRML MDRD: ABNORMAL ML/MIN/{1.73_M2}
GLUCOSE BLD-MCNC: 87 MG/DL (ref 70–99)
HAV IGM SER IA-ACNC: NONREACTIVE
HCT VFR BLD CALC: 46.9 % (ref 41–53)
HDLC SERPL-MCNC: 53 MG/DL
HEMOGLOBIN: 15.6 G/DL (ref 13.5–17.5)
HEPATITIS B CORE IGM ANTIBODY: NONREACTIVE
HEPATITIS B SURFACE ANTIGEN: NONREACTIVE
HEPATITIS C ANTIBODY: NONREACTIVE
LDL CHOLESTEROL: 125 MG/DL (ref 0–130)
MCH RBC QN AUTO: 31 PG (ref 26–34)
MCHC RBC AUTO-ENTMCNC: 33.3 G/DL (ref 31–37)
MCV RBC AUTO: 93.1 FL (ref 80–100)
NRBC AUTOMATED: NORMAL PER 100 WBC
PDW BLD-RTO: 12.7 % (ref 11.5–14.9)
PLATELET # BLD: 204 K/UL (ref 150–450)
PMV BLD AUTO: 8.6 FL (ref 6–12)
POTASSIUM SERPL-SCNC: 4.4 MMOL/L (ref 3.7–5.3)
RBC # BLD: 5.04 M/UL (ref 4.5–5.9)
SODIUM BLD-SCNC: 142 MMOL/L (ref 135–144)
TOTAL PROTEIN: 7.5 G/DL (ref 6.4–8.3)
TRIGL SERPL-MCNC: 157 MG/DL
VLDLC SERPL CALC-MCNC: ABNORMAL MG/DL (ref 1–30)
WBC # BLD: 6.8 K/UL (ref 3.5–11)

## 2019-04-05 PROCEDURE — 80061 LIPID PANEL: CPT

## 2019-04-05 PROCEDURE — 36415 COLL VENOUS BLD VENIPUNCTURE: CPT

## 2019-04-05 PROCEDURE — 80053 COMPREHEN METABOLIC PANEL: CPT

## 2019-04-05 PROCEDURE — 80074 ACUTE HEPATITIS PANEL: CPT

## 2019-04-05 PROCEDURE — 85027 COMPLETE CBC AUTOMATED: CPT

## 2019-04-05 RX ORDER — PRAVASTATIN SODIUM 80 MG/1
TABLET ORAL
Qty: 90 TABLET | Refills: 1 | Status: SHIPPED | OUTPATIENT
Start: 2019-04-05 | End: 2020-02-05

## 2019-04-09 ENCOUNTER — HOSPITAL ENCOUNTER (OUTPATIENT)
Dept: PHYSICAL THERAPY | Age: 49
Setting detail: THERAPIES SERIES
Discharge: HOME | End: 2019-04-09
Payer: MEDICARE

## 2019-04-09 PROCEDURE — G0283 ELEC STIM OTHER THAN WOUND: HCPCS

## 2019-04-09 PROCEDURE — 97110 THERAPEUTIC EXERCISES: CPT

## 2019-04-09 ASSESSMENT — PAIN DESCRIPTION - ORIENTATION: ORIENTATION: RIGHT

## 2019-04-09 ASSESSMENT — PAIN DESCRIPTION - DESCRIPTORS: DESCRIPTORS: CONSTANT

## 2019-04-09 ASSESSMENT — PAIN DESCRIPTION - LOCATION: LOCATION: SHOULDER

## 2019-04-09 ASSESSMENT — PAIN DESCRIPTION - FREQUENCY: FREQUENCY: CONTINUOUS

## 2019-04-09 ASSESSMENT — PAIN SCALES - GENERAL: PAINLEVEL_OUTOF10: 5

## 2019-04-09 NOTE — PROGRESS NOTES
Physical Therapy  Daily Treatment Note  Date: 2019  Patient Name: Kenia Mcqueen  MRN: [id number]     :   1970    Subjective:      PT Visit Information  Onset Date: 06/15/18  PT Insurance Information: paramount advantage  Total # of Visits Approved: 24  Total # of Visits to Date: 15  Subjective  Subjective: complains of pain on R cassidy today  Pain Screening  Patient Currently in Pain: Yes  Pain Assessment  Pain Assessment: 0-10  Pain Level: 5  Pain Location: Shoulder  Pain Orientation: Right  Pain Descriptors: Constant  Pain Frequency: Continuous  Vital Signs  Patient Currently in Pain: Yes       Treatment Activities:   Exercises  Exercise 1: finger ladder R 10x  Exercise 2: towel stretch IR R 3x30\"  Exercise 3: R upper trap stretch 3x30\"  Exercise 4: cane ex-flex/ABD/ER/ext 10x  Exercise 5: ball on wall 10x10\"  Exercise 6: 1# R cassidy flex/ext/scaption 15x  Exercise 7: hot pack and EStim to R cassidy 20 min sitting after ex today  Exercise 8: rows/pull down Green Tband 30x  Exercise 9: clothespin R all  Exercise 10: 1/2 hula hoop #4 R  Exercise 11: marco a with dowel R  Exercise 12: 1 strand Green Tband  6 way R cassidy 10x  Exercise 13: miguel ángel's R 0# 10x  Exercise 14: R elbow curls 3# 3x10  Exercise 15: shapes with 4# wt 5x     Assessment:   Conditions Requiring Skilled Therapeutic Intervention  REQUIRES PT FOLLOW UP: Yes  Discharge Recommendations: Home independently      Plan:    Plan  Times per week: 2x/week  Current Treatment Recommendations: Strengthening, ROM, Home Exercise Program, Modalities  Plan Comment: to continue PT per POC  Timed Code Treatment Minutes: 30 Minutes   Treatment Charges: Minutes Units   []  Ultrasound     [x]  Electrical-Stim 20 1   []  Iontophoresis     []  Traction     []  Massage       []  Eval     []  Gait     [x]  Ther Exercise 30  2    []  Manual Therapy       []  Ther Activities       []  Aquatics     []  Vasopneumatic Device     []  Neuro Re-Ed       []  Other       Total Treatment Time: 50 3        Therapy Time   Individual Concurrent Group Co-treatment   Time In 0915         Time Out 1005         Minutes 50         Timed Code Treatment Minutes: 30 Minutes     Electronically signed by: Mireille Moreno PT

## 2019-04-10 ENCOUNTER — HOSPITAL ENCOUNTER (OUTPATIENT)
Dept: ULTRASOUND IMAGING | Age: 49
Discharge: HOME | End: 2019-04-12
Payer: MEDICARE

## 2019-04-10 DIAGNOSIS — K76.0 FATTY LIVER DISEASE, NONALCOHOLIC: ICD-10-CM

## 2019-04-10 PROCEDURE — 76705 ECHO EXAM OF ABDOMEN: CPT

## 2019-04-11 ENCOUNTER — NURSE ONLY (OUTPATIENT)
Dept: FAMILY MEDICINE CLINIC | Age: 49
End: 2019-04-11
Payer: MEDICARE

## 2019-04-11 VITALS — SYSTOLIC BLOOD PRESSURE: 122 MMHG | DIASTOLIC BLOOD PRESSURE: 85 MMHG

## 2019-04-11 DIAGNOSIS — I10 ESSENTIAL HYPERTENSION: Primary | ICD-10-CM

## 2019-04-11 PROCEDURE — 99211 OFF/OP EST MAY X REQ PHY/QHP: CPT | Performed by: FAMILY MEDICINE

## 2019-04-11 NOTE — PROGRESS NOTES
Chief Complaint   Patient presents with    Hypertension      Wilda Romero is here for BP check    Has not missed any medications.      BP Readings from Last 3 Encounters:   04/11/19 122/85   04/03/19 (!) 129/92   01/08/19 138/84           Future Appointments   Date Time Provider Maribell Duran   4/12/2019  9:00 AM Marlyn Mclaughlin, PT [facility]   7/16/2019  2:15 PM Patt Kemp MD fp sc [facility]

## 2019-04-12 ENCOUNTER — HOSPITAL ENCOUNTER (OUTPATIENT)
Dept: PHYSICAL THERAPY | Age: 49
Setting detail: THERAPIES SERIES
Discharge: HOME | End: 2019-04-12
Payer: MEDICARE

## 2019-04-13 DIAGNOSIS — M79.7 FIBROMYALGIA MUSCLE PAIN: ICD-10-CM

## 2019-04-15 RX ORDER — CYCLOBENZAPRINE HCL 10 MG
TABLET ORAL
Qty: 90 TABLET | Refills: 3 | Status: SHIPPED | OUTPATIENT
Start: 2019-04-15 | End: 2019-08-08 | Stop reason: SDUPTHER

## 2019-04-15 NOTE — TELEPHONE ENCOUNTER
Please Approve or Refuse.   Send to Pharmacy per Pt's Request:      Next Visit Date:  7/16/2019   Last Visit Date: 4/3/2019    Hemoglobin A1C (%)   Date Value   04/03/2019 5.3   10/20/2017 5.1   02/08/2017 5.3             ( goal A1C is < 7)   BP Readings from Last 3 Encounters:   04/11/19 122/85   04/03/19 (!) 129/92   01/08/19 138/84          (goal 120/80)  BUN   Date Value Ref Range Status   04/05/2019 11 6 - 20 mg/dL Final     CREATININE   Date Value Ref Range Status   04/05/2019 0.90 0.70 - 1.20 mg/dL Final     Potassium   Date Value Ref Range Status   04/05/2019 4.4 3.7 - 5.3 mmol/L Final

## 2019-04-25 ENCOUNTER — HOSPITAL ENCOUNTER (OUTPATIENT)
Age: 49
Discharge: HOME | End: 2019-04-25
Payer: MEDICARE

## 2019-04-25 LAB
ALT SERPL-CCNC: 57 U/L (ref 5–41)
CHOLESTEROL: 221 MG/DL
TRIGL SERPL-MCNC: 190 MG/DL

## 2019-04-25 PROCEDURE — 36415 COLL VENOUS BLD VENIPUNCTURE: CPT

## 2019-04-25 PROCEDURE — 84460 ALANINE AMINO (ALT) (SGPT): CPT

## 2019-04-25 PROCEDURE — 82465 ASSAY BLD/SERUM CHOLESTEROL: CPT

## 2019-04-25 PROCEDURE — 84478 ASSAY OF TRIGLYCERIDES: CPT

## 2019-04-26 ENCOUNTER — HOSPITAL ENCOUNTER (OUTPATIENT)
Dept: PHYSICAL THERAPY | Age: 49
Setting detail: THERAPIES SERIES
Discharge: HOME | End: 2019-04-26
Payer: MEDICARE

## 2019-04-26 PROCEDURE — 97110 THERAPEUTIC EXERCISES: CPT

## 2019-04-26 PROCEDURE — G0283 ELEC STIM OTHER THAN WOUND: HCPCS

## 2019-04-26 ASSESSMENT — PAIN DESCRIPTION - ORIENTATION: ORIENTATION: RIGHT

## 2019-04-26 ASSESSMENT — PAIN SCALES - GENERAL: PAINLEVEL_OUTOF10: 6

## 2019-04-26 ASSESSMENT — PAIN DESCRIPTION - LOCATION: LOCATION: SHOULDER

## 2019-04-26 ASSESSMENT — PAIN DESCRIPTION - DESCRIPTORS: DESCRIPTORS: CONSTANT

## 2019-04-26 ASSESSMENT — PAIN DESCRIPTION - FREQUENCY: FREQUENCY: CONTINUOUS

## 2019-04-26 NOTE — PROGRESS NOTES
Physical Therapy  Daily Treatment Note  Date: 2019  Patient Name: Luz Hall  MRN: [id number]     :   1970    Subjective:      PT Visit Information  Onset Date: 06/15/18  PT Insurance Information: paramount advantage  Total # of Visits Approved: 24  Total # of Visits to Date: 16  Subjective  Subjective: complains of pain on R cassidy today  Pain Screening  Patient Currently in Pain: Yes  Pain Assessment  Pain Assessment: 0-10  Pain Level: 6  Pain Location: Shoulder  Pain Orientation: Right  Pain Descriptors: Constant  Pain Frequency: Continuous  Vital Signs  Patient Currently in Pain: Yes       Treatment Activities:   Exercises  Exercise 1: finger ladder R 10x  Exercise 2: towel stretch IR R 3x30\"  Exercise 3: R upper trap stretch 3x30\"  Exercise 4: cane ex-flex/ABD/ER/ext 10x  Exercise 5: ball on wall 10x10\"  Exercise 6: 1# R cassidy flex/ext/scaption 15x  Exercise 7: hot pack and EStim to R cassidy 20 min sitting after ex today  Exercise 8: rows/pull down Green Tband 30x  Exercise 9: clothespin R all  Exercise 10: 1/2 hula hoop #4 R  Exercise 11: marco a with dowel R  Exercise 12: 1 strand Green Tband  6 way R cassidy 10x  Exercise 13: miguel ángel's R 1# 10x  Exercise 14: R elbow curls 3# 3x10  Exercise 15: shapes with 4# wt 5x     Assessment:   Conditions Requiring Skilled Therapeutic Intervention  REQUIRES PT FOLLOW UP: Yes  Discharge Recommendations: Home independently     Plan:    Plan  Current Treatment Recommendations: Strengthening, ROM, Home Exercise Program, Modalities  Plan Comment: to continue PT per POC  Timed Code Treatment Minutes: 30 Minutes   Therapy Time   Individual Concurrent Group Co-treatment   Time In 1030         Time Out 1120         Minutes 50         Timed Code Treatment Minutes: 30 Minutes      Treatment Charges: Minutes Units   []  Ultrasound     [x]  Electrical-Stim 20 1   []  Iontophoresis     []  Traction     []  Massage       []  Eval     []  Gait     [x]  Ther Exercise 30  2    []  Manual Therapy       []  Ther Activities       []  Aquatics     []  Vasopneumatic Device     []  Neuro Re-Ed       []  Other       Total Treatment Time: 50 3      Electronically signed by: Chaim Simmons PT

## 2019-05-01 ENCOUNTER — HOSPITAL ENCOUNTER (OUTPATIENT)
Dept: PHYSICAL THERAPY | Age: 49
Setting detail: THERAPIES SERIES
Discharge: HOME | End: 2019-05-01
Payer: MEDICARE

## 2019-05-01 PROCEDURE — 97110 THERAPEUTIC EXERCISES: CPT

## 2019-05-01 ASSESSMENT — PAIN DESCRIPTION - ORIENTATION
ORIENTATION: RIGHT
ORIENTATION_2: RIGHT

## 2019-05-01 ASSESSMENT — PAIN DESCRIPTION - FREQUENCY: FREQUENCY: CONTINUOUS

## 2019-05-01 ASSESSMENT — PAIN DESCRIPTION - INTENSITY: RATING_2: 4

## 2019-05-01 ASSESSMENT — PAIN DESCRIPTION - DESCRIPTORS
DESCRIPTORS_2: CONSTANT
DESCRIPTORS: CONSTANT

## 2019-05-01 ASSESSMENT — PAIN DESCRIPTION - DURATION: DURATION_2: CONTINUOUS

## 2019-05-01 ASSESSMENT — PAIN DESCRIPTION - LOCATION
LOCATION: SHOULDER
LOCATION_2: ANKLE

## 2019-05-01 ASSESSMENT — PAIN SCALES - GENERAL: PAINLEVEL_OUTOF10: 6

## 2019-05-03 ENCOUNTER — HOSPITAL ENCOUNTER (OUTPATIENT)
Dept: PHYSICAL THERAPY | Age: 49
Setting detail: THERAPIES SERIES
Discharge: HOME | End: 2019-05-03
Payer: MEDICARE

## 2019-05-09 ENCOUNTER — HOSPITAL ENCOUNTER (OUTPATIENT)
Dept: PHYSICAL THERAPY | Age: 49
Setting detail: THERAPIES SERIES
Discharge: HOME | End: 2019-05-09
Payer: MEDICARE

## 2019-05-09 PROCEDURE — 97110 THERAPEUTIC EXERCISES: CPT

## 2019-05-09 ASSESSMENT — PAIN DESCRIPTION - LOCATION: LOCATION: ANKLE;SHOULDER

## 2019-05-09 ASSESSMENT — PAIN DESCRIPTION - DESCRIPTORS: DESCRIPTORS: CONSTANT

## 2019-05-09 ASSESSMENT — PAIN DESCRIPTION - FREQUENCY: FREQUENCY: CONTINUOUS

## 2019-05-09 ASSESSMENT — PAIN SCALES - GENERAL: PAINLEVEL_OUTOF10: 5

## 2019-05-09 ASSESSMENT — PAIN DESCRIPTION - ORIENTATION: ORIENTATION: RIGHT

## 2019-05-15 NOTE — RESULT ENCOUNTER NOTE
Addressed during office visit today, A1c 5.3,  within normal limits   Continue current treatment discussed during visit
no previous reaction/unable to assess; pt confused

## 2019-06-27 ENCOUNTER — OFFICE VISIT (OUTPATIENT)
Dept: FAMILY MEDICINE CLINIC | Age: 49
End: 2019-06-27
Payer: MEDICARE

## 2019-06-27 VITALS
HEART RATE: 75 BPM | SYSTOLIC BLOOD PRESSURE: 97 MMHG | WEIGHT: 184 LBS | HEIGHT: 71 IN | DIASTOLIC BLOOD PRESSURE: 68 MMHG | BODY MASS INDEX: 25.76 KG/M2 | OXYGEN SATURATION: 99 %

## 2019-06-27 DIAGNOSIS — I10 ESSENTIAL HYPERTENSION: ICD-10-CM

## 2019-06-27 DIAGNOSIS — J02.9 SORE THROAT: ICD-10-CM

## 2019-06-27 DIAGNOSIS — M79.89 BILATERAL SWELLING OF FEET: Primary | ICD-10-CM

## 2019-06-27 PROCEDURE — G8427 DOCREV CUR MEDS BY ELIG CLIN: HCPCS | Performed by: FAMILY MEDICINE

## 2019-06-27 PROCEDURE — 1036F TOBACCO NON-USER: CPT | Performed by: FAMILY MEDICINE

## 2019-06-27 PROCEDURE — G8419 CALC BMI OUT NRM PARAM NOF/U: HCPCS | Performed by: FAMILY MEDICINE

## 2019-06-27 PROCEDURE — 99213 OFFICE O/P EST LOW 20 MIN: CPT | Performed by: FAMILY MEDICINE

## 2019-06-27 RX ORDER — FUROSEMIDE 20 MG/1
20 TABLET ORAL DAILY
Qty: 60 TABLET | Refills: 3 | Status: SHIPPED | OUTPATIENT
Start: 2019-06-27 | End: 2020-02-17

## 2019-06-27 RX ORDER — POTASSIUM CHLORIDE 750 MG/1
10 TABLET, EXTENDED RELEASE ORAL DAILY
Qty: 180 TABLET | Refills: 1 | Status: SHIPPED | OUTPATIENT
Start: 2019-06-27 | End: 2020-05-27 | Stop reason: SDUPTHER

## 2019-06-27 RX ORDER — BUPROPION HYDROBROMIDE 174 MG/1
174 TABLET, EXTENDED RELEASE ORAL DAILY
Refills: 0 | COMMUNITY
Start: 2019-06-21 | End: 2021-01-26 | Stop reason: DRUGHIGH

## 2019-06-27 RX ORDER — FEXOFENADINE HCL 180 MG/1
180 TABLET ORAL DAILY
Qty: 30 TABLET | Refills: 3 | Status: SHIPPED | OUTPATIENT
Start: 2019-06-27 | End: 2020-01-10 | Stop reason: SDUPTHER

## 2019-06-27 ASSESSMENT — ENCOUNTER SYMPTOMS
BACK PAIN: 0
WHEEZING: 0
SHORTNESS OF BREATH: 0
GASTROINTESTINAL NEGATIVE: 1

## 2019-06-27 NOTE — PROGRESS NOTES
Chief Complaint   Patient presents with    Foot Swelling     and ankles, 4 days    Leg Pain     thigh, 3 days          Blas Damian  here today for follow up on chronic medical problems, go over labs and/or diagnostic studies, and medication refills. Foot Swelling (and ankles, 4 days) and Leg Pain (thigh, 3 days )      HPI: Patient reports swelling of feet started 4 to 5 days before, was in both feet, now has noticed left foot more swollen. Patient denies any trauma denies any pain. Patient denies any change in medications except he was started on Lopressor recently for blood pressure. Blood pressure is controlled. He is on Norvasc 10 mg. Patient had echocardiogram done recently which was normal, all his blood work is normal.  There was no change in the pain recently. BP 97/68   Pulse 75   Ht 5' 11\" (1.803 m)   Wt 184 lb (83.5 kg)   SpO2 99% Comment: resting @ RA  BMI 25.66 kg/m²    Body mass index is 25.66 kg/m². Wt Readings from Last 3 Encounters:   06/27/19 184 lb (83.5 kg)   04/03/19 186 lb 6.4 oz (84.6 kg)   01/08/19 191 lb 9.6 oz (86.9 kg)        [x]Negative depression screening. PHQ Scores 4/3/2019 12/26/2018 12/6/2017 5/8/2017 2/8/2017 11/8/2016   PHQ2 Score 0 0 3 2 6 4   PHQ9 Score 0 0 10 8 17 15      []1-4 = Minimal depression   []5-9 = Milddepression   []10-14 = Moderate depression   []15-19 = Moderately severe depression   []20-27 = Severe depression    Discussed testing with the patient and all questions fully answered.     Hospital Outpatient Visit on 04/25/2019   Component Date Value Ref Range Status    ALT 04/25/2019 57* 5 - 41 U/L Final    Cholesterol 04/25/2019 221* <200 mg/dL Final    Comment:    Cholesterol Guidelines:      <200  Desirable   200-240  Borderline      >240  Undesirable         Triglycerides 04/25/2019 190* <150 mg/dL Final    Comment:    Triglyceride Guidelines:     <150   Desirable   150-199  Borderline   200-499  High     >499   Very high   Based on AHA Guidelines for fasting triglyceride, October 2012.               Most recent labs reviewed:     Lab Results   Component Value Date    WBC 6.8 04/05/2019    HGB 15.6 04/05/2019    HCT 46.9 04/05/2019    MCV 93.1 04/05/2019     04/05/2019       @BRIEFLAB(NA,K,CL,CO2,BUN,CREATININE,GLUCOSE,CALCIUM)@     Lab Results   Component Value Date    ALT 57 (H) 04/25/2019    AST 32 04/05/2019    ALKPHOS 86 04/05/2019    BILITOT 0.54 04/05/2019       Lab Results   Component Value Date    TSH 3.27 01/29/2019       Lab Results   Component Value Date    CHOL 221 (H) 04/25/2019    CHOL 209 (H) 04/05/2019    CHOL 229 (H) 11/13/2018     Lab Results   Component Value Date    TRIG 190 (H) 04/25/2019    TRIG 157 (H) 04/05/2019    TRIG 268 (H) 11/13/2018     Lab Results   Component Value Date    HDL 53 04/05/2019    HDL 52 11/13/2018    HDL 46 01/25/2018     Lab Results   Component Value Date    LDLCHOLESTEROL 125 04/05/2019    LDLCHOLESTEROL 123 11/13/2018    LDLCHOLESTEROL 160 (H) 01/25/2018     Lab Results   Component Value Date    VLDL NOT REPORTED (H) 04/05/2019    VLDL NOT REPORTED 11/13/2018    VLDL NOT REPORTED 01/25/2018     Lab Results   Component Value Date    CHOLHDLRATIO 3.9 04/05/2019    CHOLHDLRATIO 4.4 11/13/2018    CHOLHDLRATIO 5.3 (H) 01/25/2018       Lab Results   Component Value Date    LABA1C 5.3 04/03/2019       No results found for: [id number]    No results found for: FOLATE    Lab Results   Component Value Date    FERRITIN 240 07/30/2014       No results found for: VITD25          Current Outpatient Medications   Medication Sig Dispense Refill    APLENZIN 174 MG TB24   0    fexofenadine (ALLEGRA ALLERGY) 180 MG tablet Take 1 tablet by mouth daily 30 tablet 3    potassium chloride (KLOR-CON M) 10 MEQ extended release tablet Take 1 tablet by mouth daily 180 tablet 1    furosemide (LASIX) 20 MG tablet Take 1 tablet by mouth daily 60 tablet 3    cyclobenzaprine (FLEXERIL) 10 MG tablet take 1 tablet by mouth three times a day if needed for muscle spasm 90 tablet 3    pravastatin (PRAVACHOL) 80 MG tablet TAKE 1 TABLET BY MOUTH IN THE EVENING 90 tablet 1    aspirin EC 81 MG EC tablet Take 1 tablet by mouth daily 90 tablet 1    metoprolol tartrate (LOPRESSOR) 25 MG tablet Take 3 tablets by mouth 2 times daily 540 tablet 3    lidocaine (LMX) 4 % cream Apply 2-3 times a day as needed for pain 120 g 3    Camphor-Menthol-Methyl Sal 3.1-16-10 % GEL Apply every 8 hours as needed for pain. OK to substitute 1 Tube 1    docusate sodium (COLACE) 100 MG capsule Take 1 capsule by mouth 2 times daily 60 capsule 3    senna-docusate (SENNA S) 8.6-50 MG per tablet Take 1 tablet by mouth 2 times daily as needed for Constipation 20 tablet 1    lithium (ESKALITH) 450 MG extended release tablet Take 450 mg by mouth 2 times daily      ibuprofen (ADVIL;MOTRIN) 600 MG tablet Take 1 tablet by mouth every 6 hours as needed for Pain 90 tablet 0    vitamin B-12 (CYANOCOBALAMIN) 1000 MCG tablet Take 1,000 mcg by mouth daily      Ginkgo Biloba (GINKOBA PO) Take by mouth      Adapalene 0.3 % GEL   1    benztropine (COGENTIN) 1 MG tablet   0    erythromycin with ethanol (THERAMYCIN) 2 % external solution apply topically as directed every morning  0    clindamycin (CLEOCIN T) 1 % external solution       aluminum chloride (DRYSOL) 20 % external solution Apply topically nightly. 37.5 mL 3    Omega-3 Fatty Acids (OMEGA-3 FISH OIL) 1200 MG CAPS Take  by mouth 2 times daily.  fluticasone (FLONASE) 50 MCG/ACT nasal spray 2 sprays by Nasal route daily 1 Bottle 3     No current facility-administered medications for this visit.               Social History     Socioeconomic History    Marital status: Single     Spouse name: Not on file    Number of children: Not on file    Years of education: Not on file    Highest education level: Not on file   Occupational History    Not on file   Social Needs    Financial resource strain: Not on file    Food insecurity:     Worry: Not on file     Inability: Not on file    Transportation needs:     Medical: Not on file     Non-medical: Not on file   Tobacco Use    Smoking status: Former Smoker     Packs/day: 1.50     Years: 12.00     Pack years: 18.00     Last attempt to quit: 2002     Years since quittin.9    Smokeless tobacco: Never Used   Substance and Sexual Activity    Alcohol use: No     Alcohol/week: 0.0 oz    Drug use: No    Sexual activity: Yes     Partners: Female     Comment:    Lifestyle    Physical activity:     Days per week: Not on file     Minutes per session: Not on file    Stress: Not on file   Relationships    Social connections:     Talks on phone: Not on file     Gets together: Not on file     Attends Mu-ism service: Not on file     Active member of club or organization: Not on file     Attends meetings of clubs or organizations: Not on file     Relationship status: Not on file    Intimate partner violence:     Fear of current or ex partner: Not on file     Emotionally abused: Not on file     Physically abused: Not on file     Forced sexual activity: Not on file   Other Topics Concern    Not on file   Social History Narrative    Not on file     Counseling given: Yes        Family History   Problem Relation Age of Onset    Cancer Mother 34        leukemia    Heart Disease Father 61        triple bipass    Breast Cancer Maternal Grandmother              -rest of complaints with corresponding details per ROS    The patient's past medical, surgical, social, and family history as well as his current medications and allergies were reviewed as documented intoday's encounter. Review of Systems   Constitutional: Negative for activity change, appetite change, fever and unexpected weight change. HENT: Negative. Respiratory: Negative for shortness of breath and wheezing. Cardiovascular: Positive for leg swelling.  Negative for chest pain and palpitations. Gastrointestinal: Negative. Genitourinary: Negative for difficulty urinating, flank pain, frequency and urgency. Musculoskeletal: Negative for arthralgias and back pain. Neurological: Negative for numbness. Psychiatric/Behavioral: Negative. Physical Exam   Musculoskeletal:        Right lower leg: He exhibits edema. Left lower leg: He exhibits edema. Right foot: There is swelling. Left foot: There is swelling. There is pitting edema bilateral, no redness seen. PHYSICAL EXAM:   VITALS:   Vitals:    06/27/19 1054   BP: 97/68   Pulse: 75   SpO2: 99%     GENERAL:  Patient is a well-developed, well-nourished male  in no acute distress, alert and oriented x3, appropriate and pleasant conversation. HEAD: Normocephalic, atraumatic. EYES: Pupils equal, round and reactive to light and accommodation, extraocular   movements intact. CARDIOVASCULAR: Regular rate and rhythm. PULMONARY: Lungs are clear to auscultation bilaterally. NEUROLOGIC: Cranial nerves II through XII grossly intact. No focal deficits are noted. ASSESSMENT AND PLAN      1. Bilateral swelling of feet  Patient's echocardiogram and blood work is normal discussed patient to keep legs elevated, could be possibility of due to Norvasc but patient is on lithium and there is interaction between lisinopril and hydrochlorothiazide. Started on low-dose Lasix, keep checking blood pressure follow-up in 1 week. - potassium chloride (KLOR-CON M) 10 MEQ extended release tablet; Take 1 tablet by mouth daily  Dispense: 180 tablet; Refill: 1    2. Essential hypertension  Discussed with patient to keep checking blood pressure, follow-up in 1 week. Keep legs elevated continue Lopressor and Norvasc    3. Sore throat  -Patient referred  - fexofenadine (ALLEGRA ALLERGY) 180 MG tablet; Take 1 tablet by mouth daily  Dispense: 30 tablet;  Refill: 3      No orders of the defined types were placed in this encounter. Medications Discontinued During This Encounter   Medication Reason    amLODIPine (NORVASC) 10 MG tablet Side effects    fexofenadine (ALLEGRA ALLERGY) 180 MG tablet REORDER       Ervin received counseling on the following healthy behaviors: nutrition, exercise and medication adherence  Reviewed prior labs and health maintenance. Continue current medications, diet and exercise. Discussed use, benefit, and side effects of prescribed medications. Barriers to medication compliance addressed. Patient given educational materials - see patient instructions. All patient questions answered. Patient voiced understanding. The patient'spast medical, surgical, social, and family history as well as his   current medications and allergies were reviewed as documented in today's encounter. Medications, labs, diagnostic studies, consultations andfollow-up as documented in this encounter. Return for keep jose martin . Patient wasseen with total face to face time of 15 minutes. More than 50% of this visit was counseling and education. Future Appointments   Date Time Provider Maribell Duran   7/8/2019  9:00 AM SCHEDULE, [facility]   7/16/2019  2:15 PM Nereida Aj MD [facility]     This note was completed by using the assistance of a speech-recognition program. However, inadvertent computerized transcription errors may be present. Althoughevery effort was made to ensure accuracy, no guarantees can be provided that every mistake has been identified and corrected by editing.   Electronically signed by Luis Alberto [facility] MD on 6/27/2019  1:06 PM

## 2019-06-27 NOTE — PROGRESS NOTES
Visit Information    Have you changed or started any medications since your last visit including any over-the-counter medicines, vitamins, or herbal medicines? no   Are you having any side effects from any of your medications? -  no  Have you stopped taking any of your medications? Is so, why? -  no    Have you seen any other physician or provider since your last visit? No  Have you had any other diagnostic tests since your last visit? No  Have you been seen in the emergency room and/or had an admission to a hospital since we last saw you? No  Have you had your routine dental cleaning in the past 6 months? no    Have you activated your [employer] account? If not, what are your barriers?  Yes     Patient Care Team:  Arturo Rios MD as PCP - General (Family Medicine)  Arturo Rios MD as PCP - [facility]  Marlon Coe MD (Dermatology)  Suzette Wisdom MD as Surgeon (Cardiology)  Massimo Lux DPM as Consulting Physician (Podiatry)    Medical History Review  Past Medical, Family, and Social History reviewed and does contribute to the patient presenting condition    Health Maintenance   Topic Date Due    Potassium monitoring  04/05/2020    Creatinine monitoring  04/05/2020    DTaP/Tdap/Td vaccine (2 - Td) 01/01/2024    Lipid screen  04/25/2024    Flu vaccine  Completed    HIV screen  Completed    Pneumococcal 0-64 years Vaccine  Aged Out

## 2019-07-08 ENCOUNTER — NURSE ONLY (OUTPATIENT)
Dept: FAMILY MEDICINE CLINIC | Age: 49
End: 2019-07-08

## 2019-07-08 VITALS — SYSTOLIC BLOOD PRESSURE: 120 MMHG | DIASTOLIC BLOOD PRESSURE: 88 MMHG

## 2019-07-08 DIAGNOSIS — I10 ESSENTIAL HYPERTENSION: Primary | ICD-10-CM

## 2019-07-08 NOTE — PROGRESS NOTES
Chief Complaint   Patient presents with    Hypertension     bp check       Yesenia Mclain is here for BP check    BP Readings from Last 3 Encounters:   07/08/19 120/88   06/27/19 97/68   04/11/19 122/85       BP is 120/88      Future Appointments   Date Time Provider Maribell Duran   7/16/2019  2:15 PM Dory Ballesteros MD [id number]

## 2019-07-12 ENCOUNTER — HOSPITAL ENCOUNTER (OUTPATIENT)
Age: 49
Discharge: HOME | End: 2019-07-12
Payer: MEDICARE

## 2019-07-12 LAB
ALBUMIN SERPL-MCNC: 4.6 G/DL (ref 3.5–5.2)
ALBUMIN/GLOBULIN RATIO: NORMAL (ref 1–2.5)
ALP BLD-CCNC: 89 U/L (ref 40–129)
ALT SERPL-CCNC: 27 U/L (ref 5–41)
AST SERPL-CCNC: 34 U/L
BILIRUB SERPL-MCNC: 0.5 MG/DL (ref 0.3–1.2)
BILIRUBIN DIRECT: 0.12 MG/DL
BILIRUBIN, INDIRECT: 0.38 MG/DL (ref 0–1)
GGT: 34 U/L (ref 8–61)
GLOBULIN: NORMAL G/DL (ref 1.5–3.8)
HCT VFR BLD CALC: 44.3 % (ref 41–53)
HEMOGLOBIN: 14.9 G/DL (ref 13.5–17.5)
MCH RBC QN AUTO: 31.5 PG (ref 26–34)
MCHC RBC AUTO-ENTMCNC: 33.7 G/DL (ref 31–37)
MCV RBC AUTO: 93.5 FL (ref 80–100)
NRBC AUTOMATED: NORMAL PER 100 WBC
PDW BLD-RTO: 13.7 % (ref 11.5–14.9)
PLATELET # BLD: 213 K/UL (ref 150–450)
PMV BLD AUTO: 8.6 FL (ref 6–12)
RBC # BLD: 4.74 M/UL (ref 4.5–5.9)
TOTAL PROTEIN: 7.7 G/DL (ref 6.4–8.3)
TSH SERPL DL<=0.05 MIU/L-ACNC: 1.24 MIU/L (ref 0.3–5)
VALPROIC ACID LEVEL: 30 UG/ML (ref 50–125)
VALPROIC DATE LAST DOSE: ABNORMAL
VALPROIC DOSE AMOUNT: ABNORMAL
VALPROIC TIME LAST DOSE: ABNORMAL
WBC # BLD: 6.8 K/UL (ref 3.5–11)

## 2019-07-12 PROCEDURE — 85027 COMPLETE CBC AUTOMATED: CPT

## 2019-07-12 PROCEDURE — 82977 ASSAY OF GGT: CPT

## 2019-07-12 PROCEDURE — 84443 ASSAY THYROID STIM HORMONE: CPT

## 2019-07-12 PROCEDURE — 36415 COLL VENOUS BLD VENIPUNCTURE: CPT

## 2019-07-12 PROCEDURE — 82306 VITAMIN D 25 HYDROXY: CPT

## 2019-07-12 PROCEDURE — 80164 ASSAY DIPROPYLACETIC ACD TOT: CPT

## 2019-07-12 PROCEDURE — 80076 HEPATIC FUNCTION PANEL: CPT

## 2019-07-18 ENCOUNTER — OFFICE VISIT (OUTPATIENT)
Dept: FAMILY MEDICINE CLINIC | Age: 49
End: 2019-07-18
Payer: MEDICARE

## 2019-07-18 VITALS
OXYGEN SATURATION: 98 % | SYSTOLIC BLOOD PRESSURE: 120 MMHG | HEART RATE: 70 BPM | BODY MASS INDEX: 25.27 KG/M2 | WEIGHT: 181.2 LBS | RESPIRATION RATE: 14 BRPM | DIASTOLIC BLOOD PRESSURE: 80 MMHG

## 2019-07-18 DIAGNOSIS — R29.898 WEAKNESS OF BOTH LEGS: ICD-10-CM

## 2019-07-18 DIAGNOSIS — I10 ESSENTIAL HYPERTENSION: Primary | ICD-10-CM

## 2019-07-18 PROCEDURE — G8419 CALC BMI OUT NRM PARAM NOF/U: HCPCS | Performed by: FAMILY MEDICINE

## 2019-07-18 PROCEDURE — 99213 OFFICE O/P EST LOW 20 MIN: CPT | Performed by: FAMILY MEDICINE

## 2019-07-18 PROCEDURE — G8427 DOCREV CUR MEDS BY ELIG CLIN: HCPCS | Performed by: FAMILY MEDICINE

## 2019-07-18 PROCEDURE — 1036F TOBACCO NON-USER: CPT | Performed by: FAMILY MEDICINE

## 2019-07-18 ASSESSMENT — ENCOUNTER SYMPTOMS
COUGH: 0
SHORTNESS OF BREATH: 0
NAUSEA: 0
ABDOMINAL PAIN: 0
SORE THROAT: 0

## 2019-07-19 LAB
SEND OUT REPORT: NORMAL
TEST NAME: NORMAL

## 2019-08-08 DIAGNOSIS — M79.7 FIBROMYALGIA MUSCLE PAIN: ICD-10-CM

## 2019-08-08 RX ORDER — CYCLOBENZAPRINE HCL 10 MG
TABLET ORAL
Qty: 90 TABLET | Refills: 3 | Status: SHIPPED | OUTPATIENT
Start: 2019-08-08 | End: 2019-12-10 | Stop reason: SDUPTHER

## 2019-09-20 DIAGNOSIS — I10 ESSENTIAL HYPERTENSION: ICD-10-CM

## 2019-09-20 RX ORDER — ACETAMINOPHEN/DIPHENHYDRAMINE 500MG-25MG
TABLET ORAL
Qty: 90 TABLET | Refills: 3 | Status: SHIPPED | OUTPATIENT
Start: 2019-09-20 | End: 2020-09-21

## 2019-09-20 NOTE — TELEPHONE ENCOUNTER
Please Approve or Refuse.   Send to Pharmacy per Pt's Request: Aspirin 81    RX: [facility] [place]     Next Visit Date:  11/19/2019   Last Visit Date: 7/18/2019    Hemoglobin A1C (%)   Date Value   04/03/2019 5.3   10/20/2017 5.1   02/08/2017 5.3             ( goal A1C is < 7)   BP Readings from Last 3 Encounters:   07/18/19 120/80   07/08/19 120/88   06/27/19 97/68          (goal 120/80)  BUN   Date Value Ref Range Status   04/05/2019 11 6 - 20 mg/dL Final     CREATININE   Date Value Ref Range Status   04/05/2019 0.90 0.70 - 1.20 mg/dL Final     Potassium   Date Value Ref Range Status   04/05/2019 4.4 3.7 - 5.3 mmol/L Final

## 2019-11-04 ENCOUNTER — HOSPITAL ENCOUNTER (OUTPATIENT)
Dept: PHYSICAL THERAPY | Age: 49
Setting detail: THERAPIES SERIES
Discharge: HOME | End: 2019-11-04
Payer: MEDICARE

## 2019-11-04 PROCEDURE — G0283 ELEC STIM OTHER THAN WOUND: HCPCS

## 2019-11-04 PROCEDURE — 97161 PT EVAL LOW COMPLEX 20 MIN: CPT

## 2019-11-04 PROCEDURE — 97110 THERAPEUTIC EXERCISES: CPT

## 2019-11-04 ASSESSMENT — PAIN DESCRIPTION - FREQUENCY: FREQUENCY: CONTINUOUS

## 2019-11-04 ASSESSMENT — PAIN DESCRIPTION - LOCATION: LOCATION: SHOULDER

## 2019-11-04 ASSESSMENT — PAIN DESCRIPTION - PAIN TYPE: TYPE: SURGICAL PAIN

## 2019-11-04 ASSESSMENT — PAIN DESCRIPTION - DESCRIPTORS: DESCRIPTORS: ACHING;CONSTANT;SHARP;STABBING

## 2019-11-04 ASSESSMENT — PAIN DESCRIPTION - ONSET: ONSET: ON-GOING

## 2019-11-04 ASSESSMENT — PAIN DESCRIPTION - ORIENTATION: ORIENTATION: RIGHT

## 2019-11-04 ASSESSMENT — PAIN DESCRIPTION - PROGRESSION: CLINICAL_PROGRESSION: NOT CHANGED

## 2019-11-04 ASSESSMENT — PAIN SCALES - GENERAL: PAINLEVEL_OUTOF10: 7

## 2019-11-07 ENCOUNTER — HOSPITAL ENCOUNTER (OUTPATIENT)
Dept: PHYSICAL THERAPY | Age: 49
Setting detail: THERAPIES SERIES
Discharge: HOME | End: 2019-11-07
Payer: MEDICARE

## 2019-11-11 ENCOUNTER — HOSPITAL ENCOUNTER (OUTPATIENT)
Dept: PHYSICAL THERAPY | Age: 49
Setting detail: THERAPIES SERIES
Discharge: HOME | End: 2019-11-11
Payer: MEDICARE

## 2019-11-11 PROCEDURE — 97110 THERAPEUTIC EXERCISES: CPT

## 2019-11-11 PROCEDURE — G0283 ELEC STIM OTHER THAN WOUND: HCPCS

## 2019-11-11 ASSESSMENT — PAIN DESCRIPTION - ORIENTATION: ORIENTATION: RIGHT

## 2019-11-11 ASSESSMENT — PAIN DESCRIPTION - PAIN TYPE: TYPE: SURGICAL PAIN

## 2019-11-11 ASSESSMENT — PAIN DESCRIPTION - ONSET: ONSET: ON-GOING

## 2019-11-11 ASSESSMENT — PAIN DESCRIPTION - FREQUENCY: FREQUENCY: CONTINUOUS

## 2019-11-11 ASSESSMENT — PAIN DESCRIPTION - PROGRESSION: CLINICAL_PROGRESSION: GRADUALLY IMPROVING

## 2019-11-11 ASSESSMENT — PAIN SCALES - GENERAL: PAINLEVEL_OUTOF10: 4

## 2019-11-11 ASSESSMENT — PAIN DESCRIPTION - LOCATION: LOCATION: SHOULDER

## 2019-11-11 ASSESSMENT — PAIN DESCRIPTION - DESCRIPTORS: DESCRIPTORS: ACHING;CONSTANT

## 2019-11-18 ENCOUNTER — HOSPITAL ENCOUNTER (OUTPATIENT)
Dept: PHYSICAL THERAPY | Age: 49
Setting detail: THERAPIES SERIES
Discharge: HOME | End: 2019-11-18
Payer: MEDICARE

## 2019-11-18 PROCEDURE — G0283 ELEC STIM OTHER THAN WOUND: HCPCS

## 2019-11-18 PROCEDURE — 97110 THERAPEUTIC EXERCISES: CPT

## 2019-11-18 ASSESSMENT — PAIN DESCRIPTION - FREQUENCY: FREQUENCY: CONTINUOUS

## 2019-11-18 ASSESSMENT — PAIN DESCRIPTION - ONSET: ONSET: ON-GOING

## 2019-11-18 ASSESSMENT — PAIN DESCRIPTION - PAIN TYPE: TYPE: SURGICAL PAIN

## 2019-11-18 ASSESSMENT — PAIN DESCRIPTION - PROGRESSION: CLINICAL_PROGRESSION: GRADUALLY IMPROVING

## 2019-11-18 ASSESSMENT — PAIN SCALES - GENERAL: PAINLEVEL_OUTOF10: 6

## 2019-11-18 ASSESSMENT — PAIN DESCRIPTION - ORIENTATION: ORIENTATION: RIGHT

## 2019-11-18 ASSESSMENT — PAIN DESCRIPTION - DESCRIPTORS: DESCRIPTORS: ACHING;CONSTANT

## 2019-11-18 ASSESSMENT — PAIN DESCRIPTION - LOCATION: LOCATION: SHOULDER

## 2019-11-19 ENCOUNTER — OFFICE VISIT (OUTPATIENT)
Dept: FAMILY MEDICINE CLINIC | Age: 49
End: 2019-11-19
Payer: MEDICARE

## 2019-11-19 VITALS
DIASTOLIC BLOOD PRESSURE: 88 MMHG | OXYGEN SATURATION: 99 % | WEIGHT: 181.4 LBS | BODY MASS INDEX: 25.4 KG/M2 | SYSTOLIC BLOOD PRESSURE: 128 MMHG | HEIGHT: 71 IN | HEART RATE: 103 BPM

## 2019-11-19 DIAGNOSIS — R20.0 NUMBNESS AND TINGLING OF FOOT: ICD-10-CM

## 2019-11-19 DIAGNOSIS — M76.61 ACHILLES TENDINITIS OF RIGHT LOWER EXTREMITY: ICD-10-CM

## 2019-11-19 DIAGNOSIS — G89.29 CHRONIC RIGHT SHOULDER PAIN: ICD-10-CM

## 2019-11-19 DIAGNOSIS — R53.82 CHRONIC FATIGUE: ICD-10-CM

## 2019-11-19 DIAGNOSIS — I10 ESSENTIAL HYPERTENSION: Primary | ICD-10-CM

## 2019-11-19 DIAGNOSIS — K59.03 CONSTIPATION DUE TO PAIN MEDICATION: ICD-10-CM

## 2019-11-19 DIAGNOSIS — M25.511 CHRONIC RIGHT SHOULDER PAIN: ICD-10-CM

## 2019-11-19 DIAGNOSIS — Z23 NEED FOR IMMUNIZATION AGAINST INFLUENZA: ICD-10-CM

## 2019-11-19 DIAGNOSIS — E78.5 HYPERLIPIDEMIA WITH TARGET LDL LESS THAN 100: ICD-10-CM

## 2019-11-19 DIAGNOSIS — R20.2 NUMBNESS AND TINGLING OF FOOT: ICD-10-CM

## 2019-11-19 DIAGNOSIS — F31.75 BIPOLAR DISORDER, IN PARTIAL REMISSION, MOST RECENT EPISODE DEPRESSED (HCC): ICD-10-CM

## 2019-11-19 PROCEDURE — G8482 FLU IMMUNIZE ORDER/ADMIN: HCPCS | Performed by: FAMILY MEDICINE

## 2019-11-19 PROCEDURE — 99214 OFFICE O/P EST MOD 30 MIN: CPT | Performed by: FAMILY MEDICINE

## 2019-11-19 PROCEDURE — 1036F TOBACCO NON-USER: CPT | Performed by: FAMILY MEDICINE

## 2019-11-19 PROCEDURE — 90686 IIV4 VACC NO PRSV 0.5 ML IM: CPT | Performed by: FAMILY MEDICINE

## 2019-11-19 PROCEDURE — G8427 DOCREV CUR MEDS BY ELIG CLIN: HCPCS | Performed by: FAMILY MEDICINE

## 2019-11-19 PROCEDURE — G8419 CALC BMI OUT NRM PARAM NOF/U: HCPCS | Performed by: FAMILY MEDICINE

## 2019-11-19 PROCEDURE — 90471 IMMUNIZATION ADMIN: CPT | Performed by: FAMILY MEDICINE

## 2019-11-19 RX ORDER — DOCUSATE SODIUM 100 MG/1
100 CAPSULE, LIQUID FILLED ORAL 2 TIMES DAILY
Qty: 60 CAPSULE | Refills: 3 | Status: SHIPPED | OUTPATIENT
Start: 2019-11-19 | End: 2020-08-06 | Stop reason: SDUPTHER

## 2019-11-19 RX ORDER — LURASIDONE HYDROCHLORIDE 120 MG/1
TABLET, FILM COATED ORAL
Refills: 0 | COMMUNITY
Start: 2019-11-14 | End: 2020-01-10 | Stop reason: ALTCHOICE

## 2019-11-19 RX ORDER — OXCARBAZEPINE 300 MG/1
TABLET, FILM COATED ORAL
Refills: 0 | COMMUNITY
Start: 2019-11-14 | End: 2020-05-27 | Stop reason: ALTCHOICE

## 2019-11-19 ASSESSMENT — ENCOUNTER SYMPTOMS
VOMITING: 0
WHEEZING: 0
CONSTIPATION: 1
COUGH: 1
ABDOMINAL DISTENTION: 0
DIARRHEA: 0
NAUSEA: 0
ABDOMINAL PAIN: 0
SHORTNESS OF BREATH: 0
CHEST TIGHTNESS: 0

## 2019-11-19 ASSESSMENT — PATIENT HEALTH QUESTIONNAIRE - PHQ9
SUM OF ALL RESPONSES TO PHQ9 QUESTIONS 1 & 2: 0
SUM OF ALL RESPONSES TO PHQ QUESTIONS 1-9: 0
SUM OF ALL RESPONSES TO PHQ QUESTIONS 1-9: 0
1. LITTLE INTEREST OR PLEASURE IN DOING THINGS: 0
2. FEELING DOWN, DEPRESSED OR HOPELESS: 0

## 2019-11-21 ENCOUNTER — HOSPITAL ENCOUNTER (OUTPATIENT)
Dept: PHYSICAL THERAPY | Age: 49
Setting detail: THERAPIES SERIES
Discharge: HOME | End: 2019-11-21
Payer: MEDICARE

## 2019-11-25 ENCOUNTER — HOSPITAL ENCOUNTER (OUTPATIENT)
Age: 49
Discharge: HOME | End: 2019-11-25
Payer: MEDICARE

## 2019-11-25 DIAGNOSIS — E78.5 HYPERLIPIDEMIA WITH TARGET LDL LESS THAN 100: ICD-10-CM

## 2019-11-25 DIAGNOSIS — R53.82 CHRONIC FATIGUE: ICD-10-CM

## 2019-11-25 DIAGNOSIS — I10 ESSENTIAL HYPERTENSION: ICD-10-CM

## 2019-11-25 DIAGNOSIS — R20.2 NUMBNESS AND TINGLING OF FOOT: ICD-10-CM

## 2019-11-25 DIAGNOSIS — R20.0 NUMBNESS AND TINGLING OF FOOT: ICD-10-CM

## 2019-11-25 LAB
ALBUMIN SERPL-MCNC: 4.8 G/DL (ref 3.5–5.2)
ALBUMIN/GLOBULIN RATIO: ABNORMAL (ref 1–2.5)
ALP BLD-CCNC: 102 U/L (ref 40–129)
ALT SERPL-CCNC: 24 U/L (ref 5–41)
ANION GAP SERPL CALCULATED.3IONS-SCNC: 10 MMOL/L (ref 9–17)
AST SERPL-CCNC: 24 U/L
BILIRUB SERPL-MCNC: 0.4 MG/DL (ref 0.3–1.2)
BUN BLDV-MCNC: 12 MG/DL (ref 6–20)
BUN/CREAT BLD: ABNORMAL (ref 9–20)
CALCIUM SERPL-MCNC: 10 MG/DL (ref 8.6–10.4)
CHLORIDE BLD-SCNC: 105 MMOL/L (ref 98–107)
CHOLESTEROL/HDL RATIO: 4.6
CHOLESTEROL: 187 MG/DL
CO2: 26 MMOL/L (ref 20–31)
CREAT SERPL-MCNC: 0.87 MG/DL (ref 0.7–1.2)
FOLATE: 6.8 NG/ML
GFR AFRICAN AMERICAN: >60 ML/MIN
GFR NON-AFRICAN AMERICAN: >60 ML/MIN
GFR SERPL CREATININE-BSD FRML MDRD: ABNORMAL ML/MIN/{1.73_M2}
GFR SERPL CREATININE-BSD FRML MDRD: ABNORMAL ML/MIN/{1.73_M2}
GLUCOSE BLD-MCNC: 100 MG/DL (ref 70–99)
HCT VFR BLD CALC: 47.3 % (ref 41–53)
HDLC SERPL-MCNC: 41 MG/DL
HEMOGLOBIN: 16.3 G/DL (ref 13.5–17.5)
LDL CHOLESTEROL: 102 MG/DL (ref 0–130)
MCH RBC QN AUTO: 31.1 PG (ref 26–34)
MCHC RBC AUTO-ENTMCNC: 34.4 G/DL (ref 31–37)
MCV RBC AUTO: 90.2 FL (ref 80–100)
NRBC AUTOMATED: NORMAL PER 100 WBC
PDW BLD-RTO: 12.6 % (ref 11.5–14.9)
PLATELET # BLD: 296 K/UL (ref 150–450)
PMV BLD AUTO: 8.5 FL (ref 6–12)
POTASSIUM SERPL-SCNC: 5 MMOL/L (ref 3.7–5.3)
RBC # BLD: 5.24 M/UL (ref 4.5–5.9)
SODIUM BLD-SCNC: 141 MMOL/L (ref 135–144)
TOTAL PROTEIN: 8 G/DL (ref 6.4–8.3)
TRIGL SERPL-MCNC: 220 MG/DL
TSH SERPL DL<=0.05 MIU/L-ACNC: 1 MIU/L (ref 0.3–5)
VITAMIN B-12: 431 PG/ML (ref 232–1245)
VITAMIN D 25-HYDROXY: 58.7 NG/ML (ref 30–100)
VLDLC SERPL CALC-MCNC: ABNORMAL MG/DL (ref 1–30)
WBC # BLD: 8.9 K/UL (ref 3.5–11)

## 2019-11-25 PROCEDURE — 36415 COLL VENOUS BLD VENIPUNCTURE: CPT

## 2019-11-25 PROCEDURE — 85027 COMPLETE CBC AUTOMATED: CPT

## 2019-11-25 PROCEDURE — 84443 ASSAY THYROID STIM HORMONE: CPT

## 2019-11-25 PROCEDURE — 80061 LIPID PANEL: CPT

## 2019-11-25 PROCEDURE — 82306 VITAMIN D 25 HYDROXY: CPT

## 2019-11-25 PROCEDURE — 80053 COMPREHEN METABOLIC PANEL: CPT

## 2019-11-25 PROCEDURE — 82607 VITAMIN B-12: CPT

## 2019-11-25 PROCEDURE — 82746 ASSAY OF FOLIC ACID SERUM: CPT

## 2019-11-27 ENCOUNTER — TELEPHONE (OUTPATIENT)
Dept: FAMILY MEDICINE CLINIC | Age: 49
End: 2019-11-27

## 2019-11-27 DIAGNOSIS — J20.9 ACUTE BRONCHITIS WITH BRONCHOSPASM: Primary | ICD-10-CM

## 2019-11-27 DIAGNOSIS — J20.9 ACUTE BRONCHITIS DUE TO INFECTION: ICD-10-CM

## 2019-11-27 RX ORDER — AZITHROMYCIN 250 MG/1
TABLET, FILM COATED ORAL
Qty: 6 TABLET | Refills: 0 | Status: SHIPPED | OUTPATIENT
Start: 2019-11-27 | End: 2019-12-02

## 2019-11-27 RX ORDER — BENZONATATE 100 MG/1
100 CAPSULE ORAL 3 TIMES DAILY PRN
Qty: 21 CAPSULE | Refills: 0 | Status: SHIPPED | OUTPATIENT
Start: 2019-11-27 | End: 2019-12-04

## 2019-12-04 ENCOUNTER — HOSPITAL ENCOUNTER (OUTPATIENT)
Dept: PHYSICAL THERAPY | Age: 49
Setting detail: THERAPIES SERIES
Discharge: HOME | End: 2019-12-04
Payer: MEDICARE

## 2019-12-04 PROCEDURE — G0283 ELEC STIM OTHER THAN WOUND: HCPCS

## 2019-12-04 PROCEDURE — 97110 THERAPEUTIC EXERCISES: CPT

## 2019-12-04 ASSESSMENT — PAIN DESCRIPTION - LOCATION: LOCATION: SHOULDER

## 2019-12-04 ASSESSMENT — PAIN SCALES - GENERAL: PAINLEVEL_OUTOF10: 7

## 2019-12-04 ASSESSMENT — PAIN DESCRIPTION - FREQUENCY: FREQUENCY: CONTINUOUS

## 2019-12-04 ASSESSMENT — PAIN DESCRIPTION - DESCRIPTORS: DESCRIPTORS: CONSTANT

## 2019-12-04 ASSESSMENT — PAIN DESCRIPTION - ORIENTATION: ORIENTATION: RIGHT

## 2019-12-09 ENCOUNTER — HOSPITAL ENCOUNTER (OUTPATIENT)
Dept: PHYSICAL THERAPY | Age: 49
Setting detail: THERAPIES SERIES
Discharge: HOME | End: 2019-12-09
Payer: MEDICARE

## 2019-12-09 PROCEDURE — 97110 THERAPEUTIC EXERCISES: CPT

## 2019-12-09 PROCEDURE — G0283 ELEC STIM OTHER THAN WOUND: HCPCS

## 2019-12-09 ASSESSMENT — PAIN DESCRIPTION - LOCATION: LOCATION: SHOULDER

## 2019-12-09 ASSESSMENT — PAIN DESCRIPTION - DESCRIPTORS: DESCRIPTORS: CONSTANT

## 2019-12-09 ASSESSMENT — PAIN SCALES - GENERAL: PAINLEVEL_OUTOF10: 5

## 2019-12-09 ASSESSMENT — PAIN DESCRIPTION - FREQUENCY: FREQUENCY: CONTINUOUS

## 2019-12-09 ASSESSMENT — PAIN DESCRIPTION - ORIENTATION: ORIENTATION: RIGHT

## 2019-12-10 DIAGNOSIS — M79.7 FIBROMYALGIA MUSCLE PAIN: ICD-10-CM

## 2019-12-10 RX ORDER — CYCLOBENZAPRINE HCL 10 MG
TABLET ORAL
Qty: 90 TABLET | Refills: 3 | Status: SHIPPED | OUTPATIENT
Start: 2019-12-10 | End: 2020-03-09

## 2019-12-11 ENCOUNTER — HOSPITAL ENCOUNTER (OUTPATIENT)
Dept: PHYSICAL THERAPY | Age: 49
Setting detail: THERAPIES SERIES
Discharge: HOME | End: 2019-12-11
Payer: MEDICARE

## 2019-12-16 ENCOUNTER — HOSPITAL ENCOUNTER (OUTPATIENT)
Dept: PHYSICAL THERAPY | Age: 49
Setting detail: THERAPIES SERIES
Discharge: HOME | End: 2019-12-16
Payer: MEDICARE

## 2019-12-16 PROCEDURE — G0283 ELEC STIM OTHER THAN WOUND: HCPCS

## 2019-12-16 PROCEDURE — 97110 THERAPEUTIC EXERCISES: CPT

## 2019-12-16 ASSESSMENT — PAIN DESCRIPTION - LOCATION: LOCATION: SHOULDER

## 2019-12-16 ASSESSMENT — PAIN SCALES - GENERAL: PAINLEVEL_OUTOF10: 6

## 2019-12-16 ASSESSMENT — PAIN DESCRIPTION - ORIENTATION: ORIENTATION: RIGHT

## 2019-12-16 ASSESSMENT — PAIN DESCRIPTION - FREQUENCY: FREQUENCY: CONTINUOUS

## 2019-12-16 ASSESSMENT — PAIN DESCRIPTION - DESCRIPTORS: DESCRIPTORS: CONSTANT

## 2019-12-18 ENCOUNTER — HOSPITAL ENCOUNTER (OUTPATIENT)
Dept: PHYSICAL THERAPY | Age: 49
Setting detail: THERAPIES SERIES
Discharge: HOME | End: 2019-12-18
Payer: MEDICARE

## 2019-12-18 PROCEDURE — G0283 ELEC STIM OTHER THAN WOUND: HCPCS

## 2019-12-18 PROCEDURE — 97110 THERAPEUTIC EXERCISES: CPT

## 2019-12-18 ASSESSMENT — PAIN DESCRIPTION - DESCRIPTORS: DESCRIPTORS: CONSTANT

## 2019-12-18 ASSESSMENT — PAIN DESCRIPTION - ORIENTATION: ORIENTATION: RIGHT

## 2019-12-18 ASSESSMENT — PAIN DESCRIPTION - FREQUENCY: FREQUENCY: CONTINUOUS

## 2019-12-18 ASSESSMENT — PAIN DESCRIPTION - LOCATION: LOCATION: SHOULDER

## 2019-12-18 ASSESSMENT — PAIN SCALES - GENERAL: PAINLEVEL_OUTOF10: 5

## 2019-12-27 ENCOUNTER — HOSPITAL ENCOUNTER (OUTPATIENT)
Dept: PHYSICAL THERAPY | Age: 49
Setting detail: THERAPIES SERIES
Discharge: HOME | End: 2019-12-27
Payer: MEDICARE

## 2019-12-27 PROCEDURE — 97110 THERAPEUTIC EXERCISES: CPT

## 2019-12-27 PROCEDURE — G0283 ELEC STIM OTHER THAN WOUND: HCPCS

## 2019-12-27 ASSESSMENT — PAIN DESCRIPTION - LOCATION: LOCATION: SHOULDER

## 2019-12-27 ASSESSMENT — PAIN SCALES - GENERAL: PAINLEVEL_OUTOF10: 4

## 2019-12-27 ASSESSMENT — PAIN DESCRIPTION - FREQUENCY: FREQUENCY: CONTINUOUS

## 2019-12-27 ASSESSMENT — PAIN DESCRIPTION - ORIENTATION: ORIENTATION: RIGHT

## 2019-12-27 ASSESSMENT — PAIN DESCRIPTION - DESCRIPTORS: DESCRIPTORS: CONSTANT

## 2019-12-31 ENCOUNTER — HOSPITAL ENCOUNTER (OUTPATIENT)
Dept: PHYSICAL THERAPY | Age: 49
Setting detail: THERAPIES SERIES
Discharge: HOME | End: 2019-12-31
Payer: MEDICARE

## 2019-12-31 PROCEDURE — 97110 THERAPEUTIC EXERCISES: CPT

## 2019-12-31 ASSESSMENT — PAIN DESCRIPTION - FREQUENCY: FREQUENCY: CONTINUOUS

## 2019-12-31 ASSESSMENT — PAIN DESCRIPTION - ORIENTATION: ORIENTATION: RIGHT

## 2019-12-31 ASSESSMENT — PAIN DESCRIPTION - LOCATION: LOCATION: SHOULDER

## 2019-12-31 ASSESSMENT — PAIN SCALES - GENERAL: PAINLEVEL_OUTOF10: 4

## 2019-12-31 ASSESSMENT — PAIN DESCRIPTION - DESCRIPTORS: DESCRIPTORS: CONSTANT

## 2019-12-31 NOTE — PROGRESS NOTES
Physical Therapy Re-evaluation Note    Date: 2019  Patient Name: Loan Jaquez  MRN: [id number]  : 1970     Treatment Diagnosis: R shoulder weakness M62.81    Subjective   General  Referring Practitioner: Ozzy Neville  Referral Date : 19  Diagnosis: R cassidy pain M25.519  General Comment  Comments: to see Dr Piotr Johansen 20  PT Visit Information  Onset Date: 19  PT Insurance Information: [employer]  Total # of Visits Approved: 12  Total # of Visits to Date: 9  Subjective  Subjective: complains of pain on R cassidy today  Pain Screening  Patient Currently in Pain: Yes  Pain Assessment  Pain Assessment: 0-10  Pain Level: 4  Pain Location: Shoulder  Pain Orientation: Right  Pain Descriptors: Constant  Pain Frequency: Continuous  Vital Signs  Patient Currently in Pain: Yes    Objective  AROM RUE (degrees)  RUE AROM : Exceptions  R Shoulder Flexion 0-180: 0-160  R Shoulder Extension 0-45: 0-70  R Shoulder ABduction 0-180: 0-150  R Shoulder Int Rotation  0-70: 0-60  R Shoulder Ext Rotation 0-90: 0-30  Strength RUE  Comment: R cassidy 3+/5distal 5/5     Exercises  Exercise 2: PROM R shoulder supine x 10   Exercise 4: UBE fwd x 5 min  Exercise 5: pulleys in flex/abd x 5 min  Exercise 6: supine scap protraction 10 x3 3#  Exercise 7: L SDLY ER 10x3 1#  Exercise 8: prone R cassidy extension 3x10 3#  Exercise 9: Prone lawnmower pulls 3x10 3#  Exercise 10: 1/2 hula hoop #4R  Exercise 11: clothespin all R  Exercise 12: R cassidy horiz add/flex prone 10x 3#/1#  Exercise 13: R cassidy flex/ext/scaption 1# 15x    Assessment   Conditions Requiring Skilled Therapeutic Intervention  Assessment: SHORT TERM GOAL MET 1/2 LONG TERM GOAL MET 1/3  Treatment Diagnosis: R shoulder weakness M62.81  REQUIRES PT FOLLOW UP: Yes  Discharge Recommendations: Home independently  Activity Tolerance  Activity Tolerance: Patient Tolerated treatment well     Plan   Plan  Current Treatment Recommendations: ROM, Modalities, Home Exercise PT    [facility] ([facility]) @ [facility]  [address] [place] [address] Alondra Bianchi  [place], [address] [facility]  Phone [phone]  Fax [phone]

## 2020-01-10 ENCOUNTER — OFFICE VISIT (OUTPATIENT)
Dept: FAMILY MEDICINE CLINIC | Age: 50
End: 2020-01-10
Payer: MEDICARE

## 2020-01-10 VITALS
HEIGHT: 71 IN | SYSTOLIC BLOOD PRESSURE: 113 MMHG | OXYGEN SATURATION: 98 % | HEART RATE: 85 BPM | WEIGHT: 181 LBS | BODY MASS INDEX: 25.34 KG/M2 | DIASTOLIC BLOOD PRESSURE: 81 MMHG

## 2020-01-10 PROBLEM — M19.011 PRIMARY OSTEOARTHRITIS, RIGHT SHOULDER: Status: ACTIVE | Noted: 2018-12-12

## 2020-01-10 PROBLEM — J30.9 ALLERGIC RHINITIS DUE TO ALLERGEN: Status: ACTIVE | Noted: 2020-01-10

## 2020-01-10 PROBLEM — M75.01 ADHESIVE CAPSULITIS OF RIGHT SHOULDER: Status: ACTIVE | Noted: 2019-09-03

## 2020-01-10 PROBLEM — M75.111 INCOMPLETE ROTATR-CUFF TEAR/RUPTR OF R SHOULDER, NOT TRAUMA: Status: ACTIVE | Noted: 2018-12-12

## 2020-01-10 PROCEDURE — G8482 FLU IMMUNIZE ORDER/ADMIN: HCPCS | Performed by: FAMILY MEDICINE

## 2020-01-10 PROCEDURE — 99396 PREV VISIT EST AGE 40-64: CPT | Performed by: FAMILY MEDICINE

## 2020-01-10 RX ORDER — FLUTICASONE PROPIONATE 50 MCG
2 SPRAY, SUSPENSION (ML) NASAL DAILY
Qty: 1 BOTTLE | Refills: 3 | Status: SHIPPED | OUTPATIENT
Start: 2020-01-10 | End: 2020-05-05

## 2020-01-10 RX ORDER — FEXOFENADINE HCL 180 MG/1
180 TABLET ORAL DAILY
Qty: 30 TABLET | Refills: 3 | Status: SHIPPED | OUTPATIENT
Start: 2020-01-10 | End: 2021-03-12 | Stop reason: SDUPTHER

## 2020-01-10 RX ORDER — BUSPIRONE HYDROCHLORIDE 10 MG/1
10 TABLET ORAL 3 TIMES DAILY
Qty: 90 TABLET | Refills: 0 | Status: SHIPPED | OUTPATIENT
Start: 2020-01-10 | End: 2020-02-03

## 2020-01-10 ASSESSMENT — PATIENT HEALTH QUESTIONNAIRE - PHQ9
2. FEELING DOWN, DEPRESSED OR HOPELESS: 0
SUM OF ALL RESPONSES TO PHQ QUESTIONS 1-9: 0
SUM OF ALL RESPONSES TO PHQ QUESTIONS 1-9: 0
SUM OF ALL RESPONSES TO PHQ9 QUESTIONS 1 & 2: 0
1. LITTLE INTEREST OR PLEASURE IN DOING THINGS: 0

## 2020-01-10 ASSESSMENT — ENCOUNTER SYMPTOMS
VOMITING: 0
ABDOMINAL DISTENTION: 0
ABDOMINAL PAIN: 0
CONSTIPATION: 0
WHEEZING: 0
SINUS PRESSURE: 0
COUGH: 0
BACK PAIN: 0
RHINORRHEA: 1
DIARRHEA: 0
CHEST TIGHTNESS: 0
SHORTNESS OF BREATH: 0
SINUS PAIN: 0
NAUSEA: 0

## 2020-01-10 ASSESSMENT — VISUAL ACUITY
OS_CC: 20/13
OD_CC: 20/25

## 2020-01-10 NOTE — PROGRESS NOTES
using lidocaine. Patient also has chronic right shoulder pain, with decreased range of motion. The pain is anterior. Patient has been doing physical therapy which has been helping. Intensity of the pain is 2 out of 10. Sexually active: Yes      Wears seatbelts: yes    Regular exercise: yes    Ever been transfused or tattooed?: yes    Last eye exam: up to date : Yes    Abnormal visual screening. Darci Osman  reports he is up-to-date with his eye exam.  Patient has glasses     Visual Acuity Screening    Right eye Left eye Both eyes   Without correction:      With correction: 20/25 20/13 20/13       Hearing: normal: No    Last dental exam and preventative dental cleaning: up to date : Yes     Nutritional assessment: Body mass index is 25.24 kg/m². Elevated.      Weight is stable  Wt Readings from Last 3 Encounters:   01/10/20 181 lb (82.1 kg)   11/19/19 181 lb 6.4 oz (82.3 kg)   07/18/19 181 lb 3.2 oz (82.2 kg)       Healthful diet and Physical activity counseling to prevent CVD- low carb, low fat diet, increase fruits and vegetables, and exercise 4-5 times a day 30-40 minutes a day discussed    Nicotine dependence: no    Alcohol use: no    Immunization History   Administered Date(s) Administered    Influenza Virus Vaccine 09/15/2015, 10/01/2017, 10/01/2017, 11/13/2017    Influenza, Abimael Reasoner, IM, (6 mo and older Fluzone, Flulaval, Fluarix and 3 yrs and older Afluria) 11/13/2017    Influenza, Quadv, IM, PF (6 mo and older Fluzone, Flulaval, Fluarix, and 3 yrs and older Afluria) 11/08/2016, 10/01/2018, 11/19/2019    Pneumococcal Polysaccharide ([id number]) 11/13/2017    Pneumococcal Vaccine 10/01/2017    Tdap (Boostrix, Adacel) 01/01/2014       Tdap onetime, then Td every 10 years-up to date: Yes    Influenza annually-up to date: Yes    PPSV 23 in all adults 19-63 yo with chronic conditions, smokers, alcoholism,  nursing home residents; then PCV 13 at 73 yo-up to date: Yes    Colonoscopy recommended at 50  The patient has NO family history of colon cancer    The patient has family history of cancer. BP controlled. Susan Bermudez reports compliance with BP medications, and tolerates them well, denies side effects. BP is Normal.    BP Readings from Last 3 Encounters:   01/10/20 113/81   11/19/19 128/88   07/18/19 120/80       Pulse is Normal.    Pulse Readings from Last 3 Encounters:   01/10/20 85   11/19/19 103   07/18/19 70       A1c is normal  Lab Results   Component Value Date    LABA1C 5.3 04/03/2019       Hyperlipidemia improving  Still high triglycerides  He is on pravastatin 80 mg daily, tolerated well, denies new muscle pains.   He has a history of fibromyalgia  Lab Results   Component Value Date    CHOL 187 11/25/2019    CHOL 221 (H) 04/25/2019    CHOL 209 (H) 04/05/2019     Lab Results   Component Value Date    TRIG 220 (H) 11/25/2019    TRIG 190 (H) 04/25/2019    TRIG 157 (H) 04/05/2019     Lab Results   Component Value Date    HDL 41 11/25/2019    HDL 53 04/05/2019    HDL 52 11/13/2018     Lab Results   Component Value Date    LDLCHOLESTEROL 102 11/25/2019    LDLCHOLESTEROL 125 04/05/2019    LDLCHOLESTEROL 123 11/13/2018     Lab Results   Component Value Date    CHOLHDLRATIO 4.6 11/25/2019    CHOLHDLRATIO 3.9 04/05/2019    CHOLHDLRATIO 4.4 11/13/2018       The 10-year ASCVD risk score (Mae Hackett, et al., 2013) is: 2.7%    Values used to calculate the score:      Age: 54 years      Sex: Male      Is Non- : No      Diabetic: No      Tobacco smoker: No      Systolic Blood Pressure: 185 mmHg      Is BP treated: Yes      HDL Cholesterol: 41 mg/dL      Total Cholesterol: 187 mg/dL    Hepatitis C screening-adults at high risk and adults born between 0801-4331-eh up to date , and it is nonreactive  Lab Results   Component Value Date    HEPAIGM NONREACTIVE 04/05/2019    HEPBIGM NONREACTIVE 04/05/2019    HEPCAB NONREACTIVE 04/05/2019     Allergic rhinitis  Patient also reports postnasal drip, clear rhinorrhea and sneezing more lately, for several weeks. He ran out of his allergy medications  Patient reports the mucus is clear. He denies sinus pain or sinus pressure. Health Maintenance reviewed -up-to-date    There are no preventive care reminders to display for this patient.       Patient Active Problem List    Diagnosis Date Noted    History of Achilles tendon repair 12/19/2017     Priority: High    Essential hypertension 11/13/2017     Priority: High    Sinus tachycardia 02/12/2017     Priority: High    Fibromyalgia muscle pain 11/24/2016     Priority: High    Bipolar disorder, in partial remission, most recent episode depressed ([facility] [address] 11/08/2016     Priority: High    Bicipital tendinitis, right shoulder 07/29/2016     Priority: High    Fatty liver disease, nonalcoholic 22/41/3944     Priority: High    Right shoulder pain 09/16/2015     Priority: High    Hyperglycemia 07/29/2016     Priority: Medium    Anxiety 04/11/2016     Priority: Medium    Calcium nephrolithiasis 12/02/2015     Priority: Medium    Tattoos 12/02/2015     Priority: Medium    Hemorrhoids 05/05/2015     Priority: Medium    Right knee pain 04/01/2015     Priority: Medium    Chronic ankle pain 04/01/2015     Priority: Medium    Chronic fatigue 04/01/2015     Priority: Medium    Hyperlipidemia with target LDL less than 100 04/01/2015     Priority: Medium    Seasonal allergies 04/01/2015     Priority: Medium    Allergic rhinitis due to allergen 01/10/2020    Adhesive capsulitis of right shoulder 09/03/2019    Weakness of both legs 07/18/2019    Bilateral swelling of feet 06/27/2019    Incomplete rotatr-cuff tear/ruptr of r shoulder, not trauma 12/12/2018    Primary osteoarthritis, right shoulder 12/12/2018    Tear of right supraspinatus tendon 10/11/2018    Impingement syndrome of right shoulder 10/01/2018    Achilles tendinitis 05/11/2018    Abnormal EKG 02/17/2018    Secondary hypertension 2018         Past Medical History:   Diagnosis Date    Allergic rhinitis     Anxiety     B[facility] disorder ([facility] [address]     Bipolar disorder, in partial remission, most recent episode depressed ([facility] [address] 2016    Chronic kidney disease     Constipation due to pain medication 2017    Depression with anxiety     Essential hypertension 2017    Fatty liver 11/29/15    per CT    Hiatal hernia 11/29/15     small HH, per CT    Hyperlipidemia     Injury of Achilles tendon 10/23/2014    Kidney stone 11/29/15    passed, calcium oxalate crystals    Primary osteoarthritis, right shoulder 2018    PTSD (post-traumatic stress disorder)     as a child     Past Surgical History:   Procedure Laterality Date    ACHILLES TENDON SURGERY Right     4 different surgeries for this,over the last 2 years    FOOT TENDON SURGERY Right     5 surgeries for Achilles tendon    ROTATOR CUFF REPAIR Left 2011    SHOULDER SURGERY  2018    SHOULDER SURGERY  right    18     Family History   Problem Relation Age of Onset    Cancer Mother 34        leukemia& lymphoma    Heart Disease Father 61        triple bipass    Stroke Father     Breast Cancer Maternal Grandmother      Social History     Tobacco Use    Smoking status: Former Smoker     Packs/day: 1.50     Years: 12.00     Pack years: 18.00     Last attempt to quit: 2002     Years since quittin.4    Smokeless tobacco: Never Used   Substance Use Topics    Alcohol use: No     Alcohol/week: 0.0 standard drinks    Drug use: No         Current Outpatient Medications   Medication Sig Dispense Refill    cyclobenzaprine (FLEXERIL) 10 MG tablet take 1 tablet by mouth three times a day if needed for muscle spasm 90 tablet 3    docusate sodium (COLACE) 100 MG capsule Take 1 capsule by mouth 2 times daily 60 capsule 3    Handicap Placard MISC by Does not apply route Can't walk greater than 200 feet. Expires in 5 years.  1 each 0    OXcarbazepine (TRILEPTAL) 300 MG tablet   0    RA ASPIRIN EC 81 MG EC tablet take 1 tablet by mouth once daily 90 tablet 3    APLENZIN 174 MG TB24   0    fexofenadine (ALLEGRA ALLERGY) 180 MG tablet Take 1 tablet by mouth daily 30 tablet 3    potassium chloride (KLOR-CON M) 10 MEQ extended release tablet Take 1 tablet by mouth daily 180 tablet 1    furosemide (LASIX) 20 MG tablet Take 1 tablet by mouth daily 60 tablet 3    pravastatin (PRAVACHOL) 80 MG tablet TAKE 1 TABLET BY MOUTH IN THE EVENING 90 tablet 1    metoprolol tartrate (LOPRESSOR) 25 MG tablet Take 3 tablets by mouth 2 times daily 540 tablet 3    lidocaine (LMX) 4 % cream Apply 2-3 times a day as needed for pain 120 g 3    Camphor-Menthol-Methyl Sal 3.1-16-10 % GEL Apply every 8 hours as needed for pain. OK to substitute 1 Tube 1    senna-docusate (SENNA S) 8.6-50 MG per tablet Take 1 tablet by mouth 2 times daily as needed for Constipation 20 tablet 1    lithium (ESKALITH) 450 MG extended release tablet Take 450 mg by mouth 2 times daily      ibuprofen (ADVIL;MOTRIN) 600 MG tablet Take 1 tablet by mouth every 6 hours as needed for Pain 90 tablet 0    vitamin B-12 (CYANOCOBALAMIN) 1000 MCG tablet Take 1,000 mcg by mouth daily      Ginkgo Biloba (GINKOBA PO) Take by mouth      Adapalene 0.3 % GEL   1    benztropine (COGENTIN) 1 MG tablet   0    erythromycin with ethanol (THERAMYCIN) 2 % external solution apply topically as directed every morning  0    aluminum chloride (DRYSOL) 20 % external solution Apply topically nightly. 37.5 mL 3    Omega-3 Fatty Acids (OMEGA-3 FISH OIL) 1200 MG CAPS Take  by mouth 2 times daily.       fluticasone (FLONASE) 50 MCG/ACT nasal spray 2 sprays by Nasal route daily 1 Bottle 3     No current facility-administered medications for this visit.              -rest of complaints with corresponding details per ROS    The patient's past medical, surgical, social,and family history as well as his current Mouth: Mucous membranes are moist.      Pharynx: Posterior oropharyngeal erythema present. No oropharyngeal exudate. Eyes:      General: Lids are normal. No scleral icterus. Right eye: No discharge. Left eye: No discharge. Conjunctiva/sclera: Conjunctivae normal.   Neck:      Musculoskeletal: Normal range of motion and neck supple. Thyroid: No thyromegaly. Cardiovascular:      Rate and Rhythm: Normal rate and regular rhythm. Heart sounds: Normal heart sounds. No murmur. Pulmonary:      Effort: Pulmonary effort is normal. No respiratory distress. Breath sounds: Normal breath sounds. No wheezing or rales. Chest:      Chest wall: No tenderness. Abdominal:      General: Bowel sounds are normal. There is no distension. Palpations: Abdomen is soft. There is no hepatomegaly or splenomegaly. Tenderness: There is no tenderness. Musculoskeletal:         General: Tenderness present. Right shoulder: He exhibits decreased range of motion, tenderness, bony tenderness, spasm and decreased strength. Right ankle: He exhibits decreased range of motion. He exhibits no swelling. Tenderness. Achilles tendon exhibits pain. Cervical back: He exhibits tenderness, pain and spasm. Thoracic back: He exhibits tenderness, pain and spasm. Right lower leg: No edema. Left lower leg: No edema. Comments: Hypersensitivity noted, multiple tender points   Skin:     General: Skin is warm and dry. Capillary Refill: Capillary refill takes less than 2 seconds. Findings: No rash. Neurological:      Mental Status: He is alert and oriented to person, place, and time. Cranial Nerves: No cranial nerve deficit. Sensory: No sensory deficit. Motor: No abnormal muscle tone. Coordination: Coordination normal.      Deep Tendon Reflexes: Reflexes normal.   Psychiatric:         Mood and Affect: Mood is anxious.          Speech: Speech is rapid and pressured. Behavior: Behavior normal.         Thought Content: Thought content normal.         Judgment: Judgment normal.         ASSESSMENT AND PLAN    1. Annual physical exam  Low carb, low fat diet, increase fruits and vegetables, and exercise 4-5 times a week 30-40 minutes a day, or walk 1-2 hours per day, or wear a pedometer and get at least 10,000 steps per day. Dental exam 2-3 times /year advised. Immunizations reviewed. Health Maintenance reviewed -up-to-date. Smoking cessation counseling given not to ever start smoking again     Follow up: 3 months         2. Anxiety  Failing to change as expected. -Start busPIRone (BUSPAR) 10 MG tablet; Take 1 tablet by mouth 3 times daily ** max 60 mg daily**  Dispense: 90 tablet; Refill: 0  I discussed with him that this can be increased if needed, he will let me know  I advised him to discuss with his psychiatrist that I started him on BuSpar    3. Bipolar disorder, in partial remission, most recent episode depressed ([facility] [address]  Stable  Continue current treatment and follow up with psychiatrist and psychologist as scheduled. -start busPIRone (BUSPAR) 10 MG tablet; Take 1 tablet by mouth 3 times daily ** max 60 mg daily**  Dispense: 90 tablet; Refill: 0    4. Chronic pain of right ankle  worsening   Start physical therapy  Continue lidocaine and start Vaseline topically  - [facility]    5. Chronic right shoulder pain  Failing to change as expected. Continue Flexeril, lidocaine, BenGay, ibuprofen as needed,  - [address]    6. Seasonal allergic rhinitis due to other allergic trigger  worsening   - fluticasone (FLONASE) 50 MCG/ACT nasal spray; 2 sprays by Nasal route daily  Dispense: 1 Bottle; Refill: 3  - fexofenadine (ALLEGRA ALLERGY) 180 MG tablet; Take 1 tablet by mouth daily  Dispense: 30 tablet;  Refill: 3      Data Unavailable     Orders Placed This Encounter   Procedures    [facility]  [place]     Referral Priority:   Routine     Referral Type:   Eval and Treat     Referral Reason:   Specialty Services Required     Requested Specialty:   Physical Therapy     Number of Visits Requested:   1       Medications Discontinued During This Encounter   Medication Reason    LATUDA 120 MG tablet Therapy completed    lithium (ESKALITH) 450 MG extended release tablet Side effects    fluticasone (FLONASE) 50 MCG/ACT nasal spray REORDER    fexofenadine (ALLEGRA ALLERGY) 180 MG tablet Ct [place] received counseling on the following healthy behaviors: nutrition, exercise, medication adherence and weight loss    Reviewed prior labs and health maintenance  Continue current medications, diet and exercise. Discussed use, benefit, and side effects of prescribed medications. Barriers to medication compliance addressed. Patient given educational materials - see patient instructions  Was a self-tracking handout given in paper form or via [employer]? Yes    Requested Prescriptions     Signed Prescriptions Disp Refills    busPIRone (BUSPAR) 10 MG tablet 90 tablet 0     Sig: Take 1 tablet by mouth 3 times daily ** max 60 mg daily**    fluticasone (FLONASE) 50 MCG/ACT nasal spray 1 Bottle 3     Si sprays by Nasal route daily    fexofenadine (ALLEGRA ALLERGY) 180 MG tablet 30 tablet 3     Sig: Take 1 tablet by mouth daily       All patient questions answered. Patient voiced understanding. Quality Measures    Body mass index is 25.24 kg/m². Elevated. Weight control planned discussed conventional weight loss and Healthy diet and regular exercise. BP: 113/81 Blood pressure is normal. Treatment plan consists of Weight Reduction, DASH Eating Plan, Dietary Sodium Restriction, Increased Physical Activity, Avoid Tobacco and Second-hand Smoke, Patient In-home Blood Pressure Monitoring and No treatment change needed.     Lab Results   Component Value Date    LDLCHOLESTEROL 102 2019    (goal LDL reduction with dx if diabetes is 50% LDL reduction)      PHQ Scores 1/10/2020 11/19/2019 4/3/2019 12/26/2018 12/6/2017 5/8/2017 2/8/2017   PHQ2 Score 0 0 0 0 3 2 6   PHQ9 Score 0 0 0 0 10 8 17     Interpretation of Total Score Depression Severity: 1-4 = Minimal depression, 5-9 = Mild depression, 10-14 = Moderate depression, 15-19 = Moderately severe depression, 20-27 = Severe depression      Thepatient's past medical, surgical, social, and family history as well as his   current medications and allergies were reviewed as documented in today's encounter. Medications, labs, diagnostic studies,consultations and follow-up as documented in this encounter. Return in about 3 months (around 4/10/2020) for HTN,HLP, LABS F/U. Patient was seen with total face to face time of  30 minutes. More than 50% of this visit was counseling and education. Future Appointments   Date Time Provider Maribell Duran   4/16/2020  1:30 PM [facility]       This note was completed by using the assistance of a speech-recognition program. However, inadvertent computerized transcription errors may be present. Although every effort wasmade to ensure accuracy, no guarantees can be provided that every mistake has been identified and corrected by editing.   Electronically signed by Kelvin Farrell MD on 1/10/2020 at 8:08 PM

## 2020-01-16 ENCOUNTER — HOSPITAL ENCOUNTER (OUTPATIENT)
Dept: PHYSICAL THERAPY | Age: 50
Setting detail: THERAPIES SERIES
Discharge: HOME | End: 2020-01-16
Payer: MEDICARE

## 2020-01-16 PROCEDURE — 97110 THERAPEUTIC EXERCISES: CPT

## 2020-01-16 PROCEDURE — G0283 ELEC STIM OTHER THAN WOUND: HCPCS

## 2020-01-16 ASSESSMENT — PAIN DESCRIPTION - DESCRIPTORS: DESCRIPTORS: CONSTANT

## 2020-01-16 ASSESSMENT — PAIN DESCRIPTION - FREQUENCY: FREQUENCY: CONTINUOUS

## 2020-01-16 ASSESSMENT — PAIN DESCRIPTION - LOCATION: LOCATION: SHOULDER

## 2020-01-16 ASSESSMENT — PAIN DESCRIPTION - ORIENTATION: ORIENTATION: RIGHT

## 2020-01-16 ASSESSMENT — PAIN SCALES - GENERAL: PAINLEVEL_OUTOF10: 5

## 2020-01-16 NOTE — PROGRESS NOTES
Physical Therapy  Daily Treatment Note  Date: 2020  Patient Name: Karin Franco  MRN: [id number]     :   1970    Subjective:      PT Visit Information  Onset Date: 19  PT Insurance Information: [employer]  Total # of Visits Approved: 20  Total # of Visits to Date: 10  Subjective  Subjective: complains of pain on R cassidy today  General Comment  Comments: new script received to continue PT 2x4  Pain Screening  Patient Currently in Pain: Yes  Pain Assessment  Pain Assessment: 0-10  Pain Level: 5  Pain Location: Shoulder  Pain Orientation: Right  Pain Descriptors: Constant  Pain Frequency: Continuous  Vital Signs  Patient Currently in Pain: Yes       Treatment Activities:   Exercises  Exercise 1: shapes with ball 4# 5x  Exercise 2: PROM R shoulder supine x 10   Exercise 3: lat pull down 20# 3x10  Exercise 4: chest press front/back 20# 3x10  Exercise 5: triceps curls B 20# 3x10  Exercise 6: supine scap protraction 10 x3 4#  Exercise 7: L SDLY ER 10x3 1#  Exercise 8: prone R cassidy extension 3x10 4#  Exercise 9: Prone lawnmower pulls 3x10 4#  Exercise 10: elbow curls B 20# 3x10  Exercise 11: rows/pull down 25# 3x10  Exercise 12: R miguel ángel's 4 way 1# 10x  Exercise 13: R cassidy flex/ext/scaption 1# 10x3  Exercise 14: 1 strand Lime Tband 6 way R cassidy 15x  Exercise 15: hot pack and EStim to R cassidy sitting 20 min     Assessment:   Conditions Requiring Skilled Therapeutic Intervention  Assessment: progress with ex  REQUIRES PT FOLLOW UP: Yes  Discharge Recommendations: Home independently     Plan:    Plan  Times per week: 2x/week  Current Treatment Recommendations: ROM, Modalities, Home Exercise Program, Strengthening  Plan Comment: to continue PT per POC  Timed Code Treatment Minutes: 40 Minutes   Treatment Charges: Minutes Units   []  Ultrasound     [x]  Electrical-Stim 20 1   []  Iontophoresis     []  Traction     []  Massage       []  Eval     []  Gait     [x]  Ther Exercise 40  3    []  Manual Therapy     []  Ther Activities       []  Aquatics     []  Vasopneumatic Device     []  Neuro Re-Ed       []  Other       Total Treatment Time: 60 4        Therapy Time   Individual Concurrent Group Co-treatment   Time In 1050         Time Out 1150         Minutes 60         Timed Code Treatment Minutes: 36 Minutes     Electronically signed by: Jacquie Singh PT

## 2020-01-21 ENCOUNTER — HOSPITAL ENCOUNTER (OUTPATIENT)
Dept: PHYSICAL THERAPY | Age: 50
Setting detail: THERAPIES SERIES
Discharge: HOME | End: 2020-01-21
Payer: MEDICARE

## 2020-01-23 ENCOUNTER — HOSPITAL ENCOUNTER (OUTPATIENT)
Dept: PHYSICAL THERAPY | Age: 50
Setting detail: THERAPIES SERIES
Discharge: HOME | End: 2020-01-23
Payer: MEDICARE

## 2020-01-23 PROCEDURE — G0283 ELEC STIM OTHER THAN WOUND: HCPCS

## 2020-01-23 PROCEDURE — 97110 THERAPEUTIC EXERCISES: CPT

## 2020-01-23 ASSESSMENT — PAIN DESCRIPTION - ORIENTATION: ORIENTATION: RIGHT

## 2020-01-23 ASSESSMENT — PAIN SCALES - GENERAL: PAINLEVEL_OUTOF10: 4

## 2020-01-23 ASSESSMENT — PAIN DESCRIPTION - LOCATION: LOCATION: SHOULDER

## 2020-01-23 ASSESSMENT — PAIN DESCRIPTION - DESCRIPTORS: DESCRIPTORS: CONSTANT

## 2020-01-23 ASSESSMENT — PAIN DESCRIPTION - FREQUENCY: FREQUENCY: CONTINUOUS

## 2020-01-23 NOTE — PROGRESS NOTES
Physical Therapy  Daily Treatment Note  Date: 2020  Patient Name: Reyes Solis  MRN: [id number]     :   1970    Subjective:      PT Visit Information  Onset Date: 19  PT Insurance Information: [place] [employer]  Total # of Visits Approved: 20  Total # of Visits to Date: 11  Subjective  Subjective: complains of pain on R cassidy today  Pain Screening  Patient Currently in Pain: Yes  Pain Assessment  Pain Assessment: 0-10  Pain Level: 4  Pain Location: Shoulder  Pain Orientation: Right  Pain Descriptors: Constant  Pain Frequency: Continuous  Vital Signs  Patient Currently in Pain: Yes       Treatment Activities:   Exercises  Exercise 1: shapes with ball 4# 5x  Exercise 2: PROM R shoulder supine x 10   Exercise 3: lat pull down 20# 3x10  Exercise 4: chest press front/back 20# 3x10  Exercise 5: triceps curls B 20# 3x10  Exercise 6: supine scap protraction 10 x3 4#  Exercise 7: L SDLY ER 10x3 1#  Exercise 8: prone R cassidy extension 3x10 4#  Exercise 9: Prone lawnmower pulls 3x10 4#  Exercise 10: elbow curls B 20# 3x10  Exercise 11: rows/pull down 35# 3x10  Exercise 12: R miguel ángel's 4 way 1# 10x  Exercise 13: R cassidy flex/ext/scaption 1# 10x3  Exercise 14: 1 strand Lime Tband 6 way R cassidy 15x  Exercise 15: hot pack and EStim to R cassidy sitting 20 min     Assessment:   Conditions Requiring Skilled Therapeutic Intervention  Assessment: progress with ex  REQUIRES PT FOLLOW UP: Yes  Discharge Recommendations: Home independently     Plan:    Plan  Times per week: 2x/week  Current Treatment Recommendations: ROM, Modalities, Home Exercise Program, Strengthening  Plan Comment: to continue PT per POC  Timed Code Treatment Minutes: 30 Minutes   Treatment Charges: Minutes Units   []  Ultrasound     [x]  Electrical-Stim 20 1   []  Iontophoresis     []  Traction     []  Massage       []  Eval     []  Gait     [x]  Ther Exercise 30  2    []  Manual Therapy       []  Ther Activities       []  Aquatics     []  Vasopneumatic Device     []  Neuro Re-Ed       []  Other       Total Treatment Time: 50 3        Therapy Time   Individual Concurrent Group Co-treatment   Time In 1040         Time Out 1130         Minutes 50         Timed Code Treatment Minutes: 30 Minutes     Electronically signed by: Jermain Woodard PT

## 2020-01-29 ENCOUNTER — HOSPITAL ENCOUNTER (OUTPATIENT)
Dept: PHYSICAL THERAPY | Age: 50
Setting detail: THERAPIES SERIES
Discharge: HOME | End: 2020-01-29
Payer: MEDICARE

## 2020-01-31 ENCOUNTER — HOSPITAL ENCOUNTER (OUTPATIENT)
Dept: PHYSICAL THERAPY | Age: 50
Setting detail: THERAPIES SERIES
Discharge: HOME | End: 2020-01-31
Payer: MEDICARE

## 2020-01-31 PROCEDURE — 97110 THERAPEUTIC EXERCISES: CPT

## 2020-01-31 PROCEDURE — G0283 ELEC STIM OTHER THAN WOUND: HCPCS

## 2020-01-31 ASSESSMENT — PAIN DESCRIPTION - FREQUENCY: FREQUENCY: CONTINUOUS

## 2020-01-31 ASSESSMENT — PAIN SCALES - GENERAL: PAINLEVEL_OUTOF10: 4

## 2020-01-31 ASSESSMENT — PAIN DESCRIPTION - LOCATION: LOCATION: SHOULDER

## 2020-01-31 ASSESSMENT — PAIN DESCRIPTION - ORIENTATION: ORIENTATION: RIGHT

## 2020-01-31 ASSESSMENT — PAIN DESCRIPTION - DESCRIPTORS: DESCRIPTORS: CONSTANT

## 2020-01-31 NOTE — PROGRESS NOTES
Device     []  Neuro Re-Ed       []  Other       Total Treatment Time: 50 3        Therapy Time   Individual Concurrent Group Co-treatment   Time In 0920         Time Out 1010         Minutes 50         Timed Code Treatment Minutes: 30 Minutes     Electronically signed by: Natan Fletcher PT

## 2020-02-03 RX ORDER — BUSPIRONE HYDROCHLORIDE 10 MG/1
TABLET ORAL
Qty: 90 TABLET | Refills: 0 | Status: SHIPPED | OUTPATIENT
Start: 2020-02-03 | End: 2020-02-28

## 2020-02-04 ENCOUNTER — HOSPITAL ENCOUNTER (OUTPATIENT)
Dept: PHYSICAL THERAPY | Age: 50
Setting detail: THERAPIES SERIES
Discharge: HOME | End: 2020-02-04
Payer: MEDICARE

## 2020-02-04 PROCEDURE — 97110 THERAPEUTIC EXERCISES: CPT

## 2020-02-04 PROCEDURE — G0283 ELEC STIM OTHER THAN WOUND: HCPCS

## 2020-02-04 ASSESSMENT — PAIN DESCRIPTION - FREQUENCY: FREQUENCY: CONTINUOUS

## 2020-02-04 ASSESSMENT — PAIN SCALES - GENERAL: PAINLEVEL_OUTOF10: 5

## 2020-02-04 ASSESSMENT — PAIN DESCRIPTION - DESCRIPTORS: DESCRIPTORS: CONSTANT

## 2020-02-04 ASSESSMENT — PAIN DESCRIPTION - ORIENTATION: ORIENTATION: RIGHT

## 2020-02-04 ASSESSMENT — PAIN DESCRIPTION - LOCATION: LOCATION: SHOULDER

## 2020-02-04 NOTE — PROGRESS NOTES
Physical Therapy  Daily Treatment Note  Date: 2020  Patient Name: Marguerite Rhodes  MRN: [id number]     :   1970    Subjective:      PT Visit Information  Onset Date: 19  PT Insurance Information: [place] [employer]  Total # of Visits Approved: 20  Total # of Visits to Date: 13  Subjective  Subjective: complains of pain on R cassidy today  Pain Screening  Patient Currently in Pain: Yes  Pain Assessment  Pain Assessment: 0-10  Pain Level: 5  Pain Location: Shoulder  Pain Orientation: Right  Pain Descriptors: Constant  Pain Frequency: Continuous  Vital Signs  Patient Currently in Pain: Yes       Treatment Activities:   Exercises  Exercise 2: cane ex standing-flex/Abd/ER/ext 10x  Exercise 3: lat pull down 20# 3x10  Exercise 4: chest press front/back 20# 3x10  Exercise 5: triceps curls B 35# 3x10  Exercise 6: supine scap protraction 10 x3 5#  Exercise 7: L SDLY ER 10x3 1#  Exercise 8: prone R cassidy extension 3x10 5#  Exercise 9: Prone lawnmower pulls 3x10 5#  Exercise 10: elbow curls B 20# 3x10  Exercise 11: rows/pull down 35# 3x10  Exercise 12: R miguel ángel's 4 way 1# 10x  Exercise 13: R cassidy flex/ext/scaption 1# 10x3  Exercise 14: 1 strand Lime Tband 6 way R cassidy 15x  Exercise 15: hot pack and EStim to R cassidy sitting 20 min  Exercise 16: towel stretch R acssidy IR 3x30\"     Assessment:   Conditions Requiring Skilled Therapeutic Intervention  Assessment: progress with ex  REQUIRES PT FOLLOW UP: Yes  Discharge Recommendations: Home independently     Plan:    Plan  Times per week: 2x/week  Current Treatment Recommendations: ROM, Modalities, Home Exercise Program, Strengthening  Plan Comment: to continue PT per POC  Timed Code Treatment Minutes: 30 Minutes   Treatment Charges: Minutes Units   []  Ultrasound     [x]  Electrical-Stim 20 1   []  Iontophoresis     []  Traction     []  Massage       []  Eval     []  Gait     [x]  Ther Exercise 30  2    []  Manual Therapy       []  Ther Activities       []  Aquatics     []

## 2020-02-05 RX ORDER — PRAVASTATIN SODIUM 80 MG/1
TABLET ORAL
Qty: 90 TABLET | Refills: 1 | Status: SHIPPED | OUTPATIENT
Start: 2020-02-05 | End: 2020-05-27 | Stop reason: SDUPTHER

## 2020-02-07 ENCOUNTER — HOSPITAL ENCOUNTER (OUTPATIENT)
Dept: PHYSICAL THERAPY | Age: 50
Setting detail: THERAPIES SERIES
Discharge: HOME | End: 2020-02-07
Payer: MEDICARE

## 2020-02-07 PROCEDURE — 97110 THERAPEUTIC EXERCISES: CPT

## 2020-02-07 PROCEDURE — G0283 ELEC STIM OTHER THAN WOUND: HCPCS

## 2020-02-07 ASSESSMENT — PAIN SCALES - GENERAL: PAINLEVEL_OUTOF10: 4

## 2020-02-07 ASSESSMENT — PAIN DESCRIPTION - LOCATION: LOCATION: SHOULDER

## 2020-02-07 ASSESSMENT — PAIN DESCRIPTION - ORIENTATION: ORIENTATION: RIGHT

## 2020-02-07 ASSESSMENT — PAIN DESCRIPTION - FREQUENCY: FREQUENCY: CONTINUOUS

## 2020-02-07 ASSESSMENT — PAIN DESCRIPTION - DESCRIPTORS: DESCRIPTORS: CONSTANT

## 2020-02-07 NOTE — PROGRESS NOTES
Physical Therapy Re-evaluation Note    Date: 2020  Patient Name: Kayla Russo  MRN: [id number]  : 1970     Treatment Diagnosis: R shoulder weakness M62.81    Subjective   General  Referring Practitioner: Meliton Dan  Referral Date : 19  Diagnosis: R cassidy pain M25.519  PT Visit Information  Onset Date: 19  PT Insurance Information: [employer]  Total # of Visits Approved: 20  Total # of Visits to Date: 14  Subjective  Subjective: complains of pain on R cassidy today  Pain Screening  Patient Currently in Pain: Yes  Pain Assessment  Pain Assessment: 0-10  Pain Level: 4  Pain Location: Shoulder  Pain Orientation: Right  Pain Descriptors: Constant  Pain Frequency: Continuous  Vital Signs  Patient Currently in Pain: Yes    Objective  PROM RUE (degrees)  R Shoulder Flex  0-180: 0-180  R Shoulder ABduction 0-180: 0-180  R Shoulder Int Rotation  0-70: 0-70  R Shoulder Ext Rotation  0-90: 0-90  AROM RUE (degrees)  R Shoulder Flexion 0-180: 0-180  R Shoulder Extension 0-45: 0-70  R Shoulder ABduction 0-180: 0-180  R Shoulder Int Rotation  0-70: 0-70  R Shoulder Ext Rotation 0-90: 0-90  Strength RUE  Comment: R cassidy 4/5distal 5/5     Exercises  Exercise 1: shapes with ball 4# 5x  Exercise 2: cane ex standing-flex/Abd/ER/ext 10x  Exercise 3: lat pull down 20# 3x10  Exercise 4: chest press front/back 20# 3x10  Exercise 5: triceps curls B 35# 3x10  Exercise 6: supine scap protraction 10 x3 5#  Exercise 7: L SDLY ER 10x3 1#  Exercise 8: prone R cassidy extension 3x10 5#  Exercise 9: Prone lawnmower pulls 3x10 5#  Exercise 10: elbow curls B 20# 3x10  Exercise 11: rows/pull down 35# 3x10  Exercise 12: R miguel ángel's 4 way 1# 10x  Exercise 13: R cassidy flex/ext/scaption 1# 10x3  Exercise 14: 1 strand Lime Tband 6 way R cassidy 15x  Exercise 15: hot pack and EStim to R cassidy sitting 20 min  Exercise 16: towel stretch R cassidy IR 3x30\"    Assessment   Conditions Requiring Skilled Therapeutic Intervention  Assessment: SHORT TERM GOALS MET 2/2 LONG TERM   Treatment Diagnosis: R shoulder weakness M62.81  REQUIRES PT FOLLOW UP: Yes  Discharge Recommendations: Home independently  Activity Tolerance  Activity Tolerance: Patient Tolerated treatment well     Plan   Plan  Current Treatment Recommendations: ROM, Modalities, Home Exercise Program, Strengthening  Plan Comment: TO FINISH LAST 6 PT VISITS THEN DISCONTINUE PT WITH PATIENT TO CONTINUE HEP    OutComes Score  QuickDASH Total Score: 27 (02/07/20 1055)       QuickDASH Disability/Symptom Score : 36.36 % (02/07/20 1055)    Goals  Short term goals  Short term goal 1: fulll PROM R cassidy(met)  Short term goal 2: Initiate HEP R cassidy ROM(met)  Long term goals  Long term goal 1: Quick Dash < 10% limited(partly met Quick Dash score 37%)  Long term goal 2: Full AROM R cassidy(met)  Long term goal 3: 4/5 strength R cassidy; 5/5 R elbow(met)     Treatment Charges: Minutes Units   []  Ultrasound     [x]  Electrical-Stim 20 1   []  Iontophoresis     []  Traction     []  Massage       []  Eval     []  Gait     [x]  Ther Exercise 35  2    []  Manual Therapy       []  Ther Activities       []  Aquatics     []  Vasopneumatic Device     []  Neuro Re-Ed       []  Other       Total Treatment Time: 55 3        Therapy Time   Individual Concurrent Group Co-treatment   Time In 1055         Time Out 1150         Minutes 55         Timed Code Treatment Minutes: 35 Minutes    Electronically signed by: Jermain Woodard, PT

## 2020-02-11 ENCOUNTER — HOSPITAL ENCOUNTER (OUTPATIENT)
Dept: PHYSICAL THERAPY | Age: 50
Setting detail: THERAPIES SERIES
Discharge: HOME | End: 2020-02-11
Payer: MEDICARE

## 2020-02-11 NOTE — PROGRESS NOTES
Physical Therapy Cancel/NS Note    Date: 2020  Patient Name: Bhavana Zheng  MRN: [id number]  : 1970    Subjective   General Comment  Comments: cancel PT today,home with a sick child  PT Visit Information  Onset Date: 19  PT Insurance Information: [employer]  Total # of Visits Approved: 20  Total # of Visits to Date: 14  No Show: 2  Canceled Appointment: 7  Electronically signed by: Mansi Tim PT

## 2020-02-17 RX ORDER — FUROSEMIDE 20 MG/1
TABLET ORAL
Qty: 90 TABLET | Refills: 3 | Status: SHIPPED | OUTPATIENT
Start: 2020-02-17 | End: 2020-04-23 | Stop reason: ALTCHOICE

## 2020-02-19 ENCOUNTER — HOSPITAL ENCOUNTER (OUTPATIENT)
Dept: PHYSICAL THERAPY | Age: 50
Setting detail: THERAPIES SERIES
Discharge: HOME | End: 2020-02-19
Payer: MEDICARE

## 2020-02-28 RX ORDER — BUSPIRONE HYDROCHLORIDE 10 MG/1
TABLET ORAL
Qty: 90 TABLET | Refills: 2 | Status: SHIPPED | OUTPATIENT
Start: 2020-02-28 | End: 2020-05-07

## 2020-02-28 NOTE — TELEPHONE ENCOUNTER
Please Approve or Refuse.   Send to Pharmacy per Pt's Request:      Next Visit Date:  4/16/2020   Last Visit Date: 1/10/2020    Hemoglobin A1C (%)   Date Value   04/03/2019 5.3   10/20/2017 5.1   02/08/2017 5.3             ( goal A1C is < 7)   BP Readings from Last 3 Encounters:   01/10/20 113/81   11/19/19 128/88   07/18/19 120/80          (goal 120/80)  BUN   Date Value Ref Range Status   11/25/2019 12 6 - 20 mg/dL Final     CREATININE   Date Value Ref Range Status   11/25/2019 0.87 0.70 - 1.20 mg/dL Final     Potassium   Date Value Ref Range Status   11/25/2019 5.0 3.7 - 5.3 mmol/L Final

## 2020-03-09 RX ORDER — CYCLOBENZAPRINE HCL 10 MG
TABLET ORAL
Qty: 90 TABLET | Refills: 3 | Status: SHIPPED | OUTPATIENT
Start: 2020-03-09 | End: 2020-03-30

## 2020-03-10 ENCOUNTER — APPOINTMENT (OUTPATIENT)
Dept: PHYSICAL THERAPY | Age: 50
End: 2020-03-10
Payer: MEDICARE

## 2020-03-13 ENCOUNTER — HOSPITAL ENCOUNTER (OUTPATIENT)
Dept: PHYSICAL THERAPY | Age: 50
Setting detail: THERAPIES SERIES
Discharge: HOME | End: 2020-03-13
Payer: MEDICARE

## 2020-03-13 PROCEDURE — 97110 THERAPEUTIC EXERCISES: CPT

## 2020-03-13 PROCEDURE — G0283 ELEC STIM OTHER THAN WOUND: HCPCS

## 2020-03-13 ASSESSMENT — PAIN DESCRIPTION - FREQUENCY: FREQUENCY: CONTINUOUS

## 2020-03-13 ASSESSMENT — PAIN DESCRIPTION - DESCRIPTORS: DESCRIPTORS: CONSTANT

## 2020-03-13 ASSESSMENT — PAIN DESCRIPTION - LOCATION: LOCATION: SHOULDER

## 2020-03-13 ASSESSMENT — PAIN DESCRIPTION - ORIENTATION: ORIENTATION: RIGHT

## 2020-03-13 ASSESSMENT — PAIN SCALES - GENERAL: PAINLEVEL_OUTOF10: 5

## 2020-03-13 NOTE — PROGRESS NOTES
Physical Therapy  Daily Treatment Note  Date: 3/13/2020  Patient Name: Evelyn De Los Santos  MRN: [id number]     :   1970    Subjective:      PT Visit Information  Onset Date: 19  PT Insurance Information: [employer]  Total # of Visits Approved: 20  Total # of Visits to Date: 15  Subjective  Subjective: complains of pain on R cassidy today  Pain Screening  Patient Currently in Pain: Yes  Pain Assessment  Pain Assessment: 0-10  Pain Level: 5  Pain Location: Shoulder  Pain Orientation: Right  Pain Descriptors: Constant  Pain Frequency: Continuous  Vital Signs  Patient Currently in Pain: Yes       Treatment Activities:   Exercises  Exercise 1: shapes with ball 4# 5x  Exercise 2: cane ex standing-flex/Abd/ER/ext 10x  Exercise 3: lat pull down 25# 3x10  Exercise 4: chest press front/back 20# 3x10  Exercise 5: triceps curls B 35# 3x10  Exercise 6: supine scap protraction 10 x3 5#  Exercise 7: L SDLY ER 10x3 1#  Exercise 8: prone R cassidy extension 3x10 5#  Exercise 9: Prone lawnmower pulls 3x10 5#  Exercise 10: elbow curls B 20# 3x10  Exercise 11: rows/pull down 35# 3x10  Exercise 12: R miguel ángel's 4 way 1# 10x  Exercise 13: R cassidy flex/ext/scaption 1# 10x3  Exercise 14: 1 strand Lime Tband 6 way R cassidy 15x  Exercise 15: hot pack and EStim to R cassidy sitting 20 min  Exercise 16: towel stretch R cassidy IR 3x30\"     Assessment:   Conditions Requiring Skilled Therapeutic Intervention  Assessment: progress with ex  REQUIRES PT FOLLOW UP: Yes  Discharge Recommendations: Home independently     Plan:    Plan  Times per week: 2x/week  Current Treatment Recommendations: ROM, Modalities, Home Exercise Program, Strengthening  Plan Comment: to continue PT per POC  Timed Code Treatment Minutes: 30 Minutes   Treatment Charges: Minutes Units   []  Ultrasound     [x]  Electrical-Stim 20 1   []  Iontophoresis     []  Traction     []  Massage       []  Eval     []  Gait     [x]  Ther Exercise 30  2    []  Manual Therapy       []  Ther

## 2020-03-30 RX ORDER — CYCLOBENZAPRINE HCL 10 MG
TABLET ORAL
Qty: 90 TABLET | Refills: 3 | Status: SHIPPED | OUTPATIENT
Start: 2020-03-30 | End: 2020-11-24

## 2020-03-30 NOTE — TELEPHONE ENCOUNTER
Please Approve or Refuse.   Send to Pharmacy per Pt's Request:     VL: Cal Goffsteffkatie Gregory     Next Visit Date:  4/16/2020   Last Visit Date: 1/10/2020    Hemoglobin A1C (%)   Date Value   04/03/2019 5.3   10/20/2017 5.1   02/08/2017 5.3             ( goal A1C is < 7)   BP Readings from Last 3 Encounters:   01/10/20 113/81   11/19/19 128/88   07/18/19 120/80          (goal 120/80)  BUN   Date Value Ref Range Status   11/25/2019 12 6 - 20 mg/dL Final     CREATININE   Date Value Ref Range Status   11/25/2019 0.87 0.70 - 1.20 mg/dL Final     Potassium   Date Value Ref Range Status   11/25/2019 5.0 3.7 - 5.3 mmol/L Final

## 2020-04-21 PROBLEM — E53.8 VITAMIN B 12 DEFICIENCY: Status: ACTIVE | Noted: 2020-04-21

## 2020-04-21 NOTE — PATIENT INSTRUCTIONS
[email or web address] and sign in to your [employer] account. Enter A039 in the [place] box to learn more about \"High Cholesterol: Care Instructions. \"     If you do not have an account, please click on the \"Sign Up Now\" link. Current as of: December 15, 2019Content Version: 12.4  © 2397-6785 Healthwise, Incorporated. Care instructions adapted under license by [facility] ([facility]). If you have questions about a medical condition or this instruction, always ask your healthcare professional. [address] any warranty or liability for your use of this information.

## 2020-04-21 NOTE — PROGRESS NOTES
supplementation. Reported some bilateral swelling of the feet. Patient reported this is chronic. He was placed on Lasix in the past for this by Dr. Trudy Ibrahim however he stopped taking it in August.  He noticed in the past few weeks that it had been swelling. Patient tries to elevate as best he can. However it continues to swell up especially if he is standing for long period time or walking long distances. At this point, I will start him on hydrochlorothiazide and attempt to help with the swelling instead of the Lasix which he had stopped since August anyway. Patient is already on some potassium supplements which will be continued. Is aware that he needs to check his blood pressure in case the hydrochlorothiazide lowers it even more. Patient also reported a previous history of PTSD, bipolar, and increasing anxiety. Patient is currently under the care of a group of psychiatrist.  He is on a regular basis. On his last visit with Dr. Trudy Ibrahim he was started on some buspirone 3 times daily. Patient reports very mild success with this therapy. He also noted that he had tried Xanax before which helped. However, he is also aware that this medication is not going to be prescribed by our office. Since he is already established with a psychiatrist and they are managing him I will just add a Vistaril on his therapy. Patient was encouraged to discuss this with the psychiatrist on his next visit.   P MARA-7 4/23/2020 12/6/2017 2/8/2017   Feeling nervous, anxious, or on edge 2-Over half the days 2-Over half the days 3-Nearly every day   Not able to stop or control worrying 2-Over half the days 2-Over half the days 2-Over half the days   Worrying too much about different things 2-Over half the days 2-Over half the days 1-Several days   Trouble relaxing 2-Over half the days - 1-Several days   Being so restless that it's hard to sit still 1-Several days - 0-Not at all sure   Becoming easily annoyed or irritable exposure is beneficial however, too much sun and sun damage can potentially result in skin cancer. I am going to start the patient on Vitamin D supplementation. I am also going to recheck VItamin D levels annually. Denisse Finch  's most recent TSH level was normal. Results reviewed with the patient. Patient denies any history of thyroid disorders.   Lab Results   Component Value Date    TSH 1.00 11/25/2019      Lab Results   Component Value Date    WBC 8.9 11/25/2019    HGB 16.3 11/25/2019    HCT 47.3 11/25/2019    MCV 90.2 11/25/2019     11/25/2019     Lab Results   Component Value Date     11/25/2019    K 5.0 11/25/2019     11/25/2019    CO2 26 11/25/2019    BUN 12 11/25/2019    CREATININE 0.87 11/25/2019    GLUCOSE 100 11/25/2019    CALCIUM 10.0 11/25/2019      Lab Results   Component Value Date    ALT 24 11/25/2019    AST 24 11/25/2019    ALKPHOS 102 11/25/2019    BILITOT 0.40 11/25/2019     Lab Results   Component Value Date    TSH 1.00 11/25/2019     Lab Results   Component Value Date    CHOL 187 11/25/2019    CHOL 221 (H) 04/25/2019    CHOL 209 (H) 04/05/2019     Lab Results   Component Value Date    TRIG 220 (H) 11/25/2019    TRIG 190 (H) 04/25/2019    TRIG 157 (H) 04/05/2019     Lab Results   Component Value Date    HDL 41 11/25/2019    HDL 53 04/05/2019    HDL 52 11/13/2018     Lab Results   Component Value Date    LDLCHOLESTEROL 102 11/25/2019    LDLCHOLESTEROL 125 04/05/2019    LDLCHOLESTEROL 123 11/13/2018     Lab Results   Component Value Date    VLDL NOT REPORTED (H) 11/25/2019    VLDL NOT REPORTED (H) 04/05/2019    VLDL NOT REPORTED 11/13/2018     Lab Results   Component Value Date    CHOLHDLRATIO 4.6 11/25/2019    CHOLHDLRATIO 3.9 04/05/2019    CHOLHDLRATIO 4.4 11/13/2018     Lab Results   Component Value Date    LABA1C 5.3 04/03/2019     Lab Results   Component Value Date    IGKLWVIO58 431 11/25/2019     Lab Results   Component Value Date    FOLATE 6.8 11/25/2019     Lab chest pains, shortness of breath, headaches, and lightheadedness. Call the office if your blood pressure continue to be higher than 140/90 or 90/50.    - hydroCHLOROthiazide (HYDRODIURIL) 12.5 MG tablet; Take 1 tablet by mouth every morning  Dispense: 30 tablet; Refill: 0    2. Hyperlipidemia with target LDL less than 100  Ongoing  Continue therapy Pravachol  Start Niacin  Advised to decrease the consumption of red meats, fried foods, trans fats, sweets, sugary beverages. Advised to increase fish, vegetables, and fruits consumption. Advised to add fiber or OTC supplements in diet. Discussed weight loss which will result in improvement of lipids levels. Advised to increase daily physical activities and add regular exercises. - niacin 250 MG extended release capsule; Take 1 capsule by mouth nightly  Dispense: 30 capsule; Refill: 3    3. Bipolar disorder, in partial remission, most recent episode depressed ([facility] [place] [address]  Ongoing  Keep appts with psychiatrist  Continue therapy  Start Vistaril    - hydrOXYzine (ATARAX) 25 MG tablet; Take 1 tablet by mouth 3 times daily as needed for Itching  Dispense: 90 tablet; Refill: 3    4. Vitamin B 12 deficiency  Ongoing  COntinue supplements    5. Vitamin D deficiency  Ongoing  Start Vitamin D supplementation  DISCUSSED AND ADVISED TO:  Foods that contain a lot of vitamin D includes [place], tuna, and mackerel. Cheese, egg yolks, and beef liver have small amounts of vit D.  Milk, soy drinks, orange juice, yogurt, margarine, and some kinds of cereal have vitamin D added to them. Continue to use sunblock when out in the sun to prevent skin cancer.  - vitamin D (CHOLECALCIFEROL) 25 MCG (1000 UT) TABS tablet; Take 1 tablet by mouth daily  Dispense: 30 tablet; Refill: 3    6. Anxiety  Ongoing  Continue current therapy. Buspirone  Start Vistaril  Discussed how to recognize anxiety. Advised to relieve tension with exercise or a massage.   Advised to get enough rest.  Advised to avoid ID verified by me prior to start of this visit  I discussed with the patient the nature of our telehealth visits via interactive/real-time audio/video that:  - I would evaluate the patient and recommend diagnostics and treatments based on my assessment  - Our sessions are not being recorded and that personal health information is protected  - Our team would provide follow up care in person if/when the patient needs it. Pursuant to the emergency declaration under the [address] [place] [address] and the [employer] and [employer] Act, this Virtual Visit was conducted with patient's (and/or legal guardian's) consent, to reduce the patient's risk of exposure to COVID-19 and provide necessary medical care. The patient (and/or legal guardian) has also been advised to contact this office for worsening conditions or problems, and seek emergency medical treatment and/or call 911 if deemed necessary. This note was completed by using the assistance of a speech-recognition program. However, inadvertent computerized transcription errors may be present. Although every effort was made to ensure accuracy, no guarantees can be provided that every mistake has been identified and corrected by editing.   Electronically signed by DENNIS Caceres CNP on 4/21/20 at 11:13 AM HARDIKT

## 2020-04-22 VITALS
BODY MASS INDEX: 25.48 KG/M2 | DIASTOLIC BLOOD PRESSURE: 79 MMHG | SYSTOLIC BLOOD PRESSURE: 118 MMHG | WEIGHT: 182 LBS | HEIGHT: 71 IN

## 2020-04-23 ENCOUNTER — TELEMEDICINE (OUTPATIENT)
Dept: FAMILY MEDICINE CLINIC | Age: 50
End: 2020-04-23
Payer: MEDICARE

## 2020-04-23 PROBLEM — M1A.0710 CHRONIC GOUT OF RIGHT ANKLE: Status: ACTIVE | Noted: 2020-04-23

## 2020-04-23 PROCEDURE — 99214 OFFICE O/P EST MOD 30 MIN: CPT | Performed by: FAMILY MEDICINE

## 2020-04-23 PROCEDURE — G8427 DOCREV CUR MEDS BY ELIG CLIN: HCPCS | Performed by: FAMILY MEDICINE

## 2020-04-23 RX ORDER — HYDROXYZINE HYDROCHLORIDE 25 MG/1
25 TABLET, FILM COATED ORAL 3 TIMES DAILY PRN
Qty: 90 TABLET | Refills: 3 | Status: SHIPPED | OUTPATIENT
Start: 2020-04-23 | End: 2020-05-23

## 2020-04-23 RX ORDER — LANOLIN ALCOHOL/MO/W.PET/CERES
250 CREAM (GRAM) TOPICAL NIGHTLY
Qty: 30 CAPSULE | Refills: 3 | Status: SHIPPED | OUTPATIENT
Start: 2020-04-23 | End: 2020-05-27 | Stop reason: HOSPADM

## 2020-04-23 RX ORDER — HYDROCHLOROTHIAZIDE 12.5 MG/1
12.5 TABLET ORAL EVERY MORNING
Qty: 30 TABLET | Refills: 0 | Status: SHIPPED | OUTPATIENT
Start: 2020-04-23 | End: 2020-05-15

## 2020-04-23 ASSESSMENT — ANXIETY QUESTIONNAIRES
1. FEELING NERVOUS, ANXIOUS, OR ON EDGE: 2-OVER HALF THE DAYS
7. FEELING AFRAID AS IF SOMETHING AWFUL MIGHT HAPPEN: 2-OVER HALF THE DAYS
GAD7 TOTAL SCORE: 12
5. BEING SO RESTLESS THAT IT IS HARD TO SIT STILL: 1-SEVERAL DAYS
4. TROUBLE RELAXING: 2-OVER HALF THE DAYS
2. NOT BEING ABLE TO STOP OR CONTROL WORRYING: 2-OVER HALF THE DAYS
3. WORRYING TOO MUCH ABOUT DIFFERENT THINGS: 2-OVER HALF THE DAYS
6. BECOMING EASILY ANNOYED OR IRRITABLE: 1-SEVERAL DAYS

## 2020-04-23 ASSESSMENT — ENCOUNTER SYMPTOMS
EYE PAIN: 0
VOMITING: 0
COLOR CHANGE: 0
ABDOMINAL PAIN: 0
SHORTNESS OF BREATH: 0
NAUSEA: 0
COUGH: 0
DIARRHEA: 0
TROUBLE SWALLOWING: 0
RESPIRATORY NEGATIVE: 1
SORE THROAT: 0
CONSTIPATION: 0
APNEA: 0
CHEST TIGHTNESS: 0

## 2020-04-30 ENCOUNTER — HOSPITAL ENCOUNTER (OUTPATIENT)
Dept: PHYSICAL THERAPY | Age: 50
Setting detail: THERAPIES SERIES
Discharge: HOME | End: 2020-04-30
Payer: MEDICARE

## 2020-05-05 RX ORDER — FLUTICASONE PROPIONATE 50 MCG
SPRAY, SUSPENSION (ML) NASAL
Qty: 16 G | Refills: 5 | Status: SHIPPED | OUTPATIENT
Start: 2020-05-05 | End: 2021-07-12 | Stop reason: ALTCHOICE

## 2020-05-07 RX ORDER — BUSPIRONE HYDROCHLORIDE 10 MG/1
TABLET ORAL
Qty: 90 TABLET | Refills: 0 | Status: SHIPPED | OUTPATIENT
Start: 2020-05-07 | End: 2020-05-28

## 2020-05-13 ENCOUNTER — HOSPITAL ENCOUNTER (OUTPATIENT)
Dept: PHYSICAL THERAPY | Age: 50
Setting detail: THERAPIES SERIES
Discharge: HOME | End: 2020-05-13
Payer: MEDICARE

## 2020-05-13 PROCEDURE — G0283 ELEC STIM OTHER THAN WOUND: HCPCS

## 2020-05-13 PROCEDURE — 97110 THERAPEUTIC EXERCISES: CPT

## 2020-05-13 ASSESSMENT — PAIN DESCRIPTION - FREQUENCY: FREQUENCY: INTERMITTENT

## 2020-05-13 ASSESSMENT — PAIN DESCRIPTION - ORIENTATION
ORIENTATION_2: RIGHT
ORIENTATION: RIGHT

## 2020-05-13 ASSESSMENT — PAIN DESCRIPTION - LOCATION
LOCATION_2: ANKLE
LOCATION: SHOULDER

## 2020-05-13 ASSESSMENT — PAIN DESCRIPTION - INTENSITY: RATING_2: 6

## 2020-05-13 ASSESSMENT — PAIN DESCRIPTION - DURATION: DURATION_2: CONTINUOUS

## 2020-05-13 ASSESSMENT — PAIN SCALES - GENERAL: PAINLEVEL_OUTOF10: 4

## 2020-05-13 ASSESSMENT — PAIN DESCRIPTION - DESCRIPTORS: DESCRIPTORS_2: CONSTANT

## 2020-05-15 RX ORDER — HYDROCHLOROTHIAZIDE 12.5 MG/1
12.5 TABLET ORAL EVERY MORNING
Qty: 30 TABLET | Refills: 5 | Status: SHIPPED | OUTPATIENT
Start: 2020-05-15 | End: 2020-05-27 | Stop reason: SDUPTHER

## 2020-05-19 ENCOUNTER — HOSPITAL ENCOUNTER (OUTPATIENT)
Dept: PHYSICAL THERAPY | Age: 50
Setting detail: THERAPIES SERIES
Discharge: HOME | End: 2020-05-19
Payer: MEDICARE

## 2020-05-19 PROCEDURE — 97110 THERAPEUTIC EXERCISES: CPT

## 2020-05-19 PROCEDURE — G0283 ELEC STIM OTHER THAN WOUND: HCPCS

## 2020-05-19 ASSESSMENT — PAIN DESCRIPTION - LOCATION
LOCATION_2: ANKLE
LOCATION: SHOULDER

## 2020-05-19 ASSESSMENT — PAIN DESCRIPTION - ORIENTATION
ORIENTATION_2: RIGHT
ORIENTATION: RIGHT

## 2020-05-19 ASSESSMENT — PAIN DESCRIPTION - DESCRIPTORS
DESCRIPTORS: CONSTANT
DESCRIPTORS_2: CONSTANT

## 2020-05-19 ASSESSMENT — PAIN DESCRIPTION - DURATION: DURATION_2: CONTINUOUS

## 2020-05-19 ASSESSMENT — PAIN DESCRIPTION - INTENSITY: RATING_2: 2

## 2020-05-19 ASSESSMENT — PAIN SCALES - GENERAL: PAINLEVEL_OUTOF10: 3

## 2020-05-19 ASSESSMENT — PAIN DESCRIPTION - FREQUENCY: FREQUENCY: CONTINUOUS

## 2020-05-21 ENCOUNTER — HOSPITAL ENCOUNTER (OUTPATIENT)
Dept: PHYSICAL THERAPY | Age: 50
Setting detail: THERAPIES SERIES
Discharge: HOME | End: 2020-05-21
Payer: MEDICARE

## 2020-05-26 ASSESSMENT — PATIENT HEALTH QUESTIONNAIRE - PHQ9
SUM OF ALL RESPONSES TO PHQ QUESTIONS 1-9: 0
SUM OF ALL RESPONSES TO PHQ9 QUESTIONS 1 & 2: 0
SUM OF ALL RESPONSES TO PHQ QUESTIONS 1-9: 0
2. FEELING DOWN, DEPRESSED OR HOPELESS: 0
1. LITTLE INTEREST OR PLEASURE IN DOING THINGS: 0

## 2020-05-27 ENCOUNTER — TELEMEDICINE (OUTPATIENT)
Dept: FAMILY MEDICINE CLINIC | Age: 50
End: 2020-05-27
Payer: MEDICARE

## 2020-05-27 ENCOUNTER — TELEPHONE (OUTPATIENT)
Dept: FAMILY MEDICINE CLINIC | Age: 50
End: 2020-05-27

## 2020-05-27 ENCOUNTER — HOSPITAL ENCOUNTER (OUTPATIENT)
Dept: PHYSICAL THERAPY | Age: 50
Setting detail: THERAPIES SERIES
Discharge: HOME | End: 2020-05-27
Payer: MEDICARE

## 2020-05-27 VITALS
HEIGHT: 71 IN | BODY MASS INDEX: 25.2 KG/M2 | WEIGHT: 180 LBS | HEART RATE: 98 BPM | SYSTOLIC BLOOD PRESSURE: 124 MMHG | DIASTOLIC BLOOD PRESSURE: 84 MMHG

## 2020-05-27 PROBLEM — R60.0 BILATERAL LEG EDEMA: Status: ACTIVE | Noted: 2020-05-27

## 2020-05-27 PROCEDURE — 97110 THERAPEUTIC EXERCISES: CPT

## 2020-05-27 PROCEDURE — G0283 ELEC STIM OTHER THAN WOUND: HCPCS

## 2020-05-27 PROCEDURE — G8427 DOCREV CUR MEDS BY ELIG CLIN: HCPCS | Performed by: FAMILY MEDICINE

## 2020-05-27 PROCEDURE — 99214 OFFICE O/P EST MOD 30 MIN: CPT | Performed by: FAMILY MEDICINE

## 2020-05-27 RX ORDER — FOLIC ACID/MULTIVIT,IRON,MINER 0.4MG-18MG
2 TABLET ORAL 2 TIMES DAILY
Qty: 360 CAPSULE | Refills: 3 | Status: SHIPPED | OUTPATIENT
Start: 2020-05-27 | End: 2021-06-29

## 2020-05-27 RX ORDER — HYDROCHLOROTHIAZIDE 25 MG/1
25 TABLET ORAL EVERY MORNING
Qty: 90 TABLET | Refills: 3 | Status: SHIPPED | OUTPATIENT
Start: 2020-05-27 | End: 2021-05-03

## 2020-05-27 RX ORDER — PRAVASTATIN SODIUM 80 MG/1
TABLET ORAL
Qty: 90 TABLET | Refills: 3 | Status: SHIPPED | OUTPATIENT
Start: 2020-05-27 | End: 2021-05-03

## 2020-05-27 RX ORDER — POTASSIUM CHLORIDE 750 MG/1
20 TABLET, EXTENDED RELEASE ORAL DAILY
Qty: 180 TABLET | Refills: 3 | Status: SHIPPED | OUTPATIENT
Start: 2020-05-27 | End: 2020-06-29 | Stop reason: SDUPTHER

## 2020-05-27 ASSESSMENT — ENCOUNTER SYMPTOMS
SHORTNESS OF BREATH: 0
CHEST TIGHTNESS: 0
WHEEZING: 0
VOMITING: 0
ABDOMINAL DISTENTION: 0
ABDOMINAL PAIN: 0
DIARRHEA: 0
CONSTIPATION: 0
NAUSEA: 0
COUGH: 0

## 2020-05-27 ASSESSMENT — PAIN DESCRIPTION - FREQUENCY: FREQUENCY: CONTINUOUS

## 2020-05-27 ASSESSMENT — PAIN DESCRIPTION - ORIENTATION: ORIENTATION: RIGHT

## 2020-05-27 ASSESSMENT — PAIN DESCRIPTION - LOCATION: LOCATION: ANKLE;SHOULDER

## 2020-05-27 ASSESSMENT — PAIN SCALES - GENERAL: PAINLEVEL_OUTOF10: 5

## 2020-05-27 ASSESSMENT — PAIN DESCRIPTION - DESCRIPTORS: DESCRIPTORS: CONSTANT

## 2020-05-27 ASSESSMENT — PATIENT HEALTH QUESTIONNAIRE - PHQ9
2. FEELING DOWN, DEPRESSED OR HOPELESS: 0
SUM OF ALL RESPONSES TO PHQ QUESTIONS 1-9: 0
SUM OF ALL RESPONSES TO PHQ9 QUESTIONS 1 & 2: 0
1. LITTLE INTEREST OR PLEASURE IN DOING THINGS: 0
SUM OF ALL RESPONSES TO PHQ QUESTIONS 1-9: 0

## 2020-05-27 NOTE — PROGRESS NOTES
2020    TELEHEALTH EVALUATION -- Audio/Visual (During MLOCC-84 public health emergency)    HPI:    Santiago Galvin (:  1970) has requested an audio/video evaluation for the following concern(s):Hypertension; Manic Behavior; and Leg Swelling    Patient complains of ankles swelling again. He was recently started on water pill and Niacin by my partner        Hypertension:    he  is exercising and is adherent to low salt diet. Blood pressure is well controlled at home. Cardiac symptoms fatigue and lower extremity edema. Patient denies chest pain, chest pressure/discomfort, claudication, dyspnea, exertional chest pressure/discomfort, irregular heart beat, near-syncope, orthopnea, palpitations, paroxysmal nocturnal dyspnea, syncope and tachypnea. Cardiovascular risk factors: dyslipidemia, hypertension and male gender. Use of agents associated with hypertension: none. History of target organ damage: none. Secondary hypertension work-up was negative  Holter monitor on 2018 showed sinus tachycardia  Echo 2D on 10/20/2017 showed EF greater than 55% otherwise normal    Patient reports taking metoprolol 75 BID, giving him erectile dysfunction which is intermittent. Patient was also started on HCTZ 12.5 for bilateral leg edema. Patient says leg edema has been improving  Has compression stockings helping, but if taken off he gets swelling. He says swelling started a few weeks ago the weather changed. blood pressure is Normal.  BP controlled per his report. Maryjo Goss reports compliance with BP medications, and tolerates them well, denies side effects.     BP Readings from Last 3 Encounters:   20 124/84   20 118/79   20 120/78        Pulse is Normal.    Pulse Readings from Last 3 Encounters:   20 98   01/10/20 85   19 103       Weight is stable  Wt Readings from Last 3 Encounters:   20 180 lb (81.6 kg)   20 182 lb (82.6 kg)   20 175 lb (79.4 kg) Hyperlipidemia:  No new myalgias or GI upset on niacin (Niaspan), pravastatin (Pravachol) and Fish oil but only 2 caps a day. Medication compliance: compliant all of the time. Patient is  following a low fat, low cholesterol diet. LDL is borderline high and high triglycerides  Lab Results   Component Value Date    LDLCHOLESTEROL 102 11/25/2019     Lab Results   Component Value Date    TRIG 220 (H) 11/25/2019    TRIG 190 (H) 04/25/2019    TRIG 157 (H) 04/05/2019         Bipolar Depression  Going to [facility], has counselor and psychiatrist.  Recently dated other medication changes which he feels is working better for him  He says Trileptal was stopped  Denies suicidal ideation, plan or intent. Denies hallucinations  He still feels anxious and has difficulty falling asleep and nightmares. PHQ-2 Over the past 2 weeks, how often have you been bothered by any of the following problems? Little interest or pleasure in doing things: Not at all  Feeling down, depressed, or hopeless: Not at all  PHQ-2 Score: 0  PHQ-9 Over the past 2 weeks, how often have you been bothered by any of the following problems? PHQ-9 Total Score: 0      PHQ Scores 5/27/2020 5/26/2020 1/10/2020 11/19/2019 4/3/2019 12/26/2018 12/6/2017   PHQ2 Score 0 0 0 0 0 0 3   PHQ9 Score 0 0 0 0 0 0 10         Review of Systems   Constitutional: Positive for fatigue. Negative for activity change, appetite change, chills, diaphoresis, fever and unexpected weight change. Respiratory: Negative for cough, chest tightness, shortness of breath and wheezing. Cardiovascular: Positive for leg swelling. Negative for chest pain and palpitations. Gastrointestinal: Negative for abdominal distention, abdominal pain, constipation, diarrhea, nausea and vomiting. Endocrine: Negative for cold intolerance, heat intolerance, polydipsia, polyphagia and polyuria. Genitourinary:        Erectile Dysfunction    Musculoskeletal: Positive for arthralgias. substitute Yes Denver Hikes, MD   senna-docusate (SENNA S) 8.6-50 MG per tablet Take 1 tablet by mouth 2 times daily as needed for Constipation Yes Denver Hikes, MD   ibuprofen (ADVIL;MOTRIN) 600 MG tablet Take 1 tablet by mouth every 6 hours as needed for Pain Yes DENNIS Rea - CNP   vitamin B-12 (CYANOCOBALAMIN) 1000 MCG tablet Take 1,000 mcg by mouth daily Yes Historical Provider, MD   Ginkgo Biloba (GINKOBA PO) Take by mouth Yes Historical Provider, MD   Adapalene 0.3 % GEL  Yes Historical Provider, MD   benztropine (COGENTIN) 1 MG tablet  Yes Historical Provider, MD   erythromycin with ethanol (THERAMYCIN) 2 % external solution apply topically as directed every morning Yes Historical Provider, MD   aluminum chloride (DRYSOL) 20 % external solution Apply topically nightly. Yes Denver Hikes, MD   Omega-3 Fatty Acids (OMEGA-3 FISH OIL) 1200 MG CAPS Take  by mouth 2 times daily.  Yes Historical Provider, MD   vitamin D (CHOLECALCIFEROL) 25 MCG (1000 UT) TABS tablet Take 1 tablet by mouth daily  DENNIS Gamez - CNP       Social History     Tobacco Use    Smoking status: Former Smoker     Packs/day: 1.50     Years: 12.00     Pack years: 18.00     Last attempt to quit: 2002     Years since quittin.8    Smokeless tobacco: Never Used   Substance Use Topics    Alcohol use: No     Alcohol/week: 0.0 standard drinks    Drug use: No          PHYSICAL EXAMINATION:    Vital Signs: (As obtained by patient/caregiver or practitioner observation)  -vital signs stable and within normal limits except mildly overweight       /84   Pulse 98   Ht 5' 11\" (1.803 m)   Wt 180 lb (81.6 kg)   BMI 25.10 kg/m²     Intensity of pain is 0/10        Constitutional: [x] Appears well-developed and well-nourished [x] No apparent distress      [] Abnormal-       Mental status  [x] Alert and awake  [x] Oriented to person/place/time [x]Able to follow commands      Eyes:  EOM    [x]  Normal

## 2020-05-28 ENCOUNTER — HOSPITAL ENCOUNTER (OUTPATIENT)
Age: 50
Setting detail: SPECIMEN
Discharge: HOME | End: 2020-05-28
Payer: MEDICARE

## 2020-05-28 LAB
ALBUMIN SERPL-MCNC: 4.5 G/DL (ref 3.5–5.2)
ALBUMIN/GLOBULIN RATIO: 1.5 (ref 1–2.5)
ALP BLD-CCNC: 103 U/L (ref 40–129)
ALT SERPL-CCNC: 36 U/L (ref 5–41)
ANION GAP SERPL CALCULATED.3IONS-SCNC: 13 MMOL/L (ref 9–17)
AST SERPL-CCNC: 34 U/L
BILIRUB SERPL-MCNC: 0.53 MG/DL (ref 0.3–1.2)
BUN BLDV-MCNC: 14 MG/DL (ref 6–20)
BUN/CREAT BLD: ABNORMAL (ref 9–20)
CALCIUM SERPL-MCNC: 9.9 MG/DL (ref 8.6–10.4)
CHLORIDE BLD-SCNC: 103 MMOL/L (ref 98–107)
CHOLESTEROL/HDL RATIO: 4.6
CHOLESTEROL: 187 MG/DL
CO2: 24 MMOL/L (ref 20–31)
CREAT SERPL-MCNC: 0.9 MG/DL (ref 0.7–1.2)
GFR AFRICAN AMERICAN: >60 ML/MIN
GFR NON-AFRICAN AMERICAN: >60 ML/MIN
GFR SERPL CREATININE-BSD FRML MDRD: ABNORMAL ML/MIN/{1.73_M2}
GFR SERPL CREATININE-BSD FRML MDRD: ABNORMAL ML/MIN/{1.73_M2}
GLUCOSE BLD-MCNC: 107 MG/DL (ref 70–99)
HCT VFR BLD CALC: 46.8 % (ref 40.7–50.3)
HDLC SERPL-MCNC: 41 MG/DL
HEMOGLOBIN: 15.6 G/DL (ref 13–17)
LDL CHOLESTEROL: 111 MG/DL (ref 0–130)
MCH RBC QN AUTO: 30.7 PG (ref 25.2–33.5)
MCHC RBC AUTO-ENTMCNC: 33.3 G/DL (ref 28.4–34.8)
MCV RBC AUTO: 92.1 FL (ref 82.6–102.9)
NRBC AUTOMATED: 0 PER 100 WBC
PDW BLD-RTO: 11.8 % (ref 11.8–14.4)
PLATELET # BLD: 241 K/UL (ref 138–453)
PMV BLD AUTO: 10.8 FL (ref 8.1–13.5)
POTASSIUM SERPL-SCNC: 4.6 MMOL/L (ref 3.7–5.3)
RBC # BLD: 5.08 M/UL (ref 4.21–5.77)
SODIUM BLD-SCNC: 140 MMOL/L (ref 135–144)
TOTAL PROTEIN: 7.6 G/DL (ref 6.4–8.3)
TRIGL SERPL-MCNC: 174 MG/DL
TSH SERPL DL<=0.05 MIU/L-ACNC: 0.57 MIU/L (ref 0.3–5)
VLDLC SERPL CALC-MCNC: ABNORMAL MG/DL (ref 1–30)
WBC # BLD: 7.7 K/UL (ref 3.5–11.3)

## 2020-05-28 RX ORDER — BUSPIRONE HYDROCHLORIDE 10 MG/1
TABLET ORAL
Qty: 90 TABLET | Refills: 0 | Status: SHIPPED | OUTPATIENT
Start: 2020-05-28 | End: 2020-07-27 | Stop reason: SDUPTHER

## 2020-05-28 NOTE — TELEPHONE ENCOUNTER
Please Approve or Refuse.   Send to Pharmacy per Pt's Request:      Next Visit Date:  Visit date not found   Last Visit Date: 1/10/2020    Hemoglobin A1C (%)   Date Value   04/03/2019 5.3   10/20/2017 5.1   02/08/2017 5.3             ( goal A1C is < 7)   BP Readings from Last 3 Encounters:   05/27/20 124/84   04/22/20 118/79   04/16/20 120/78          (goal 120/80)  BUN   Date Value Ref Range Status   11/25/2019 12 6 - 20 mg/dL Final     CREATININE   Date Value Ref Range Status   11/25/2019 0.87 0.70 - 1.20 mg/dL Final     Potassium   Date Value Ref Range Status   11/25/2019 5.0 3.7 - 5.3 mmol/L Final

## 2020-06-01 ENCOUNTER — HOSPITAL ENCOUNTER (OUTPATIENT)
Dept: PHYSICAL THERAPY | Age: 50
Setting detail: THERAPIES SERIES
Discharge: HOME | End: 2020-06-01
Payer: MEDICARE

## 2020-06-01 PROCEDURE — 97110 THERAPEUTIC EXERCISES: CPT

## 2020-06-01 PROCEDURE — G0283 ELEC STIM OTHER THAN WOUND: HCPCS

## 2020-06-01 ASSESSMENT — PAIN DESCRIPTION - ORIENTATION: ORIENTATION: RIGHT

## 2020-06-01 ASSESSMENT — PAIN DESCRIPTION - FREQUENCY: FREQUENCY: CONTINUOUS

## 2020-06-01 ASSESSMENT — PAIN SCALES - GENERAL: PAINLEVEL_OUTOF10: 5

## 2020-06-01 ASSESSMENT — PAIN DESCRIPTION - DESCRIPTORS: DESCRIPTORS: CONSTANT

## 2020-06-01 ASSESSMENT — PAIN DESCRIPTION - LOCATION: LOCATION: ANKLE;SHOULDER

## 2020-06-01 NOTE — PROGRESS NOTES
Physical Therapy  Daily Treatment Note  Date: 2020  Patient Name: Anibal Gonzalez  MRN: [id number]     :   1970    Subjective:      PT Visit Information  Onset Date: 19  PT Insurance Information: [employer]  Total # of Visits Approved: 32  Total # of Visits to Date: 19  Subjective  Subjective: complains of pain on R cassidy and R achilles tendon today  Pain Screening  Patient Currently in Pain: Yes  Pain Assessment  Pain Assessment: 0-10  Pain Level: 5  Pain Location: Ankle; Shoulder  Pain Orientation: Right  Pain Descriptors: Constant  Pain Frequency: Continuous  Vital Signs  Patient Currently in Pain: Yes       Treatment Activities:   Exercises  Exercise 1: shapes with ball 4# 5x  Exercise 2: cane ex standing-flex/Abd/ER/ext 10x  Exercise 3: lat pull down 25# 3x10  Exercise 4: chest press front/back 25# 3x10  Exercise 5: triceps curls B 35# 3x10  Exercise 6: supine scap protraction 10 x3 5#  Exercise 7: L SDLY ER 10x3 1#  Exercise 8: prone R cassidy extension 3x10 5#  Exercise 9: Prone lawnmower pulls 3x10 5#  Exercise 10: elbow curls B 20# 3x10  Exercise 11: rows/pull down 35# 3x10  Exercise 12: R miguel ángel's 4 way 1# 10x  Exercise 13: R cassidy flex/ext/scaption 1# 10x  Exercise 14: 1 strand Lime Tband 6 way R cassidy 15x  Exercise 15: hot pack and EStim to R cassidy sitting 20 min  Exercise 16: towel stretch R cassidy IR 3x30\"  Exercise 18: leg press squats/heel raises 3x10 35#  Exercise 19:  Total Gym L10 squats/heel raises 3x10     Assessment:   Conditions Requiring Skilled Therapeutic Intervention  Assessment: progress with ex  REQUIRES PT FOLLOW UP: Yes  Discharge Recommendations: Home independently     Plan:    Plan  Times per week: 2x/week  Current Treatment Recommendations: ROM, Modalities, Home Exercise Program, Strengthening  Plan Comment: to continue PT per POC  Timed Code Treatment Minutes: 40 Minutes   Treatment Charges: Minutes Units   []  Ultrasound     [x]  Electrical-Stim 20 1   []  Iontophoresis

## 2020-06-04 ENCOUNTER — HOSPITAL ENCOUNTER (OUTPATIENT)
Dept: PHYSICAL THERAPY | Age: 50
Setting detail: THERAPIES SERIES
Discharge: HOME | End: 2020-06-04
Payer: MEDICARE

## 2020-06-04 PROCEDURE — G0283 ELEC STIM OTHER THAN WOUND: HCPCS

## 2020-06-04 PROCEDURE — 97110 THERAPEUTIC EXERCISES: CPT

## 2020-06-04 ASSESSMENT — PAIN DESCRIPTION - DESCRIPTORS: DESCRIPTORS: CONSTANT

## 2020-06-04 ASSESSMENT — PAIN SCALES - GENERAL: PAINLEVEL_OUTOF10: 3

## 2020-06-04 ASSESSMENT — PAIN DESCRIPTION - LOCATION: LOCATION: ANKLE;SHOULDER

## 2020-06-04 ASSESSMENT — PAIN DESCRIPTION - FREQUENCY: FREQUENCY: CONTINUOUS

## 2020-06-04 ASSESSMENT — PAIN DESCRIPTION - ORIENTATION: ORIENTATION: RIGHT

## 2020-06-04 NOTE — PROGRESS NOTES
Physical Therapy  Daily Treatment Note  Date: 2020  Patient Name: Beltran Pozo  MRN: [id number]     :   1970    Subjective:      PT Visit Information  Onset Date: 19  PT Insurance Information: [employer]  Total # of Visits Approved: 32  Total # of Visits to Date: 20  Subjective  Subjective: complains of pain on R cassidy and R achilles tendon today  Pain Screening  Patient Currently in Pain: Yes  Pain Assessment  Pain Assessment: 0-10  Pain Level: 3  Pain Location: Ankle; Shoulder  Pain Orientation: Right  Pain Descriptors: Constant  Pain Frequency: Continuous  Vital Signs  Patient Currently in Pain: Yes       Treatment Activities:   Exercises  Exercise 1: shapes with ball 4# 5x  Exercise 2: cane ex standing-flex/Abd/ER/ext 10x  Exercise 3: lat pull down 25# 3x10  Exercise 4: chest press front/back 25# 3x10  Exercise 5: triceps curls B 35# 3x10  Exercise 6: supine scap protraction 10 x3 5#  Exercise 7: L SDLY ER 10x3 1#  Exercise 8: prone R cassidy extension 3x10 5#  Exercise 9: Prone lawnmower pulls 3x10 5#  Exercise 10: elbow curls B 20# 3x10  Exercise 11: rows/pull down 35# 3x10  Exercise 12: R miguel ángel's 4 way 1# 10x  Exercise 13: R cassidy flex/ext/scaption 1# 10x  Exercise 14: 1 strand Lime Tband 6 way R cassidy 15x  Exercise 15: hot pack and EStim to R cassidy sitting 20 min  Exercise 16: towel stretch R cassidy IR 3x30\"  Exercise 17: kinesiotape R achilles tendon  Exercise 18: leg press squats/heel raises 3x10 35#  Exercise 19:  Total Gym L10 squats/heel raises 3x10    Assessment:   Conditions Requiring Skilled Therapeutic Intervention  Assessment: progress with ex  REQUIRES PT FOLLOW UP: Yes  Discharge Recommendations: Home independently     Plan:    Plan  Times per week: 2x/week  Current Treatment Recommendations: ROM, Modalities, Home Exercise Program, Strengthening  Plan Comment: to continue PT per POC  Timed Code Treatment Minutes: 40 Minutes   Treatment Charges: Minutes Units   []  Ultrasound     [x]

## 2020-06-10 ENCOUNTER — HOSPITAL ENCOUNTER (OUTPATIENT)
Dept: PHYSICAL THERAPY | Age: 50
Setting detail: THERAPIES SERIES
Discharge: HOME | End: 2020-06-10
Payer: MEDICARE

## 2020-06-10 PROCEDURE — 97110 THERAPEUTIC EXERCISES: CPT

## 2020-06-10 PROCEDURE — G0283 ELEC STIM OTHER THAN WOUND: HCPCS

## 2020-06-10 ASSESSMENT — PAIN DESCRIPTION - ORIENTATION
ORIENTATION_2: RIGHT
ORIENTATION: RIGHT

## 2020-06-10 ASSESSMENT — PAIN DESCRIPTION - DURATION: DURATION_2: CONTINUOUS

## 2020-06-10 ASSESSMENT — PAIN DESCRIPTION - LOCATION
LOCATION_2: ANKLE
LOCATION: SHOULDER

## 2020-06-10 ASSESSMENT — PAIN DESCRIPTION - DESCRIPTORS
DESCRIPTORS_2: CONSTANT
DESCRIPTORS: CONSTANT

## 2020-06-10 ASSESSMENT — PAIN DESCRIPTION - FREQUENCY: FREQUENCY: CONTINUOUS

## 2020-06-10 ASSESSMENT — PAIN SCALES - GENERAL: PAINLEVEL_OUTOF10: 6

## 2020-06-10 ASSESSMENT — PAIN DESCRIPTION - INTENSITY: RATING_2: 4

## 2020-06-12 ENCOUNTER — HOSPITAL ENCOUNTER (OUTPATIENT)
Dept: PHYSICAL THERAPY | Age: 50
Setting detail: THERAPIES SERIES
Discharge: HOME | End: 2020-06-12
Payer: MEDICARE

## 2020-06-12 NOTE — PROGRESS NOTES
Physical Therapy Cancel/NS Note    Date: 2020  Patient Name: Cesar Thomas  MRN: [id number]  : 1970     Subjective   General Comment  Comments: cancel PT today  PT Visit Information  Onset Date: 19  PT Insurance Information: [place] [employer]  Total # of Visits Approved: 32  Total # of Visits to Date: 21  No Show: 2  Canceled Appointment: 11  Electronically signed by: Dana De Los Santos PT

## 2020-06-16 ENCOUNTER — HOSPITAL ENCOUNTER (OUTPATIENT)
Dept: PHYSICAL THERAPY | Age: 50
Setting detail: THERAPIES SERIES
Discharge: HOME | End: 2020-06-16
Payer: MEDICARE

## 2020-06-16 PROCEDURE — 97110 THERAPEUTIC EXERCISES: CPT

## 2020-06-16 PROCEDURE — G0283 ELEC STIM OTHER THAN WOUND: HCPCS

## 2020-06-16 ASSESSMENT — PAIN DESCRIPTION - FREQUENCY: FREQUENCY: CONTINUOUS

## 2020-06-16 ASSESSMENT — PAIN SCALES - GENERAL: PAINLEVEL_OUTOF10: 3

## 2020-06-16 ASSESSMENT — PAIN DESCRIPTION - DESCRIPTORS
DESCRIPTORS: CONSTANT
DESCRIPTORS_2: CONSTANT

## 2020-06-16 ASSESSMENT — PAIN DESCRIPTION - DURATION: DURATION_2: CONTINUOUS

## 2020-06-16 ASSESSMENT — PAIN DESCRIPTION - ORIENTATION
ORIENTATION: RIGHT
ORIENTATION_2: RIGHT

## 2020-06-16 ASSESSMENT — PAIN DESCRIPTION - LOCATION
LOCATION: SHOULDER
LOCATION_2: ANKLE

## 2020-06-16 ASSESSMENT — PAIN DESCRIPTION - INTENSITY: RATING_2: 5

## 2020-06-16 NOTE — FLOWSHEET NOTE
[facility]   Outpatient Physical Therapy  [address] [place] [address]. [address]  Phone: [phone]  Fax: [phone]  Daily Progress Note    Date: 20    Patient Name: Queenie Edmondson        MRN: [id number]  Account: [de-identified] : 1970      General Information:  Referring Practitioner: Phyllis Ji  Referral Date : 19  Diagnosis: R cassidy pain M25.519/R ankle pain M25.571 G89.29  Onset Date: 19  PT Insurance Information: [place] [employer]  Total # of Visits Approved: 32  Total # of Visits to Date:   No Show: 2  Canceled Appointment: 1    Subjective:  Subjective: Pt reports R shoulder more sore then normal.  had to take pain meds when pt woke up. ankle always a consistant pain that does not change much throughout the day. Pain:  Patient Currently in Pain: Yes  Pain Assessment: 0-10  Pain Level: 3  Pain Location: Shoulder  Pain Orientation: Right  Pain Descriptors: Constant  Pain Frequency: Continuous  Pain Rating 2: 5  Pain Location 2: Ankle  Pain Orientation 2: Right  Pain Descriptors 2: Constant  Pain Duration 2: Continuous    Objective:  Exercise 1: shapes with ball 4# 5x  Exercise 2: cane ex standing-flex/Abd/ER/ext 10x  Exercise 3: lat pull down 25# 3x10  Exercise 4: chest press front/back 25# 3x10  Exercise 5: triceps curls B 35# 3x10  Exercise 6: supine scap protraction 10 x3 5#  Exercise 7: L SDLY ER 10x3 1#  Exercise 8: prone R cassidy extension 3x10 5#  Exercise 9: Prone lawnmower pulls 3x10 5#  Exercise 10: elbow curls B 20# 3x10  Exercise 11: rows/pull down 35# 3x10  Exercise 12: R miguel ángel's 4 way 1# 10x  Exercise 13: R cassidy flex/ext/scaption 1# 10x  Exercise 14: 1 strand Lime Tband 6 way R cassidy 15x  Exercise 15: hot pack and EStim to R cassidy sitting 20 min  Exercise 16: towel stretch R cassidy IR 3x30\"  Exercise 18: leg press squats/heel raises 3x10 35#  Exercise 19: Total Gym L10 squats/heel raises 3x10     Assessment:   Body structures, Functions, Activity limitations: Decreased ADL status; Decreased ROM; Decreased strength  Assessment: tolerated exercise well today with no complaints of increased pain throughout treatment  Treatment Diagnosis: R shoulder weakness M62.81 difficulty walking R26.2 NEC  Prognosis: Good  REQUIRES PT FOLLOW UP: Yes  Activity Tolerance: Patient Tolerated treatment well    Plan:  Plan: Continue with current plan    Therapy Time:  Time In: 1045  Time Out: 1140  Minutes: 55  Timed Code Treatment Minutes: 35 Minutes    Treatment Charges: Minutes Units   []  Ultrasound     [x]  Electrical-Stim 20 1   []  Iontophoresis     []  Traction     []  Massage       []  Eval     []  Gait     []  Vasopneumatic Device     [x]  Ther Exercise  35  2   []  Manual Therapy       []  Ther Activities       []  Aquatics     []  Neuro Re-Ed       [x]  Other  HP 20'  0   Total Treatment Time: 54  3     Rashel Alvarez, PTA

## 2020-06-18 ENCOUNTER — HOSPITAL ENCOUNTER (OUTPATIENT)
Dept: PHYSICAL THERAPY | Age: 50
Setting detail: THERAPIES SERIES
Discharge: HOME | End: 2020-06-18
Payer: MEDICARE
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
## 2020-06-22 ENCOUNTER — HOSPITAL ENCOUNTER (OUTPATIENT)
Dept: PHYSICAL THERAPY | Age: 50
Setting detail: THERAPIES SERIES
Discharge: HOME | End: 2020-06-22
Payer: MEDICARE

## 2020-06-22 PROCEDURE — G0283 ELEC STIM OTHER THAN WOUND: HCPCS

## 2020-06-22 PROCEDURE — 97110 THERAPEUTIC EXERCISES: CPT

## 2020-06-22 ASSESSMENT — PAIN SCALES - GENERAL: PAINLEVEL_OUTOF10: 6

## 2020-06-22 ASSESSMENT — PAIN DESCRIPTION - DURATION: DURATION_2: CONTINUOUS

## 2020-06-22 ASSESSMENT — PAIN DESCRIPTION - INTENSITY: RATING_2: 4

## 2020-06-22 ASSESSMENT — PAIN DESCRIPTION - DESCRIPTORS
DESCRIPTORS_2: CONSTANT
DESCRIPTORS: CONSTANT

## 2020-06-22 ASSESSMENT — PAIN DESCRIPTION - ORIENTATION
ORIENTATION_2: RIGHT
ORIENTATION: RIGHT

## 2020-06-22 ASSESSMENT — PAIN DESCRIPTION - LOCATION
LOCATION: SHOULDER
LOCATION_2: ANKLE

## 2020-06-22 ASSESSMENT — PAIN DESCRIPTION - FREQUENCY: FREQUENCY: CONTINUOUS

## 2020-06-22 NOTE — FLOWSHEET NOTE
status; Decreased ROM; Decreased strength  Assessment: Tolerated all exercises   Treatment Diagnosis: R shoulder weakness M62.81 difficulty walking R26.2 NEC  Prognosis: Good  REQUIRES PT FOLLOW UP: Yes  Activity Tolerance: Patient Tolerated treatment well    Plan:  Plan: Continue with current plan    Therapy Time:  Time In: 0947  Time Out: 1045  Minutes: 58  Timed Code Treatment Minutes: 38 Minutes    Treatment Charges: Minutes Units   []  Ultrasound     [x]  Electrical-Stim 20 1   []  Iontophoresis     []  Traction     []  Massage       []  Eval     []  Gait     []  Vasopneumatic Device     [x]  Ther Exercise  38  3   []  Manual Therapy       []  Ther Activities       []  Aquatics     []  Neuro Re-Ed       []  Other       Total Treatment Time: 62  4     Rashel Alvarez, PTA

## 2020-06-25 ENCOUNTER — APPOINTMENT (OUTPATIENT)
Dept: PHYSICAL THERAPY | Age: 50
End: 2020-06-25
Payer: MEDICARE

## 2020-06-26 ENCOUNTER — HOSPITAL ENCOUNTER (OUTPATIENT)
Dept: PHYSICAL THERAPY | Age: 50
Setting detail: THERAPIES SERIES
Discharge: HOME | End: 2020-06-26
Payer: MEDICARE

## 2020-06-29 ENCOUNTER — HOSPITAL ENCOUNTER (OUTPATIENT)
Dept: PHYSICAL THERAPY | Age: 50
Setting detail: THERAPIES SERIES
Discharge: HOME | End: 2020-06-29
Payer: MEDICARE

## 2020-06-29 PROCEDURE — 97110 THERAPEUTIC EXERCISES: CPT

## 2020-06-29 PROCEDURE — G0283 ELEC STIM OTHER THAN WOUND: HCPCS

## 2020-06-29 RX ORDER — POTASSIUM CHLORIDE 750 MG/1
20 TABLET, EXTENDED RELEASE ORAL DAILY
Qty: 180 TABLET | Refills: 3 | Status: SHIPPED | OUTPATIENT
Start: 2020-06-29 | End: 2021-06-15

## 2020-06-29 ASSESSMENT — PAIN DESCRIPTION - DESCRIPTORS
DESCRIPTORS_2: CONSTANT
DESCRIPTORS: CONSTANT

## 2020-06-29 ASSESSMENT — PAIN DESCRIPTION - INTENSITY: RATING_2: 7

## 2020-06-29 ASSESSMENT — PAIN DESCRIPTION - ORIENTATION
ORIENTATION: RIGHT
ORIENTATION_2: RIGHT

## 2020-06-29 ASSESSMENT — PAIN DESCRIPTION - LOCATION
LOCATION: SHOULDER
LOCATION_2: ANKLE

## 2020-06-29 ASSESSMENT — PAIN DESCRIPTION - FREQUENCY: FREQUENCY: CONTINUOUS

## 2020-06-29 ASSESSMENT — PAIN SCALES - GENERAL: PAINLEVEL_OUTOF10: 5

## 2020-06-29 ASSESSMENT — PAIN DESCRIPTION - DURATION: DURATION_2: CONTINUOUS

## 2020-06-29 NOTE — PROGRESS NOTES
Minutes   Treatment Charges: Minutes Units   []  Ultrasound     [x]  Electrical-Stim 20 1   []  Iontophoresis     []  Traction     []  Massage       []  Eval     []  Gait     [x]  Ther Exercise 45  3    []  Manual Therapy       []  Ther Activities       []  Aquatics     []  Vasopneumatic Device     []  Neuro Re-Ed       []  Other       Total Treatment Time: 65 4        Therapy Time   Individual Concurrent Group Co-treatment   Time In 0930         Time Out 1035         Minutes 65         Timed Code Treatment Minutes: 39 Minutes     Electronically signed by: Stacia Ochoa, PT

## 2020-06-29 NOTE — TELEPHONE ENCOUNTER
Last seen 5/27/20    Next Visit Date:  Future Appointments   Date Time Provider Maribell Duran   7/10/2020 11:00 AM Marlyn Ji, PT [facility]   8/27/2020 10:30 AM Tyree Beebe MD fp sc [address] Maintenance   Topic Date Due    Lipid screen  05/28/2021    Potassium monitoring  05/28/2021    Creatinine monitoring  05/28/2021    Diabetes screen  04/03/2022    DTaP/Tdap/Td vaccine (2 - Td) 01/01/2024    Flu vaccine  Completed    HIV screen  Completed    Hepatitis A vaccine  Aged Out    Hepatitis B vaccine  Aged Out    Hib vaccine  Aged Out    Meningococcal (ACWY) vaccine  Aged Out    Pneumococcal 0-64 years Vaccine  Aged Out       Hemoglobin A1C (%)   Date Value   04/03/2019 5.3   10/20/2017 5.1   02/08/2017 5.3             ( goal A1C is < 7)   No results found for: LABMICR  LDL Cholesterol (mg/dL)   Date Value   05/28/2020 111       (goal LDL is <100)   AST (U/L)   Date Value   05/28/2020 34     ALT (U/L)   Date Value   05/28/2020 36     BUN (mg/dL)   Date Value   05/28/2020 14     BP Readings from Last 3 Encounters:   05/27/20 124/84   04/22/20 118/79   04/16/20 120/78          (goal 120/80)    All Future Testing planned in [facility]              Patient Active Problem List:     Right knee pain     Chronic fatigue     Hypertriglyceridemia     Seasonal allergies     Hemorrhoids     Right shoulder pain     Calcium nephrolithiasis     Fatty liver disease, nonalcoholic     Tattoos     Anxiety     Bicipital tendinitis, right shoulder     Hyperglycemia     Bipolar disorder, in partial remission, most recent episode depressed (HCC)     Fibromyalgia muscle pain     Sinus tachycardia     Essential hypertension     S/P Achilles tendon repair     Secondary hypertension     Abnormal EKG     Impingement syndrome of right shoulder     Tear of right supraspinatus tendon     Achilles tendinitis     Bilateral swelling of feet     Weakness of both legs     Adhesive capsulitis of right shoulder     Incomplete rotatr-cuff tear/ruptr of r shoulder, not trauma     Primary osteoarthritis, right shoulder     Allergic rhinitis due to allergen     Vitamin B 12 deficiency     Vitamin D deficiency     Chronic gout of right ankle     Bilateral leg edema

## 2020-06-30 ENCOUNTER — HOSPITAL ENCOUNTER (EMERGENCY)
Age: 50
Discharge: HOME | End: 2020-06-30
Attending: EMERGENCY MEDICINE
Payer: MEDICARE

## 2020-06-30 VITALS
HEART RATE: 94 BPM | WEIGHT: 180 LBS | TEMPERATURE: 98.6 F | BODY MASS INDEX: 25.2 KG/M2 | OXYGEN SATURATION: 96 % | SYSTOLIC BLOOD PRESSURE: 148 MMHG | HEIGHT: 71 IN | DIASTOLIC BLOOD PRESSURE: 77 MMHG | RESPIRATION RATE: 20 BRPM

## 2020-06-30 PROCEDURE — 2500000003 HC RX 250 WO HCPCS: Performed by: EMERGENCY MEDICINE

## 2020-06-30 PROCEDURE — 6370000000 HC RX 637 (ALT 250 FOR IP): Performed by: EMERGENCY MEDICINE

## 2020-06-30 PROCEDURE — 99282 EMERGENCY DEPT VISIT SF MDM: CPT

## 2020-06-30 RX ORDER — HYDROCODONE BITARTRATE AND ACETAMINOPHEN 5; 325 MG/1; MG/1
1 TABLET ORAL EVERY 6 HOURS PRN
Qty: 10 TABLET | Refills: 0 | Status: SHIPPED | OUTPATIENT
Start: 2020-06-30 | End: 2020-07-03

## 2020-06-30 RX ORDER — HYDROCODONE BITARTRATE AND ACETAMINOPHEN 5; 325 MG/1; MG/1
1 TABLET ORAL ONCE
Status: COMPLETED | OUTPATIENT
Start: 2020-06-30 | End: 2020-06-30

## 2020-06-30 RX ADMIN — HYDROCODONE BITARTRATE AND ACETAMINOPHEN 1 TABLET: 5; 325 TABLET ORAL at 14:31

## 2020-06-30 RX ADMIN — SILVER SULFADIAZINE: 10 CREAM TOPICAL at 14:31

## 2020-06-30 ASSESSMENT — ENCOUNTER SYMPTOMS
DIARRHEA: 0
VOMITING: 0
COUGH: 0
SHORTNESS OF BREATH: 0
ABDOMINAL PAIN: 0
NAUSEA: 0
CONSTIPATION: 0

## 2020-06-30 ASSESSMENT — PAIN SCALES - GENERAL
PAINLEVEL_OUTOF10: 10
PAINLEVEL_OUTOF10: 10

## 2020-06-30 ASSESSMENT — PAIN DESCRIPTION - DESCRIPTORS: DESCRIPTORS: BURNING;THROBBING

## 2020-06-30 ASSESSMENT — PAIN DESCRIPTION - LOCATION: LOCATION: LEG

## 2020-06-30 ASSESSMENT — PAIN DESCRIPTION - PAIN TYPE: TYPE: ACUTE PAIN

## 2020-06-30 ASSESSMENT — PAIN DESCRIPTION - ORIENTATION: ORIENTATION: RIGHT;LOWER
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
## 2020-07-02 ENCOUNTER — TELEPHONE (OUTPATIENT)
Dept: FAMILY MEDICINE CLINIC | Age: 50
End: 2020-07-02

## 2020-07-02 ENCOUNTER — TELEMEDICINE (OUTPATIENT)
Dept: FAMILY MEDICINE CLINIC | Age: 50
End: 2020-07-02
Payer: MEDICARE

## 2020-07-02 PROCEDURE — 99214 OFFICE O/P EST MOD 30 MIN: CPT | Performed by: FAMILY MEDICINE

## 2020-07-02 PROCEDURE — G8427 DOCREV CUR MEDS BY ELIG CLIN: HCPCS | Performed by: FAMILY MEDICINE

## 2020-07-02 RX ORDER — CEPHALEXIN 500 MG/1
500 CAPSULE ORAL EVERY 6 HOURS
Qty: 40 CAPSULE | Refills: 0 | Status: SHIPPED | OUTPATIENT
Start: 2020-07-02 | End: 2020-07-28 | Stop reason: ALTCHOICE

## 2020-07-02 RX ORDER — DOXYCYCLINE HYCLATE 100 MG
100 TABLET ORAL 2 TIMES DAILY
Qty: 20 TABLET | Refills: 0 | Status: SHIPPED | OUTPATIENT
Start: 2020-07-02 | End: 2020-07-12

## 2020-07-02 RX ORDER — CHLORHEXIDINE GLUCONATE 4 G/100ML
SOLUTION TOPICAL
Qty: 946 ML | Refills: 3 | Status: SHIPPED | OUTPATIENT
Start: 2020-07-02 | End: 2020-08-05

## 2020-07-02 RX ORDER — GAUZE BANDAGE 2" X 2"
BANDAGE TOPICAL
Qty: 60 EACH | Refills: 0 | Status: SHIPPED | OUTPATIENT
Start: 2020-07-02 | End: 2020-07-02 | Stop reason: SDUPTHER

## 2020-07-02 RX ORDER — GAUZE BANDAGE 2" X 2"
BANDAGE TOPICAL
Qty: 60 EACH | Refills: 0 | Status: SHIPPED | OUTPATIENT
Start: 2020-07-02 | End: 2020-07-21 | Stop reason: SDUPTHER

## 2020-07-02 ASSESSMENT — ENCOUNTER SYMPTOMS
COUGH: 0
SHORTNESS OF BREATH: 0
NAUSEA: 0
VOMITING: 0
ABDOMINAL PAIN: 0

## 2020-07-02 NOTE — TELEPHONE ENCOUNTER
Pt called and said he spoke with [facility] and they dont bill for gauze    . Please Approve or Refuse.   Send to Pharmacy per Pt's Request:      Next Visit Date:  8/27/2020   Last Visit Date: 1/10/2020    Hemoglobin A1C (%)   Date Value   04/03/2019 5.3   10/20/2017 5.1   02/08/2017 5.3             ( goal A1C is < 7)   BP Readings from Last 3 Encounters:   06/30/20 (!) 148/77   05/27/20 124/84   04/22/20 118/79          (goal 120/80)  BUN   Date Value Ref Range Status   05/28/2020 14 6 - 20 mg/dL Final     CREATININE   Date Value Ref Range Status   05/28/2020 0.90 0.70 - 1.20 mg/dL Final     Potassium   Date Value Ref Range Status   05/28/2020 4.6 3.7 - 5.3 mmol/L Final
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
## 2020-07-10 ENCOUNTER — HOSPITAL ENCOUNTER (OUTPATIENT)
Dept: PHYSICAL THERAPY | Age: 50
Setting detail: THERAPIES SERIES
Discharge: HOME | End: 2020-07-10
Payer: MEDICARE

## 2020-07-10 ENCOUNTER — HOSPITAL ENCOUNTER (OUTPATIENT)
Dept: WOUND CARE | Age: 50
Discharge: HOME | End: 2020-07-10
Payer: MEDICARE

## 2020-07-10 VITALS
WEIGHT: 180 LBS | HEART RATE: 73 BPM | BODY MASS INDEX: 25.2 KG/M2 | RESPIRATION RATE: 18 BRPM | DIASTOLIC BLOOD PRESSURE: 71 MMHG | HEIGHT: 71 IN | TEMPERATURE: 97.1 F | SYSTOLIC BLOOD PRESSURE: 103 MMHG

## 2020-07-10 PROBLEM — T24.201A PARTIAL THICKNESS BURN OF RIGHT LOWER EXTREMITY: Status: ACTIVE | Noted: 2020-07-10

## 2020-07-10 PROCEDURE — 6370000000 HC RX 637 (ALT 250 FOR IP): Performed by: PODIATRIST

## 2020-07-10 PROCEDURE — 29580 STRAPPING UNNA BOOT: CPT

## 2020-07-10 PROCEDURE — 99203 OFFICE O/P NEW LOW 30 MIN: CPT | Performed by: PODIATRIST

## 2020-07-10 PROCEDURE — 99213 OFFICE O/P EST LOW 20 MIN: CPT

## 2020-07-10 RX ORDER — LIDOCAINE HYDROCHLORIDE 40 MG/ML
SOLUTION TOPICAL ONCE
Status: COMPLETED | OUTPATIENT
Start: 2020-07-10 | End: 2020-07-10

## 2020-07-10 RX ADMIN — LIDOCAINE HYDROCHLORIDE 10 ML: 40 SOLUTION TOPICAL at 09:28

## 2020-07-10 ASSESSMENT — PAIN DESCRIPTION - PROGRESSION: CLINICAL_PROGRESSION: NOT CHANGED

## 2020-07-10 ASSESSMENT — PAIN DESCRIPTION - ORIENTATION: ORIENTATION: RIGHT

## 2020-07-10 ASSESSMENT — PAIN DESCRIPTION - LOCATION: LOCATION: LEG

## 2020-07-10 ASSESSMENT — PAIN - FUNCTIONAL ASSESSMENT: PAIN_FUNCTIONAL_ASSESSMENT: ACTIVITIES ARE NOT PREVENTED

## 2020-07-10 ASSESSMENT — PAIN DESCRIPTION - ONSET: ONSET: ON-GOING

## 2020-07-10 ASSESSMENT — PAIN SCALES - GENERAL: PAINLEVEL_OUTOF10: 6

## 2020-07-10 ASSESSMENT — PAIN DESCRIPTION - DESCRIPTORS: DESCRIPTORS: STABBING;CONSTANT

## 2020-07-10 ASSESSMENT — PAIN DESCRIPTION - PAIN TYPE: TYPE: CHRONIC PAIN

## 2020-07-10 ASSESSMENT — PAIN DESCRIPTION - FREQUENCY: FREQUENCY: CONTINUOUS
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
## 2020-07-10 NOTE — PROGRESS NOTES
[place] Application   Below Knee    NAME:  Yolanda Rapp  YOB: 1970  MEDICAL RECORD NUMBER:  [id number]  DATE:  7/10/2020     [x] Applied moisturizing agent to dry skin as needed.  [x] Appied primary and secondary dressing as ordered     [x] Applied Unna roll from toes to knee overlapping each time.  [x] Applied ace wrap or coban from toes to below the knee.  [x] Secured with tape and/or metal clips covered with tape.  [x] Instructed patient/caregiver to keep dressing dry and intact. DO NOT REMOVE DRESSING.  [x] Instructed pt/family/caregiver to report excessive draining, loose bandage, wet dressing, severe pain or tingling in toes.  [x] Applied [place] dressing below the knee to Right lower leg(s)        Unna Boot(s) were applied per  Guidelines.      Electronically signed by Tate Moya RN on 7/10/2020 at 10:34 AM

## 2020-07-10 NOTE — PROGRESS NOTES
Physical Therapy Cancel/NS Note    Date: 7/10/2020  Patient Name: Tosha Dias  MRN: [id number]  : 1970    Subjective   General Comment  Comments: cancel PT today  PT Visit Information  Onset Date: 19  PT Insurance Information: [employer]  Total # of Visits Approved: 32  Total # of Visits to Date: 24  No Show: 2  Canceled Appointment: 4  Electronically signed by: Ananth Tony PT

## 2020-07-10 NOTE — PROGRESS NOTES
Take 1 tablet by mouth 2 times daily as needed for Constipation 20 tablet 1    ibuprofen (ADVIL;MOTRIN) 600 MG tablet Take 1 tablet by mouth every 6 hours as needed for Pain 90 tablet 0    vitamin B-12 (CYANOCOBALAMIN) 1000 MCG tablet Take 1,000 mcg by mouth daily      Ginkgo Biloba (GINKOBA PO) Take by mouth      Adapalene 0.3 % GEL   1    benztropine (COGENTIN) 1 MG tablet   0    erythromycin with ethanol (THERAMYCIN) 2 % external solution apply topically as directed every morning  0    aluminum chloride (DRYSOL) 20 % external solution Apply topically nightly. 37.5 mL 3     No current facility-administered medications on file prior to encounter. ALLERGIES    Allergies   Allergen Reactions    Latuda [Lurasidone Hcl]      Tremors, dyskinesia    Lithium      Tremors       PAST SURGICAL HISTORY    Past Surgical History:   Procedure Laterality Date    ACHILLES TENDON SURGERY Right     4 different surgeries for this,over the last 2 years    FOOT TENDON SURGERY Right     5 surgeries for Achilles tendon    ROTATOR CUFF REPAIR Left 2011    SHOULDER SURGERY  2018    SHOULDER SURGERY  right    18       FAMILY HISTORY    family history includes Breast Cancer in his maternal grandmother; Cancer (age of onset: 34) in his mother; Heart Disease (age of onset: 61) in his father; Stroke in his father. SOCIAL HISTORY    Social History     Tobacco Use    Smoking status: Former Smoker     Packs/day: 1.50     Years: 12.00     Pack years: 18.00     Last attempt to quit: 2002     Years since quittin.9    Smokeless tobacco: Never Used   Substance Use Topics    Alcohol use: No     Alcohol/week: 0.0 standard drinks    Drug use: No       REVIEW OF SYSTEMS    Pertinent items are noted in HPI.       Objective:      /71   Pulse 73   Temp 97.1 °F (36.2 °C) (Tympanic)   Resp 18   Ht 5' 11\" (1.803 m)   Wt 180 lb (81.6 kg)   BMI 25.10 kg/m²   General Appearance: alert and oriented to Right shoulder pain    Calcium nephrolithiasis    Fatty liver disease, nonalcoholic    Tattoos    Anxiety    Bicipital tendinitis, right shoulder    Hyperglycemia    Bipolar disorder, in partial remission, most recent episode depressed (HCC)    Fibromyalgia muscle pain    Sinus tachycardia    Essential hypertension    S/P Achilles tendon repair    Secondary hypertension    Abnormal EKG    Impingement syndrome of right shoulder    Tear of right supraspinatus tendon    Achilles tendinitis    Bilateral swelling of feet    Weakness of both legs    Adhesive capsulitis of right shoulder    Incomplete rotatr-cuff tear/ruptr of r shoulder, not trauma    Primary osteoarthritis, right shoulder    Allergic rhinitis due to allergen    Vitamin B 12 deficiency    Vitamin D deficiency    Chronic gout of right ankle    Bilateral leg edema    Partial thickness burn of right lower extremity         Plan:   Pt was evaluated and examined. Patient was given personalized discharge instructions. Diagnosis was discussed with the pt and all of their questions were answered in detail. Proper foot hygiene and care was discussed with the pt. Patient to check feet daily and contact the office with any questions/problems/concerns. Other comorbidity noted and will be managed by PCP. Procedure Note  Indications:  Based on my examination of this patient's wound(s)/ulcer(s) today, debridement is not required to promote healing and evaluate the wound base. Plan for wound  Dress per physician order  Treatment: collagen, unna boot  Finish antibiotics  If unna boot gets uncomfortable, take off, and apply silvadene daily. 1. Discussed appropriate home care of this wound. 2. Dressings: No orders of the defined types were placed in this encounter. 3. Follow up: 1 week. 4. Detailed home instructions and education material given to patient prior to discharge.      Discharge Instructions         [facility] [place] and [address] [facility]      Visit  Discharge Instructions / Physician Orders  DATE:7/10/20     Home Care: none     SUPPLIES ORDERED THRU: none     Wound Location:  right lateral lower leg     Cleanse with: Liquid antibacterial soap and water, rinse well      Dressing Orders:  Primary dressing  aria                     Secondary dressing    ABD zinc unna boot                       secure with           x 30days     Frequency:  Keep dry and intact     Additional Orders: Increase protein to diet (meat, cheese, eggs, fish, peanut butter, nuts and beans)  Multivitamin daily    MY CHART []     Smart Device  []     HYPERBARIC TREATMENT-                TREATMENT #     Your next appointment with the [facility] is in 1 week                                                                                                   ROOM TYPE   [] CHAIR     [] BED   [] EITHER        TIME [] 45 MIN     [] 60 MIN     (Please note your next appointment above and if you are unable to keep, kindly give a 24 hour notice. Thank you.)     If you experience any of the following, please call the [address] [place] [address] during business hours:  [phone]     * Increase in Pain  * Temperature over 101  * Increase in drainage from your wound  * Drainage with a foul odor  * Bleeding  * Increase in swelling  * Need for compression bandage changes due to slippage, breakthrough drainage. If you need medical attention outside of the business hours of the [address] [place] [address] please contact your PCP or go to the nearest emergency room. The information contained in the After Visit Summary has been reviewed with me, the patient and/or responsible adult, by my health care provider(s). I had the opportunity to ask questions regarding this information.  I have elected to receive;      []After Visit Summary  [x]Comprehensive Discharge Instruction      Patient signature______________________________________Date:________  Electronically signed by Becki Brantley RN on 7/10/2020 at 10:16 AM      Electronically signed by Cj Sylvester DPM on 7/10/2020 at 9:21 AM            Electronically signed by Cj Sylvester DPM on 7/10/2020 at 10:22 AM

## 2020-07-10 NOTE — PLAN OF CARE
Problem: Pain:  Goal: Pain level will decrease  Description: Pain level will decrease  Outcome: Ongoing  Goal: Control of acute pain  Description: Control of acute pain  Outcome: Ongoing  Goal: Control of chronic pain  Description: Control of chronic pain  Outcome: Ongoing     Problem: Wound:  Goal: Will show signs of wound healing; wound closure and no evidence of infection  Description: Will show signs of wound healing; wound closure and no evidence of infection  Outcome: Ongoing     Problem: Compression therapy:  Goal: Will be free from complications associated with compression therapy  Description: Will be free from complications associated with compression therapy  Outcome: Ongoing

## 2020-07-17 ENCOUNTER — HOSPITAL ENCOUNTER (OUTPATIENT)
Dept: WOUND CARE | Age: 50
Discharge: HOME | End: 2020-07-17
Payer: MEDICARE

## 2020-07-17 VITALS
HEART RATE: 71 BPM | SYSTOLIC BLOOD PRESSURE: 117 MMHG | DIASTOLIC BLOOD PRESSURE: 79 MMHG | RESPIRATION RATE: 18 BRPM | BODY MASS INDEX: 25.2 KG/M2 | TEMPERATURE: 98.6 F | WEIGHT: 180 LBS | HEIGHT: 71 IN

## 2020-07-17 PROCEDURE — 97597 DBRDMT OPN WND 1ST 20 CM/<: CPT | Performed by: PODIATRIST

## 2020-07-17 PROCEDURE — 97598 DBRDMT OPN WND ADDL 20CM/<: CPT

## 2020-07-17 PROCEDURE — 6370000000 HC RX 637 (ALT 250 FOR IP): Performed by: PODIATRIST

## 2020-07-17 PROCEDURE — 99213 OFFICE O/P EST LOW 20 MIN: CPT | Performed by: PODIATRIST

## 2020-07-17 PROCEDURE — 97597 DBRDMT OPN WND 1ST 20 CM/<: CPT

## 2020-07-17 PROCEDURE — 97598 DBRDMT OPN WND ADDL 20CM/<: CPT | Performed by: PODIATRIST

## 2020-07-17 RX ORDER — LIDOCAINE HYDROCHLORIDE 40 MG/ML
SOLUTION TOPICAL ONCE
Status: COMPLETED | OUTPATIENT
Start: 2020-07-17 | End: 2020-07-17

## 2020-07-17 RX ADMIN — LIDOCAINE HYDROCHLORIDE 20 ML: 40 SOLUTION TOPICAL at 08:47

## 2020-07-17 ASSESSMENT — PAIN DESCRIPTION - PAIN TYPE: TYPE: CHRONIC PAIN

## 2020-07-17 ASSESSMENT — PAIN - FUNCTIONAL ASSESSMENT: PAIN_FUNCTIONAL_ASSESSMENT: ACTIVITIES ARE NOT PREVENTED

## 2020-07-17 ASSESSMENT — PAIN SCALES - GENERAL: PAINLEVEL_OUTOF10: 7

## 2020-07-17 ASSESSMENT — PAIN DESCRIPTION - DESCRIPTORS: DESCRIPTORS: CONSTANT;STABBING

## 2020-07-17 ASSESSMENT — PAIN DESCRIPTION - PROGRESSION: CLINICAL_PROGRESSION: NOT CHANGED

## 2020-07-17 ASSESSMENT — PAIN DESCRIPTION - LOCATION: LOCATION: LEG

## 2020-07-17 ASSESSMENT — PAIN DESCRIPTION - ONSET: ONSET: ON-GOING

## 2020-07-17 ASSESSMENT — PAIN DESCRIPTION - FREQUENCY: FREQUENCY: CONTINUOUS

## 2020-07-17 ASSESSMENT — PAIN DESCRIPTION - ORIENTATION: ORIENTATION: RIGHT

## 2020-07-17 NOTE — PROGRESS NOTES
[address]  Return Patient History and Physical      Russell Lorenzo  AGE: 54 y.o. GENDER: male  : 1970  TODAY'S DATE:  2020    Chief Complaint:   Chief Complaint   Patient presents with    Wound Check     right leg         History of the Present Illness       Russell Lorenzo is a 54 y.o. male who presents today for evaluation and treatment for a burn wound which is located on the right. Left the unna boot on for about 3 days. Swelling better. Using silvadene. History of Wound: , laid his leg on hot shingles. 10 days ago. Using silvadene. On oral antibiotics. Painful, leg swelling.    Wound Type:burn  Wound Location:left leg  Modifying factors:edema    Pain Level: 7   Pain Assessment: 0-10     Abx :Yes   Cultures :were notobtained  Pain : 6/10    PAST MEDICAL HISTORY        Diagnosis Date    Allergic rhinitis     Anxiety     [facility] ([facility]     Bipolar disorder, in partial remission, most recent episode depressed ([facility] [address] [facility] 2016    Chronic kidney disease     Constipation due to pain medication 2017    Depression with anxiety     Essential hypertension 2017    Fatty liver 11/29/15    per CT    Hiatal hernia 11/29/15     small HH, per CT    Hyperlipidemia     Injury of Achilles tendon 10/23/2014    Kidney stone 11/29/15    passed, calcium oxalate crystals    Primary osteoarthritis, right shoulder 2018    PTSD (post-traumatic stress disorder)     as a child       MEDICATIONS    Current Outpatient Medications on File Prior to Encounter   Medication Sig Dispense Refill    cephALEXin (KEFLEX) 500 MG capsule Take 1 capsule by mouth every 6 hours 40 capsule 0    chlorhexidine (HIBICLENS) 4 % external liquid Dilute in water 946 mL 3    silver sulfADIAZINE (SILVADENE) 1 % cream Apply topically  Twice a day, burn wound 3 in x 4 in 400 g 0    Gauze Pads & Dressings (Bhavesh Iglesias MD) MISC Needs 2 times a day 60 each 0    Gauze Pads & Dressings 4\"X4\" PADS Using 6 per day for large wound on the right leg 180 each 0    chlorhexidine (HIBICLENS) 4 % external liquid Use once or twice a day to clean the wound, diluted in water and wash the wound with it 946 mL 3    potassium chloride (KLOR-CON M) 10 MEQ extended release tablet Take 2 tablets by mouth daily Take with Hydrochlorothiazide 180 tablet 3    busPIRone (BUSPAR) 10 MG tablet take 1 tablet by mouth three times a day 90 tablet 0    pravastatin (PRAVACHOL) 80 MG tablet take 1 tablet by mouth every evening 90 tablet 3    Omega-3 Fatty Acids (OMEGA-3 FISH OIL) 1200 MG CAPS Take 2 capsules by mouth 2 times daily **stop niacin** 360 capsule 3    hydrochlorothiazide (HYDRODIURIL) 25 MG tablet Take 1 tablet by mouth every morning Dose increased 5/27/2020 90 tablet 3    metoprolol tartrate (LOPRESSOR) 25 MG tablet Take 2 tablets by mouth 2 times daily Dose decreased 5/27/2020 360 tablet 0    fluticasone (FLONASE) 50 MCG/ACT nasal spray instill 2 sprays into each nostril once daily 16 g 5    vitamin D (CHOLECALCIFEROL) 25 MCG (1000 UT) TABS tablet Take 1 tablet by mouth daily 30 tablet 3    cyclobenzaprine (FLEXERIL) 10 MG tablet take 1 tablet by mouth three times a day if needed for muscle spasm 90 tablet 3    fexofenadine (ALLEGRA ALLERGY) 180 MG tablet Take 1 tablet by mouth daily 30 tablet 3    docusate sodium (COLACE) 100 MG capsule Take 1 capsule by mouth 2 times daily 60 capsule 3    Handicap Placard MISC by Does not apply route Can't walk greater than 200 feet. Expires in 5 years. 1 each 0    RA ASPIRIN EC 81 MG EC tablet take 1 tablet by mouth once daily 90 tablet 3    APLENZIN 174 MG TB24   0    lidocaine (LMX) 4 % cream Apply 2-3 times a day as needed for pain 120 g 3    Camphor-Menthol-Methyl Sal 3.1-16-10 % GEL Apply every 8 hours as needed for pain.  OK to substitute 1 Tube 1    senna-docusate (SENNA S) 8.6-50 MG per tablet Take 1 tablet by mouth 2 times daily as well-nourished, in no acute distress  Wound 07/10/20 Leg Right;Lateral;Lower #1  (Active)   Wound Image   07/10/20 0924   Wound Burn 07/17/20 0841   Dressing Status Old drainage 07/17/20 0841   Dressing Changed Changed/New 07/17/20 0841   Dressing/Treatment Other (comment) 07/10/20 1032   Wound Cleansed Rinsed/Irrigated with saline 07/17/20 0841   Wound Length (cm) 12.5 cm 07/17/20 0841   Wound Width (cm) 5.5 cm 07/17/20 0841   Wound Depth (cm) 0.1 cm 07/17/20 0841   Wound Surface Area (cm^2) 68.75 cm^2 07/17/20 0841   Change in Wound Size % (l*w) 11.86 07/17/20 0841   Wound Volume (cm^3) 6.88 cm^3 07/17/20 0841   Wound Healing % 12 07/17/20 0841   Post-Procedure Length (cm) 12.5 cm 07/17/20 0841   Post-Procedure Width (cm) 5.5 cm 07/17/20 0841   Post-Procedure Depth (cm) 0.1 cm 07/17/20 0841   Post-Procedure Surface Area (cm^2) 68.75 cm^2 07/17/20 0841   Post-Procedure Volume (cm^3) 6.88 cm^3 07/17/20 0841   Wound Assessment Drainage;Painful;Pink;Yellow 07/17/20 0841   Drainage Amount Moderate 07/17/20 0841   Drainage Description Serosanguinous 07/17/20 0841   Odor None 07/17/20 0841   Margins Defined edges 07/17/20 0841   Angela-wound Assessment Fragile;Pink 07/17/20 0841   Pink%Wound Bed 10 07/17/20 0841   Red%Wound Bed 0 07/17/20 0841   Yellow%Wound Bed 90 07/17/20 0841   Number of days: 6           Vascular:  DP/PT pulses palpable 3/4, Bilateral.    CFT <3 seconds to digits 1-5, Bilateral .   Hair growth present to level of digits, Bilateral.  Edema present, Right. Erythema absent, Right. Neurological:  Sensation present to light touch to level of digits, Bilateral.    Musculoskeletal:  Muscle strength 5/5 all LE groups tested, Bilateral.         Assessment:           1. Partial thickness burn of right lower extremity, sequela    2. Bilateral leg edema    3.  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, right [facility])        Patient Active Problem List   Diagnosis    Right knee pain    Chronic fatigue    Hypertriglyceridemia    Seasonal allergies    Hemorrhoids    Right shoulder pain    Calcium nephrolithiasis    Fatty liver disease, nonalcoholic    Tattoos    Anxiety    Bicipital tendinitis, right shoulder    Hyperglycemia    Bipolar disorder, in partial remission, most recent episode depressed (HCC)    Fibromyalgia muscle pain    Sinus tachycardia    Essential hypertension    S/P Achilles tendon repair    Secondary hypertension    Abnormal EKG    Impingement syndrome of right shoulder    Tear of right supraspinatus tendon    Achilles tendinitis    Bilateral swelling of feet    Weakness of both legs    Adhesive capsulitis of right shoulder    Incomplete rotatr-cuff tear/ruptr of r shoulder, not trauma    Primary osteoarthritis, right shoulder    Allergic rhinitis due to allergen    Vitamin B 12 deficiency    Vitamin D deficiency    Chronic gout of right ankle    Bilateral leg edema    Partial thickness burn of right lower extremity    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, right ([address]         Plan:   Pt was evaluated and examined. Patient was given personalized discharge instructions. Diagnosis was discussed with the pt and all of their questions were answered in detail. Proper foot hygiene and care was discussed with the pt. Patient to check feet daily and contact the office with any questions/problems/concerns. Other comorbidity noted and will be managed by PCP. Procedure Note  Debridement, Open wound First 20sq cm or less. ([id number]) Lidocaine soaked gauze was applied per physician order at beginning of wound evaluation. It was subsequently removed. Selective debridement was performed of hyperkeratotic rim, dried drainage, and slough/loose fibrin. Pick-up, was used for debridement. No bleeding was noted.      Plan for wound  Dress per physician order  Treatment: start Santyl  If insurance does not cover, will try hydrogel and After Visit Summary has been reviewed with me, the patient and/or responsible adult, by my health care provider(s). I had the opportunity to ask questions regarding this information. I have elected to receive;      []? After Visit Summary  [x]? Comprehensive Discharge Instruction        Patient signature______________________________________Date:________    Electronically signed by Jeremy De Anda DPM on 7/17/2020 at 8:38 AM            Electronically signed by Jeremy De Anda DPM on 7/17/2020 at 8:59 AM

## 2020-07-17 NOTE — PROGRESS NOTES
[address] [facility]:     [address]  [address] [facility] p: [phone] f: [address] [facility]:     [address]  [address] [place] [address]  [phone]  WOUND CARE Dept: [phone]   FAX NUMBER [unfilled]    Patient Information:      Andrew Cornea  [address] [facility] [address] [facility]   [phone]   : 1970  AGE: 54 y.o.      GENDER: male   TODAYS DATE:  2020    Insurance:      PRIMARY INSURANCE:  Plan: PARAMOUNT ADVANTAGE  Coverage: PARAMOUNT ADVANTAGE  Effective Date: 2/15/2017  [id number] - (Commercial)    SECONDARY INSURANCE:  Plan:   Coverage:   Effective Date:   [unfilled]    [unfilled]   [unfilled]     Patient Wound Information:      Problem List Items Addressed This Visit     Bilateral leg edema    Partial thickness burn of right lower extremity - Primary          WOUNDS REQUIRING DRESSING SUPPLIES:     Wound 07/10/20 Leg Right;Lateral;Lower #1  (Active)   Wound Image   07/10/20 0924   Wound Burn 20 0841   Dressing Status Old drainage 20 0841   Dressing Changed Changed/New 20 0841   Dressing/Treatment Other (comment) 07/10/20 1032   Wound Cleansed Rinsed/Irrigated with saline 20 0841   Wound Length (cm) 12.5 cm 20 0841   Wound Width (cm) 5.5 cm 20 0841   Wound Depth (cm) 0.1 cm 20 0841   Wound Surface Area (cm^2) 68.75 cm^2 20 0841   Change in Wound Size % (l*w) 11.86 20 0841   Wound Volume (cm^3) 6.88 cm^3 20 0841   Wound Healing % 12 20 0841   Post-Procedure Length (cm) 12.5 cm 20 0841   Post-Procedure Width (cm) 5.5 cm 20 0841   Post-Procedure Depth (cm) 0.1 cm 20 0841   Post-Procedure Surface Area (cm^2) 68.75 cm^2 20 08   Post-Procedure Volume (cm^3) 6.88 cm^3 20 08   Wound Assessment Drainage;Painful;Pink;Yellow 20   Drainage Amount Moderate 20 Drainage Description Serosanguinous 07/17/20 0841   Odor None 07/17/20 0841   Margins Defined edges 07/17/20 0841   Angela-wound Assessment Fragile;Pink 07/17/20 0841   Pink%Wound Bed 10 07/17/20 0841   Red%Wound Bed 0 07/17/20 0841   Yellow%Wound Bed 90 07/17/20 0841   Number of days: 6          Supplies Requested :      WOUND #: 1   PRIMARY DRESSING:  Other: santyl   Cover and Secure with: Other moist saline gauze, ABD, kerlilx , paper tape     FREQUENCY OF DRESSING CHANGES:  Daily, risk of infection.                                      ADDITIONAL ITEMS:  [] Gloves Small  [x] Gloves Medium [] Gloves Large [] Gloves XLarge  [x] Tape 1\" [x] Tape 2\" [] Tape 3\"  [] Medipore Tape  [x] Saline  [] Skin Prep   [] Adhesive Remover   [] Cotton Tip Applicators   [] Other:    Patient Wound(s) Debrided: [x] Yes   [] No    Debribement Type: epidermis    Debridement Date: 7/17/2020    Patient currently being seen by Home Health: [] Yes   [x] No    Duration for needed supplies:  []15  [x]30  []60  []90 Days    Provider Information:      PROVIDER'S NAME: Donn Graff [address] Goodpasture Kathren Canal DPM  [phone]

## 2020-07-21 ENCOUNTER — TELEPHONE (OUTPATIENT)
Dept: FAMILY MEDICINE CLINIC | Age: 50
End: 2020-07-21

## 2020-07-21 RX ORDER — GAUZE BANDAGE 2" X 2"
BANDAGE TOPICAL
Qty: 60 EACH | Refills: 0 | Status: SHIPPED | OUTPATIENT
Start: 2020-07-21 | End: 2021-03-12 | Stop reason: ALTCHOICE

## 2020-07-21 RX ORDER — GAUZE BANDAGE 2" X 2"
BANDAGE TOPICAL
Qty: 60 EACH | Refills: 0 | Status: SHIPPED | OUTPATIENT
Start: 2020-07-21 | End: 2020-07-21 | Stop reason: SDUPTHER

## 2020-07-22 NOTE — TELEPHONE ENCOUNTER
Patient is being seen at [place] [address] , he just didn't have any at home to use, he was not given any to change at home .   Orders faxed to [address] [place] [address]

## 2020-07-24 ENCOUNTER — HOSPITAL ENCOUNTER (OUTPATIENT)
Dept: WOUND CARE | Age: 50
Discharge: HOME | End: 2020-07-24
Payer: MEDICARE

## 2020-07-24 VITALS
HEART RATE: 73 BPM | HEIGHT: 71 IN | WEIGHT: 180 LBS | TEMPERATURE: 97.3 F | BODY MASS INDEX: 25.2 KG/M2 | DIASTOLIC BLOOD PRESSURE: 75 MMHG | SYSTOLIC BLOOD PRESSURE: 100 MMHG

## 2020-07-24 PROCEDURE — 6370000000 HC RX 637 (ALT 250 FOR IP): Performed by: PODIATRIST

## 2020-07-24 PROCEDURE — 99213 OFFICE O/P EST LOW 20 MIN: CPT

## 2020-07-24 PROCEDURE — 99213 OFFICE O/P EST LOW 20 MIN: CPT | Performed by: PODIATRIST

## 2020-07-24 RX ORDER — LIDOCAINE HYDROCHLORIDE 40 MG/ML
SOLUTION TOPICAL ONCE
Status: COMPLETED | OUTPATIENT
Start: 2020-07-24 | End: 2020-07-24

## 2020-07-24 RX ADMIN — LIDOCAINE HYDROCHLORIDE: 40 SOLUTION TOPICAL at 08:37

## 2020-07-24 ASSESSMENT — PAIN SCALES - GENERAL: PAINLEVEL_OUTOF10: 6

## 2020-07-24 ASSESSMENT — PAIN DESCRIPTION - PROGRESSION: CLINICAL_PROGRESSION: NOT CHANGED

## 2020-07-24 ASSESSMENT — PAIN DESCRIPTION - ONSET: ONSET: ON-GOING

## 2020-07-24 ASSESSMENT — PAIN DESCRIPTION - FREQUENCY: FREQUENCY: INTERMITTENT

## 2020-07-24 ASSESSMENT — PAIN DESCRIPTION - ORIENTATION: ORIENTATION: RIGHT

## 2020-07-24 ASSESSMENT — PAIN DESCRIPTION - DESCRIPTORS: DESCRIPTORS: STABBING

## 2020-07-24 ASSESSMENT — PAIN DESCRIPTION - PAIN TYPE: TYPE: CHRONIC PAIN

## 2020-07-24 ASSESSMENT — PAIN DESCRIPTION - LOCATION: LOCATION: LEG
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
## 2020-07-27 RX ORDER — BUSPIRONE HYDROCHLORIDE 10 MG/1
TABLET ORAL
Qty: 90 TABLET | Refills: 2 | Status: SHIPPED | OUTPATIENT
Start: 2020-07-27 | End: 2020-10-16

## 2020-07-27 NOTE — TELEPHONE ENCOUNTER
Please Approve or Refuse.   Send to Pharmacy per Pt's Request:      Next Visit Date:  8/27/2020   Last Visit Date: 1/10/2020    Hemoglobin A1C (%)   Date Value   04/03/2019 5.3   10/20/2017 5.1   02/08/2017 5.3             ( goal A1C is < 7)   BP Readings from Last 3 Encounters:   07/24/20 100/75   07/17/20 117/79   07/10/20 103/71          (goal 120/80)  BUN   Date Value Ref Range Status   05/28/2020 14 6 - 20 mg/dL Final     CREATININE   Date Value Ref Range Status   05/28/2020 0.90 0.70 - 1.20 mg/dL Final     Potassium   Date Value Ref Range Status   05/28/2020 4.6 3.7 - 5.3 mmol/L Final

## 2020-07-28 ENCOUNTER — HOSPITAL ENCOUNTER (OUTPATIENT)
Dept: PREADMISSION TESTING | Age: 50
Discharge: HOME | End: 2020-08-01
Payer: MEDICARE

## 2020-07-28 VITALS
HEART RATE: 67 BPM | HEIGHT: 71 IN | WEIGHT: 175.8 LBS | OXYGEN SATURATION: 100 % | DIASTOLIC BLOOD PRESSURE: 73 MMHG | RESPIRATION RATE: 18 BRPM | BODY MASS INDEX: 24.61 KG/M2 | TEMPERATURE: 97.6 F | SYSTOLIC BLOOD PRESSURE: 102 MMHG

## 2020-07-28 LAB
ABSOLUTE EOS #: 0.1 K/UL (ref 0–0.4)
ABSOLUTE IMMATURE GRANULOCYTE: NORMAL K/UL (ref 0–0.3)
ABSOLUTE LYMPH #: 3 K/UL (ref 1–4.8)
ABSOLUTE MONO #: 0.7 K/UL (ref 0.1–1.3)
ANION GAP SERPL CALCULATED.3IONS-SCNC: 9 MMOL/L (ref 9–17)
BASOPHILS # BLD: 0 % (ref 0–2)
BASOPHILS ABSOLUTE: 0 K/UL (ref 0–0.2)
BUN BLDV-MCNC: 14 MG/DL (ref 6–20)
BUN/CREAT BLD: ABNORMAL (ref 9–20)
CALCIUM SERPL-MCNC: 9.6 MG/DL (ref 8.6–10.4)
CHLORIDE BLD-SCNC: 101 MMOL/L (ref 98–107)
CO2: 31 MMOL/L (ref 20–31)
CREAT SERPL-MCNC: 0.89 MG/DL (ref 0.7–1.2)
DIFFERENTIAL TYPE: NORMAL
EOSINOPHILS RELATIVE PERCENT: 1 % (ref 0–4)
GFR AFRICAN AMERICAN: >60 ML/MIN
GFR NON-AFRICAN AMERICAN: >60 ML/MIN
GFR SERPL CREATININE-BSD FRML MDRD: ABNORMAL ML/MIN/{1.73_M2}
GFR SERPL CREATININE-BSD FRML MDRD: ABNORMAL ML/MIN/{1.73_M2}
GLUCOSE BLD-MCNC: 108 MG/DL (ref 70–99)
HCT VFR BLD CALC: 46 % (ref 41–53)
HEMOGLOBIN: 15.1 G/DL (ref 13.5–17.5)
IMMATURE GRANULOCYTES: NORMAL %
LYMPHOCYTES # BLD: 30 % (ref 24–44)
MCH RBC QN AUTO: 30.6 PG (ref 26–34)
MCHC RBC AUTO-ENTMCNC: 32.8 G/DL (ref 31–37)
MCV RBC AUTO: 93 FL (ref 80–100)
MONOCYTES # BLD: 7 % (ref 1–7)
NRBC AUTOMATED: NORMAL PER 100 WBC
PDW BLD-RTO: 12.5 % (ref 11.5–14.9)
PLATELET # BLD: 253 K/UL (ref 150–450)
PLATELET ESTIMATE: NORMAL
PMV BLD AUTO: 9.2 FL (ref 6–12)
POTASSIUM SERPL-SCNC: 4.5 MMOL/L (ref 3.7–5.3)
RBC # BLD: 4.94 M/UL (ref 4.5–5.9)
RBC # BLD: NORMAL 10*6/UL
SEG NEUTROPHILS: 62 % (ref 36–66)
SEGMENTED NEUTROPHILS ABSOLUTE COUNT: 6.3 K/UL (ref 1.3–9.1)
SODIUM BLD-SCNC: 141 MMOL/L (ref 135–144)
WBC # BLD: 10.1 K/UL (ref 3.5–11)
WBC # BLD: NORMAL 10*3/UL

## 2020-07-28 PROCEDURE — 36415 COLL VENOUS BLD VENIPUNCTURE: CPT

## 2020-07-28 PROCEDURE — 80048 BASIC METABOLIC PNL TOTAL CA: CPT

## 2020-07-28 PROCEDURE — 93005 ELECTROCARDIOGRAM TRACING: CPT | Performed by: ANESTHESIOLOGY

## 2020-07-28 PROCEDURE — 85025 COMPLETE CBC W/AUTO DIFF WBC: CPT

## 2020-07-28 RX ORDER — OXYCODONE HYDROCHLORIDE AND ACETAMINOPHEN 5; 325 MG/1; MG/1
1 TABLET ORAL EVERY 6 HOURS PRN
Status: ON HOLD | COMMUNITY
End: 2020-08-03 | Stop reason: HOSPADM
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
## 2020-07-29 ENCOUNTER — ANESTHESIA EVENT (OUTPATIENT)
Dept: OPERATING ROOM | Age: 50
End: 2020-07-29
Payer: MEDICARE

## 2020-07-29 LAB
EKG ATRIAL RATE: 56 BPM
EKG P AXIS: 24 DEGREES
EKG P-R INTERVAL: 150 MS
EKG Q-T INTERVAL: 428 MS
EKG QRS DURATION: 66 MS
EKG QTC CALCULATION (BAZETT): 413 MS
EKG R AXIS: 0 DEGREES
EKG T AXIS: 11 DEGREES
EKG VENTRICULAR RATE: 56 BPM

## 2020-07-29 PROCEDURE — 93010 ELECTROCARDIOGRAM REPORT: CPT | Performed by: INTERNAL MEDICINE

## 2020-07-29 RX ORDER — SODIUM CHLORIDE 0.9 % (FLUSH) 0.9 %
10 SYRINGE (ML) INJECTION EVERY 12 HOURS SCHEDULED
Status: CANCELLED | OUTPATIENT
Start: 2020-07-29

## 2020-07-29 RX ORDER — LIDOCAINE HYDROCHLORIDE 10 MG/ML
1 INJECTION, SOLUTION EPIDURAL; INFILTRATION; INTRACAUDAL; PERINEURAL
Status: CANCELLED | OUTPATIENT
Start: 2020-07-29 | End: 2020-07-29

## 2020-07-29 RX ORDER — SODIUM CHLORIDE 0.9 % (FLUSH) 0.9 %
10 SYRINGE (ML) INJECTION PRN
Status: CANCELLED | OUTPATIENT
Start: 2020-07-29

## 2020-07-29 RX ORDER — SODIUM CHLORIDE, SODIUM LACTATE, POTASSIUM CHLORIDE, CALCIUM CHLORIDE 600; 310; 30; 20 MG/100ML; MG/100ML; MG/100ML; MG/100ML
INJECTION, SOLUTION INTRAVENOUS CONTINUOUS
Status: CANCELLED | OUTPATIENT
Start: 2020-07-29

## 2020-07-30 ENCOUNTER — HOSPITAL ENCOUNTER (OUTPATIENT)
Dept: PREADMISSION TESTING | Age: 50
Setting detail: SPECIMEN
Discharge: HOME | End: 2020-08-03
Payer: MEDICARE

## 2020-07-30 PROCEDURE — U0003 INFECTIOUS AGENT DETECTION BY NUCLEIC ACID (DNA OR RNA); SEVERE ACUTE RESPIRATORY SYNDROME CORONAVIRUS 2 (SARS-COV-2) (CORONAVIRUS DISEASE [COVID-19]), AMPLIFIED PROBE TECHNIQUE, MAKING USE OF HIGH THROUGHPUT TECHNOLOGIES AS DESCRIBED BY CMS-2020-01-R: HCPCS

## 2020-07-31 LAB — SARS-COV-2, NAA: NOT DETECTED

## 2020-08-01 ENCOUNTER — TELEPHONE (OUTPATIENT)
Dept: PRIMARY CARE CLINIC | Age: 50
End: 2020-08-01

## 2020-08-03 ENCOUNTER — HOSPITAL ENCOUNTER (OUTPATIENT)
Age: 50
Setting detail: OUTPATIENT SURGERY
Discharge: HOME | End: 2020-08-03
Attending: PODIATRIST | Admitting: PODIATRIST
Payer: MEDICARE

## 2020-08-03 ENCOUNTER — ANESTHESIA (OUTPATIENT)
Dept: OPERATING ROOM | Age: 50
End: 2020-08-03
Payer: MEDICARE

## 2020-08-03 VITALS
HEART RATE: 72 BPM | OXYGEN SATURATION: 97 % | RESPIRATION RATE: 16 BRPM | WEIGHT: 175 LBS | DIASTOLIC BLOOD PRESSURE: 69 MMHG | TEMPERATURE: 97.5 F | SYSTOLIC BLOOD PRESSURE: 102 MMHG | BODY MASS INDEX: 24.5 KG/M2 | HEIGHT: 71 IN

## 2020-08-03 VITALS
OXYGEN SATURATION: 99 % | DIASTOLIC BLOOD PRESSURE: 60 MMHG | SYSTOLIC BLOOD PRESSURE: 97 MMHG | RESPIRATION RATE: 12 BRPM

## 2020-08-03 PROCEDURE — 15271 SKIN SUB GRAFT TRNK/ARM/LEG: CPT | Performed by: PODIATRIST

## 2020-08-03 PROCEDURE — 3600000012 HC SURGERY LEVEL 2 ADDTL 15MIN: Performed by: PODIATRIST

## 2020-08-03 PROCEDURE — 2709999900 HC NON-CHARGEABLE SUPPLY: Performed by: PODIATRIST

## 2020-08-03 PROCEDURE — 7100000000 HC PACU RECOVERY - FIRST 15 MIN: Performed by: PODIATRIST

## 2020-08-03 PROCEDURE — 7100000031 HC ASPR PHASE II RECOVERY - ADDTL 15 MIN: Performed by: PODIATRIST

## 2020-08-03 PROCEDURE — 15002 WOUND PREP TRK/ARM/LEG: CPT | Performed by: PODIATRIST

## 2020-08-03 PROCEDURE — 3700000000 HC ANESTHESIA ATTENDED CARE: Performed by: PODIATRIST

## 2020-08-03 PROCEDURE — 15272 SKIN SUB GRAFT T/A/L ADD-ON: CPT | Performed by: PODIATRIST

## 2020-08-03 PROCEDURE — 2580000003 HC RX 258: Performed by: ANESTHESIOLOGY

## 2020-08-03 PROCEDURE — 7100000001 HC PACU RECOVERY - ADDTL 15 MIN: Performed by: PODIATRIST

## 2020-08-03 PROCEDURE — 2500000003 HC RX 250 WO HCPCS: Performed by: ANESTHESIOLOGY

## 2020-08-03 PROCEDURE — 7100000030 HC ASPR PHASE II RECOVERY - FIRST 15 MIN: Performed by: PODIATRIST

## 2020-08-03 PROCEDURE — 3700000001 HC ADD 15 MINUTES (ANESTHESIA): Performed by: PODIATRIST

## 2020-08-03 PROCEDURE — 6370000000 HC RX 637 (ALT 250 FOR IP): Performed by: ANESTHESIOLOGY

## 2020-08-03 PROCEDURE — 6360000002 HC RX W HCPCS: Performed by: NURSE ANESTHETIST, CERTIFIED REGISTERED

## 2020-08-03 PROCEDURE — 2500000003 HC RX 250 WO HCPCS: Performed by: NURSE ANESTHETIST, CERTIFIED REGISTERED

## 2020-08-03 PROCEDURE — 2500000003 HC RX 250 WO HCPCS: Performed by: PODIATRIST

## 2020-08-03 PROCEDURE — 3600000002 HC SURGERY LEVEL 2 BASE: Performed by: PODIATRIST

## 2020-08-03 DEVICE — IMPLANTABLE DEVICE: Type: IMPLANTABLE DEVICE | Site: LEG | Status: FUNCTIONAL

## 2020-08-03 RX ORDER — OXYCODONE HYDROCHLORIDE AND ACETAMINOPHEN 5; 325 MG/1; MG/1
1 TABLET ORAL PRN
Status: DISCONTINUED | OUTPATIENT
Start: 2020-08-03 | End: 2020-08-03 | Stop reason: HOSPADM

## 2020-08-03 RX ORDER — FENTANYL CITRATE 50 UG/ML
INJECTION, SOLUTION INTRAMUSCULAR; INTRAVENOUS PRN
Status: DISCONTINUED | OUTPATIENT
Start: 2020-08-03 | End: 2020-08-03 | Stop reason: SDUPTHER

## 2020-08-03 RX ORDER — ONDANSETRON 2 MG/ML
INJECTION INTRAMUSCULAR; INTRAVENOUS PRN
Status: DISCONTINUED | OUTPATIENT
Start: 2020-08-03 | End: 2020-08-03 | Stop reason: SDUPTHER

## 2020-08-03 RX ORDER — OXYCODONE HYDROCHLORIDE AND ACETAMINOPHEN 5; 325 MG/1; MG/1
2 TABLET ORAL PRN
Status: DISCONTINUED | OUTPATIENT
Start: 2020-08-03 | End: 2020-08-03 | Stop reason: HOSPADM

## 2020-08-03 RX ORDER — DIPHENHYDRAMINE HYDROCHLORIDE 50 MG/ML
12.5 INJECTION INTRAMUSCULAR; INTRAVENOUS
Status: DISCONTINUED | OUTPATIENT
Start: 2020-08-03 | End: 2020-08-03 | Stop reason: HOSPADM

## 2020-08-03 RX ORDER — SODIUM CHLORIDE 0.9 % (FLUSH) 0.9 %
10 SYRINGE (ML) INJECTION PRN
Status: DISCONTINUED | OUTPATIENT
Start: 2020-08-03 | End: 2020-08-03 | Stop reason: HOSPADM

## 2020-08-03 RX ORDER — OXYCODONE HYDROCHLORIDE AND ACETAMINOPHEN 5; 325 MG/1; MG/1
2 TABLET ORAL PRN
Status: COMPLETED | OUTPATIENT
Start: 2020-08-03 | End: 2020-08-03

## 2020-08-03 RX ORDER — MIDAZOLAM HYDROCHLORIDE 1 MG/ML
INJECTION INTRAMUSCULAR; INTRAVENOUS PRN
Status: DISCONTINUED | OUTPATIENT
Start: 2020-08-03 | End: 2020-08-03 | Stop reason: SDUPTHER

## 2020-08-03 RX ORDER — OXYCODONE HYDROCHLORIDE AND ACETAMINOPHEN 5; 325 MG/1; MG/1
1 TABLET ORAL PRN
Status: COMPLETED | OUTPATIENT
Start: 2020-08-03 | End: 2020-08-03

## 2020-08-03 RX ORDER — OXYCODONE HYDROCHLORIDE AND ACETAMINOPHEN 5; 325 MG/1; MG/1
1 TABLET ORAL EVERY 6 HOURS PRN
Qty: 28 TABLET | Refills: 0 | Status: SHIPPED | OUTPATIENT
Start: 2020-08-03 | End: 2020-08-10

## 2020-08-03 RX ORDER — LIDOCAINE HYDROCHLORIDE 10 MG/ML
INJECTION, SOLUTION INFILTRATION; PERINEURAL PRN
Status: DISCONTINUED | OUTPATIENT
Start: 2020-08-03 | End: 2020-08-03 | Stop reason: ALTCHOICE

## 2020-08-03 RX ORDER — ONDANSETRON 2 MG/ML
4 INJECTION INTRAMUSCULAR; INTRAVENOUS
Status: DISCONTINUED | OUTPATIENT
Start: 2020-08-03 | End: 2020-08-03 | Stop reason: HOSPADM

## 2020-08-03 RX ORDER — AMLODIPINE BESYLATE 10 MG/1
10 TABLET ORAL DAILY
COMMUNITY
End: 2022-01-19 | Stop reason: ALTCHOICE

## 2020-08-03 RX ORDER — LABETALOL 20 MG/4 ML (5 MG/ML) INTRAVENOUS SYRINGE
5 EVERY 10 MIN PRN
Status: DISCONTINUED | OUTPATIENT
Start: 2020-08-03 | End: 2020-08-03 | Stop reason: HOSPADM

## 2020-08-03 RX ORDER — SODIUM CHLORIDE 0.9 % (FLUSH) 0.9 %
10 SYRINGE (ML) INJECTION EVERY 12 HOURS SCHEDULED
Status: DISCONTINUED | OUTPATIENT
Start: 2020-08-03 | End: 2020-08-03 | Stop reason: HOSPADM

## 2020-08-03 RX ORDER — LIDOCAINE HYDROCHLORIDE 10 MG/ML
1 INJECTION, SOLUTION EPIDURAL; INFILTRATION; INTRACAUDAL; PERINEURAL
Status: DISCONTINUED | OUTPATIENT
Start: 2020-08-03 | End: 2020-08-03 | Stop reason: HOSPADM

## 2020-08-03 RX ORDER — SODIUM CHLORIDE, SODIUM LACTATE, POTASSIUM CHLORIDE, CALCIUM CHLORIDE 600; 310; 30; 20 MG/100ML; MG/100ML; MG/100ML; MG/100ML
INJECTION, SOLUTION INTRAVENOUS CONTINUOUS
Status: DISCONTINUED | OUTPATIENT
Start: 2020-08-03 | End: 2020-08-03 | Stop reason: HOSPADM

## 2020-08-03 RX ORDER — BUPIVACAINE HYDROCHLORIDE 5 MG/ML
INJECTION, SOLUTION EPIDURAL; INTRACAUDAL PRN
Status: DISCONTINUED | OUTPATIENT
Start: 2020-08-03 | End: 2020-08-03 | Stop reason: ALTCHOICE

## 2020-08-03 RX ORDER — DEXAMETHASONE SODIUM PHOSPHATE 4 MG/ML
INJECTION, SOLUTION INTRA-ARTICULAR; INTRALESIONAL; INTRAMUSCULAR; INTRAVENOUS; SOFT TISSUE PRN
Status: DISCONTINUED | OUTPATIENT
Start: 2020-08-03 | End: 2020-08-03 | Stop reason: SDUPTHER

## 2020-08-03 RX ORDER — LIDOCAINE HYDROCHLORIDE 10 MG/ML
INJECTION, SOLUTION EPIDURAL; INFILTRATION; INTRACAUDAL; PERINEURAL PRN
Status: DISCONTINUED | OUTPATIENT
Start: 2020-08-03 | End: 2020-08-03 | Stop reason: SDUPTHER

## 2020-08-03 RX ORDER — PROPOFOL 10 MG/ML
INJECTION, EMULSION INTRAVENOUS PRN
Status: DISCONTINUED | OUTPATIENT
Start: 2020-08-03 | End: 2020-08-03 | Stop reason: SDUPTHER

## 2020-08-03 RX ADMIN — PROPOFOL 200 MG: 10 INJECTION, EMULSION INTRAVENOUS at 12:34

## 2020-08-03 RX ADMIN — FAMOTIDINE 20 MG: 10 INJECTION, SOLUTION INTRAVENOUS at 11:08

## 2020-08-03 RX ADMIN — OXYCODONE HYDROCHLORIDE AND ACETAMINOPHEN 1 TABLET: 5; 325 TABLET ORAL at 14:02

## 2020-08-03 RX ADMIN — FENTANYL CITRATE 100 MCG: 50 INJECTION, SOLUTION INTRAMUSCULAR; INTRAVENOUS at 12:34

## 2020-08-03 RX ADMIN — ONDANSETRON 4 MG: 2 INJECTION INTRAMUSCULAR; INTRAVENOUS at 12:51

## 2020-08-03 RX ADMIN — DEXAMETHASONE SODIUM PHOSPHATE 4 MG: 4 INJECTION, SOLUTION INTRA-ARTICULAR; INTRALESIONAL; INTRAMUSCULAR; INTRAVENOUS; SOFT TISSUE at 12:34

## 2020-08-03 RX ADMIN — SODIUM CHLORIDE, POTASSIUM CHLORIDE, SODIUM LACTATE AND CALCIUM CHLORIDE: 600; 310; 30; 20 INJECTION, SOLUTION INTRAVENOUS at 10:36

## 2020-08-03 RX ADMIN — LIDOCAINE HYDROCHLORIDE 50 MG: 10 INJECTION, SOLUTION EPIDURAL; INFILTRATION; INTRACAUDAL; PERINEURAL at 12:34

## 2020-08-03 RX ADMIN — MIDAZOLAM 2 MG: 1 INJECTION INTRAMUSCULAR; INTRAVENOUS at 12:29

## 2020-08-03 ASSESSMENT — PAIN DESCRIPTION - LOCATION
LOCATION: LEG
LOCATION: TIBIA

## 2020-08-03 ASSESSMENT — PULMONARY FUNCTION TESTS
PIF_VALUE: 14
PIF_VALUE: 2
PIF_VALUE: 12
PIF_VALUE: 1
PIF_VALUE: 12
PIF_VALUE: 1
PIF_VALUE: 1
PIF_VALUE: 12
PIF_VALUE: 12
PIF_VALUE: 1
PIF_VALUE: 2
PIF_VALUE: 12
PIF_VALUE: 22
PIF_VALUE: 12
PIF_VALUE: 4
PIF_VALUE: 2
PIF_VALUE: 0
PIF_VALUE: 12
PIF_VALUE: 8
PIF_VALUE: 2
PIF_VALUE: 12

## 2020-08-03 ASSESSMENT — PAIN SCALES - GENERAL
PAINLEVEL_OUTOF10: 3
PAINLEVEL_OUTOF10: 5
PAINLEVEL_OUTOF10: 0
PAINLEVEL_OUTOF10: 0
PAINLEVEL_OUTOF10: 5
PAINLEVEL_OUTOF10: 2

## 2020-08-03 ASSESSMENT — PAIN DESCRIPTION - ORIENTATION
ORIENTATION: RIGHT
ORIENTATION: RIGHT

## 2020-08-03 ASSESSMENT — PAIN DESCRIPTION - ONSET: ONSET: GRADUAL

## 2020-08-03 ASSESSMENT — PAIN DESCRIPTION - PAIN TYPE
TYPE: SURGICAL PAIN
TYPE: SURGICAL PAIN

## 2020-08-03 ASSESSMENT — PAIN DESCRIPTION - DESCRIPTORS
DESCRIPTORS: ACHING
DESCRIPTORS: BURNING;DULL;THROBBING
DESCRIPTORS: STABBING

## 2020-08-03 ASSESSMENT — PAIN DESCRIPTION - FREQUENCY: FREQUENCY: INTERMITTENT

## 2020-08-03 ASSESSMENT — PAIN DESCRIPTION - PROGRESSION: CLINICAL_PROGRESSION: NOT CHANGED

## 2020-08-03 ASSESSMENT — PAIN - FUNCTIONAL ASSESSMENT: PAIN_FUNCTIONAL_ASSESSMENT: 0-10

## 2020-08-03 NOTE — INTERVAL H&P NOTE
Update History & Physical    The patient's History and Physical of July 28, 2020 was reviewed with the patient and I examined the patient. There was no change. I concur with findings. Here today for right leg debridement lateral lower leg with epifix. NPO. Took metoprolol this am with sip of water. No blood thinners in last 7 days. Pt having nausea. Taking pepto bismal with no relief with last dose being 2 days ago. Pt reports sore throat today with hoarse voice. Reports dry cough that began this am. Denies recent URI. No fever or chills. Denies current or recent chest pain/pressure, palpitations, SOB. COVID testing negative on 07/30/2020. Vitals per RN flowsheet.      Electronically signed by Maryelizabeth Dance, APRN - CNP on 8/3/2020 at 10:11 AM
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
## 2020-08-03 NOTE — BRIEF OP NOTE
PODIATRY BRIEF OP NOTE    PATIENT NAME: Isaac Avalos  YOB: 1970  -  54 y.o. male  MRN: [id number]  DATE: 8/3/2020  BILLING #: [id number]    Surgeon(s):  Gustavo Chou DPM     ASSISTANTS: Kimmie Chew DPM PGY1, Darren Tatum DPM PGY2    PRE-OP DIAGNOSIS:   1. Chronic non-healing wound, right leg     POST-OP DIAGNOSIS: Same as above. PROCEDURE:   1. Wound bed preparation down to level of subcutaneous tissue, right leg  2. Application of skin substitute graft (EpiFix), right leg    ANESTHESIA: LMA with local (10 cc of 1:1 mixture 1% lidocaine plain and 0.5% marcaine plain)      HEMOSTASIS: Direct pressure. ESTIMATED BLOOD LOSS: Less than 5 cc. MATERIALS: EpiFix graft    Implant Name Type Inv. Item Serial No.  Lot No. LRB No. Used Action   GOMEZ-ALLOGRAFT EPIXL 6X10CM Bone/Graft/Tissue/Human/Synth GOMEZ-ALLOGRAFT EPIXL 6X10CM  [employer]  Right 1 Implanted       INJECTABLES: None    SPECIMEN: None  * No specimens in log *    COMPLICATIONS: None    FINDINGS: Necrotic eschar removed. No purulent drainage noted. Post-debridement measurements 10 x 6 x 0.3 cm. The patient was counseled at length about the risks of david Covid-19 during their perioperative period and any recovery window from their procedure. The patient was made aware that david Covid-19  may worsen their prognosis for recovering from their procedure  and lend to a higher morbidity and/or mortality risk. All material risks, benefits, and reasonable alternatives including postponing the procedure were discussed. The patient does wish to proceed with the procedure at this time.     Kimmie Chew DPM   Podiatric Medicine & Surgery   8/3/2020 at 1:10 PM

## 2020-08-05 ENCOUNTER — NURSE TRIAGE (OUTPATIENT)
Dept: OTHER | Facility: CLINIC | Age: 50
End: 2020-08-05

## 2020-08-05 NOTE — PROGRESS NOTES
Visit Information    Have you changed or started any medications since your last visit including any over-the-counter medicines, vitamins, or herbal medicines? no   Are you having any side effects from any of your medications? -  no  Have you stopped taking any of your medications? Is so, why? -  no    Have you seen any other physician or provider since your last visit? Yes - Records Obtained  Have you had any other diagnostic tests since your last visit? Yes - Records Obtained  Have you been seen in the emergency room and/or had an admission to a hospital since we last saw you? No  Have you had your routine dental cleaning in the past 6 months? no    Have you activated your [employer] account? If not, what are your barriers?  Yes     Patient Care Team:  Manuel Dove MD as PCP - General (Family Medicine)  Manuel Dove MD as PCP - [facility]  Tabitha Mitchell MD (Dermatology)  Chanda Esparza MD as Surgeon (Cardiology)  Susan Higginbotham DPM as Consulting Physician (Podiatry)    Medical History Review  Past Medical, Family, and Social History reviewed and does contribute to the patient presenting condition    Health Maintenance   Topic Date Due    Flu vaccine (1) 09/01/2020    Lipid screen  05/28/2021    Potassium monitoring  07/28/2021    Creatinine monitoring  07/28/2021    DTaP/Tdap/Td vaccine (2 - Td) 01/01/2024    HIV screen  Completed    Hepatitis A vaccine  Aged Out    Hepatitis B vaccine  Aged Out    Hib vaccine  Aged Out    Meningococcal (ACWY) vaccine  Aged Out    Pneumococcal 0-64 years Vaccine  Aged Out

## 2020-08-05 NOTE — TELEPHONE ENCOUNTER
Reason for Disposition   Continuous (nonstop) coughing interferes with work or school and no improvement using cough treatment per Care Advice    Answer Assessment - Initial Assessment Questions  1. ONSET: \"When did the cough begin? \"       Sunday     2. SEVERITY: \"How bad is the cough today? \"        Dry cough- feels like there is stuff loose in there but not coming up    3. RESPIRATORY DISTRESS: \"Describe your breathing. \"         Denies    4. FEVER: \"Do you have a fever? \" If so, ask: \"What is your temperature, how was it measured, and when did it start? \"        Denies    5. HEMOPTYSIS: \"Are you coughing up any blood? \" If so ask: \"How much? \" (flecks, streaks, tablespoons, etc.)         Denies    6. TREATMENT: \"What have you done so far to treat the cough? \" (e.g., meds, fluids, humidifier)          OTC meds    7. CARDIAC HISTORY: \"Do you have any history of heart disease? \" (e.g., heart attack, congestive heart failure)          HTN    8. LUNG HISTORY: \"Do you have any history of lung disease? \"  (e.g., pulmonary embolus, asthma, emphysema)          Denies    9. PE RISK FACTORS: \"Do you have a history of blood clots? \" (or: recent major surgery, recent prolonged travel, bedridden)            Pt thinks he had one after a surgery a couple of years ago            Pt had surgery for debridement of burn on leg         10. OTHER SYMPTOMS: \"Do you have any other symptoms? (e.g., runny nose, wheezing, chest pain)          Sore throat, pt is very hoarse, fatigue     11. PREGNANCY: \"Is there any chance you are pregnant? \" \"When was your last menstrual period? \"         NA    12. TRAVEL: \"Have you traveled out of the country in the last month? \" (e.g., travel history, exposures)        Denies    Protocols used: COUGH-ADULT-[place]    Patient called [facility]) to schedule appointment, with red flag complaint, transferred to RN access for triage. Caller reports symptoms as documented above.   Caller informed of disposition. Soft transfer to Cookie in pre-service center to schedule appointment as recommended. Care advice as documented. Pt verbalized understanding and is agreeable to plan. Please do not respond to the triage nurse through this encounter. Any subsequent communication should be directly with the patient.

## 2020-08-05 NOTE — TELEPHONE ENCOUNTER
Future Appointments   Date Time Provider Maribell Duran   8/6/2020  2:00 PM [facility]   8/7/2020  8:30 AM ERICKA AltamiranoD [facility]   8/27/2020 10:30 AM MD [facility]

## 2020-08-06 ENCOUNTER — TELEMEDICINE (OUTPATIENT)
Dept: FAMILY MEDICINE CLINIC | Age: 50
End: 2020-08-06
Payer: MEDICARE

## 2020-08-06 PROBLEM — R00.0 SINUS TACHYCARDIA: Status: RESOLVED | Noted: 2017-02-12 | Resolved: 2020-08-06

## 2020-08-06 PROBLEM — M75.01 ADHESIVE CAPSULITIS OF RIGHT SHOULDER: Status: RESOLVED | Noted: 2019-09-03 | Resolved: 2020-08-06

## 2020-08-06 PROBLEM — J20.9 ACUTE INFECTIVE TRACHEOBRONCHITIS: Status: ACTIVE | Noted: 2020-08-06

## 2020-08-06 PROBLEM — R29.898 WEAKNESS OF BOTH LEGS: Status: RESOLVED | Noted: 2019-07-18 | Resolved: 2020-08-06

## 2020-08-06 PROCEDURE — G8427 DOCREV CUR MEDS BY ELIG CLIN: HCPCS | Performed by: FAMILY MEDICINE

## 2020-08-06 PROCEDURE — 99213 OFFICE O/P EST LOW 20 MIN: CPT | Performed by: FAMILY MEDICINE

## 2020-08-06 RX ORDER — GUAIFENESIN AND DEXTROMETHORPHAN HYDROBROMIDE 100; 10 MG/5ML; MG/5ML
10 SOLUTION ORAL EVERY 4 HOURS PRN
Qty: 120 ML | Refills: 0 | Status: SHIPPED | OUTPATIENT
Start: 2020-08-06 | End: 2020-08-26

## 2020-08-06 RX ORDER — DOCUSATE SODIUM 100 MG/1
100 CAPSULE, LIQUID FILLED ORAL 2 TIMES DAILY
Qty: 60 CAPSULE | Refills: 3 | Status: SHIPPED | OUTPATIENT
Start: 2020-08-06 | End: 2021-07-12 | Stop reason: ALTCHOICE

## 2020-08-06 RX ORDER — AZITHROMYCIN 250 MG/1
TABLET, FILM COATED ORAL
Qty: 6 TABLET | Refills: 0 | Status: SHIPPED | OUTPATIENT
Start: 2020-08-06 | End: 2020-08-11

## 2020-08-06 RX ORDER — BENZONATATE 100 MG/1
100 CAPSULE ORAL 3 TIMES DAILY PRN
Qty: 21 CAPSULE | Refills: 0 | Status: SHIPPED | OUTPATIENT
Start: 2020-08-06 | End: 2020-08-13

## 2020-08-06 ASSESSMENT — ENCOUNTER SYMPTOMS
SORE THROAT: 1
VOICE CHANGE: 1
DIARRHEA: 0
CONSTIPATION: 1
SINUS PRESSURE: 0
RHINORRHEA: 0
TROUBLE SWALLOWING: 1
CHEST TIGHTNESS: 0
SINUS PAIN: 0
ABDOMINAL PAIN: 0
SHORTNESS OF BREATH: 0
VOMITING: 0
COUGH: 1
NAUSEA: 1
WHEEZING: 0

## 2020-08-06 NOTE — PROGRESS NOTES
2020    TELEHEALTH EVALUATION -- Audio/Visual (During JPZGB-35 public health emergency)    HPI:    Mily Awad (:  1970) has requested an audio/video evaluation for the following concern(s):Cough (since , lost voice )    Mily Awad complains of cough. Onset of symptoms was 5 days ago. Symptoms have been gradually worsening since that time. The cough is dry, hoarse and nocturnal and is aggravated by reclining position. Associated symptoms include: change in voice, sore throat and hoarseness, and he feels it is getting down into his chest.  Reports some mild difficulty swallowing related to sore throat. Patient does not have a history of asthma. Patient does have a history of environmental allergens. Patient has not traveled recently. Patient does not have a history of smoking. Denies lost of taste or smell  Denies fever, chills, night sweats. Cough is worse at nighttime,and cannot sleep. He feels he has some flying in the chest but he is unable to bring it up. Denies shortness of breath, chest pain or wheezing. He denies sinus pain, sinus pressure or congestion. Has some nausea and has been constipated related to his pain medications. Stool softeners have been helping. Denies abdominal pain    Patient says he has been trying to eat a few times a day, but feels nauseated  Denies night sweats    Patient says his wife is fine    Denies possible contact with COVID-19    Coronavirus 19 testing negative on 2020              Review of Systems   Constitutional: Positive for fatigue. Negative for activity change, appetite change, chills, diaphoresis and fever. HENT: Positive for sore throat, trouble swallowing and voice change. Negative for congestion, ear discharge, ear pain, postnasal drip, rhinorrhea, sinus pressure and sinus pain. Respiratory: Positive for cough. Negative for chest tightness, shortness of breath and wheezing.     Cardiovascular: Negative for chest pain, palpitations and leg swelling. Gastrointestinal: Positive for constipation and nausea. Negative for abdominal pain, diarrhea and vomiting. Allergic/Immunologic: Positive for environmental allergies. Psychiatric/Behavioral: Positive for sleep disturbance. Prior to Visit Medications    Medication Sig Taking? Authorizing Provider   amLODIPine (NORVASC) 10 MG tablet Take 10 mg by mouth daily Yes Historical Provider, MD   oxyCODONE-acetaminophen (PERCOCET) 5-325 MG per tablet Take 1 tablet by mouth every 6 hours as needed for Pain for up to 7 days. Intended supply: 7 days. Take lowest dose possible to manage pain Yes Rosalva Rush DPM   busPIRone (BUSPAR) 10 MG tablet take 1 tablet by mouth three times a day  Patient taking differently: 2 times daily take 1 tablet by mouth three times a day Yes Jacky Sales MD   Gauze Pads & Dressings (Compa Gutierrez MD) [address] [facility] Needs 2 times a day Yes Jacky Sales MD   Gauze Pads & Dressings 4\"X4\" PADS Using 6 per day for large wound on the right leg Yes Jacky Sales MD   collagenase (SANTYL) 250 UNIT/GM ointment Apply topically daily.  Yes Ana Lozano DPM   silver sulfADIAZINE (SILVADENE) 1 % cream Apply topically  Twice a day, burn wound 3 in x 4 in Yes Jacky Sales MD   potassium chloride (KLOR-CON M) 10 MEQ extended release tablet Take 2 tablets by mouth daily Take with Hydrochlorothiazide Yes Jacky Sales MD   pravastatin (PRAVACHOL) 80 MG tablet take 1 tablet by mouth every evening Yes Jacky Sales MD   Omega-3 Fatty Acids (OMEGA-3 FISH OIL) 1200 MG CAPS Take 2 capsules by mouth 2 times daily **stop niacin** Yes Melisa Hough MD   hydrochlorothiazide (HYDRODIURIL) 25 MG tablet Take 1 tablet by mouth every morning Dose increased 5/27/2020 Yes Jacky Sales MD   metoprolol tartrate (LOPRESSOR) 25 MG tablet Take 2 tablets by mouth 2 times daily Dose decreased 5/27/2020 Yes Melisa MD Gianluca   fluticasone (FLONASE) 50 MCG/ACT nasal spray instill 2 sprays into each nostril once daily  Patient taking differently: 1 spray by Nasal route daily as needed  Yes Ian Stockton MD   cyclobenzaprine (FLEXERIL) 10 MG tablet take 1 tablet by mouth three times a day if needed for muscle spasm Yes Ian Stockton MD   fexofenadine (ALLEGRA ALLERGY) 180 MG tablet Take 1 tablet by mouth daily Yes Ian Stockton MD   docusate sodium (COLACE) 100 MG capsule Take 1 capsule by mouth 2 times daily Yes Ian Stockton MD   Tiffani Munoz MISC by Does not apply route Can't walk greater than 200 feet. Expires in 5 years. Yes Ian Stockton MD   RA ASPIRIN EC 81 MG EC tablet take 1 tablet by mouth once daily Yes Ian Stockton MD   APLENZIN 174 MG TB24 Take 174 mg by mouth daily  Yes Historical Provider, MD   lidocaine (LMX) 4 % cream Apply 2-3 times a day as needed for pain Yes Ian Stockton MD   ibuprofen (ADVIL;MOTRIN) 600 MG tablet Take 1 tablet by mouth every 6 hours as needed for Pain Yes DENNIS Childs - CNP   vitamin B-12 (CYANOCOBALAMIN) 1000 MCG tablet Take 1,000 mcg by mouth daily Yes Historical Provider, MD   Ginkgo Biloba (GINKOBA PO) Take by mouth Yes Historical Provider, MD   Adapalene 0.3 % GEL  Yes Historical Provider, MD   benztropine (COGENTIN) 1 MG tablet Take 1 mg by mouth daily  Yes Historical Provider, MD   erythromycin with ethanol (THERAMYCIN) 2 % external solution apply topically as directed every morning Yes Historical Provider, MD   aluminum chloride (DRYSOL) 20 % external solution Apply topically nightly.  Yes Ian Stockton MD   vitamin D (CHOLECALCIFEROL) 25 MCG (1000 UT) TABS tablet Take 1 tablet by mouth daily  DENNIS Gamez - CNP       Social History     Tobacco Use    Smoking status: Former Smoker     Packs/day: 1.50     Years: 12.00     Pack years: 18.00     Last attempt to quit: 2002     Years since quittin.0  Smokeless tobacco: Never Used   Substance Use Topics    Alcohol use: No     Alcohol/week: 0.0 standard drinks    Drug use: No          PHYSICAL EXAMINATION:    Vital Signs: (As obtained by patient/caregiver or practitioner observation)  -vital signs stable and within normal limits  Patient-Reported Vitals 8/5/2020   Patient-Reported Weight 174 lb   Patient-Reported Height 5'11   Patient-Reported Systolic 595   Patient-Reported Diastolic 78   Patient-Reported Temperature -         Constitutional: [x] Appears well-developed and well-nourished [x] No apparent distress      [x] Abnormal-  looks tired    Mental status  [x] Alert and awake  [x] Oriented to person/place/time [x]Able to follow commands      Eyes:  EOM    [x]  Normal  [] Abnormal-  Sclera  [x]  Normal  [] Abnormal -         Discharge [x]  None visible  [] Abnormal -    HENT:   [x] Normocephalic, atraumatic. [] Abnormal   [x] Mouth/Throat: Mucous membranes are moist.     External Ears [x] Normal  [] Abnormal-     Neck: [x] No visualized mass     Pulmonary/Chest: [x] Respiratory effort normal.  [x] No visualized signs of difficulty breathing or respiratory distress        [x] Abnormal -noticed occasional deep wet cough, and mild hoarseness when talking.   Patient does admit that it hurts retrosternal when coughing and he says he is unable to expectorate       Musculoskeletal:   [x] Normal gait with no signs of ataxia         [x] Normal range of motion of neck        [] Abnormal-       Neurological:        [x] No Facial Asymmetry (Cranial nerve 7 motor function) (limited exam to video visit)          [x] No gaze palsy        [] Abnormal-            Skin:        [x] No significant exanthematous lesions or discoloration noted on facial skin         [] Abnormal-            Psychiatric:      [x] No Hallucinations       []Mood is normal      [x]Behavior is normal      [x]Judgment is normal      [x]Thought content is normal       [x] Abnormal-anxious    Other pertinent observable physical exam findings-none  Due to this being a TeleHealth encounter, evaluation of the following organ systems is limited: Vitals/Constitutional/EENT/Resp/CV/GI//MS/Neuro/Skin/Heme-Lymph-Imm. ASSESSMENT/PLAN:    1. Acute infective tracheobronchitis  Worsening  - benzonatate (TESSALON PERLES) 100 MG capsule; Take 1 capsule by mouth 3 times daily as needed for Cough  Dispense: 21 capsule; Refill: 0  - azithromycin (ZITHROMAX) 250 MG tablet; 500 mg orally on day one followed by 250 mg daily on days two through five  Dispense: 6 tablet; Refill: 0  - Dextromethorphan-guaiFENesin  MG/5ML SYRP; Take 10 mLs by mouth every 4 hours as needed for Cough  Dispense: 120 mL; Refill: 0    Advised to increase fluids, rest, increase proteins in diet  I do not suspect COVID-19, recent test was negative    Patient did have recent surgical intervention 8/3/2020 for debridement of his right lateral lower leg and graft, under MAC and general anesthesia. Call or return if symptoms persist or fail to improve. 2. Constipation due to pain medication  Improved with medication  - docusate sodium (COLACE) 100 MG capsule; Take 1 capsule by mouth 2 times daily  Dispense: 60 capsule; Refill: 3  Increase fiber in diet    Roland Sandoval received counseling on the following healthy behaviors: nutrition, exercise and medication adherence  Reviewed prior labs and health maintenance. Continue current medications, diet and exercise. Discussed use, benefit, and side effects of prescribed medications. Barriers to medication compliance addressed. Patient given educational materials - see patient instructions. All patient questions answered. Patient voiced understanding. Return for KEEP APPT.   Data Unavailable      Future Appointments   Date Time Provider Maribell Duran   8/7/2020  8:30 AM Orin Leal [facility]   8/27/2020 10:30 [facility]        Total time spent during this visit 15 minutes including face-to-face, counseling, charting review, and non-face-to-face time. Yolanda Rapp is a 54 y.o. male being evaluated by a Virtual Visit (video visit) encounter to address concerns as mentioned above. Due to this being a TeleHealth encounter (During KDY-27 public health emergency), evaluation of the following organ systems was limited: Vitals/Constitutional/EENT/Resp/CV/GI//MS/Neuro/Skin/Heme-Lymph-Imm. Pursuant to the emergency declaration under the [address] [place] [address], [address] [place] [address] authority and the [employer] Act, this Virtual Visit was conducted with patient's (and/or legal guardian's) consent, to reduce the patient's risk of exposure to COVID-19 and provide necessary medical care. The patient (and/or legal guardian) has also been advised to contact this office for worsening conditions or problems, and seek emergency medical treatment and/or call 911 if deemed necessary. Services were provided through a video synchronous discussion virtually to substitute for in-person clinic visit. Patient was located at his home, provider was located in the office, at the primary practice location. Patient identification was verified at the start of the visit: Yes    Total time spent for this encounter: 15 minutes    --Janae Faust MD on 8/6/2020 at 7:39 PM    An electronic signature was used to authenticate this note.

## 2020-08-06 NOTE — PATIENT INSTRUCTIONS
Patient Education        Bronchitis: Care Instructions  Your Care Instructions     Bronchitis is inflammation of the bronchial tubes, which carry air to the lungs. The tubes swell and produce mucus, or phlegm. The mucus and inflamed bronchial tubes make you cough. You may have trouble breathing. Most cases of bronchitis are caused by viruses like those that cause colds. Antibiotics usually do not help and they may be harmful. Bronchitis usually develops rapidly and lasts about 2 to 3 weeks in otherwise healthy people. Follow-up care is a key part of your treatment and safety. Be sure to make and go to all appointments, and call your doctor if you are having problems. It's also a good idea to know your test results and keep a list of the medicines you take. How can you care for yourself at home? · Take all medicines exactly as prescribed. Call your doctor if you think you are having a problem with your medicine. · Get some extra rest.  · Take an over-the-counter pain medicine, such as acetaminophen (Tylenol), ibuprofen (Advil, Motrin), or naproxen (Aleve) to reduce fever and relieve body aches. Read and follow all instructions on the label. · Do not take two or more pain medicines at the same time unless the doctor told you to. Many pain medicines have acetaminophen, which is Tylenol. Too much acetaminophen (Tylenol) can be harmful. · Take an over-the-counter cough medicine that contains dextromethorphan to help quiet a dry, hacking cough so that you can sleep. Avoid cough medicines that have more than one active ingredient. Read and follow all instructions on the label. · Breathe moist air from a humidifier, hot shower, or sink filled with hot water. The heat and moisture will thin mucus so you can cough it out. · Do not smoke. Smoking can make bronchitis worse. If you need help quitting, talk to your doctor about stop-smoking programs and medicines.  These can increase your chances of quitting for good.  When should you call for help? [id number] anytime you think you may need emergency care. For example, call if:  · You have severe trouble breathing. Call your doctor now or seek immediate medical care if:  · You have new or worse trouble breathing. · You cough up dark brown or bloody mucus (sputum). · You have a new or higher fever. · You have a new rash. Watch closely for changes in your health, and be sure to contact your doctor if:  · You cough more deeply or more often, especially if you notice more mucus or a change in the color of your mucus. · You are not getting better as expected. Where can you learn more? Go to https://[employer].Kambit. org and sign in to your Bl[employer]est account. Enter H333 in the Arkami box to learn more about \"Bronchitis: Care Instructions. \"     If you do not have an account, please click on the \"Sign Up Now\" link. Current as of: February 24, 2020               Content Version: 12.5  © 2006-2020 Healthwise, Incorporated. Care instructions adapted under license by [facility] ([facility]). If you have questions about a medical condition or this instruction, always ask your healthcare professional. [place] [address] any warranty or liability for your use of this information.

## 2020-08-07 ENCOUNTER — HOSPITAL ENCOUNTER (OUTPATIENT)
Dept: WOUND CARE | Age: 50
Discharge: HOME | End: 2020-08-07
Payer: MEDICARE

## 2020-08-07 VITALS
WEIGHT: 175 LBS | RESPIRATION RATE: 16 BRPM | HEIGHT: 71 IN | SYSTOLIC BLOOD PRESSURE: 110 MMHG | TEMPERATURE: 96.6 F | BODY MASS INDEX: 24.5 KG/M2 | HEART RATE: 73 BPM | DIASTOLIC BLOOD PRESSURE: 74 MMHG

## 2020-08-07 PROCEDURE — 99213 OFFICE O/P EST LOW 20 MIN: CPT

## 2020-08-07 RX ORDER — GINSENG 100 MG
CAPSULE ORAL ONCE
Status: CANCELLED | OUTPATIENT
Start: 2020-08-07

## 2020-08-07 RX ORDER — GENTAMICIN SULFATE 1 MG/G
OINTMENT TOPICAL ONCE
Status: CANCELLED | OUTPATIENT
Start: 2020-08-07

## 2020-08-07 RX ORDER — BACITRACIN ZINC AND POLYMYXIN B SULFATE 500; 1000 [USP'U]/G; [USP'U]/G
OINTMENT TOPICAL ONCE
Status: CANCELLED | OUTPATIENT
Start: 2020-08-07

## 2020-08-07 RX ORDER — CLOBETASOL PROPIONATE 0.5 MG/G
OINTMENT TOPICAL ONCE
Status: CANCELLED | OUTPATIENT
Start: 2020-08-07

## 2020-08-07 RX ORDER — BETAMETHASONE DIPROPIONATE 0.05 %
OINTMENT (GRAM) TOPICAL ONCE
Status: CANCELLED | OUTPATIENT
Start: 2020-08-07

## 2020-08-07 RX ORDER — LIDOCAINE 40 MG/G
CREAM TOPICAL ONCE
Status: CANCELLED | OUTPATIENT
Start: 2020-08-07

## 2020-08-07 RX ORDER — LIDOCAINE HYDROCHLORIDE 40 MG/ML
SOLUTION TOPICAL ONCE
Status: CANCELLED | OUTPATIENT
Start: 2020-08-07

## 2020-08-07 RX ORDER — LIDOCAINE HYDROCHLORIDE 40 MG/ML
SOLUTION TOPICAL ONCE
Status: DISCONTINUED | OUTPATIENT
Start: 2020-08-07 | End: 2020-08-08 | Stop reason: HOSPADM

## 2020-08-07 RX ORDER — LIDOCAINE 50 MG/G
OINTMENT TOPICAL ONCE
Status: CANCELLED | OUTPATIENT
Start: 2020-08-07

## 2020-08-07 RX ORDER — BACITRACIN, NEOMYCIN, POLYMYXIN B 400; 3.5; 5 [USP'U]/G; MG/G; [USP'U]/G
OINTMENT TOPICAL ONCE
Status: CANCELLED | OUTPATIENT
Start: 2020-08-07

## 2020-08-07 ASSESSMENT — ENCOUNTER SYMPTOMS
ROS SKIN COMMENTS: RIGHT LEG
NAUSEA: 0
VOMITING: 0
DIARRHEA: 0

## 2020-08-07 ASSESSMENT — PAIN DESCRIPTION - PAIN TYPE: TYPE: ACUTE PAIN;SURGICAL PAIN

## 2020-08-07 ASSESSMENT — PAIN DESCRIPTION - LOCATION: LOCATION: LEG

## 2020-08-07 ASSESSMENT — PAIN DESCRIPTION - ORIENTATION: ORIENTATION: RIGHT;LOWER

## 2020-08-07 ASSESSMENT — PAIN SCALES - GENERAL: PAINLEVEL_OUTOF10: 5
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
## 2020-08-11 ENCOUNTER — HOSPITAL ENCOUNTER (OUTPATIENT)
Dept: WOUND CARE | Age: 50
Discharge: HOME | End: 2020-08-11
Payer: MEDICARE

## 2020-08-11 VITALS
BODY MASS INDEX: 24.34 KG/M2 | SYSTOLIC BLOOD PRESSURE: 91 MMHG | WEIGHT: 170 LBS | RESPIRATION RATE: 16 BRPM | HEART RATE: 71 BPM | DIASTOLIC BLOOD PRESSURE: 64 MMHG | TEMPERATURE: 97.2 F | HEIGHT: 70 IN

## 2020-08-11 PROCEDURE — 97597 DBRDMT OPN WND 1ST 20 CM/<: CPT

## 2020-08-11 PROCEDURE — 6370000000 HC RX 637 (ALT 250 FOR IP): Performed by: PODIATRIST

## 2020-08-11 PROCEDURE — 97598 DBRDMT OPN WND ADDL 20CM/<: CPT | Performed by: PODIATRIST

## 2020-08-11 PROCEDURE — 97597 DBRDMT OPN WND 1ST 20 CM/<: CPT | Performed by: PODIATRIST

## 2020-08-11 PROCEDURE — 97598 DBRDMT OPN WND ADDL 20CM/<: CPT

## 2020-08-11 RX ORDER — BETAMETHASONE DIPROPIONATE 0.05 %
OINTMENT (GRAM) TOPICAL ONCE
Status: CANCELLED | OUTPATIENT
Start: 2020-08-11

## 2020-08-11 RX ORDER — CLOBETASOL PROPIONATE 0.5 MG/G
OINTMENT TOPICAL ONCE
Status: CANCELLED | OUTPATIENT
Start: 2020-08-11

## 2020-08-11 RX ORDER — LIDOCAINE HYDROCHLORIDE 40 MG/ML
SOLUTION TOPICAL ONCE
Status: COMPLETED | OUTPATIENT
Start: 2020-08-11 | End: 2020-08-11

## 2020-08-11 RX ORDER — BACITRACIN, NEOMYCIN, POLYMYXIN B 400; 3.5; 5 [USP'U]/G; MG/G; [USP'U]/G
OINTMENT TOPICAL ONCE
Status: CANCELLED | OUTPATIENT
Start: 2020-08-11

## 2020-08-11 RX ORDER — GENTAMICIN SULFATE 1 MG/G
OINTMENT TOPICAL ONCE
Status: CANCELLED | OUTPATIENT
Start: 2020-08-11

## 2020-08-11 RX ORDER — BACITRACIN ZINC AND POLYMYXIN B SULFATE 500; 1000 [USP'U]/G; [USP'U]/G
OINTMENT TOPICAL ONCE
Status: CANCELLED | OUTPATIENT
Start: 2020-08-11

## 2020-08-11 RX ORDER — LIDOCAINE 50 MG/G
OINTMENT TOPICAL ONCE
Status: CANCELLED | OUTPATIENT
Start: 2020-08-11

## 2020-08-11 RX ORDER — GINSENG 100 MG
CAPSULE ORAL ONCE
Status: CANCELLED | OUTPATIENT
Start: 2020-08-11

## 2020-08-11 RX ORDER — LIDOCAINE 40 MG/G
CREAM TOPICAL ONCE
Status: CANCELLED | OUTPATIENT
Start: 2020-08-11

## 2020-08-11 RX ORDER — LIDOCAINE HYDROCHLORIDE 40 MG/ML
SOLUTION TOPICAL ONCE
Status: CANCELLED | OUTPATIENT
Start: 2020-08-11

## 2020-08-11 RX ADMIN — LIDOCAINE HYDROCHLORIDE 5 ML: 40 SOLUTION TOPICAL at 11:55

## 2020-08-11 ASSESSMENT — PAIN SCALES - GENERAL: PAINLEVEL_OUTOF10: 6

## 2020-08-11 ASSESSMENT — PAIN DESCRIPTION - PAIN TYPE: TYPE: CHRONIC PAIN

## 2020-08-11 ASSESSMENT — PAIN DESCRIPTION - PROGRESSION: CLINICAL_PROGRESSION: NOT CHANGED

## 2020-08-11 ASSESSMENT — PAIN DESCRIPTION - ORIENTATION: ORIENTATION: LOWER;RIGHT

## 2020-08-11 ASSESSMENT — PAIN DESCRIPTION - LOCATION: LOCATION: LEG

## 2020-08-11 ASSESSMENT — PAIN DESCRIPTION - ONSET: ONSET: ON-GOING

## 2020-08-11 ASSESSMENT — PAIN DESCRIPTION - FREQUENCY: FREQUENCY: INTERMITTENT

## 2020-08-11 ASSESSMENT — PAIN DESCRIPTION - DESCRIPTORS: DESCRIPTORS: ACHING;SHOOTING
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
## 2020-08-13 ENCOUNTER — HOSPITAL ENCOUNTER (OUTPATIENT)
Dept: WOUND CARE | Age: 50
Discharge: HOME | End: 2020-08-13
Payer: MEDICARE

## 2020-08-13 VITALS
DIASTOLIC BLOOD PRESSURE: 66 MMHG | SYSTOLIC BLOOD PRESSURE: 100 MMHG | TEMPERATURE: 95.2 F | HEIGHT: 70 IN | WEIGHT: 170 LBS | RESPIRATION RATE: 18 BRPM | HEART RATE: 70 BPM | BODY MASS INDEX: 24.34 KG/M2

## 2020-08-13 PROCEDURE — 6370000000 HC RX 637 (ALT 250 FOR IP): Performed by: PODIATRIST

## 2020-08-13 PROCEDURE — 16030 DRESS/DEBRID P-THICK BURN L: CPT | Performed by: NURSE PRACTITIONER

## 2020-08-13 PROCEDURE — 16020 DRESS/DEBRID P-THICK BURN S: CPT

## 2020-08-13 PROCEDURE — 99202 OFFICE O/P NEW SF 15 MIN: CPT | Performed by: NURSE PRACTITIONER

## 2020-08-13 RX ORDER — CLOBETASOL PROPIONATE 0.5 MG/G
OINTMENT TOPICAL ONCE
Status: CANCELLED | OUTPATIENT
Start: 2020-08-13

## 2020-08-13 RX ORDER — GENTAMICIN SULFATE 1 MG/G
OINTMENT TOPICAL ONCE
Status: CANCELLED | OUTPATIENT
Start: 2020-08-13

## 2020-08-13 RX ORDER — LIDOCAINE HYDROCHLORIDE 40 MG/ML
SOLUTION TOPICAL ONCE
Status: CANCELLED | OUTPATIENT
Start: 2020-08-13

## 2020-08-13 RX ORDER — LIDOCAINE 40 MG/G
CREAM TOPICAL ONCE
Status: CANCELLED | OUTPATIENT
Start: 2020-08-13

## 2020-08-13 RX ORDER — LIDOCAINE 50 MG/G
OINTMENT TOPICAL ONCE
Status: CANCELLED | OUTPATIENT
Start: 2020-08-13

## 2020-08-13 RX ORDER — BETAMETHASONE DIPROPIONATE 0.05 %
OINTMENT (GRAM) TOPICAL ONCE
Status: CANCELLED | OUTPATIENT
Start: 2020-08-13

## 2020-08-13 RX ORDER — GINSENG 100 MG
CAPSULE ORAL ONCE
Status: CANCELLED | OUTPATIENT
Start: 2020-08-13

## 2020-08-13 RX ORDER — BACITRACIN ZINC AND POLYMYXIN B SULFATE 500; 1000 [USP'U]/G; [USP'U]/G
OINTMENT TOPICAL ONCE
Status: CANCELLED | OUTPATIENT
Start: 2020-08-13

## 2020-08-13 RX ORDER — LIDOCAINE HYDROCHLORIDE 40 MG/ML
SOLUTION TOPICAL ONCE
Status: COMPLETED | OUTPATIENT
Start: 2020-08-13 | End: 2020-08-13

## 2020-08-13 RX ORDER — BACITRACIN, NEOMYCIN, POLYMYXIN B 400; 3.5; 5 [USP'U]/G; MG/G; [USP'U]/G
OINTMENT TOPICAL ONCE
Status: CANCELLED | OUTPATIENT
Start: 2020-08-13

## 2020-08-13 RX ADMIN — LIDOCAINE HYDROCHLORIDE 5 ML: 40 SOLUTION TOPICAL at 10:48

## 2020-08-13 ASSESSMENT — PAIN DESCRIPTION - DESCRIPTORS: DESCRIPTORS: ACHING

## 2020-08-13 ASSESSMENT — PAIN DESCRIPTION - PAIN TYPE: TYPE: CHRONIC PAIN

## 2020-08-13 ASSESSMENT — PAIN SCALES - GENERAL: PAINLEVEL_OUTOF10: 4

## 2020-08-13 ASSESSMENT — PAIN DESCRIPTION - ONSET: ONSET: ON-GOING

## 2020-08-13 ASSESSMENT — PAIN DESCRIPTION - FREQUENCY: FREQUENCY: INTERMITTENT

## 2020-08-13 ASSESSMENT — PAIN DESCRIPTION - LOCATION: LOCATION: LEG

## 2020-08-13 ASSESSMENT — PAIN DESCRIPTION - ORIENTATION: ORIENTATION: RIGHT;LOWER

## 2020-08-13 ASSESSMENT — PAIN DESCRIPTION - PROGRESSION: CLINICAL_PROGRESSION: NOT CHANGED

## 2020-08-13 NOTE — PLAN OF CARE
Problem: Pain:  Goal: Pain level will decrease  Description: Pain level will decrease  Outcome: Ongoing  Goal: Control of acute pain  Description: Control of acute pain  Outcome: Ongoing  Goal: Control of chronic pain  Description: Control of chronic pain  Outcome: Ongoing     Problem: Wound:  Goal: Will show signs of wound healing; wound closure and no evidence of infection  Description: Will show signs of wound healing; wound closure and no evidence of infection  Outcome: Ongoing
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
## 2020-08-19 ENCOUNTER — HOSPITAL ENCOUNTER (OUTPATIENT)
Dept: WOUND CARE | Age: 50
Discharge: HOME | End: 2020-08-19
Payer: MEDICARE

## 2020-08-19 VITALS
DIASTOLIC BLOOD PRESSURE: 73 MMHG | SYSTOLIC BLOOD PRESSURE: 122 MMHG | HEIGHT: 70 IN | WEIGHT: 170 LBS | RESPIRATION RATE: 18 BRPM | BODY MASS INDEX: 24.34 KG/M2 | TEMPERATURE: 97.8 F | HEART RATE: 84 BPM

## 2020-08-19 PROCEDURE — 6370000000 HC RX 637 (ALT 250 FOR IP): Performed by: PODIATRIST

## 2020-08-19 PROCEDURE — 99213 OFFICE O/P EST LOW 20 MIN: CPT

## 2020-08-19 RX ORDER — CLOBETASOL PROPIONATE 0.5 MG/G
OINTMENT TOPICAL ONCE
Status: CANCELLED | OUTPATIENT
Start: 2020-08-19

## 2020-08-19 RX ORDER — LIDOCAINE 50 MG/G
OINTMENT TOPICAL ONCE
Status: CANCELLED | OUTPATIENT
Start: 2020-08-19

## 2020-08-19 RX ORDER — LIDOCAINE 40 MG/G
CREAM TOPICAL ONCE
Status: CANCELLED | OUTPATIENT
Start: 2020-08-19

## 2020-08-19 RX ORDER — BETAMETHASONE DIPROPIONATE 0.05 %
OINTMENT (GRAM) TOPICAL ONCE
Status: CANCELLED | OUTPATIENT
Start: 2020-08-19

## 2020-08-19 RX ORDER — BACITRACIN ZINC AND POLYMYXIN B SULFATE 500; 1000 [USP'U]/G; [USP'U]/G
OINTMENT TOPICAL ONCE
Status: CANCELLED | OUTPATIENT
Start: 2020-08-19

## 2020-08-19 RX ORDER — LIDOCAINE HYDROCHLORIDE 40 MG/ML
SOLUTION TOPICAL ONCE
Status: COMPLETED | OUTPATIENT
Start: 2020-08-19 | End: 2020-08-19

## 2020-08-19 RX ORDER — BACITRACIN, NEOMYCIN, POLYMYXIN B 400; 3.5; 5 [USP'U]/G; MG/G; [USP'U]/G
OINTMENT TOPICAL ONCE
Status: CANCELLED | OUTPATIENT
Start: 2020-08-19

## 2020-08-19 RX ORDER — GENTAMICIN SULFATE 1 MG/G
OINTMENT TOPICAL ONCE
Status: CANCELLED | OUTPATIENT
Start: 2020-08-19

## 2020-08-19 RX ORDER — LIDOCAINE HYDROCHLORIDE 40 MG/ML
SOLUTION TOPICAL ONCE
Status: CANCELLED | OUTPATIENT
Start: 2020-08-19

## 2020-08-19 RX ORDER — GINSENG 100 MG
CAPSULE ORAL ONCE
Status: CANCELLED | OUTPATIENT
Start: 2020-08-19

## 2020-08-19 RX ADMIN — LIDOCAINE HYDROCHLORIDE: 40 SOLUTION TOPICAL at 15:54

## 2020-08-19 ASSESSMENT — ENCOUNTER SYMPTOMS
DIARRHEA: 0
VOMITING: 0
NAUSEA: 0
ROS SKIN COMMENTS: RIGHT LEG

## 2020-08-19 ASSESSMENT — PAIN DESCRIPTION - LOCATION: LOCATION: LEG

## 2020-08-19 ASSESSMENT — PAIN DESCRIPTION - PAIN TYPE: TYPE: CHRONIC PAIN

## 2020-08-19 ASSESSMENT — PAIN DESCRIPTION - ONSET: ONSET: ON-GOING

## 2020-08-19 ASSESSMENT — PAIN DESCRIPTION - PROGRESSION: CLINICAL_PROGRESSION: NOT CHANGED

## 2020-08-19 ASSESSMENT — PAIN DESCRIPTION - ORIENTATION: ORIENTATION: LOWER;LEFT

## 2020-08-19 ASSESSMENT — PAIN DESCRIPTION - FREQUENCY: FREQUENCY: INTERMITTENT

## 2020-08-19 ASSESSMENT — PAIN SCALES - GENERAL: PAINLEVEL_OUTOF10: 4

## 2020-08-19 ASSESSMENT — PAIN DESCRIPTION - DESCRIPTORS: DESCRIPTORS: BURNING;STABBING
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
## 2020-08-21 ENCOUNTER — HOSPITAL ENCOUNTER (OUTPATIENT)
Dept: PREADMISSION TESTING | Age: 50
Setting detail: SPECIMEN
Discharge: HOME | End: 2020-08-25
Payer: MEDICARE

## 2020-08-21 PROCEDURE — U0003 INFECTIOUS AGENT DETECTION BY NUCLEIC ACID (DNA OR RNA); SEVERE ACUTE RESPIRATORY SYNDROME CORONAVIRUS 2 (SARS-COV-2) (CORONAVIRUS DISEASE [COVID-19]), AMPLIFIED PROBE TECHNIQUE, MAKING USE OF HIGH THROUGHPUT TECHNOLOGIES AS DESCRIBED BY CMS-2020-01-R: HCPCS

## 2020-08-23 LAB — SARS-COV-2, NAA: NOT DETECTED

## 2020-08-24 RX ORDER — ALBUTEROL SULFATE 90 UG/1
2 AEROSOL, METERED RESPIRATORY (INHALATION) EVERY 6 HOURS PRN
COMMUNITY
End: 2022-01-19 | Stop reason: ALTCHOICE

## 2020-08-24 RX ORDER — MINOCYCLINE HYDROCHLORIDE 100 MG/1
100 CAPSULE ORAL DAILY
COMMUNITY
End: 2020-11-09 | Stop reason: ALTCHOICE

## 2020-08-25 ENCOUNTER — HOSPITAL ENCOUNTER (OUTPATIENT)
Age: 50
Setting detail: OUTPATIENT SURGERY
Discharge: HOME | End: 2020-08-25
Attending: PODIATRIST | Admitting: PODIATRIST
Payer: MEDICARE

## 2020-08-25 VITALS
TEMPERATURE: 96.8 F | HEART RATE: 59 BPM | DIASTOLIC BLOOD PRESSURE: 86 MMHG | BODY MASS INDEX: 23.8 KG/M2 | OXYGEN SATURATION: 98 % | SYSTOLIC BLOOD PRESSURE: 123 MMHG | WEIGHT: 170 LBS | RESPIRATION RATE: 16 BRPM | HEIGHT: 71 IN

## 2020-08-25 PROCEDURE — 2500000003 HC RX 250 WO HCPCS: Performed by: PODIATRIST

## 2020-08-25 PROCEDURE — 7100000040 HC SPAR PHASE II RECOVERY - FIRST 15 MIN: Performed by: PODIATRIST

## 2020-08-25 PROCEDURE — 2709999900 HC NON-CHARGEABLE SUPPLY: Performed by: PODIATRIST

## 2020-08-25 PROCEDURE — 2580000003 HC RX 258: Performed by: PODIATRIST

## 2020-08-25 PROCEDURE — 3600000014 HC SURGERY LEVEL 4 ADDTL 15MIN: Performed by: PODIATRIST

## 2020-08-25 PROCEDURE — 3600000004 HC SURGERY LEVEL 4 BASE: Performed by: PODIATRIST

## 2020-08-25 DEVICE — INTEGRA® MESHED DERMAL REGENERATION TEMPLATE 4IN. X 5IN. (10CM X 12.5CM)
Type: IMPLANTABLE DEVICE | Site: LEG | Status: FUNCTIONAL
Brand: INTEGRA®

## 2020-08-25 RX ORDER — OXYCODONE HYDROCHLORIDE AND ACETAMINOPHEN 5; 325 MG/1; MG/1
1 TABLET ORAL EVERY 4 HOURS PRN
COMMUNITY

## 2020-08-25 RX ORDER — LIDOCAINE HYDROCHLORIDE 40 MG/ML
SOLUTION TOPICAL ONCE
Status: DISCONTINUED | OUTPATIENT
Start: 2020-08-25 | End: 2020-08-25 | Stop reason: HOSPADM

## 2020-08-25 RX ORDER — MAGNESIUM HYDROXIDE 1200 MG/15ML
LIQUID ORAL CONTINUOUS PRN
Status: COMPLETED | OUTPATIENT
Start: 2020-08-25 | End: 2020-08-25

## 2020-08-25 RX ORDER — LIDOCAINE HYDROCHLORIDE 10 MG/ML
INJECTION, SOLUTION EPIDURAL; INFILTRATION; INTRACAUDAL; PERINEURAL PRN
Status: DISCONTINUED | OUTPATIENT
Start: 2020-08-25 | End: 2020-08-25 | Stop reason: ALTCHOICE

## 2020-08-25 ASSESSMENT — PAIN SCALES - GENERAL: PAINLEVEL_OUTOF10: 0

## 2020-08-25 ASSESSMENT — PAIN - FUNCTIONAL ASSESSMENT: PAIN_FUNCTIONAL_ASSESSMENT: 0-10

## 2020-08-25 NOTE — H&P
Once     Continuous Infusions:  PRN Meds:. Allergies  is allergic to latuda [lurasidone hcl]; lithium; and seasonal.    Family History    family history includes Breast Cancer in his maternal grandmother; Cancer (age of onset: 34) in his mother; Heart Disease (age of onset: 61) in his father; Stroke in his father.     Family Status   Relation Name Status    Mother      Father  Alive    MGM           Social History  Social History     Socioeconomic History    Marital status: Single     Spouse name: Not on file    Number of children: Not on file    Years of education: Not on file    Highest education level: Not on file   Occupational History    Not on file   Social Needs    Financial resource strain: Not on file    Food insecurity     Worry: Not on file     Inability: Not on file    Transportation needs     Medical: Not on file     Non-medical: Not on file   Tobacco Use    Smoking status: Former Smoker     Packs/day: 1.50     Years: 12.00     Pack years: 18.00     Last attempt to quit: 2002     Years since quittin.0    Smokeless tobacco: Never Used   Substance and Sexual Activity    Alcohol use: No     Alcohol/week: 0.0 standard drinks    Drug use: No    Sexual activity: Yes     Partners: Female     Comment:    Lifestyle    Physical activity     Days per week: Not on file     Minutes per session: Not on file    Stress: Not on file   Relationships    Social connections     Talks on phone: Not on file     Gets together: Not on file     Attends Confucianism service: Not on file     Active member of club or organization: Not on file     Attends meetings of clubs or organizations: Not on file     Relationship status: Not on file    Intimate partner violence     Fear of current or ex partner: Not on file     Emotionally abused: Not on file     Physically abused: Not on file     Forced sexual activity: Not on file   Other Topics Concern    Not on file   Social History Narrative    Not on file        Service:No    Occupation:     x 35 yrs      Hobbies:   Fishing , gardening     OBJECTIVE:   VITALS:  height is 5' 11\" (1.803 m) and weight is 170 lb (77.1 kg). His temporal temperature is 97.2 °F (36.2 °C). His blood pressure is 123/86 and his pulse is 72. His respiration is 16 and oxygen saturation is 98%. CONSTITUTIONAL: Alert and oriented times 3, no acute distress and cooperative to examination. friendly and pleasant , muscular build     SKIN: rash No,   Many  tattoos     HEENT: Head is normocephalic, atraumatic. EOMI, PERRLA    Oral air way :slightly narrow Yes    NECK: neck supple, no lymphadenopathy noted, trachea midline and straight       2+ carotid, no bruit    LUNGS: Chest expands equally bilaterally upon respiration, no accessory muscle used. Ausculation reveals no adventitious breath sounds. CARDIOVASCULAR: \"Heart sounds are normal.  Regular rate and rhythm without murmur,    ABDOMEN: Bowel sounds are present in all four quadrants      GENATALIA:Deferred. NEUROLOGIC: \"CN II-XII are grossly intact.        EXTREMITIES: Pitting edema:   No of left leg      RIGHT LOWER  LEG has dressing on   Varicose veins:  No  of left leg     Dorsal pedal/posterior tibial pulses palpable: Yes  of left leg          Strength:   Not tested       Patient Active Problem List   Diagnosis    Right knee pain    Chronic fatigue    Hypertriglyceridemia    Seasonal allergies    Hemorrhoids    Right shoulder pain    Calcium nephrolithiasis    Fatty liver disease, nonalcoholic    Tattoos    Anxiety    Bicipital tendinitis, right shoulder    Hyperglycemia    Bipolar disorder, in partial remission, most recent episode depressed (HCC)    Fibromyalgia muscle pain    Essential hypertension    S/P Achilles tendon repair    Secondary hypertension    Abnormal EKG    Impingement syndrome of right shoulder    Tear of right supraspinatus tendon    Achilles tendinitis    Bilateral swelling of feet    Incomplete rotatr-cuff tear/ruptr of r shoulder, not trauma    Primary osteoarthritis, right shoulder    Allergic rhinitis due to allergen    Vitamin B 12 deficiency    Vitamin D deficiency    Chronic gout of right ankle    Bilateral leg edema    Partial thickness burn of right lower extremity    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 Right leg burn   2. does not have any pertinent problems on file.     Niurka Saldana [facility]  Electronically signed 8/25/2020 at 12:37 PM       Scheduled for:    RIGHT LEG   APPLICATION INTEGRA BIOLAYER GRAFT - LEG

## 2020-08-25 NOTE — OP NOTE
surgical debridement with application of graft has been recommended to which the patient is amenable. In pre-op all risks and benefits of the procedure were discussed at length prior to the patient signing the consent form . Consent is signed, witnessed, and placed in patient chart. The right leg was marked, labs and images reviewed. No guarantees were given or implied. PROCEDURE IN DETAIL: The patient was brought to the operating room and placed on the operating table in the supine position with a safety strap across the lap. Topical 4% lidocaine solution was applied to the right leg wound prior to transport to the OR. The surgical site was then scrubbed, prepped, and draped in the usual aseptic manner. A timeout was performed confirming the patient's identity, correct site, correct procedure, allergies. Skin Substitute Skin Graft   Type: Integra Bilayer Graft    Attention was directed to the lateral aspect of the right leg. The ulceration were prepped by sharp debridement with a currette. At this time, patient had increased pain and 10 cc of 1% lidocaine plain was injected around the wound site. Debridement was performed through and including subcutaneous tissue. All fibrotic tissue debrided until healthy bleeding tissue noted. Hemostais was obtained with direct pressure. No underlying necrosis or purulent drainage noted. Post debridement wound measured 7 x 3 x 0.3cm. Next, the entire skin substitute - Integra bilayer graft was applied to the wound. The graft was fixated into place with 4-0 nylon. The wound was then dressed with a compressive dressing to prevent seroma/hematoma with adaptic, 4x4s, ABDs, kerlix and an ACE. Patient tolerated procedure well and was transferred to the PACU with all vital signs stable and vascular status intact to the right foot and ankle. Following a period of postoperative monitoring, the patient will be discharged to home.  Patient will be weightbearing as tolerated to to the right leg . Instructed to keep dressing clean, dry, and intact until follow up with Dr. Shalonda Rojas at wound care on 8/26/2020.     Darren Tatum DPM   Podiatric Medicine & Surgery   8/25/2020 at 3:11 PM
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
## 2020-08-26 ENCOUNTER — HOSPITAL ENCOUNTER (OUTPATIENT)
Dept: WOUND CARE | Age: 50
Discharge: HOME | End: 2020-08-26
Payer: MEDICARE

## 2020-08-26 VITALS
WEIGHT: 170 LBS | HEART RATE: 83 BPM | RESPIRATION RATE: 16 BRPM | TEMPERATURE: 97.7 F | HEIGHT: 71 IN | DIASTOLIC BLOOD PRESSURE: 77 MMHG | BODY MASS INDEX: 23.8 KG/M2 | SYSTOLIC BLOOD PRESSURE: 114 MMHG

## 2020-08-26 PROCEDURE — 99213 OFFICE O/P EST LOW 20 MIN: CPT

## 2020-08-26 RX ORDER — LIDOCAINE 50 MG/G
OINTMENT TOPICAL ONCE
Status: CANCELLED | OUTPATIENT
Start: 2020-08-26

## 2020-08-26 RX ORDER — BACITRACIN ZINC AND POLYMYXIN B SULFATE 500; 1000 [USP'U]/G; [USP'U]/G
OINTMENT TOPICAL ONCE
Status: CANCELLED | OUTPATIENT
Start: 2020-08-26

## 2020-08-26 RX ORDER — BETAMETHASONE DIPROPIONATE 0.05 %
OINTMENT (GRAM) TOPICAL ONCE
Status: CANCELLED | OUTPATIENT
Start: 2020-08-26

## 2020-08-26 RX ORDER — GENTAMICIN SULFATE 1 MG/G
OINTMENT TOPICAL ONCE
Status: CANCELLED | OUTPATIENT
Start: 2020-08-26

## 2020-08-26 RX ORDER — CLOBETASOL PROPIONATE 0.5 MG/G
OINTMENT TOPICAL ONCE
Status: CANCELLED | OUTPATIENT
Start: 2020-08-26

## 2020-08-26 RX ORDER — LIDOCAINE 40 MG/G
CREAM TOPICAL ONCE
Status: CANCELLED | OUTPATIENT
Start: 2020-08-26

## 2020-08-26 RX ORDER — LIDOCAINE HYDROCHLORIDE 40 MG/ML
SOLUTION TOPICAL ONCE
Status: CANCELLED | OUTPATIENT
Start: 2020-08-26

## 2020-08-26 RX ORDER — BACITRACIN, NEOMYCIN, POLYMYXIN B 400; 3.5; 5 [USP'U]/G; MG/G; [USP'U]/G
OINTMENT TOPICAL ONCE
Status: CANCELLED | OUTPATIENT
Start: 2020-08-26

## 2020-08-26 RX ORDER — GINSENG 100 MG
CAPSULE ORAL ONCE
Status: CANCELLED | OUTPATIENT
Start: 2020-08-26

## 2020-08-26 ASSESSMENT — PAIN DESCRIPTION - PAIN TYPE: TYPE: SURGICAL PAIN;ACUTE PAIN

## 2020-08-26 ASSESSMENT — ENCOUNTER SYMPTOMS
NAUSEA: 0
VOMITING: 0
DIARRHEA: 0
ROS SKIN COMMENTS: RIGHT LEG

## 2020-08-26 ASSESSMENT — PAIN SCALES - GENERAL: PAINLEVEL_OUTOF10: 0

## 2020-08-26 ASSESSMENT — PAIN DESCRIPTION - LOCATION: LOCATION: LEG

## 2020-08-26 ASSESSMENT — PAIN DESCRIPTION - ORIENTATION: ORIENTATION: RIGHT;LOWER
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
## 2020-08-26 NOTE — PROGRESS NOTES
Visit Information    Have you changed or started any medications since your last visit including any over-the-counter medicines, vitamins, or herbal medicines? no   Are you having any side effects from any of your medications? -  no  Have you stopped taking any of your medications? Is so, why? -  no    Have you seen any other physician or provider since your last visit? Yes - Records Obtained  Have you had any other diagnostic tests since your last visit? Yes - Records Obtained  Have you been seen in the emergency room and/or had an admission to a hospital since we last saw you? No  Have you had your routine dental cleaning in the past 6 months? no    Have you activated your Urge account? If not, what are your barriers?  Yes     Patient Care Team:  Td Zamora MD as PCP - General (Family Medicine)  Td Zamora MD as PCP - [facility]  Oli Willis MD (Dermatology)  Selvin Mcdonough MD as Surgeon (Cardiology)  Mack Bey DPM as Consulting Physician (Podiatry)    Medical History Review  Past Medical, Family, and Social History reviewed and does contribute to the patient presenting condition    Health Maintenance   Topic Date Due    Flu vaccine (1) 09/01/2020    Lipid screen  05/28/2021    Potassium monitoring  07/28/2021    Creatinine monitoring  07/28/2021    DTaP/Tdap/Td vaccine (2 - Td) 01/01/2024    HIV screen  Completed    Hepatitis A vaccine  Aged Out    Hepatitis B vaccine  Aged Out    Hib vaccine  Aged Out    Meningococcal (ACWY) vaccine  Aged Out    Pneumococcal 0-64 years Vaccine  Aged Out

## 2020-08-27 ENCOUNTER — TELEMEDICINE (OUTPATIENT)
Dept: FAMILY MEDICINE CLINIC | Age: 50
End: 2020-08-27
Payer: MEDICARE

## 2020-08-27 PROBLEM — M21.371 FOOT DROP, RIGHT: Status: ACTIVE | Noted: 2020-08-27

## 2020-08-27 PROCEDURE — 99214 OFFICE O/P EST MOD 30 MIN: CPT | Performed by: FAMILY MEDICINE

## 2020-08-27 PROCEDURE — G8427 DOCREV CUR MEDS BY ELIG CLIN: HCPCS | Performed by: FAMILY MEDICINE

## 2020-08-27 ASSESSMENT — ENCOUNTER SYMPTOMS
SHORTNESS OF BREATH: 0
COUGH: 0
DIARRHEA: 0
ABDOMINAL DISTENTION: 0
CHEST TIGHTNESS: 0
CONSTIPATION: 0
ABDOMINAL PAIN: 0
NAUSEA: 0
VOMITING: 0
WHEEZING: 0
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
## 2020-08-27 NOTE — PATIENT INSTRUCTIONS

## 2020-08-31 PROBLEM — R20.2 NUMBNESS AND TINGLING OF FOOT: Status: ACTIVE | Noted: 2020-08-31

## 2020-08-31 PROBLEM — R20.0 NUMBNESS AND TINGLING OF FOOT: Status: ACTIVE | Noted: 2020-08-31

## 2020-09-01 ENCOUNTER — HOSPITAL ENCOUNTER (OUTPATIENT)
Age: 50
Discharge: HOME | End: 2020-09-01
Payer: MEDICARE

## 2020-09-01 LAB
ALBUMIN SERPL-MCNC: 4.6 G/DL (ref 3.5–5.2)
ALBUMIN/GLOBULIN RATIO: NORMAL (ref 1–2.5)
ALP BLD-CCNC: 97 U/L (ref 40–129)
ALT SERPL-CCNC: 32 U/L (ref 5–41)
ANION GAP SERPL CALCULATED.3IONS-SCNC: 9 MMOL/L (ref 9–17)
AST SERPL-CCNC: 26 U/L
BILIRUB SERPL-MCNC: 0.53 MG/DL (ref 0.3–1.2)
BUN BLDV-MCNC: 17 MG/DL (ref 6–20)
BUN/CREAT BLD: NORMAL (ref 9–20)
CALCIUM SERPL-MCNC: 9.2 MG/DL (ref 8.6–10.4)
CHLORIDE BLD-SCNC: 105 MMOL/L (ref 98–107)
CHOLESTEROL/HDL RATIO: 3.6
CHOLESTEROL: 205 MG/DL
CO2: 28 MMOL/L (ref 20–31)
CREAT SERPL-MCNC: 0.79 MG/DL (ref 0.7–1.2)
FOLATE: 6.2 NG/ML
GFR AFRICAN AMERICAN: >60 ML/MIN
GFR NON-AFRICAN AMERICAN: >60 ML/MIN
GFR SERPL CREATININE-BSD FRML MDRD: NORMAL ML/MIN/{1.73_M2}
GFR SERPL CREATININE-BSD FRML MDRD: NORMAL ML/MIN/{1.73_M2}
GLUCOSE BLD-MCNC: 92 MG/DL (ref 70–99)
HDLC SERPL-MCNC: 57 MG/DL
LDL CHOLESTEROL: 122 MG/DL (ref 0–130)
POTASSIUM SERPL-SCNC: 3.9 MMOL/L (ref 3.7–5.3)
SODIUM BLD-SCNC: 142 MMOL/L (ref 135–144)
TOTAL PROTEIN: 7.4 G/DL (ref 6.4–8.3)
TRIGL SERPL-MCNC: 130 MG/DL
TSH SERPL DL<=0.05 MIU/L-ACNC: 1.19 MIU/L (ref 0.3–5)
VITAMIN B-12: 481 PG/ML (ref 232–1245)
VLDLC SERPL CALC-MCNC: ABNORMAL MG/DL (ref 1–30)

## 2020-09-01 PROCEDURE — 80053 COMPREHEN METABOLIC PANEL: CPT

## 2020-09-01 PROCEDURE — 82607 VITAMIN B-12: CPT

## 2020-09-01 PROCEDURE — 84443 ASSAY THYROID STIM HORMONE: CPT

## 2020-09-01 PROCEDURE — 36415 COLL VENOUS BLD VENIPUNCTURE: CPT

## 2020-09-01 PROCEDURE — 80061 LIPID PANEL: CPT

## 2020-09-01 PROCEDURE — 83036 HEMOGLOBIN GLYCOSYLATED A1C: CPT

## 2020-09-01 PROCEDURE — 82746 ASSAY OF FOLIC ACID SERUM: CPT

## 2020-09-02 ENCOUNTER — HOSPITAL ENCOUNTER (OUTPATIENT)
Dept: WOUND CARE | Age: 50
Discharge: HOME | End: 2020-09-02
Payer: MEDICARE

## 2020-09-02 VITALS
RESPIRATION RATE: 16 BRPM | HEART RATE: 81 BPM | SYSTOLIC BLOOD PRESSURE: 118 MMHG | HEIGHT: 71 IN | TEMPERATURE: 97.7 F | DIASTOLIC BLOOD PRESSURE: 83 MMHG | WEIGHT: 170 LBS | BODY MASS INDEX: 23.8 KG/M2

## 2020-09-02 LAB
ESTIMATED AVERAGE GLUCOSE: 120 MG/DL
HBA1C MFR BLD: 5.8 % (ref 4–6)

## 2020-09-02 PROCEDURE — 99213 OFFICE O/P EST LOW 20 MIN: CPT

## 2020-09-02 RX ORDER — LIDOCAINE HYDROCHLORIDE 40 MG/ML
SOLUTION TOPICAL ONCE
Status: DISCONTINUED | OUTPATIENT
Start: 2020-09-02 | End: 2020-09-03 | Stop reason: HOSPADM

## 2020-09-02 RX ORDER — BACITRACIN ZINC AND POLYMYXIN B SULFATE 500; 1000 [USP'U]/G; [USP'U]/G
OINTMENT TOPICAL ONCE
Status: CANCELLED | OUTPATIENT
Start: 2020-09-02

## 2020-09-02 RX ORDER — BACITRACIN, NEOMYCIN, POLYMYXIN B 400; 3.5; 5 [USP'U]/G; MG/G; [USP'U]/G
OINTMENT TOPICAL ONCE
Status: CANCELLED | OUTPATIENT
Start: 2020-09-02

## 2020-09-02 RX ORDER — LIDOCAINE 50 MG/G
OINTMENT TOPICAL ONCE
Status: CANCELLED | OUTPATIENT
Start: 2020-09-02

## 2020-09-02 RX ORDER — LIDOCAINE HYDROCHLORIDE 40 MG/ML
SOLUTION TOPICAL ONCE
Status: CANCELLED | OUTPATIENT
Start: 2020-09-02

## 2020-09-02 RX ORDER — GINSENG 100 MG
CAPSULE ORAL ONCE
Status: CANCELLED | OUTPATIENT
Start: 2020-09-02

## 2020-09-02 RX ORDER — CLOBETASOL PROPIONATE 0.5 MG/G
OINTMENT TOPICAL ONCE
Status: CANCELLED | OUTPATIENT
Start: 2020-09-02

## 2020-09-02 RX ORDER — GENTAMICIN SULFATE 1 MG/G
OINTMENT TOPICAL ONCE
Status: CANCELLED | OUTPATIENT
Start: 2020-09-02

## 2020-09-02 RX ORDER — LIDOCAINE 40 MG/G
CREAM TOPICAL ONCE
Status: CANCELLED | OUTPATIENT
Start: 2020-09-02

## 2020-09-02 RX ORDER — BETAMETHASONE DIPROPIONATE 0.05 %
OINTMENT (GRAM) TOPICAL ONCE
Status: CANCELLED | OUTPATIENT
Start: 2020-09-02

## 2020-09-02 ASSESSMENT — ENCOUNTER SYMPTOMS
NAUSEA: 0
VOMITING: 0
DIARRHEA: 0
ROS SKIN COMMENTS: RIGHT LEG

## 2020-09-02 ASSESSMENT — PAIN DESCRIPTION - ORIENTATION: ORIENTATION: RIGHT;LOWER

## 2020-09-02 ASSESSMENT — PAIN DESCRIPTION - PAIN TYPE: TYPE: ACUTE PAIN;SURGICAL PAIN

## 2020-09-02 ASSESSMENT — PAIN DESCRIPTION - LOCATION: LOCATION: LEG

## 2020-09-02 ASSESSMENT — PAIN SCALES - GENERAL: PAINLEVEL_OUTOF10: 5

## 2020-09-02 NOTE — RESULT ENCOUNTER NOTE
ABNORMAL. Please notify patient. Prediabetes A1c 5.8, new. Low-carb diet, no pop, increase fruits and vegetables advised.   Worsening LDL which is the bad cholesterol  Otherwise labs within normal limits    To continue with pravastatin 80 mg every night and not to miss any    Future Appointments  9/2/2020   3:00 PM    ERICKA Castaneda  12/2/2020  4:00 PM    MD jean paul Agosto
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
## 2020-09-09 ENCOUNTER — HOSPITAL ENCOUNTER (OUTPATIENT)
Dept: WOUND CARE | Age: 50
Discharge: HOME | End: 2020-09-09
Payer: MEDICARE

## 2020-09-09 VITALS
SYSTOLIC BLOOD PRESSURE: 103 MMHG | TEMPERATURE: 98.2 F | RESPIRATION RATE: 16 BRPM | WEIGHT: 170 LBS | BODY MASS INDEX: 23.8 KG/M2 | DIASTOLIC BLOOD PRESSURE: 71 MMHG | HEART RATE: 76 BPM | HEIGHT: 71 IN

## 2020-09-09 PROCEDURE — 99213 OFFICE O/P EST LOW 20 MIN: CPT

## 2020-09-09 RX ORDER — GENTAMICIN SULFATE 1 MG/G
OINTMENT TOPICAL ONCE
Status: CANCELLED | OUTPATIENT
Start: 2020-09-09

## 2020-09-09 RX ORDER — LIDOCAINE HYDROCHLORIDE 40 MG/ML
SOLUTION TOPICAL ONCE
Status: CANCELLED | OUTPATIENT
Start: 2020-09-09

## 2020-09-09 RX ORDER — BACITRACIN, NEOMYCIN, POLYMYXIN B 400; 3.5; 5 [USP'U]/G; MG/G; [USP'U]/G
OINTMENT TOPICAL ONCE
Status: CANCELLED | OUTPATIENT
Start: 2020-09-09

## 2020-09-09 RX ORDER — BACITRACIN ZINC AND POLYMYXIN B SULFATE 500; 1000 [USP'U]/G; [USP'U]/G
OINTMENT TOPICAL ONCE
Status: CANCELLED | OUTPATIENT
Start: 2020-09-09

## 2020-09-09 RX ORDER — BETAMETHASONE DIPROPIONATE 0.05 %
OINTMENT (GRAM) TOPICAL ONCE
Status: CANCELLED | OUTPATIENT
Start: 2020-09-09

## 2020-09-09 RX ORDER — CLOBETASOL PROPIONATE 0.5 MG/G
OINTMENT TOPICAL ONCE
Status: CANCELLED | OUTPATIENT
Start: 2020-09-09

## 2020-09-09 RX ORDER — LIDOCAINE 50 MG/G
OINTMENT TOPICAL ONCE
Status: CANCELLED | OUTPATIENT
Start: 2020-09-09

## 2020-09-09 RX ORDER — GINSENG 100 MG
CAPSULE ORAL ONCE
Status: CANCELLED | OUTPATIENT
Start: 2020-09-09

## 2020-09-09 RX ORDER — LIDOCAINE 40 MG/G
CREAM TOPICAL ONCE
Status: CANCELLED | OUTPATIENT
Start: 2020-09-09

## 2020-09-09 RX ORDER — LIDOCAINE HYDROCHLORIDE 40 MG/ML
SOLUTION TOPICAL ONCE
Status: DISCONTINUED | OUTPATIENT
Start: 2020-09-09 | End: 2020-09-10 | Stop reason: HOSPADM

## 2020-09-09 ASSESSMENT — PAIN DESCRIPTION - PAIN TYPE: TYPE: ACUTE PAIN;SURGICAL PAIN

## 2020-09-09 ASSESSMENT — PAIN DESCRIPTION - ORIENTATION: ORIENTATION: RIGHT;LOWER

## 2020-09-09 ASSESSMENT — PAIN SCALES - GENERAL: PAINLEVEL_OUTOF10: 3

## 2020-09-09 ASSESSMENT — PAIN DESCRIPTION - ONSET: ONSET: ON-GOING

## 2020-09-09 ASSESSMENT — ENCOUNTER SYMPTOMS
NAUSEA: 0
VOMITING: 0
DIARRHEA: 0
ROS SKIN COMMENTS: RIGHT LEG

## 2020-09-09 ASSESSMENT — PAIN DESCRIPTION - DESCRIPTORS: DESCRIPTORS: BURNING;SHARP

## 2020-09-09 ASSESSMENT — PAIN DESCRIPTION - FREQUENCY: FREQUENCY: INTERMITTENT

## 2020-09-09 ASSESSMENT — PAIN DESCRIPTION - LOCATION: LOCATION: LEG

## 2020-09-09 ASSESSMENT — PAIN DESCRIPTION - PROGRESSION: CLINICAL_PROGRESSION: NOT CHANGED

## 2020-09-09 ASSESSMENT — PAIN - FUNCTIONAL ASSESSMENT: PAIN_FUNCTIONAL_ASSESSMENT: ACTIVITIES ARE NOT PREVENTED
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
## 2020-09-14 ENCOUNTER — OFFICE VISIT (OUTPATIENT)
Dept: NEUROLOGY | Age: 50
End: 2020-09-14
Payer: MEDICARE

## 2020-09-14 VITALS
BODY MASS INDEX: 24.5 KG/M2 | DIASTOLIC BLOOD PRESSURE: 63 MMHG | HEART RATE: 82 BPM | HEIGHT: 71 IN | WEIGHT: 175 LBS | OXYGEN SATURATION: 97 % | SYSTOLIC BLOOD PRESSURE: 109 MMHG | TEMPERATURE: 98 F

## 2020-09-14 PROCEDURE — G8427 DOCREV CUR MEDS BY ELIG CLIN: HCPCS | Performed by: STUDENT IN AN ORGANIZED HEALTH CARE EDUCATION/TRAINING PROGRAM

## 2020-09-14 PROCEDURE — 99204 OFFICE O/P NEW MOD 45 MIN: CPT | Performed by: STUDENT IN AN ORGANIZED HEALTH CARE EDUCATION/TRAINING PROGRAM

## 2020-09-14 PROCEDURE — G8420 CALC BMI NORM PARAMETERS: HCPCS | Performed by: STUDENT IN AN ORGANIZED HEALTH CARE EDUCATION/TRAINING PROGRAM

## 2020-09-14 PROCEDURE — 1036F TOBACCO NON-USER: CPT | Performed by: STUDENT IN AN ORGANIZED HEALTH CARE EDUCATION/TRAINING PROGRAM

## 2020-09-14 ASSESSMENT — ENCOUNTER SYMPTOMS
PHOTOPHOBIA: 0
COUGH: 0
SHORTNESS OF BREATH: 0
ABDOMINAL DISTENTION: 0
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
## 2020-09-15 ENCOUNTER — HOSPITAL ENCOUNTER (OUTPATIENT)
Age: 50
Discharge: HOME | End: 2020-09-17
Payer: MEDICARE

## 2020-09-15 ENCOUNTER — HOSPITAL ENCOUNTER (OUTPATIENT)
Dept: GENERAL RADIOLOGY | Age: 50
Discharge: HOME | End: 2020-09-17
Payer: MEDICARE

## 2020-09-15 PROCEDURE — 73630 X-RAY EXAM OF FOOT: CPT

## 2020-09-16 ENCOUNTER — HOSPITAL ENCOUNTER (OUTPATIENT)
Dept: WOUND CARE | Age: 50
Discharge: HOME | End: 2020-09-16
Payer: MEDICARE

## 2020-09-16 VITALS
RESPIRATION RATE: 16 BRPM | HEIGHT: 71 IN | DIASTOLIC BLOOD PRESSURE: 66 MMHG | SYSTOLIC BLOOD PRESSURE: 98 MMHG | WEIGHT: 175 LBS | BODY MASS INDEX: 24.5 KG/M2 | HEART RATE: 76 BPM | TEMPERATURE: 97.4 F

## 2020-09-16 PROCEDURE — 99213 OFFICE O/P EST LOW 20 MIN: CPT

## 2020-09-16 RX ORDER — CLOBETASOL PROPIONATE 0.5 MG/G
OINTMENT TOPICAL ONCE
Status: CANCELLED | OUTPATIENT
Start: 2020-09-16

## 2020-09-16 RX ORDER — BACITRACIN, NEOMYCIN, POLYMYXIN B 400; 3.5; 5 [USP'U]/G; MG/G; [USP'U]/G
OINTMENT TOPICAL ONCE
Status: CANCELLED | OUTPATIENT
Start: 2020-09-16

## 2020-09-16 RX ORDER — BACITRACIN ZINC AND POLYMYXIN B SULFATE 500; 1000 [USP'U]/G; [USP'U]/G
OINTMENT TOPICAL ONCE
Status: CANCELLED | OUTPATIENT
Start: 2020-09-16

## 2020-09-16 RX ORDER — BETAMETHASONE DIPROPIONATE 0.05 %
OINTMENT (GRAM) TOPICAL ONCE
Status: CANCELLED | OUTPATIENT
Start: 2020-09-16

## 2020-09-16 RX ORDER — LIDOCAINE 40 MG/G
CREAM TOPICAL ONCE
Status: CANCELLED | OUTPATIENT
Start: 2020-09-16

## 2020-09-16 RX ORDER — LIDOCAINE HYDROCHLORIDE 40 MG/ML
SOLUTION TOPICAL ONCE
Status: CANCELLED | OUTPATIENT
Start: 2020-09-16

## 2020-09-16 RX ORDER — GENTAMICIN SULFATE 1 MG/G
OINTMENT TOPICAL ONCE
Status: CANCELLED | OUTPATIENT
Start: 2020-09-16

## 2020-09-16 RX ORDER — LIDOCAINE 50 MG/G
OINTMENT TOPICAL ONCE
Status: CANCELLED | OUTPATIENT
Start: 2020-09-16

## 2020-09-16 RX ORDER — GINSENG 100 MG
CAPSULE ORAL ONCE
Status: CANCELLED | OUTPATIENT
Start: 2020-09-16

## 2020-09-16 RX ORDER — LIDOCAINE HYDROCHLORIDE 40 MG/ML
SOLUTION TOPICAL ONCE
Status: COMPLETED | OUTPATIENT
Start: 2020-09-16 | End: 2020-09-16

## 2020-09-16 RX ADMIN — LIDOCAINE HYDROCHLORIDE 10 ML: 40 SOLUTION TOPICAL at 13:29

## 2020-09-16 ASSESSMENT — PAIN DESCRIPTION - FREQUENCY: FREQUENCY: INTERMITTENT

## 2020-09-16 ASSESSMENT — ENCOUNTER SYMPTOMS
DIARRHEA: 0
NAUSEA: 0
VOMITING: 0
ROS SKIN COMMENTS: RIGHT LEG

## 2020-09-16 ASSESSMENT — PAIN DESCRIPTION - PROGRESSION: CLINICAL_PROGRESSION: NOT CHANGED

## 2020-09-16 ASSESSMENT — PAIN - FUNCTIONAL ASSESSMENT: PAIN_FUNCTIONAL_ASSESSMENT: ACTIVITIES ARE NOT PREVENTED

## 2020-09-16 ASSESSMENT — PAIN SCALES - GENERAL: PAINLEVEL_OUTOF10: 4

## 2020-09-16 ASSESSMENT — PAIN DESCRIPTION - ONSET: ONSET: ON-GOING

## 2020-09-16 ASSESSMENT — PAIN DESCRIPTION - PAIN TYPE: TYPE: ACUTE PAIN;SURGICAL PAIN

## 2020-09-16 ASSESSMENT — PAIN DESCRIPTION - LOCATION: LOCATION: LEG

## 2020-09-16 ASSESSMENT — PAIN DESCRIPTION - ORIENTATION: ORIENTATION: RIGHT;LOWER

## 2020-09-16 ASSESSMENT — PAIN DESCRIPTION - DESCRIPTORS: DESCRIPTORS: BURNING;SHARP
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
## 2020-09-21 RX ORDER — ACETAMINOPHEN/DIPHENHYDRAMINE 500MG-25MG
TABLET ORAL
Qty: 90 TABLET | Refills: 3 | Status: SHIPPED | OUTPATIENT
Start: 2020-09-21 | End: 2021-08-20
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
## 2020-09-22 ENCOUNTER — TELEPHONE (OUTPATIENT)
Dept: WOUND CARE | Age: 50
End: 2020-09-22

## 2020-09-23 ENCOUNTER — HOSPITAL ENCOUNTER (OUTPATIENT)
Dept: WOUND CARE | Age: 50
Discharge: HOME | End: 2020-09-23

## 2020-09-25 ENCOUNTER — HOSPITAL ENCOUNTER (OUTPATIENT)
Dept: WOUND CARE | Age: 50
Discharge: HOME | End: 2020-09-25
Payer: MEDICARE

## 2020-09-25 VITALS
RESPIRATION RATE: 18 BRPM | HEIGHT: 71 IN | HEART RATE: 79 BPM | WEIGHT: 175 LBS | DIASTOLIC BLOOD PRESSURE: 79 MMHG | SYSTOLIC BLOOD PRESSURE: 115 MMHG | TEMPERATURE: 97.7 F | BODY MASS INDEX: 24.5 KG/M2

## 2020-09-25 PROCEDURE — 99213 OFFICE O/P EST LOW 20 MIN: CPT | Performed by: PODIATRIST

## 2020-09-25 PROCEDURE — 29580 STRAPPING UNNA BOOT: CPT

## 2020-09-25 RX ORDER — LIDOCAINE 40 MG/G
CREAM TOPICAL ONCE
Status: CANCELLED | OUTPATIENT
Start: 2020-09-25

## 2020-09-25 RX ORDER — BACITRACIN ZINC AND POLYMYXIN B SULFATE 500; 1000 [USP'U]/G; [USP'U]/G
OINTMENT TOPICAL ONCE
Status: CANCELLED | OUTPATIENT
Start: 2020-09-25

## 2020-09-25 RX ORDER — LIDOCAINE 50 MG/G
OINTMENT TOPICAL ONCE
Status: CANCELLED | OUTPATIENT
Start: 2020-09-25

## 2020-09-25 RX ORDER — LIDOCAINE HYDROCHLORIDE 40 MG/ML
SOLUTION TOPICAL ONCE
Status: CANCELLED | OUTPATIENT
Start: 2020-09-25

## 2020-09-25 RX ORDER — GENTAMICIN SULFATE 1 MG/G
OINTMENT TOPICAL ONCE
Status: CANCELLED | OUTPATIENT
Start: 2020-09-25

## 2020-09-25 RX ORDER — CLOBETASOL PROPIONATE 0.5 MG/G
OINTMENT TOPICAL ONCE
Status: CANCELLED | OUTPATIENT
Start: 2020-09-25

## 2020-09-25 RX ORDER — BACITRACIN, NEOMYCIN, POLYMYXIN B 400; 3.5; 5 [USP'U]/G; MG/G; [USP'U]/G
OINTMENT TOPICAL ONCE
Status: CANCELLED | OUTPATIENT
Start: 2020-09-25

## 2020-09-25 RX ORDER — LIDOCAINE HYDROCHLORIDE 40 MG/ML
SOLUTION TOPICAL ONCE
Status: COMPLETED | OUTPATIENT
Start: 2020-09-25 | End: 2020-09-25

## 2020-09-25 RX ORDER — BETAMETHASONE DIPROPIONATE 0.05 %
OINTMENT (GRAM) TOPICAL ONCE
Status: CANCELLED | OUTPATIENT
Start: 2020-09-25

## 2020-09-25 RX ORDER — GINSENG 100 MG
CAPSULE ORAL ONCE
Status: CANCELLED | OUTPATIENT
Start: 2020-09-25

## 2020-09-25 RX ADMIN — LIDOCAINE HYDROCHLORIDE 10 ML: 40 SOLUTION TOPICAL at 09:03

## 2020-09-25 ASSESSMENT — PAIN DESCRIPTION - ONSET: ONSET: ON-GOING

## 2020-09-25 ASSESSMENT — PAIN DESCRIPTION - DESCRIPTORS: DESCRIPTORS: BURNING

## 2020-09-25 ASSESSMENT — PAIN DESCRIPTION - ORIENTATION: ORIENTATION: RIGHT

## 2020-09-25 ASSESSMENT — PAIN DESCRIPTION - PAIN TYPE: TYPE: CHRONIC PAIN

## 2020-09-25 ASSESSMENT — PAIN DESCRIPTION - LOCATION: LOCATION: LEG

## 2020-09-25 ASSESSMENT — PAIN DESCRIPTION - PROGRESSION: CLINICAL_PROGRESSION: NOT CHANGED

## 2020-09-25 ASSESSMENT — PAIN DESCRIPTION - FREQUENCY: FREQUENCY: INTERMITTENT

## 2020-09-25 ASSESSMENT — PAIN - FUNCTIONAL ASSESSMENT: PAIN_FUNCTIONAL_ASSESSMENT: ACTIVITIES ARE NOT PREVENTED

## 2020-09-25 ASSESSMENT — PAIN SCALES - GENERAL: PAINLEVEL_OUTOF10: 4

## 2020-09-25 NOTE — PLAN OF CARE
Problem: Wound:  Goal: Will show signs of wound healing; wound closure and no evidence of infection  Description: Will show signs of wound healing; wound closure and no evidence of infection  Outcome: Ongoing     Problem: Pain:  Goal: Pain level will decrease  Description: Pain level will decrease  Outcome: Ongoing  Goal: Control of acute pain  Description: Control of acute pain  Outcome: Ongoing  Goal: Control of chronic pain  Description: Control of chronic pain  Outcome: Ongoing

## 2020-09-25 NOTE — PROGRESS NOTES
[employer] Application   Below Knee    NAME:  Yasmin Bryant  YOB: 1970  MEDICAL RECORD NUMBER:  [id number]  DATE:  9/25/2020     [] Removed old Paz Sieving boot if indicated and wash leg with mild soap and water.  [] Applied moisturizing agent to dry skin as needed.  [x] Appied primary and secondary dressing as ordered     [x] Applied Unna roll from toes to knee overlapping each time.  [x] Applied ace wrap or coban from toes to below the knee.  [x] Secured with tape and/or metal clips covered with tape.  [x] Instructed patient/caregiver to keep dressing dry and intact. DO NOT REMOVE DRESSING.  [x] Instructed pt/family/caregiver to report excessive draining, loose bandage, wet dressing, severe pain or tingling in toes.  [x] Applied [place] dressing below the knee to Right lower leg(s)        Unna Boot(s) were applied per  Guidelines.      Electronically signed by Maria Elena Marsh RN on 9/25/2020 at 9:43 AM
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
## 2020-09-30 ENCOUNTER — HOSPITAL ENCOUNTER (OUTPATIENT)
Dept: WOUND CARE | Age: 50
Discharge: HOME | End: 2020-09-30
Payer: MEDICARE

## 2020-09-30 VITALS
HEIGHT: 71 IN | RESPIRATION RATE: 18 BRPM | DIASTOLIC BLOOD PRESSURE: 76 MMHG | TEMPERATURE: 97.2 F | SYSTOLIC BLOOD PRESSURE: 109 MMHG | HEART RATE: 75 BPM | WEIGHT: 175 LBS | BODY MASS INDEX: 24.5 KG/M2

## 2020-09-30 PROCEDURE — 11042 DBRDMT SUBQ TIS 1ST 20SQCM/<: CPT

## 2020-09-30 RX ORDER — LIDOCAINE 40 MG/G
CREAM TOPICAL ONCE
Status: CANCELLED | OUTPATIENT
Start: 2020-09-30

## 2020-09-30 RX ORDER — GENTAMICIN SULFATE 1 MG/G
OINTMENT TOPICAL ONCE
Status: CANCELLED | OUTPATIENT
Start: 2020-09-30

## 2020-09-30 RX ORDER — BETAMETHASONE DIPROPIONATE 0.05 %
OINTMENT (GRAM) TOPICAL ONCE
Status: CANCELLED | OUTPATIENT
Start: 2020-09-30

## 2020-09-30 RX ORDER — LIDOCAINE 50 MG/G
OINTMENT TOPICAL ONCE
Status: CANCELLED | OUTPATIENT
Start: 2020-09-30

## 2020-09-30 RX ORDER — BACITRACIN, NEOMYCIN, POLYMYXIN B 400; 3.5; 5 [USP'U]/G; MG/G; [USP'U]/G
OINTMENT TOPICAL ONCE
Status: CANCELLED | OUTPATIENT
Start: 2020-09-30

## 2020-09-30 RX ORDER — GINSENG 100 MG
CAPSULE ORAL ONCE
Status: CANCELLED | OUTPATIENT
Start: 2020-09-30

## 2020-09-30 RX ORDER — LIDOCAINE HYDROCHLORIDE 40 MG/ML
SOLUTION TOPICAL ONCE
Status: CANCELLED | OUTPATIENT
Start: 2020-09-30

## 2020-09-30 RX ORDER — LIDOCAINE HYDROCHLORIDE 40 MG/ML
SOLUTION TOPICAL ONCE
Status: COMPLETED | OUTPATIENT
Start: 2020-09-30 | End: 2020-09-30

## 2020-09-30 RX ORDER — BACITRACIN ZINC AND POLYMYXIN B SULFATE 500; 1000 [USP'U]/G; [USP'U]/G
OINTMENT TOPICAL ONCE
Status: CANCELLED | OUTPATIENT
Start: 2020-09-30

## 2020-09-30 RX ORDER — CLOBETASOL PROPIONATE 0.5 MG/G
OINTMENT TOPICAL ONCE
Status: CANCELLED | OUTPATIENT
Start: 2020-09-30

## 2020-09-30 RX ADMIN — LIDOCAINE HYDROCHLORIDE 5 ML: 40 SOLUTION TOPICAL at 11:26

## 2020-09-30 ASSESSMENT — PAIN DESCRIPTION - ONSET: ONSET: ON-GOING

## 2020-09-30 ASSESSMENT — PAIN DESCRIPTION - ORIENTATION: ORIENTATION: LOWER

## 2020-09-30 ASSESSMENT — PAIN DESCRIPTION - LOCATION: LOCATION: LEG

## 2020-09-30 ASSESSMENT — PAIN DESCRIPTION - FREQUENCY: FREQUENCY: INTERMITTENT

## 2020-09-30 ASSESSMENT — PAIN DESCRIPTION - PAIN TYPE: TYPE: CHRONIC PAIN

## 2020-09-30 ASSESSMENT — PAIN SCALES - GENERAL: PAINLEVEL_OUTOF10: 3

## 2020-09-30 ASSESSMENT — PAIN DESCRIPTION - PROGRESSION: CLINICAL_PROGRESSION: NOT CHANGED

## 2020-09-30 ASSESSMENT — PAIN DESCRIPTION - DESCRIPTORS: DESCRIPTORS: BURNING

## 2020-09-30 NOTE — PROGRESS NOTES
[employer] Application   Below Knee    NAME:  Tomas Corcoran  YOB: 1970  MEDICAL RECORD NUMBER:  [id number]  DATE:  9/30/2020     [] Removed old Nicholaus Dorita boot if indicated and wash leg with mild soap and water.  [] Applied moisturizing agent to dry skin as needed.  [x] Appied primary and secondary dressing as ordered     [x] Applied Unna roll from toes to knee overlapping each time.  [x] Applied ace wrap or coban from toes to below the knee.  [x] Secured with tape and/or metal clips covered with tape.  [x] Instructed patient/caregiver to keep dressing dry and intact. DO NOT REMOVE DRESSING.  [x] Instructed pt/family/caregiver to report excessive draining, loose bandage, wet dressing, severe pain or tingling in toes.  [x] Applied [place] dressing below the knee to Right lower leg(s)        Unna Boot(s) were applied per  Guidelines.      Electronically signed by Ruth Pinon RN on 9/30/2020 at 11:56 AM
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
## 2020-10-07 ENCOUNTER — HOSPITAL ENCOUNTER (OUTPATIENT)
Dept: WOUND CARE | Age: 50
Discharge: HOME | End: 2020-10-07
Payer: MEDICARE

## 2020-10-07 VITALS
RESPIRATION RATE: 18 BRPM | SYSTOLIC BLOOD PRESSURE: 108 MMHG | DIASTOLIC BLOOD PRESSURE: 69 MMHG | TEMPERATURE: 96.3 F | BODY MASS INDEX: 24.5 KG/M2 | HEIGHT: 71 IN | HEART RATE: 84 BPM | WEIGHT: 175 LBS

## 2020-10-07 PROCEDURE — 11042 DBRDMT SUBQ TIS 1ST 20SQCM/<: CPT

## 2020-10-07 RX ORDER — BETAMETHASONE DIPROPIONATE 0.05 %
OINTMENT (GRAM) TOPICAL ONCE
Status: CANCELLED | OUTPATIENT
Start: 2020-10-07

## 2020-10-07 RX ORDER — LIDOCAINE HYDROCHLORIDE 40 MG/ML
SOLUTION TOPICAL ONCE
Status: DISCONTINUED | OUTPATIENT
Start: 2020-10-07 | End: 2020-10-08 | Stop reason: HOSPADM

## 2020-10-07 RX ORDER — BACITRACIN, NEOMYCIN, POLYMYXIN B 400; 3.5; 5 [USP'U]/G; MG/G; [USP'U]/G
OINTMENT TOPICAL ONCE
Status: CANCELLED | OUTPATIENT
Start: 2020-10-07

## 2020-10-07 RX ORDER — GENTAMICIN SULFATE 1 MG/G
OINTMENT TOPICAL ONCE
Status: CANCELLED | OUTPATIENT
Start: 2020-10-07

## 2020-10-07 RX ORDER — CLOBETASOL PROPIONATE 0.5 MG/G
OINTMENT TOPICAL ONCE
Status: CANCELLED | OUTPATIENT
Start: 2020-10-07

## 2020-10-07 RX ORDER — GINSENG 100 MG
CAPSULE ORAL ONCE
Status: CANCELLED | OUTPATIENT
Start: 2020-10-07

## 2020-10-07 RX ORDER — BACITRACIN ZINC AND POLYMYXIN B SULFATE 500; 1000 [USP'U]/G; [USP'U]/G
OINTMENT TOPICAL ONCE
Status: CANCELLED | OUTPATIENT
Start: 2020-10-07

## 2020-10-07 RX ORDER — LIDOCAINE HYDROCHLORIDE 40 MG/ML
SOLUTION TOPICAL ONCE
Status: CANCELLED | OUTPATIENT
Start: 2020-10-07

## 2020-10-07 RX ORDER — LIDOCAINE 40 MG/G
CREAM TOPICAL ONCE
Status: CANCELLED | OUTPATIENT
Start: 2020-10-07

## 2020-10-07 RX ORDER — LIDOCAINE 50 MG/G
OINTMENT TOPICAL ONCE
Status: CANCELLED | OUTPATIENT
Start: 2020-10-07

## 2020-10-07 ASSESSMENT — PAIN SCALES - GENERAL: PAINLEVEL_OUTOF10: 4

## 2020-10-07 ASSESSMENT — ENCOUNTER SYMPTOMS
ROS SKIN COMMENTS: RIGHT LEG
DIARRHEA: 0
NAUSEA: 0
VOMITING: 0

## 2020-10-07 ASSESSMENT — PAIN DESCRIPTION - FREQUENCY: FREQUENCY: INTERMITTENT

## 2020-10-07 ASSESSMENT — PAIN DESCRIPTION - ONSET: ONSET: ON-GOING

## 2020-10-07 ASSESSMENT — PAIN DESCRIPTION - ORIENTATION: ORIENTATION: RIGHT

## 2020-10-07 ASSESSMENT — PAIN DESCRIPTION - PAIN TYPE: TYPE: CHRONIC PAIN

## 2020-10-07 ASSESSMENT — PAIN DESCRIPTION - PROGRESSION: CLINICAL_PROGRESSION: NOT CHANGED

## 2020-10-07 ASSESSMENT — PAIN DESCRIPTION - LOCATION: LOCATION: LEG

## 2020-10-07 ASSESSMENT — PAIN DESCRIPTION - DESCRIPTORS: DESCRIPTORS: BURNING
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
## 2020-10-14 ENCOUNTER — HOSPITAL ENCOUNTER (OUTPATIENT)
Dept: WOUND CARE | Age: 50
Discharge: HOME | End: 2020-10-14
Payer: MEDICARE

## 2020-10-14 VITALS
HEIGHT: 71 IN | BODY MASS INDEX: 24.5 KG/M2 | TEMPERATURE: 98.4 F | RESPIRATION RATE: 18 BRPM | DIASTOLIC BLOOD PRESSURE: 75 MMHG | HEART RATE: 78 BPM | WEIGHT: 175 LBS | SYSTOLIC BLOOD PRESSURE: 114 MMHG

## 2020-10-14 PROCEDURE — 99213 OFFICE O/P EST LOW 20 MIN: CPT

## 2020-10-14 PROCEDURE — 29580 STRAPPING UNNA BOOT: CPT

## 2020-10-14 RX ORDER — LIDOCAINE HYDROCHLORIDE 40 MG/ML
SOLUTION TOPICAL ONCE
Status: COMPLETED | OUTPATIENT
Start: 2020-10-14 | End: 2020-10-14

## 2020-10-14 RX ORDER — BACITRACIN ZINC AND POLYMYXIN B SULFATE 500; 1000 [USP'U]/G; [USP'U]/G
OINTMENT TOPICAL ONCE
Status: CANCELLED | OUTPATIENT
Start: 2020-10-14

## 2020-10-14 RX ORDER — BETAMETHASONE DIPROPIONATE 0.05 %
OINTMENT (GRAM) TOPICAL ONCE
Status: CANCELLED | OUTPATIENT
Start: 2020-10-14

## 2020-10-14 RX ORDER — LIDOCAINE 50 MG/G
OINTMENT TOPICAL ONCE
Status: CANCELLED | OUTPATIENT
Start: 2020-10-14

## 2020-10-14 RX ORDER — GENTAMICIN SULFATE 1 MG/G
OINTMENT TOPICAL ONCE
Status: CANCELLED | OUTPATIENT
Start: 2020-10-14

## 2020-10-14 RX ORDER — LIDOCAINE 40 MG/G
CREAM TOPICAL ONCE
Status: CANCELLED | OUTPATIENT
Start: 2020-10-14

## 2020-10-14 RX ORDER — BACITRACIN, NEOMYCIN, POLYMYXIN B 400; 3.5; 5 [USP'U]/G; MG/G; [USP'U]/G
OINTMENT TOPICAL ONCE
Status: CANCELLED | OUTPATIENT
Start: 2020-10-14

## 2020-10-14 RX ORDER — GINSENG 100 MG
CAPSULE ORAL ONCE
Status: CANCELLED | OUTPATIENT
Start: 2020-10-14

## 2020-10-14 RX ORDER — CLOBETASOL PROPIONATE 0.5 MG/G
OINTMENT TOPICAL ONCE
Status: CANCELLED | OUTPATIENT
Start: 2020-10-14

## 2020-10-14 RX ORDER — LIDOCAINE HYDROCHLORIDE 40 MG/ML
SOLUTION TOPICAL ONCE
Status: CANCELLED | OUTPATIENT
Start: 2020-10-14

## 2020-10-14 RX ADMIN — LIDOCAINE HYDROCHLORIDE: 40 SOLUTION TOPICAL at 13:02

## 2020-10-14 ASSESSMENT — PAIN DESCRIPTION - FREQUENCY: FREQUENCY: INTERMITTENT

## 2020-10-14 ASSESSMENT — PAIN DESCRIPTION - ORIENTATION: ORIENTATION: RIGHT

## 2020-10-14 ASSESSMENT — PAIN DESCRIPTION - PAIN TYPE: TYPE: CHRONIC PAIN

## 2020-10-14 ASSESSMENT — PAIN SCALES - GENERAL: PAINLEVEL_OUTOF10: 2

## 2020-10-14 ASSESSMENT — PAIN DESCRIPTION - DESCRIPTORS: DESCRIPTORS: BURNING

## 2020-10-14 ASSESSMENT — PAIN DESCRIPTION - ONSET: ONSET: ON-GOING

## 2020-10-14 ASSESSMENT — PAIN DESCRIPTION - PROGRESSION: CLINICAL_PROGRESSION: NOT CHANGED

## 2020-10-14 ASSESSMENT — ENCOUNTER SYMPTOMS
DIARRHEA: 0
ROS SKIN COMMENTS: RIGHT LEG
VOMITING: 0
NAUSEA: 0

## 2020-10-14 ASSESSMENT — PAIN DESCRIPTION - LOCATION: LOCATION: LEG

## 2020-10-14 NOTE — PROGRESS NOTES
[employer] Application   Below Knee    NAME:  Reese Brooks OF BIRTH:  1970  MEDICAL RECORD NUMBER:  [id number]  DATE:  10/14/2020     [x] Removed old Danna  boot if indicated and wash leg with mild soap and water.  [x] Applied moisturizing agent to dry skin as needed.  [x] Appied primary and secondary dressing as ordered     [x] Applied Unna roll from toes to knee overlapping each time.  [x] Applied ace wrap or coban from toes to below the knee.  [x] Secured with tape and/or metal clips covered with tape.  [x] Instructed patient/caregiver to keep dressing dry and intact. DO NOT REMOVE DRESSING.  [x] Instructed pt/family/caregiver to report excessive draining, loose bandage, wet dressing, severe pain or tingling in toes.  [x] Applied [employer] dressing below the knee to Right lower leg(s)        Unna Boot(s) were applied per  Guidelines.      Electronically signed by Isaac Aburto RN on 10/14/2020 at 1:52 PM
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
## 2020-10-16 RX ORDER — BUSPIRONE HYDROCHLORIDE 10 MG/1
10 TABLET ORAL 3 TIMES DAILY
Qty: 90 TABLET | Refills: 3 | Status: SHIPPED | OUTPATIENT
Start: 2020-10-16 | End: 2021-02-10

## 2020-10-16 NOTE — TELEPHONE ENCOUNTER
Please Approve or Refuse.   Send to Pharmacy per Pt's Request:    Next Visit Date:  12/2/2020   Last Visit Date: 8/27/2020    Hemoglobin A1C (%)   Date Value   09/01/2020 5.8   04/03/2019 5.3   10/20/2017 5.1             ( goal A1C is < 7)   BP Readings from Last 3 Encounters:   10/14/20 114/75   10/07/20 108/69   09/30/20 109/76          (goal 120/80)  BUN   Date Value Ref Range Status   09/01/2020 17 6 - 20 mg/dL Final     CREATININE   Date Value Ref Range Status   09/01/2020 0.79 0.70 - 1.20 mg/dL Final     Potassium   Date Value Ref Range Status   09/01/2020 3.9 3.7 - 5.3 mmol/L Final

## 2020-10-21 ENCOUNTER — HOSPITAL ENCOUNTER (OUTPATIENT)
Dept: WOUND CARE | Age: 50
Discharge: HOME | End: 2020-10-21
Payer: MEDICARE

## 2020-10-21 VITALS
HEIGHT: 71 IN | DIASTOLIC BLOOD PRESSURE: 70 MMHG | SYSTOLIC BLOOD PRESSURE: 103 MMHG | RESPIRATION RATE: 18 BRPM | TEMPERATURE: 96.7 F | BODY MASS INDEX: 24.5 KG/M2 | HEART RATE: 72 BPM | WEIGHT: 175 LBS

## 2020-10-21 PROCEDURE — 29580 STRAPPING UNNA BOOT: CPT

## 2020-10-21 RX ORDER — BACITRACIN ZINC AND POLYMYXIN B SULFATE 500; 1000 [USP'U]/G; [USP'U]/G
OINTMENT TOPICAL ONCE
Status: CANCELLED | OUTPATIENT
Start: 2020-10-21

## 2020-10-21 RX ORDER — LIDOCAINE HYDROCHLORIDE 40 MG/ML
SOLUTION TOPICAL ONCE
Status: COMPLETED | OUTPATIENT
Start: 2020-10-21 | End: 2020-10-21

## 2020-10-21 RX ORDER — GINSENG 100 MG
CAPSULE ORAL ONCE
Status: CANCELLED | OUTPATIENT
Start: 2020-10-21

## 2020-10-21 RX ORDER — LIDOCAINE 40 MG/G
CREAM TOPICAL ONCE
Status: CANCELLED | OUTPATIENT
Start: 2020-10-21

## 2020-10-21 RX ORDER — LIDOCAINE HYDROCHLORIDE 40 MG/ML
SOLUTION TOPICAL ONCE
Status: CANCELLED | OUTPATIENT
Start: 2020-10-21

## 2020-10-21 RX ORDER — CLOBETASOL PROPIONATE 0.5 MG/G
OINTMENT TOPICAL ONCE
Status: CANCELLED | OUTPATIENT
Start: 2020-10-21

## 2020-10-21 RX ORDER — GENTAMICIN SULFATE 1 MG/G
OINTMENT TOPICAL ONCE
Status: CANCELLED | OUTPATIENT
Start: 2020-10-21

## 2020-10-21 RX ORDER — BETAMETHASONE DIPROPIONATE 0.05 %
OINTMENT (GRAM) TOPICAL ONCE
Status: CANCELLED | OUTPATIENT
Start: 2020-10-21

## 2020-10-21 RX ORDER — LIDOCAINE 50 MG/G
OINTMENT TOPICAL ONCE
Status: CANCELLED | OUTPATIENT
Start: 2020-10-21

## 2020-10-21 RX ORDER — BACITRACIN, NEOMYCIN, POLYMYXIN B 400; 3.5; 5 [USP'U]/G; MG/G; [USP'U]/G
OINTMENT TOPICAL ONCE
Status: CANCELLED | OUTPATIENT
Start: 2020-10-21

## 2020-10-21 RX ADMIN — LIDOCAINE HYDROCHLORIDE: 40 SOLUTION TOPICAL at 15:00

## 2020-10-21 ASSESSMENT — PAIN DESCRIPTION - DESCRIPTORS: DESCRIPTORS: BURNING

## 2020-10-21 ASSESSMENT — ENCOUNTER SYMPTOMS
DIARRHEA: 0
NAUSEA: 0
ROS SKIN COMMENTS: RIGHT LEG
VOMITING: 0

## 2020-10-21 ASSESSMENT — PAIN DESCRIPTION - FREQUENCY: FREQUENCY: INTERMITTENT

## 2020-10-21 ASSESSMENT — PAIN DESCRIPTION - ORIENTATION: ORIENTATION: LOWER;RIGHT

## 2020-10-21 ASSESSMENT — PAIN DESCRIPTION - PROGRESSION: CLINICAL_PROGRESSION: NOT CHANGED

## 2020-10-21 ASSESSMENT — PAIN DESCRIPTION - ONSET: ONSET: ON-GOING

## 2020-10-21 ASSESSMENT — PAIN DESCRIPTION - PAIN TYPE: TYPE: CHRONIC PAIN

## 2020-10-21 ASSESSMENT — PAIN SCALES - GENERAL: PAINLEVEL_OUTOF10: 2

## 2020-10-21 ASSESSMENT — PAIN DESCRIPTION - LOCATION: LOCATION: LEG

## 2020-10-21 NOTE — PLAN OF CARE
Problem: Pain:  Goal: Pain level will decrease  Description: Pain level will decrease  Outcome: Ongoing  Goal: Control of acute pain  Description: Control of acute pain  Outcome: Ongoing  Goal: Control of chronic pain  Description: Control of chronic pain  Outcome: Ongoing     Problem: Wound:  Goal: Will show signs of wound healing; wound closure and no evidence of infection  Description: Will show signs of wound healing; wound closure and no evidence of infection  Outcome: Ongoing     Problem: Pain:  Goal: Pain level will decrease  Description: Pain level will decrease  Outcome: Ongoing  Goal: Control of acute pain  Description: Control of acute pain  Outcome: Ongoing  Goal: Control of chronic pain  Description: Control of chronic pain  Outcome: Ongoing     Problem: Wound:  Goal: Will show signs of wound healing; wound closure and no evidence of infection  Description: Will show signs of wound healing; wound closure and no evidence of infection  Outcome: Ongoing
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
## 2020-10-28 ENCOUNTER — HOSPITAL ENCOUNTER (OUTPATIENT)
Dept: WOUND CARE | Age: 50
Discharge: HOME | End: 2020-10-28
Payer: MEDICARE

## 2020-10-28 PROCEDURE — 99211 OFF/OP EST MAY X REQ PHY/QHP: CPT

## 2020-10-28 ASSESSMENT — ENCOUNTER SYMPTOMS
DIARRHEA: 0
VOMITING: 0
NAUSEA: 0
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
## 2020-11-08 PROBLEM — G62.89 AXONAL NEUROPATHY: Status: ACTIVE | Noted: 2020-11-08

## 2020-11-08 PROBLEM — G57.31 PERONEAL NEUROPATHY, RIGHT: Status: ACTIVE | Noted: 2020-11-08

## 2020-11-08 RX ORDER — ROPINIROLE 1 MG/1
1 TABLET, FILM COATED ORAL NIGHTLY
COMMUNITY
Start: 2020-10-01 | End: 2022-01-19 | Stop reason: ALTCHOICE

## 2020-11-09 ENCOUNTER — OFFICE VISIT (OUTPATIENT)
Dept: FAMILY MEDICINE CLINIC | Age: 50
End: 2020-11-09
Payer: MEDICARE

## 2020-11-09 VITALS
WEIGHT: 186.4 LBS | HEART RATE: 86 BPM | BODY MASS INDEX: 26 KG/M2 | SYSTOLIC BLOOD PRESSURE: 106 MMHG | DIASTOLIC BLOOD PRESSURE: 71 MMHG

## 2020-11-09 PROCEDURE — G8419 CALC BMI OUT NRM PARAM NOF/U: HCPCS | Performed by: FAMILY MEDICINE

## 2020-11-09 PROCEDURE — 99214 OFFICE O/P EST MOD 30 MIN: CPT | Performed by: FAMILY MEDICINE

## 2020-11-09 PROCEDURE — 90471 IMMUNIZATION ADMIN: CPT | Performed by: FAMILY MEDICINE

## 2020-11-09 PROCEDURE — G8427 DOCREV CUR MEDS BY ELIG CLIN: HCPCS | Performed by: FAMILY MEDICINE

## 2020-11-09 PROCEDURE — 1036F TOBACCO NON-USER: CPT | Performed by: FAMILY MEDICINE

## 2020-11-09 PROCEDURE — 90686 IIV4 VACC NO PRSV 0.5 ML IM: CPT | Performed by: FAMILY MEDICINE

## 2020-11-09 PROCEDURE — G8482 FLU IMMUNIZE ORDER/ADMIN: HCPCS | Performed by: FAMILY MEDICINE

## 2020-11-09 RX ORDER — HYDROXYZINE HYDROCHLORIDE 25 MG/1
2 TABLET, FILM COATED ORAL 3 TIMES DAILY PRN
COMMUNITY
Start: 2020-09-13 | End: 2022-01-19 | Stop reason: ALTCHOICE

## 2020-11-09 RX ORDER — PROPRANOLOL HCL 60 MG
1 CAPSULE, EXTENDED RELEASE 24HR ORAL DAILY
COMMUNITY
Start: 2020-09-15 | End: 2021-01-26 | Stop reason: HOSPADM

## 2020-11-09 RX ORDER — LURASIDONE HYDROCHLORIDE 120 MG/1
0.5 TABLET, FILM COATED ORAL 2 TIMES DAILY
COMMUNITY
Start: 2020-10-02 | End: 2021-01-12 | Stop reason: ALTCHOICE

## 2020-11-09 RX ORDER — CEPHALEXIN 500 MG/1
500 CAPSULE ORAL 3 TIMES DAILY
Qty: 21 CAPSULE | Refills: 0 | Status: SHIPPED | OUTPATIENT
Start: 2020-11-09 | End: 2020-11-16

## 2020-11-09 RX ORDER — CLONIDINE HYDROCHLORIDE 0.2 MG/1
1 TABLET ORAL NIGHTLY
COMMUNITY
Start: 2020-09-25 | End: 2022-01-19 | Stop reason: ALTCHOICE

## 2020-11-09 RX ORDER — OXCARBAZEPINE 300 MG/1
2 TABLET, FILM COATED ORAL 2 TIMES DAILY
COMMUNITY
Start: 2020-10-01 | End: 2022-01-19 | Stop reason: ALTCHOICE

## 2020-11-09 RX ORDER — DOXEPIN HYDROCHLORIDE 25 MG/1
2 CAPSULE ORAL NIGHTLY
COMMUNITY
Start: 2020-09-25 | End: 2022-01-19 | Stop reason: ALTCHOICE

## 2020-11-09 ASSESSMENT — ENCOUNTER SYMPTOMS
SHORTNESS OF BREATH: 0
RESPIRATORY NEGATIVE: 1
COLOR CHANGE: 1
COUGH: 0
ABDOMINAL PAIN: 0
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
## 2020-11-09 NOTE — PATIENT INSTRUCTIONS
prevent cuts, scrapes, or other injuries to your skin. Cellulitis most often occurs where there is a break in the skin. · If you get a scrape, cut, mild burn, or bite, wash the wound with clean water as soon as you can to help avoid infection. Don't use hydrogen peroxide or alcohol, which can slow healing. · If you have swelling in your legs (edema), support stockings and good skin care may help prevent leg sores and cellulitis. · Take care of your feet, especially if you have diabetes or other conditions that increase the risk of infection. Wear shoes and socks. Do not go barefoot. If you have athlete's foot or other skin problems on your feet, talk to your doctor about how to treat them. When should you call for help? Call your doctor now or seek immediate medical care if:    · You have signs that your infection is getting worse, such as:  ? Increased pain, swelling, warmth, or redness. ? Red streaks leading from the area. ? Pus draining from the area. ? A fever.     · You get a rash. Watch closely for changes in your health, and be sure to contact your doctor if:    · You do not get better as expected. Where can you learn more? Go to https://iAcademic.Moozey. org and sign in to your [employer] account. Enter W473 in the [facility] box to learn more about \"Cellulitis: Care Instructions. \"     If you do not have an account, please click on the \"Sign Up Now\" link. Current as of: July 2, 2020               Content Version: 12.6  © 2006-2020 [employer]. Care instructions adapted under license by [facility] ([facility]). If you have questions about a medical condition or this instruction, always ask your healthcare professional. [place] [address] any warranty or liability for your use of this information. Patient Education        Peroneal Tendon Strain: Rehab Exercises  Introduction  Here are some examples of exercises for you to try.  The exercises may be suggested for a condition or for rehabilitation. Start each exercise slowly. Ease off the exercises if you start to have pain. You will be told when to start these exercises and which ones will work best for you. How to do the exercises  Calf wall stretch (back knee straight)   1. Stand facing a wall with your hands on the wall at about eye level. Put your affected leg about a step behind your other leg. 2. Keeping your back leg straight and your back heel on the floor, bend your front knee and gently bring your hip and chest toward the wall until you feel a stretch in the calf of your back leg. 3. Hold the stretch for at least 15 to 30 seconds. 4. Repeat 2 to 4 times. Calf wall stretch (knees bent)   1. Stand facing a wall with your hands on the wall at about eye level. Put your affected leg about a step behind your other leg. 2. Keeping both heels on the floor, bend both knees. Then gently bring your hip and chest toward the wall until you feel a stretch in the calf of your back leg. 3. Hold the stretch for at least 15 to 30 seconds. 4. Repeat 2 to 4 times. Hamstring wall stretch   1. Lie on your back in a doorway, with your good leg through the open door. 2. Slide your affected leg up the wall to straighten your knee. You should feel a gentle stretch down the back of your leg. 3. Hold the stretch for at least 1 minute to begin. Then over time, try to lengthen the time you hold the stretch to as long as 6 minutes. 4. Repeat 2 to 4 times. 5. If you do not have a place to do this exercise in a doorway, there is another way to do it:  6. Lie on your back, and bend the knee of your affected leg. 7. Loop a towel under the ball and toes of that foot, and hold the ends of the towel in your hands. 8. Straighten your knee, and slowly pull back on the towel. You should feel a gentle stretch down the back of your leg. 9. Hold the stretch for 15 to 30 seconds.  Or even better, hold the stretch for 1 minute if you can. 10. Repeat 2 to 4 times. 1. Do not arch your back. 2. Do not bend either knee. 3. Keep one heel touching the floor and the other heel touching the wall. Do not point your toes. Shin muscle stretch   1. Sit in a chair, with both feet flat on the floor. 2. Bend your affected leg behind you so that the top of your foot near your toes is flat on the floor and your toes are pointed away from your body. If you need to, you can hold on to the sides of the chair for support. 3. Hold the stretch for at least 15 to 30 seconds. You should feel a stretch in the front (shin) of your lower leg. 4. Repeat 2 to 4 times. Follow-up care is a key part of your treatment and safety. Be sure to make and go to all appointments, and call your doctor if you are having problems. It's also a good idea to know your test results and keep a list of the medicines you take. Where can you learn more? Go to https://Wavesat.Cued. org and sign in to your [employer] account. Enter [id number] in the [employer] box to learn more about \"Peroneal Tendon Strain: Rehab Exercises. \"     If you do not have an account, please click on the \"Sign Up Now\" link. Current as of: March 2, 2020               Content Version: 12.6  © 0175-6328 [employer]. Care instructions adapted under license by [facility] ([facility]). If you have questions about a medical condition or this instruction, always ask your healthcare professional. [address] any warranty or liability for your use of this information.

## 2020-11-09 NOTE — PROGRESS NOTES
Visit Information    Have you changed or started any medications since your last visit including any over-the-counter medicines, vitamins, or herbal medicines? no   Are you having any side effects from any of your medications? -  no  Have you stopped taking any of your medications? Is so, why? -  no    Have you seen any other physician or provider since your last visit? No  Have you had any other diagnostic tests since your last visit? No  Have you been seen in the emergency room and/or had an admission to a hospital since we last saw you? No  Have you had your routine dental cleaning in the past 6 months? yes - routine    Have you activated your [employer] account? If not, what are your barriers?  Yes     Patient Care Team:  Saroj Maldonado MD as PCP - General (Family Medicine)  Saroj Maldonado MD as PCP - [facility]  Dima Kay MD (Dermatology)  Tess Archer MD as Surgeon (Cardiology)  Elba Lesches, DPM as Consulting Physician (Podiatry)    Medical History Review  Past Medical, Family, and Social History reviewed and does not contribute to the patient presenting condition    Health Maintenance   Topic Date Due    Flu vaccine (1) 09/01/2020    A1C test (Diabetic or Prediabetic)  09/01/2021    Lipid screen  09/01/2021    Potassium monitoring  09/01/2021    Creatinine monitoring  09/01/2021    DTaP/Tdap/Td vaccine (2 - Td) 01/01/2024    HIV screen  Completed    Hepatitis A vaccine  Aged Out    Hepatitis B vaccine  Aged Out    Hib vaccine  Aged Out    Meningococcal (ACWY) vaccine  Aged Out    Pneumococcal 0-64 years Vaccine  Aged Out

## 2020-11-21 ENCOUNTER — HOSPITAL ENCOUNTER (INPATIENT)
Age: 50
LOS: 1 days | Discharge: HOME | DRG: 383 | End: 2020-11-22
Attending: EMERGENCY MEDICINE | Admitting: INTERNAL MEDICINE
Payer: MEDICARE

## 2020-11-21 ENCOUNTER — VIRTUAL VISIT (OUTPATIENT)
Dept: FAMILY MEDICINE CLINIC | Age: 50
End: 2020-11-21
Payer: MEDICARE

## 2020-11-21 PROBLEM — L03.115 CELLULITIS OF RIGHT LOWER LEG: Status: ACTIVE | Noted: 2020-11-21

## 2020-11-21 LAB
ABSOLUTE EOS #: 0.2 K/UL (ref 0–0.4)
ABSOLUTE IMMATURE GRANULOCYTE: ABNORMAL K/UL (ref 0–0.3)
ABSOLUTE LYMPH #: 2.8 K/UL (ref 1–4.8)
ABSOLUTE MONO #: 0.8 K/UL (ref 0.1–1.3)
ANION GAP SERPL CALCULATED.3IONS-SCNC: 8 MMOL/L (ref 9–17)
BASOPHILS # BLD: 1 % (ref 0–2)
BASOPHILS ABSOLUTE: 0.1 K/UL (ref 0–0.2)
BUN BLDV-MCNC: 14 MG/DL (ref 6–20)
BUN/CREAT BLD: ABNORMAL (ref 9–20)
CALCIUM SERPL-MCNC: 9.4 MG/DL (ref 8.6–10.4)
CHLORIDE BLD-SCNC: 102 MMOL/L (ref 98–107)
CO2: 28 MMOL/L (ref 20–31)
CREAT SERPL-MCNC: 0.76 MG/DL (ref 0.7–1.2)
DIFFERENTIAL TYPE: ABNORMAL
EOSINOPHILS RELATIVE PERCENT: 2 % (ref 0–4)
GFR AFRICAN AMERICAN: >60 ML/MIN
GFR NON-AFRICAN AMERICAN: >60 ML/MIN
GFR SERPL CREATININE-BSD FRML MDRD: ABNORMAL ML/MIN/{1.73_M2}
GFR SERPL CREATININE-BSD FRML MDRD: ABNORMAL ML/MIN/{1.73_M2}
GLUCOSE BLD-MCNC: 114 MG/DL (ref 70–99)
HCT VFR BLD CALC: 40.2 % (ref 41–53)
HEMOGLOBIN: 14.1 G/DL (ref 13.5–17.5)
IMMATURE GRANULOCYTES: ABNORMAL %
INR BLD: 1
LYMPHOCYTES # BLD: 35 % (ref 24–44)
MCH RBC QN AUTO: 31.7 PG (ref 26–34)
MCHC RBC AUTO-ENTMCNC: 35.1 G/DL (ref 31–37)
MCV RBC AUTO: 90.3 FL (ref 80–100)
MONOCYTES # BLD: 10 % (ref 1–7)
NRBC AUTOMATED: ABNORMAL PER 100 WBC
PARTIAL THROMBOPLASTIN TIME: 27.3 SEC (ref 24–36)
PDW BLD-RTO: 12.6 % (ref 11.5–14.9)
PLATELET # BLD: 184 K/UL (ref 150–450)
PLATELET ESTIMATE: ABNORMAL
PMV BLD AUTO: 8.5 FL (ref 6–12)
POTASSIUM SERPL-SCNC: 3.2 MMOL/L (ref 3.7–5.3)
PROTHROMBIN TIME: 12.6 SEC (ref 11.8–14.6)
RBC # BLD: 4.45 M/UL (ref 4.5–5.9)
RBC # BLD: ABNORMAL 10*6/UL
SEG NEUTROPHILS: 52 % (ref 36–66)
SEGMENTED NEUTROPHILS ABSOLUTE COUNT: 4.1 K/UL (ref 1.3–9.1)
SODIUM BLD-SCNC: 138 MMOL/L (ref 135–144)
WBC # BLD: 8 K/UL (ref 3.5–11)
WBC # BLD: ABNORMAL 10*3/UL

## 2020-11-21 PROCEDURE — 80048 BASIC METABOLIC PNL TOTAL CA: CPT

## 2020-11-21 PROCEDURE — 93971 EXTREMITY STUDY: CPT

## 2020-11-21 PROCEDURE — 99285 EMERGENCY DEPT VISIT HI MDM: CPT

## 2020-11-21 PROCEDURE — 6360000002 HC RX W HCPCS: Performed by: STUDENT IN AN ORGANIZED HEALTH CARE EDUCATION/TRAINING PROGRAM

## 2020-11-21 PROCEDURE — 99442 PR PHYS/QHP TELEPHONE EVALUATION 11-20 MIN: CPT | Performed by: FAMILY MEDICINE

## 2020-11-21 PROCEDURE — 85025 COMPLETE CBC W/AUTO DIFF WBC: CPT

## 2020-11-21 PROCEDURE — 6370000000 HC RX 637 (ALT 250 FOR IP): Performed by: STUDENT IN AN ORGANIZED HEALTH CARE EDUCATION/TRAINING PROGRAM

## 2020-11-21 PROCEDURE — 2580000003 HC RX 258: Performed by: STUDENT IN AN ORGANIZED HEALTH CARE EDUCATION/TRAINING PROGRAM

## 2020-11-21 PROCEDURE — 85730 THROMBOPLASTIN TIME PARTIAL: CPT

## 2020-11-21 PROCEDURE — 36415 COLL VENOUS BLD VENIPUNCTURE: CPT

## 2020-11-21 PROCEDURE — 85610 PROTHROMBIN TIME: CPT

## 2020-11-21 RX ORDER — IBUPROFEN 800 MG/1
800 TABLET ORAL ONCE
Status: COMPLETED | OUTPATIENT
Start: 2020-11-21 | End: 2020-11-21

## 2020-11-21 RX ORDER — SODIUM CHLORIDE, SODIUM LACTATE, POTASSIUM CHLORIDE, AND CALCIUM CHLORIDE .6; .31; .03; .02 G/100ML; G/100ML; G/100ML; G/100ML
1000 INJECTION, SOLUTION INTRAVENOUS ONCE
Status: COMPLETED | OUTPATIENT
Start: 2020-11-21 | End: 2020-11-21

## 2020-11-21 RX ORDER — OXYCODONE HYDROCHLORIDE AND ACETAMINOPHEN 5; 325 MG/1; MG/1
1 TABLET ORAL ONCE
Status: COMPLETED | OUTPATIENT
Start: 2020-11-21 | End: 2020-11-21

## 2020-11-21 RX ORDER — DOXYCYCLINE HYCLATE 100 MG
100 TABLET ORAL 2 TIMES DAILY
Qty: 20 TABLET | Refills: 0 | Status: SHIPPED | OUTPATIENT
Start: 2020-11-21 | End: 2020-12-01

## 2020-11-21 RX ADMIN — SODIUM CHLORIDE, POTASSIUM CHLORIDE, SODIUM LACTATE AND CALCIUM CHLORIDE 1000 ML: 600; 310; 30; 20 INJECTION, SOLUTION INTRAVENOUS at 23:53

## 2020-11-21 RX ADMIN — OXYCODONE HYDROCHLORIDE AND ACETAMINOPHEN 1 TABLET: 5; 325 TABLET ORAL at 22:08

## 2020-11-21 RX ADMIN — VANCOMYCIN HYDROCHLORIDE 2000 MG: 1 INJECTION, POWDER, LYOPHILIZED, FOR SOLUTION INTRAVENOUS at 23:53

## 2020-11-21 RX ADMIN — IBUPROFEN 800 MG: 800 TABLET, FILM COATED ORAL at 22:09

## 2020-11-21 ASSESSMENT — PAIN SCALES - GENERAL: PAINLEVEL_OUTOF10: 7

## 2020-11-21 ASSESSMENT — ENCOUNTER SYMPTOMS
SHORTNESS OF BREATH: 0
RHINORRHEA: 0
ABDOMINAL PAIN: 0
NAUSEA: 0
VOMITING: 0
COUGH: 0
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
## 2020-11-22 VITALS
RESPIRATION RATE: 12 BRPM | WEIGHT: 190.7 LBS | HEART RATE: 63 BPM | TEMPERATURE: 97.2 F | OXYGEN SATURATION: 99 % | BODY MASS INDEX: 26.7 KG/M2 | SYSTOLIC BLOOD PRESSURE: 104 MMHG | DIASTOLIC BLOOD PRESSURE: 67 MMHG | HEIGHT: 71 IN

## 2020-11-22 PROBLEM — L03.115 CELLULITIS OF RIGHT LOWER EXTREMITY: Status: ACTIVE | Noted: 2020-11-22

## 2020-11-22 LAB — POTASSIUM SERPL-SCNC: 3.4 MMOL/L (ref 3.7–5.3)

## 2020-11-22 PROCEDURE — 2580000003 HC RX 258: Performed by: INTERNAL MEDICINE

## 2020-11-22 PROCEDURE — 6360000002 HC RX W HCPCS: Performed by: STUDENT IN AN ORGANIZED HEALTH CARE EDUCATION/TRAINING PROGRAM

## 2020-11-22 PROCEDURE — 96376 TX/PRO/DX INJ SAME DRUG ADON: CPT

## 2020-11-22 PROCEDURE — 1200000000 HC SEMI PRIVATE

## 2020-11-22 PROCEDURE — 6370000000 HC RX 637 (ALT 250 FOR IP): Performed by: STUDENT IN AN ORGANIZED HEALTH CARE EDUCATION/TRAINING PROGRAM

## 2020-11-22 PROCEDURE — G0378 HOSPITAL OBSERVATION PER HR: HCPCS

## 2020-11-22 PROCEDURE — 96366 THER/PROPH/DIAG IV INF ADDON: CPT

## 2020-11-22 PROCEDURE — 84132 ASSAY OF SERUM POTASSIUM: CPT

## 2020-11-22 PROCEDURE — 2580000003 HC RX 258: Performed by: STUDENT IN AN ORGANIZED HEALTH CARE EDUCATION/TRAINING PROGRAM

## 2020-11-22 PROCEDURE — 6370000000 HC RX 637 (ALT 250 FOR IP): Performed by: INTERNAL MEDICINE

## 2020-11-22 PROCEDURE — 96365 THER/PROPH/DIAG IV INF INIT: CPT

## 2020-11-22 PROCEDURE — 99223 1ST HOSP IP/OBS HIGH 75: CPT | Performed by: INTERNAL MEDICINE

## 2020-11-22 PROCEDURE — 36415 COLL VENOUS BLD VENIPUNCTURE: CPT

## 2020-11-22 PROCEDURE — 6360000002 HC RX W HCPCS: Performed by: INTERNAL MEDICINE

## 2020-11-22 RX ORDER — PRAVASTATIN SODIUM 40 MG
80 TABLET ORAL NIGHTLY
Status: DISCONTINUED | OUTPATIENT
Start: 2020-11-22 | End: 2020-11-22 | Stop reason: HOSPADM

## 2020-11-22 RX ORDER — SODIUM CHLORIDE 0.9 % (FLUSH) 0.9 %
10 SYRINGE (ML) INJECTION PRN
Status: DISCONTINUED | OUTPATIENT
Start: 2020-11-22 | End: 2020-11-22 | Stop reason: HOSPADM

## 2020-11-22 RX ORDER — DOXEPIN HYDROCHLORIDE 50 MG/1
50 CAPSULE ORAL NIGHTLY
Status: DISCONTINUED | OUTPATIENT
Start: 2020-11-22 | End: 2020-11-22 | Stop reason: HOSPADM

## 2020-11-22 RX ORDER — POTASSIUM CHLORIDE 7.45 MG/ML
10 INJECTION INTRAVENOUS PRN
Status: DISCONTINUED | OUTPATIENT
Start: 2020-11-22 | End: 2020-11-22 | Stop reason: HOSPADM

## 2020-11-22 RX ORDER — FLUTICASONE PROPIONATE 50 MCG
1 SPRAY, SUSPENSION (ML) NASAL DAILY PRN
Status: DISCONTINUED | OUTPATIENT
Start: 2020-11-22 | End: 2020-11-22 | Stop reason: HOSPADM

## 2020-11-22 RX ORDER — POTASSIUM CHLORIDE 20 MEQ/1
40 TABLET, EXTENDED RELEASE ORAL ONCE
Status: COMPLETED | OUTPATIENT
Start: 2020-11-22 | End: 2020-11-22

## 2020-11-22 RX ORDER — CYCLOBENZAPRINE HCL 10 MG
10 TABLET ORAL 3 TIMES DAILY PRN
Status: DISCONTINUED | OUTPATIENT
Start: 2020-11-22 | End: 2020-11-22 | Stop reason: HOSPADM

## 2020-11-22 RX ORDER — ASPIRIN 81 MG/1
81 TABLET ORAL DAILY
Status: DISCONTINUED | OUTPATIENT
Start: 2020-11-22 | End: 2020-11-22 | Stop reason: HOSPADM

## 2020-11-22 RX ORDER — HYDROXYZINE HYDROCHLORIDE 25 MG/1
50 TABLET, FILM COATED ORAL 3 TIMES DAILY PRN
Status: DISCONTINUED | OUTPATIENT
Start: 2020-11-22 | End: 2020-11-22 | Stop reason: HOSPADM

## 2020-11-22 RX ORDER — OXYCODONE HYDROCHLORIDE AND ACETAMINOPHEN 5; 325 MG/1; MG/1
1 TABLET ORAL EVERY 4 HOURS PRN
Status: DISCONTINUED | OUTPATIENT
Start: 2020-11-22 | End: 2020-11-22 | Stop reason: HOSPADM

## 2020-11-22 RX ORDER — METOPROLOL TARTRATE 50 MG/1
50 TABLET, FILM COATED ORAL 2 TIMES DAILY
Status: DISCONTINUED | OUTPATIENT
Start: 2020-11-22 | End: 2020-11-22 | Stop reason: HOSPADM

## 2020-11-22 RX ORDER — CLONIDINE HYDROCHLORIDE 0.2 MG/1
0.2 TABLET ORAL NIGHTLY
Status: DISCONTINUED | OUTPATIENT
Start: 2020-11-22 | End: 2020-11-22 | Stop reason: HOSPADM

## 2020-11-22 RX ORDER — BENZTROPINE MESYLATE 1 MG/1
1 TABLET ORAL DAILY
Status: DISCONTINUED | OUTPATIENT
Start: 2020-11-22 | End: 2020-11-22 | Stop reason: HOSPADM

## 2020-11-22 RX ORDER — POTASSIUM CHLORIDE 20 MEQ/1
40 TABLET, EXTENDED RELEASE ORAL PRN
Status: DISCONTINUED | OUTPATIENT
Start: 2020-11-22 | End: 2020-11-22 | Stop reason: HOSPADM

## 2020-11-22 RX ORDER — SODIUM CHLORIDE 0.9 % (FLUSH) 0.9 %
10 SYRINGE (ML) INJECTION EVERY 12 HOURS SCHEDULED
Status: DISCONTINUED | OUTPATIENT
Start: 2020-11-22 | End: 2020-11-22 | Stop reason: HOSPADM

## 2020-11-22 RX ORDER — ROPINIROLE 0.5 MG/1
1 TABLET, FILM COATED ORAL NIGHTLY
Status: DISCONTINUED | OUTPATIENT
Start: 2020-11-22 | End: 2020-11-22 | Stop reason: HOSPADM

## 2020-11-22 RX ORDER — HYDROCHLOROTHIAZIDE 25 MG/1
25 TABLET ORAL EVERY MORNING
Status: DISCONTINUED | OUTPATIENT
Start: 2020-11-22 | End: 2020-11-22 | Stop reason: HOSPADM

## 2020-11-22 RX ORDER — ACETAMINOPHEN 325 MG/1
650 TABLET ORAL EVERY 4 HOURS PRN
Status: DISCONTINUED | OUTPATIENT
Start: 2020-11-22 | End: 2020-11-22 | Stop reason: HOSPADM

## 2020-11-22 RX ORDER — ALBUTEROL SULFATE 90 UG/1
2 AEROSOL, METERED RESPIRATORY (INHALATION) EVERY 6 HOURS PRN
Status: DISCONTINUED | OUTPATIENT
Start: 2020-11-22 | End: 2020-11-22 | Stop reason: HOSPADM

## 2020-11-22 RX ORDER — BUSPIRONE HYDROCHLORIDE 10 MG/1
10 TABLET ORAL 3 TIMES DAILY
Status: DISCONTINUED | OUTPATIENT
Start: 2020-11-22 | End: 2020-11-22 | Stop reason: HOSPADM

## 2020-11-22 RX ORDER — AMLODIPINE BESYLATE 10 MG/1
10 TABLET ORAL DAILY
Status: DISCONTINUED | OUTPATIENT
Start: 2020-11-22 | End: 2020-11-22 | Stop reason: HOSPADM

## 2020-11-22 RX ADMIN — POTASSIUM CHLORIDE 40 MEQ: 1500 TABLET, EXTENDED RELEASE ORAL at 01:17

## 2020-11-22 RX ADMIN — VANCOMYCIN HYDROCHLORIDE 1250 MG: 1.25 INJECTION, POWDER, LYOPHILIZED, FOR SOLUTION INTRAVENOUS at 12:14

## 2020-11-22 RX ADMIN — OXYCODONE HYDROCHLORIDE AND ACETAMINOPHEN 1 TABLET: 5; 325 TABLET ORAL at 08:45

## 2020-11-22 RX ADMIN — OXYCODONE HYDROCHLORIDE AND ACETAMINOPHEN 1 TABLET: 5; 325 TABLET ORAL at 03:26

## 2020-11-22 RX ADMIN — ENOXAPARIN SODIUM 40 MG: 40 INJECTION SUBCUTANEOUS at 08:45

## 2020-11-22 RX ADMIN — Medication 10 ML: at 10:27

## 2020-11-22 ASSESSMENT — PAIN DESCRIPTION - FREQUENCY: FREQUENCY: CONTINUOUS

## 2020-11-22 ASSESSMENT — PAIN SCALES - GENERAL
PAINLEVEL_OUTOF10: 6
PAINLEVEL_OUTOF10: 1
PAINLEVEL_OUTOF10: 3
PAINLEVEL_OUTOF10: 6
PAINLEVEL_OUTOF10: 0
PAINLEVEL_OUTOF10: 6

## 2020-11-22 ASSESSMENT — PAIN DESCRIPTION - ORIENTATION: ORIENTATION: RIGHT;OUTER

## 2020-11-22 ASSESSMENT — PAIN DESCRIPTION - PAIN TYPE: TYPE: ACUTE PAIN;CHRONIC PAIN

## 2020-11-22 ASSESSMENT — PAIN DESCRIPTION - LOCATION: LOCATION: LEG

## 2020-11-22 ASSESSMENT — PAIN DESCRIPTION - DESCRIPTORS: DESCRIPTORS: SHARP
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
## 2020-11-22 NOTE — ED NOTES
Writer called Phani Patel from vascular lab at 2131. Voicemail was left.       Ave Alvarez  11/21/20 2135
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
## 2020-11-22 NOTE — PROGRESS NOTES
kg)(weight gain likely due to swelling and edema.  lbs per patient.)   · Admission Body Weight: 190 lb 11.2 oz (86.5 kg)    · Usual Body Weight: 180 lb (81.6 kg)     · Ideal Body Weight: 172 lbs; % Ideal Body Weight 110.9 %   · BMI: 26.6  · BMI Categories: Overweight (BMI 25.0-29. 9)       Nutrition Diagnosis:   · Increased nutrient needs related to other (comment), inadequate protein-energy intake(For healing) as evidenced by wounds    Nutrition Interventions:   Food and/or Nutrient Delivery:  Continue Current Diet, Start Oral Nutrition Supplement  Nutrition Education/Counseling:  Education not indicated   Coordination of Nutrition Care:  Continue to monitor while inpatient    Goals:  Meet % of estimated nutrition needs       Nutrition Monitoring and Evaluation:   Behavioral-Environmental Outcomes:      Food/Nutrient Intake Outcomes:  Food and Nutrient Intake, Supplement Intake  Physical Signs/Symptoms Outcomes:  Biochemical Data, Skin, Weight     Discharge Planning:     Too soon to determine       Some areas of assessment may be incomplete due to COVID-19 precautions    Carlos Doyle, RD, LD  Office phone [phone]

## 2020-11-22 NOTE — PROGRESS NOTES
Patient admitted to room 2065 from ED. Patient alert and oriented. No signs of distress noted.  Vitals and assessment completed

## 2020-11-22 NOTE — PLAN OF CARE
Problem: Falls - Risk of:  Goal: Will remain free from falls  Description: Will remain free from falls  11/22/2020 1423 by Cathy Vance RN  Outcome: Ongoing  Note: Pt educated on and verbalizes understanding of fall risks. Pt wearing nonskid stockings, uses assistive devices appropriately, call light within reach, encouraged to ask for assistance. Pt calls out appropriately this shift. Will continue to monitor and intervene as needed. Problem: Skin Integrity:  Goal: Will show no infection signs and symptoms  Description: Will show no infection signs and symptoms  11/22/2020 1423 by Cathy Vance RN  Outcome: Ongoing  Note: Pt educated on risks for impaired skin integrity. Pt assisted in keeping skin clean and dry, assisted and prompted to reposition frequently. No s/s of infection or new breakdown noted this shift. Will continue to monitor and intervene as needed. Problem: Pain:  Goal: Control of acute pain  Description: Control of acute pain  Outcome: Ongoing  Note: Pt reports the pain has came down to 2-3/10 and that it is now a dull pain which is manageable. Pt reports pain is controlled by current pain regimen. Non-pharmacological interventions discussed, pt accepting. Will continue to monitor and medicate per MD orders.

## 2020-11-22 NOTE — ED NOTES
Admission Dx: cellulitis    Pts Chief Complaints on Arrival: leg pain/swelling    ADL's - Self-care    Pending Diagnostics:  none    Residence PTA: multiple-story home    Special Considerations/Circumstances:  none    Vitals: Current vital signs:  /72   Pulse 86   Temp 98.1 °F (36.7 °C) (Oral)   Resp 16   Ht 5' 11\" (1.803 m)   Wt 180 lb (81.6 kg)   SpO2 99%   BMI 25.10 kg/m²                MEWS Score: 1          Susan Rockwell RN  11/22/20 0147

## 2020-11-22 NOTE — ED NOTES
This RN made 2 IV insertion attempts, both failed. Advised paramedic Benjie Wright to attempt.      Brandon Viramontes RN  11/21/20 1572

## 2020-11-22 NOTE — PROGRESS NOTES
Pt discharged with all belongings. Pt educated on and verbalizes understanding of discharge instructions including medications and follow up appointments. No s/s of distress noted upon discharge.

## 2020-11-23 ENCOUNTER — TELEPHONE (OUTPATIENT)
Dept: FAMILY MEDICINE CLINIC | Age: 50
End: 2020-11-23

## 2020-11-23 ENCOUNTER — OFFICE VISIT (OUTPATIENT)
Dept: NEUROLOGY | Age: 50
End: 2020-11-23
Payer: MEDICARE

## 2020-11-23 VITALS
BODY MASS INDEX: 26.6 KG/M2 | DIASTOLIC BLOOD PRESSURE: 76 MMHG | OXYGEN SATURATION: 100 % | HEART RATE: 80 BPM | HEIGHT: 71 IN | WEIGHT: 190 LBS | SYSTOLIC BLOOD PRESSURE: 122 MMHG

## 2020-11-23 PROCEDURE — 99214 OFFICE O/P EST MOD 30 MIN: CPT | Performed by: STUDENT IN AN ORGANIZED HEALTH CARE EDUCATION/TRAINING PROGRAM

## 2020-11-23 PROCEDURE — 1111F DSCHRG MED/CURRENT MED MERGE: CPT | Performed by: STUDENT IN AN ORGANIZED HEALTH CARE EDUCATION/TRAINING PROGRAM

## 2020-11-23 PROCEDURE — 1036F TOBACCO NON-USER: CPT | Performed by: STUDENT IN AN ORGANIZED HEALTH CARE EDUCATION/TRAINING PROGRAM

## 2020-11-23 PROCEDURE — G8427 DOCREV CUR MEDS BY ELIG CLIN: HCPCS | Performed by: STUDENT IN AN ORGANIZED HEALTH CARE EDUCATION/TRAINING PROGRAM

## 2020-11-23 PROCEDURE — G8482 FLU IMMUNIZE ORDER/ADMIN: HCPCS | Performed by: STUDENT IN AN ORGANIZED HEALTH CARE EDUCATION/TRAINING PROGRAM

## 2020-11-23 PROCEDURE — G8419 CALC BMI OUT NRM PARAM NOF/U: HCPCS | Performed by: STUDENT IN AN ORGANIZED HEALTH CARE EDUCATION/TRAINING PROGRAM

## 2020-11-23 ASSESSMENT — ENCOUNTER SYMPTOMS
COUGH: 0
SHORTNESS OF BREATH: 0
ABDOMINAL DISTENTION: 0
PHOTOPHOBIA: 0
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
## 2020-11-24 RX ORDER — CYCLOBENZAPRINE HCL 10 MG
TABLET ORAL
Qty: 90 TABLET | Refills: 3 | Status: SHIPPED | OUTPATIENT
Start: 2020-11-24 | End: 2021-02-18 | Stop reason: SDUPTHER

## 2020-11-24 NOTE — TELEPHONE ENCOUNTER
Please Approve or Refuse.   Send to Pharmacy per Pt's Request     Next Visit Date:  12/2/2020   Last Visit Date: 11/21/2020    Hemoglobin A1C (%)   Date Value   09/01/2020 5.8   04/03/2019 5.3   10/20/2017 5.1             ( goal A1C is < 7)   BP Readings from Last 3 Encounters:   11/23/20 122/76   11/22/20 104/67   11/09/20 106/71          (goal 120/80)  BUN   Date Value Ref Range Status   11/21/2020 14 6 - 20 mg/dL Final     CREATININE   Date Value Ref Range Status   11/21/2020 0.76 0.70 - 1.20 mg/dL Final     Potassium   Date Value Ref Range Status   11/22/2020 3.4 (L) 3.7 - 5.3 mmol/L Final

## 2020-12-01 ENCOUNTER — HOSPITAL ENCOUNTER (OUTPATIENT)
Dept: PHYSICAL THERAPY | Age: 50
Setting detail: THERAPIES SERIES
Discharge: HOME | End: 2020-12-01
Payer: MEDICARE

## 2020-12-01 PROCEDURE — G0283 ELEC STIM OTHER THAN WOUND: HCPCS

## 2020-12-01 PROCEDURE — 97110 THERAPEUTIC EXERCISES: CPT

## 2020-12-01 PROCEDURE — 97161 PT EVAL LOW COMPLEX 20 MIN: CPT

## 2020-12-01 ASSESSMENT — PAIN DESCRIPTION - DESCRIPTORS: DESCRIPTORS: STABBING

## 2020-12-01 ASSESSMENT — PAIN DESCRIPTION - ORIENTATION: ORIENTATION: RIGHT

## 2020-12-01 ASSESSMENT — PAIN DESCRIPTION - FREQUENCY: FREQUENCY: INTERMITTENT

## 2020-12-01 ASSESSMENT — PAIN SCALES - GENERAL: PAINLEVEL_OUTOF10: 5

## 2020-12-01 ASSESSMENT — PAIN DESCRIPTION - LOCATION: LOCATION: FOOT;LEG
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
## 2020-12-02 ENCOUNTER — TELEMEDICINE (OUTPATIENT)
Dept: FAMILY MEDICINE CLINIC | Age: 50
End: 2020-12-02
Payer: MEDICARE

## 2020-12-02 PROCEDURE — 1111F DSCHRG MED/CURRENT MED MERGE: CPT | Performed by: FAMILY MEDICINE

## 2020-12-02 PROCEDURE — 99214 OFFICE O/P EST MOD 30 MIN: CPT | Performed by: FAMILY MEDICINE

## 2020-12-02 RX ORDER — FAMOTIDINE 20 MG/1
20 TABLET, FILM COATED ORAL 2 TIMES DAILY
Qty: 60 TABLET | Refills: 0 | Status: SHIPPED | OUTPATIENT
Start: 2020-12-02 | End: 2020-12-28

## 2020-12-02 ASSESSMENT — ENCOUNTER SYMPTOMS
NAUSEA: 1
CONSTIPATION: 0
CHEST TIGHTNESS: 0
DIARRHEA: 0
SHORTNESS OF BREATH: 0
WHEEZING: 0
VOMITING: 0
ABDOMINAL PAIN: 0
ABDOMINAL DISTENTION: 0
COUGH: 0

## 2020-12-02 NOTE — PATIENT INSTRUCTIONS
Patient Education        Learning About Low-Carbohydrate Diets  What is a low-carbohydrate diet? A low-carbohydrate (or \"low-carb\") diet limits foods and drinks that have carbohydrates. This includes grains, fruits, milk and yogurt, and starchy vegetables like potatoes, beans, and corn. It also avoids foods and drinks that have added sugar. Instead, low-carb diets include foods that are high in protein and fat. Why might you follow a low-carb diet? Low-carb diets may be used for a variety of reasons, such as for weight loss. People who have diabetes may use a low-carb diet to help manage their blood sugar levels. What should you do before you start the diet? Talk to your doctor before you try any diet. This is even more important if you have health problems like kidney disease, heart disease, or diabetes. Your doctor may suggest that you meet with a registered dietitian. A dietitian can help you make an eating plan that works for you. What foods do you eat on a low-carb diet? On a low-carb diet, you choose foods that are high in protein and fat. Examples of these are:  · Meat, poultry, and fish. · Eggs. · Nuts, such as walnuts, pecans, almonds, and peanuts. · Peanut butter and other nut butters. · Tofu. · Avocado. · [facility]. · Non-starchy vegetables like broccoli, cauliflower, green beans, mushrooms, peppers, lettuce, and spinach. · Unsweetened non-dairy milks like almond milk and coconut milk. · Cheese, cottage cheese, and cream cheese. Current as of: August 22, 2019               Content Version: 12.6  © 9431-8320 [employer], [employer]. Care instructions adapted under license by [facility] ([facility]). If you have questions about a medical condition or this instruction, always ask your healthcare professional. [address]  any warranty or liability for your use of this information.          Patient Education        High-Calorie and High-Protein Diet: Care Instructions  Your Care Instructions     A high-calorie, high-protein diet gives you more energy and extra nutrition to help your body heal. Your doctor and dietitian can help you design a diet based on your health and what you prefer to eat. Always talk with your doctor or dietitian before you make changes in your diet. Follow-up care is a key part of your treatment and safety. Be sure to make and go to all appointments, and call your doctor if you are having problems. It's also a good idea to know your test results and keep a list of the medicines you take. How can you care for yourself at home? · Eat three meals a day, plus snacks in between and at bedtime. · Include favorite foods in your meals. This will help make meals more pleasant. · Drink your beverage at the end of the meal, because drinking before or during the meal can fill you up. · Eat high-protein foods, such as:  ? Meat, fish, and poultry. ? Milk and milk products. Add powdered milk to other foods (such as pudding or soups) to boost the protein. ? Eggs. ? Cooked dried beans and peas. ? Peanut butter, nuts, and seeds. ? Tofu.  ? Cheeses. ? Protein bars. · Eat high-calorie foods, such as:  ? Butter, honey, and brown sugar, added to foods to make them taste better. ? Oils, sauces, and gravies. ? Peanut butter. ? Whole milk, yogurt, mayonnaise, and sour cream.  ? Granola cereal with fruit and granola bars. ? Muffins, pancakes, waffles, and other breads. ? Milkshakes, puddings, and custard. · Try a liquid meal replacement, such as Ensure, Boost, or instant breakfast drinks, if you have trouble eating solid foods. They will give you both calories and protein. Soups and smoothies also are good sources of nutrition. · Keep snacks around that are easy to eat, such as pudding, energy bars, ice cream, and flavored ice pops. · If you can, take a walk before you eat, to make you hungrier. · Drink plenty of fluids.  If you have kidney, heart, or liver disease and have to limit fluids, talk with your doctor before you increase the amount of fluids you drink. · Try to avoid eating foods that don't give you much nutrition, such as potato chips, candy bars, and soft drinks. Where can you learn more? Go to https://humphrey.Yerdle. org and sign in to your [employer] account. Enter A767 in the [employer] box to learn more about \"High-Calorie and High-Protein Diet: Care Instructions. \"     If you do not have an account, please click on the \"Sign Up Now\" link. Current as of: August 22, 2019               Content Version: 12.6  © 4867-3430 [employer], [employer]. Care instructions adapted under license by [facility] ([facility]). If you have questions about a medical condition or this instruction, always ask your healthcare professional. [address] any warranty or liability for your use of this information. Patient Education        Low Sodium Diet (2,000 Milligram): Care Instructions  Your Care Instructions     Too much sodium causes your body to hold on to extra water. This can raise your blood pressure and force your heart and kidneys to work harder. In very serious cases, this could cause you to be put in the hospital. It might even be life-threatening. By limiting sodium, you will feel better and lower your risk of serious problems. The most common source of sodium is salt. People get most of the salt in their diet from canned, prepared, and packaged foods. Fast food and restaurant meals also are very high in sodium. Your doctor will probably limit your sodium to less than 2,000 milligrams (mg) a day. This limit counts all the sodium in prepared and packaged foods and any salt you add to your food. Follow-up care is a key part of your treatment and safety. Be sure to make and go to all appointments, and call your doctor if you are having problems.  It's also a good idea to know your test results and keep a list of the medicines you take. How can you care for yourself at home? Read food labels  · Read labels on cans and food packages. The labels tell you how much sodium is in each serving. Make sure that you look at the serving size. If you eat more than the serving size, you have eaten more sodium. · Food labels also tell you the Percent Daily Value for sodium. Choose products with low Percent Daily Values for sodium. · Be aware that sodium can come in forms other than salt, including monosodium glutamate (MSG), sodium citrate, and sodium bicarbonate (baking soda). MSG is often added to Asian food. When you eat out, you can sometimes ask for food without MSG or added salt. Buy low-sodium foods  · Buy foods that are labeled \"unsalted\" (no salt added), \"sodium-free\" (less than 5 mg of sodium per serving), or \"low-sodium\" (less than 140 mg of sodium per serving). Foods labeled \"reduced-sodium\" and \"light sodium\" may still have too much sodium. Be sure to read the label to see how much sodium you are getting. · Buy fresh vegetables, or frozen vegetables without added sauces. Buy low-sodium versions of canned vegetables, soups, and other canned goods. Prepare low-sodium meals  · Cut back on the amount of salt you use in cooking. This will help you adjust to the taste. Do not add salt after cooking. One teaspoon of salt has about 2,300 mg of sodium. · Take the salt shaker off the table. · Flavor your food with garlic, lemon juice, onion, vinegar, herbs, and spices. Do not use soy sauce, lite soy sauce, steak sauce, onion salt, garlic salt, celery salt, mustard, or ketchup on your food. · Use low-sodium salad dressings, sauces, and ketchup. Or make your own salad dressings and sauces without adding salt. · Use less salt (or none) when recipes call for it. You can often use half the salt a recipe calls for without losing flavor. Other foods such as rice, pasta, and grains do not need added salt.   · Rinse canned vegetables, and cook them
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
## 2020-12-03 ENCOUNTER — HOSPITAL ENCOUNTER (OUTPATIENT)
Age: 50
Setting detail: SPECIMEN
Discharge: HOME | End: 2020-12-03
Payer: MEDICARE

## 2020-12-03 LAB
RHEUMATOID FACTOR: <10 IU/ML
SEDIMENTATION RATE, ERYTHROCYTE: 2 MM (ref 0–15)

## 2020-12-04 ENCOUNTER — TELEPHONE (OUTPATIENT)
Dept: INTERNAL MEDICINE CLINIC | Age: 50
End: 2020-12-04

## 2020-12-04 ENCOUNTER — HOSPITAL ENCOUNTER (OUTPATIENT)
Dept: PHYSICAL THERAPY | Age: 50
Setting detail: THERAPIES SERIES
Discharge: HOME | End: 2020-12-04
Payer: MEDICARE

## 2020-12-04 LAB
ANCA MYELOPEROXIDASE: 8 AU/ML
ANCA PROTEINASE 3: 7 AU/ML
ANTI-NUCLEAR ANTIBODY (ANA): NEGATIVE

## 2020-12-04 NOTE — TELEPHONE ENCOUNTER
Pt called regarding Ensure protein from Dr. Ashley Sidhu during a hospital stay. He got a call regarding a medical necessity form that needs to be faxed. Pt was informed the paper is waiting on Dr Sherine Sampson and will be faxed asa.

## 2020-12-04 NOTE — PROGRESS NOTES
Physical Therapy Cancel/NS Note    Date: 2020  Patient Name: Juan C Barton  MRN: [id number]  : 1970    Subjective   General Comment  Comments: cancel PT today  PT Visit Information  Onset Date: 20  PT Insurance Information: paramount advantage  Total # of Visits Approved: 12  Total # of Visits to Date: 1  No Show: 0  Canceled Appointment: 1  Electronically signed by: Simon Oviedo PT

## 2020-12-07 ENCOUNTER — TELEPHONE (OUTPATIENT)
Dept: FAMILY MEDICINE CLINIC | Age: 50
End: 2020-12-07

## 2020-12-07 PROBLEM — I15.9 SECONDARY HYPERTENSION: Status: RESOLVED | Noted: 2018-02-16 | Resolved: 2020-12-07

## 2020-12-07 PROBLEM — R11.0 NAUSEA: Status: ACTIVE | Noted: 2020-12-07

## 2020-12-07 PROBLEM — J20.9 ACUTE INFECTIVE TRACHEOBRONCHITIS: Status: RESOLVED | Noted: 2020-08-06 | Resolved: 2020-12-07

## 2020-12-07 PROBLEM — L03.115 CELLULITIS OF RIGHT LOWER EXTREMITY: Status: RESOLVED | Noted: 2020-11-22 | Resolved: 2020-12-07

## 2020-12-07 NOTE — TELEPHONE ENCOUNTER
Return in about 3 months (around 3/2/2021) for depression-DO PHQ-9 in EPIC, HTN,HLP, LABS F/U.       Future Appointments   Date Time Provider Maribell Duran   12/11/2020  2:00 PM Marlyn Schwartz PT [facility]   12/22/2020 11:00 AM Elba Lesches, DPM [facility]   2/15/2021  1:00 PM Peri Maria MD Neuro [facility] What Is The Reason For Today's Visit?: History of Melanoma

## 2020-12-10 ENCOUNTER — TELEPHONE (OUTPATIENT)
Dept: INTERNAL MEDICINE CLINIC | Age: 50
End: 2020-12-10

## 2020-12-10 NOTE — TELEPHONE ENCOUNTER
Spoke with pt regarding order for protein shakes. Fax was not received, message sent to patient coordinator regarding new order be placed.

## 2020-12-11 ENCOUNTER — HOSPITAL ENCOUNTER (OUTPATIENT)
Dept: PHYSICAL THERAPY | Age: 50
Setting detail: THERAPIES SERIES
Discharge: HOME | End: 2020-12-11
Payer: MEDICARE

## 2020-12-11 ENCOUNTER — TELEPHONE (OUTPATIENT)
Dept: FAMILY MEDICINE CLINIC | Age: 50
End: 2020-12-11

## 2020-12-11 PROCEDURE — G0283 ELEC STIM OTHER THAN WOUND: HCPCS

## 2020-12-11 PROCEDURE — 97110 THERAPEUTIC EXERCISES: CPT

## 2020-12-11 RX ORDER — FEEDER CONTAINER WITH PUMP SET
1 EACH MISCELLANEOUS
Qty: 90 CAN | Refills: 0 | Status: SHIPPED | OUTPATIENT
Start: 2020-12-11 | End: 2021-03-12 | Stop reason: ALTCHOICE

## 2020-12-11 ASSESSMENT — PAIN DESCRIPTION - ORIENTATION
ORIENTATION_2: RIGHT
ORIENTATION: RIGHT

## 2020-12-11 ASSESSMENT — PAIN SCALES - GENERAL: PAINLEVEL_OUTOF10: 6

## 2020-12-11 ASSESSMENT — PAIN DESCRIPTION - LOCATION
LOCATION: SHOULDER
LOCATION_2: ANKLE

## 2020-12-11 ASSESSMENT — PAIN DESCRIPTION - DESCRIPTORS
DESCRIPTORS: CONSTANT
DESCRIPTORS_2: CONSTANT

## 2020-12-11 ASSESSMENT — PAIN DESCRIPTION - FREQUENCY: FREQUENCY: CONTINUOUS

## 2020-12-11 ASSESSMENT — PAIN DESCRIPTION - DURATION: DURATION_2: CONTINUOUS

## 2020-12-11 ASSESSMENT — PAIN DESCRIPTION - INTENSITY: RATING_2: 4

## 2020-12-11 NOTE — PROGRESS NOTES
Physical Therapy  Daily Treatment Note  Date: 2020  Patient Name: Irma Marcelino  MRN: [id number]     :   1970    Subjective:      PT Visit Information  Onset Date: 20  PT Insurance Information: anna advantage  Total # of Visits Approved: 12  Total # of Visits to Date: 2  Subjective  Subjective: complains of pain on R cassidy and ankle today  General Comment  Comments: new orders for PT per Dr Edil Helton to add R cassidy  Pain Screening  Patient Currently in Pain: Yes  Pain Assessment  Pain Assessment: 0-10  Pain Level: 6  Pain Location: Shoulder  Pain Orientation: Right  Pain Descriptors: Constant  Pain Frequency: Continuous  Multiple Pain Sites: Yes  Pain 2  Pain Rating 2: 4  Pain Location 2: Ankle  Pain Orientation 2: Right  Pain Descriptors 2: Constant  Pain Duration 2: Continuous  Vital Signs  Patient Currently in Pain: Yes       Treatment Activities:   Exercises  Exercise 1: slant board stretch 3x30\"  Exercise 2: slant board R ankle DF/PF/Inve/Ever 1'  Exercise 3: tandem stance B 3x30\"  Exercise 4: R SLS 3x10\"  Exercise 5: walking free motion machine 13#  1x backwards  Exercise 6: free motion machine 4 way hip 10# RLE  only 10x  Exercise 7: towel stretch R cassidy 3x30\"  Exercise 8: cane ex -R cassidy flex/Abd /ER/ext 10x standing  Exercise 9: supine R cassidy protraction 3# 3x10  Exercise 10: L S/L R cassidy ER 1# 3x10  Exercise 11: prone R cassidy ext 3# 3x10  Exercise 12: prone lawnmower pull 3# 3x10  Exercise 13: miguel ángel,s R 0# 10x 4 way  Exercise 14: HP and EStim ti R cassidy 20 min sitting    Assessment:   Conditions Requiring Skilled Therapeutic Intervention  Assessment: progress with ex  Treatment Diagnosis: stiffness R ankle M25.671 weakness R ankle M62.571 difficulty walking R26.2 NEC stiffness R cassidy M25.611 weakness R cassidy M62.511  REQUIRES PT FOLLOW UP: Yes  Discharge Recommendations: Home independently     Plan:    Plan  Current Treatment Recommendations: Modalities, Strengthening, ROM, Home Exercise Program  Plan Comment: to continue PT and add R cassidy to PT RX as ordered  Timed Code Treatment Minutes: 40 Minutes   Treatment Charges: Minutes Units   []  Ultrasound     [x]  Electrical-Stim 10 1   []  Iontophoresis     []  Traction     []  Massage       []  Eval     []  Gait     [x]  Ther Exercise 40  3    []  Manual Therapy       []  Ther Activities       []  Aquatics     []  Vasopneumatic Device     []  Neuro Re-Ed       []  Other       Total Treatment Time: 50 4        Therapy Time   Individual Concurrent Group Co-treatment   Time In 3563         Time Out 1455         Minutes 50         Timed Code Treatment Minutes: 40 Minutes     Electronically signed by: Regino Forbes PT complains of pain/discomfort

## 2020-12-15 ENCOUNTER — TELEPHONE (OUTPATIENT)
Dept: FAMILY MEDICINE CLINIC | Age: 50
End: 2020-12-15

## 2020-12-15 NOTE — TELEPHONE ENCOUNTER
Please fax referral     Diagnosis Orders   1.  Bilateral hearing loss, unspecified hearing loss type  ALFREDA - King Robb MD, Otolaryngology, [place]

## 2020-12-15 NOTE — TELEPHONE ENCOUNTER
Patient having hearing lost ,  He been talking loud ( per his family) patient states that thing are Muffled . Last visit 12/2/2020   No ear pain ,ears are not pulled.  Can you order him hearing test ? Thank you

## 2020-12-17 ENCOUNTER — HOSPITAL ENCOUNTER (OUTPATIENT)
Dept: PHYSICAL THERAPY | Age: 50
Setting detail: THERAPIES SERIES
Discharge: HOME | End: 2020-12-17
Payer: MEDICARE

## 2020-12-17 PROCEDURE — 97110 THERAPEUTIC EXERCISES: CPT

## 2020-12-17 PROCEDURE — G0283 ELEC STIM OTHER THAN WOUND: HCPCS

## 2020-12-17 ASSESSMENT — PAIN DESCRIPTION - LOCATION
LOCATION_2: ANKLE
LOCATION: SHOULDER

## 2020-12-17 ASSESSMENT — PAIN SCALES - GENERAL: PAINLEVEL_OUTOF10: 6

## 2020-12-17 ASSESSMENT — PAIN DESCRIPTION - FREQUENCY: FREQUENCY: CONTINUOUS

## 2020-12-17 ASSESSMENT — PAIN DESCRIPTION - ORIENTATION
ORIENTATION: RIGHT
ORIENTATION_2: RIGHT

## 2020-12-17 ASSESSMENT — PAIN DESCRIPTION - DURATION: DURATION_2: CONTINUOUS

## 2020-12-17 ASSESSMENT — PAIN DESCRIPTION - INTENSITY: RATING_2: 4

## 2020-12-17 ASSESSMENT — PAIN DESCRIPTION - DESCRIPTORS
DESCRIPTORS: CONSTANT
DESCRIPTORS_2: CONSTANT

## 2020-12-17 NOTE — PROGRESS NOTES
Physical Therapy  Daily Treatment Note  Date: 2020  Patient Name: Cornelius Douglas  MRN: [id number]     :   1970    Subjective:      PT Visit Information  Onset Date: 20  PT Insurance Information: paramount advantage  Total # of Visits Approved: 12  Total # of Visits to Date: 3  Subjective  Subjective: complains of pain on R cassidy and ankle today  Pain Screening  Patient Currently in Pain: Yes  Pain Assessment  Pain Assessment: 0-10  Pain Level: 6  Pain Location: Shoulder  Pain Orientation: Right  Pain Descriptors: Constant  Pain Frequency: Continuous  Pain 2  Pain Rating 2: 4  Pain Location 2: Ankle  Pain Orientation 2: Right  Pain Descriptors 2: Constant  Pain Duration 2: Continuous  Vital Signs  Patient Currently in Pain: Yes       Treatment Activities:   Exercises  Exercise 1: slant board stretch 3x30\"  Exercise 2: slant board R ankle DF/PF/Inve/Ever 1'  Exercise 5: leg press BLE 25# 3x10  Exercise 6: [employer] L10 squats/heel raises 3x10  Exercise 7: towel stretch R cassidy 3x30\"  Exercise 8: cane ex -R cassidy flex/Abd /ER/ext 10x standing  Exercise 9: supine R cassidy protraction 5# 3x10  Exercise 10: L S/L R cassidy ER 1# 3x10  Exercise 11: prone R cassidy ext 5# 3x10  Exercise 12: prone lawnmower pull 5# 3x10  Exercise 13: miguel ángel,s R 1# 10x 4 way  Exercise 14: HP and EStim to R cassidy 20 min sitting  Exercise 15: B heel/toe raises 3x10  Exercise 16: rows/pull down 35# 3x10 elbow curls 10# 3x10 elbow ext 25# 3x10  Exercise 17: chest press front/back 20#/35# 3x10  Exercise 18: lat pull down 25# 3x10     Assessment:   Conditions Requiring Skilled Therapeutic Intervention  Assessment: progress with ex  REQUIRES PT FOLLOW UP: Yes  Discharge Recommendations: Home independently      Plan:    Plan  Current Treatment Recommendations: Modalities, Strengthening, ROM, Home Exercise Program  Plan Comment: to continue PT per POC  Timed Code Treatment Minutes: 30 Minutes   Treatment Charges: Minutes Units   []  Ultrasound     [x]

## 2020-12-21 ENCOUNTER — HOSPITAL ENCOUNTER (OUTPATIENT)
Dept: PHYSICAL THERAPY | Age: 50
Setting detail: THERAPIES SERIES
Discharge: HOME | End: 2020-12-21
Payer: MEDICARE

## 2020-12-21 NOTE — PROGRESS NOTES
Physical Therapy Cancel/NS Note    Date: 2020  Patient Name: Tomas Corcoran  MRN: [id number]  : 1970    Subjective   General Comment  Comments: cancel PT today,leg hurting  PT Visit Information  Onset Date: 20  PT Insurance Information: paramount advantage  Total # of Visits Approved: 12  Total # of Visits to Date: 3  No Show: 0  Canceled Appointment: 2  Electronically signed by: Bandar Garcia PT

## 2020-12-28 RX ORDER — FAMOTIDINE 20 MG/1
TABLET, FILM COATED ORAL
Qty: 60 TABLET | Refills: 3 | Status: SHIPPED | OUTPATIENT
Start: 2020-12-28 | End: 2021-04-12

## 2020-12-29 ENCOUNTER — HOSPITAL ENCOUNTER (OUTPATIENT)
Dept: PHYSICAL THERAPY | Age: 50
Setting detail: THERAPIES SERIES
Discharge: HOME | End: 2020-12-29
Payer: MEDICARE

## 2020-12-29 PROCEDURE — 97110 THERAPEUTIC EXERCISES: CPT

## 2020-12-29 ASSESSMENT — PAIN SCALES - GENERAL: PAINLEVEL_OUTOF10: 6

## 2020-12-29 ASSESSMENT — PAIN DESCRIPTION - LOCATION
LOCATION: SHOULDER
LOCATION_2: ANKLE

## 2020-12-29 ASSESSMENT — PAIN DESCRIPTION - DURATION: DURATION_2: CONTINUOUS

## 2020-12-29 ASSESSMENT — PAIN DESCRIPTION - DESCRIPTORS
DESCRIPTORS_2: CONSTANT
DESCRIPTORS: CONSTANT

## 2020-12-29 ASSESSMENT — PAIN DESCRIPTION - FREQUENCY: FREQUENCY: CONTINUOUS

## 2020-12-29 ASSESSMENT — PAIN DESCRIPTION - ORIENTATION
ORIENTATION_2: RIGHT
ORIENTATION: RIGHT

## 2020-12-29 ASSESSMENT — PAIN DESCRIPTION - INTENSITY: RATING_2: 4

## 2020-12-29 NOTE — PROGRESS NOTES
Physical Therapy  Daily Treatment Note  Date: 2020  Patient Name: Shanna Dominguez  MRN: [id number]     :   1970    Subjective:      PT Visit Information  Onset Date: 20  PT Insurance Information: [employer] advantage  Total # of Visits Approved: 12  Total # of Visits to Date: 4  Subjective  Subjective: complains of pain on R cassidy and ankle today  Pain Screening  Patient Currently in Pain: Yes  Pain Assessment  Pain Assessment: 0-10  Pain Level: 6  Pain Location: Shoulder  Pain Orientation: Right  Pain Descriptors: Constant  Pain Frequency: Continuous  Pain 2  Pain Rating 2: 4  Pain Location 2: Ankle  Pain Orientation 2: Right  Pain Descriptors 2: Constant  Pain Duration 2: Continuous  Vital Signs  Patient Currently in Pain: Yes       Treatment Activities:   Exercises  Exercise 1: slant board stretch 3x30\"  Exercise 2: slant board R ankle DF/PF/Inve/Ever 1'  Exercise 3: tandem stance B 3x30\"  Exercise 4: R SLS 3x10\"  Exercise 5: leg press BLE 25# 3x10  Exercise 6: [employer] L10 squats/heel raises 3x10  Exercise 7: towel stretch R cassidy 3x30\"  Exercise 8: cane ex -R cassidy flex/Abd /ER/ext 10x standing  Exercise 9: supine R cassidy protraction 5# 3x10  Exercise 10: L S/L R cassidy ER 1# 3x10  Exercise 11: prone R cassidy ext 5# 3x10  Exercise 12: prone lawnmower pull 5# 3x10  Exercise 13: miguel ángel,s R 1# 10x 4 way  Exercise 15: B heel/toe raises 3x10  Exercise 16: rows/pull down 35# 3x10 elbow curls 10# 3x10 elbow ext 25# 3x10  Exercise 17: chest press front/back 10#/35# 3x10  Exercise 18: lat pull down 35# 3x10    Assessment:   Conditions Requiring Skilled Therapeutic Intervention  Assessment: progress with ex  REQUIRES PT FOLLOW UP: Yes  Discharge Recommendations: Home independently     Plan:    Plan  Current Treatment Recommendations: Modalities, Strengthening, ROM, Home Exercise Program  Plan Comment: to continue PT per POC  Timed Code Treatment Minutes: 40 Minutes   Treatment Charges: Minutes Units   []  Ultrasound []  Electrical-Stim     []  Iontophoresis     []  Traction     []  Massage       []  Eval     []  Gait     [x]  Ther Exercise 40  3    []  Manual Therapy       []  Ther Activities       []  Aquatics     []  Vasopneumatic Device     []  Neuro Re-Ed       []  Other       Total Treatment Time: 40 3        Therapy Time   Individual Concurrent Group Co-treatment   Time In 0800         Time Out 0840         Minutes 40         Timed Code Treatment Minutes: 36 Minutes     Electronically signed by: Maddi Morgan PT

## 2021-01-04 ENCOUNTER — HOSPITAL ENCOUNTER (OUTPATIENT)
Dept: PHYSICAL THERAPY | Age: 51
Setting detail: THERAPIES SERIES
Discharge: HOME | End: 2021-01-04
Payer: MEDICARE
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
## 2021-01-07 ENCOUNTER — HOSPITAL ENCOUNTER (OUTPATIENT)
Dept: PHYSICAL THERAPY | Age: 51
Setting detail: THERAPIES SERIES
Discharge: HOME | End: 2021-01-07
Payer: MEDICARE

## 2021-01-07 ENCOUNTER — HOSPITAL ENCOUNTER (OUTPATIENT)
Age: 51
Setting detail: SPECIMEN
Discharge: HOME | End: 2021-01-07
Payer: MEDICARE

## 2021-01-07 LAB
ALBUMIN SERPL-MCNC: 4.7 G/DL (ref 3.5–5.2)
ALBUMIN/GLOBULIN RATIO: 1.6 (ref 1–2.5)
ALP BLD-CCNC: 86 U/L (ref 40–129)
ALT SERPL-CCNC: 24 U/L (ref 5–41)
ANION GAP SERPL CALCULATED.3IONS-SCNC: 17 MMOL/L (ref 9–17)
AST SERPL-CCNC: 32 U/L
BILIRUB SERPL-MCNC: 0.2 MG/DL (ref 0.3–1.2)
BILIRUBIN DIRECT: <0.08 MG/DL
BILIRUBIN, INDIRECT: ABNORMAL MG/DL (ref 0–1)
BUN BLDV-MCNC: 14 MG/DL (ref 6–20)
BUN/CREAT BLD: ABNORMAL (ref 9–20)
CALCIUM SERPL-MCNC: 9.4 MG/DL (ref 8.6–10.4)
CHLORIDE BLD-SCNC: 102 MMOL/L (ref 98–107)
CO2: 26 MMOL/L (ref 20–31)
CREAT SERPL-MCNC: 0.91 MG/DL (ref 0.7–1.2)
GFR AFRICAN AMERICAN: >60 ML/MIN
GFR NON-AFRICAN AMERICAN: >60 ML/MIN
GFR SERPL CREATININE-BSD FRML MDRD: ABNORMAL ML/MIN/{1.73_M2}
GFR SERPL CREATININE-BSD FRML MDRD: ABNORMAL ML/MIN/{1.73_M2}
GLOBULIN: ABNORMAL G/DL (ref 1.5–3.8)
GLUCOSE BLD-MCNC: 70 MG/DL (ref 70–99)
HCT VFR BLD CALC: 49.3 % (ref 40.7–50.3)
HEMOGLOBIN: 16 G/DL (ref 13–17)
LITHIUM DATE LAST DOSE: ABNORMAL
LITHIUM DOSE AMOUNT: ABNORMAL
LITHIUM DOSE TIME: 1000
LITHIUM LEVEL: 0.5 MMOL/L (ref 0.6–1.2)
MCH RBC QN AUTO: 30.7 PG (ref 25.2–33.5)
MCHC RBC AUTO-ENTMCNC: 32.5 G/DL (ref 28.4–34.8)
MCV RBC AUTO: 94.6 FL (ref 82.6–102.9)
NRBC AUTOMATED: 0 PER 100 WBC
PDW BLD-RTO: 11.8 % (ref 11.8–14.4)
PLATELET # BLD: 297 K/UL (ref 138–453)
PMV BLD AUTO: 11.4 FL (ref 8.1–13.5)
POTASSIUM SERPL-SCNC: 4.5 MMOL/L (ref 3.7–5.3)
RBC # BLD: 5.21 M/UL (ref 4.21–5.77)
SODIUM BLD-SCNC: 145 MMOL/L (ref 135–144)
T3 FREE: 2.79 PG/ML (ref 2.02–4.43)
THYROXINE, FREE: 0.97 NG/DL (ref 0.93–1.7)
TOTAL PROTEIN: 7.7 G/DL (ref 6.4–8.3)
VALPROIC ACID LEVEL: 38 UG/ML (ref 50–125)
VALPROIC DATE LAST DOSE: ABNORMAL
VALPROIC DOSE AMOUNT: ABNORMAL
VALPROIC TIME LAST DOSE: ABNORMAL
WBC # BLD: 11.2 K/UL (ref 3.5–11.3)

## 2021-01-07 PROCEDURE — 97110 THERAPEUTIC EXERCISES: CPT

## 2021-01-07 NOTE — FLOWSHEET NOTE
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 Samreen Greco:   Visit# / total visits:   Cancels/No Shows: 3/0    Subjective:    Pain:  [x] Yes  [] No Location: R ankle, R shoulder Pain Rating: (0-10 scale) 3/10  Pain altered Tx:  [] No  [] Yes  Action:  Comments: Pt reports improve of pain with use of pain medication, 5mg percocet. Pt reports no change in pain or function since last visit.      Objective:  Modalities:   Precautions: R foot drop w/ healing burn on R shin  Exercises:  Exercise Reps/ Time Weight/ Level Comments   Slant board stretch 3x30\"     Slant board R ankle 1'  DF/PF/IN/EV   Tandem stance  3x30\" Foam board 2 finger support    R SLS 3x10\"  W/ B UE support    Forward weight shifts 30x  With R foot forward lunge, added 1/7   Heel raises 15x2  Added 1/7         Towel stretch R shoulder 3x30\"     Can ex 10x 5 lbs R shoulder flex/abd/er/ext   Supine R protractions 3x10 5lbs          Prone R shoulder ext 3x10 5 lbs    Prone lawnmower pull 3x10 5 lbs    Prone Y/T 10x  1 lb with T          Forward lunges 10x  Alternating LEs, pt requires stand by assist d/t poor balance    Other:    Specific Instructions for next treatment: progress strength per pt tolerance, continue balance activities with emphasis on weight shifting, gait training, shoulder stabilization Assessment: [x] Progressing toward goals. Pt enters clinic demonstrating R foot drop with decreased knee flexion and decreased stance time on RLE during ambulation. Pt continues to demonstrate poor weight shifting/accepting addressed this date with forward lunges, stepping, and SLS. Pt's shoulder has improved pain and progress with additional scapular strengthening. Pt requires cues for good posture as well as decreasing visual dependence. [] No change. [] Other:    [x] Patient would continue to benefit from skilled physical therapy services in order to:     Goals  Short term goals: to be met 6 visits   1: decrease pain R ffot/lower leg by 50% so patient can tolerate walking better    2: increase AROM R ankle to full pain free    3: increase strength RLE by 1/2 grade or better   4: indep with HEP    Long term goals: to be met in 12 visits   1: improve LEFS from 13 to 23 or better    Patient goals : eliminate R foot/lower leg pain,regain mobility and strength R foot    Pt. Education:  [x] Yes  [] No  [] Reviewed Prior HEP/Ed  Method of Education: [x] Verbal  [x] Demo  [] Written  Comprehension of Education:  [x] Verbalizes understanding. [] Demonstrates understanding. [x] Needs review. [] Demonstrates/verbalizes HEP/Ed previously given. Plan: [x] Continue per plan of care.    [] Other:      Treatment Charges: Mins Units   []  Modalities     [x]  Ther Exercise 45 3   []  Manual Therapy     []  Ther Activities     []  Aquatics     []  Neuromuscular     [] Vasocompression     [] Gait Training     [] Dry needling        [] 1 or 2 muscles        [] 3 or more muscles     []  Other     Total Treatment time       Time In:  3:10         Time Out: 3:55    Electronically signed by:  Andrew Ruth, PT

## 2021-01-12 ENCOUNTER — OFFICE VISIT (OUTPATIENT)
Dept: PODIATRY | Age: 51
End: 2021-01-12
Payer: MEDICARE

## 2021-01-12 VITALS — WEIGHT: 175 LBS | HEIGHT: 71 IN | BODY MASS INDEX: 24.5 KG/M2

## 2021-01-12 DIAGNOSIS — M21.371 FOOT DROP, RIGHT FOOT: Primary | ICD-10-CM

## 2021-01-12 DIAGNOSIS — T24.331D FULL THICKNESS BURN OF RIGHT LOWER LEG, SUBSEQUENT ENCOUNTER: ICD-10-CM

## 2021-01-12 DIAGNOSIS — L97.912 SKIN ULCER OF LOWER LEG, RIGHT, WITH FAT LAYER EXPOSED (HCC): ICD-10-CM

## 2021-01-12 PROCEDURE — 1036F TOBACCO NON-USER: CPT | Performed by: PODIATRIST

## 2021-01-12 PROCEDURE — G8420 CALC BMI NORM PARAMETERS: HCPCS | Performed by: PODIATRIST

## 2021-01-12 PROCEDURE — G8482 FLU IMMUNIZE ORDER/ADMIN: HCPCS | Performed by: PODIATRIST

## 2021-01-12 PROCEDURE — G8427 DOCREV CUR MEDS BY ELIG CLIN: HCPCS | Performed by: PODIATRIST

## 2021-01-12 PROCEDURE — 99213 OFFICE O/P EST LOW 20 MIN: CPT | Performed by: PODIATRIST

## 2021-01-13 ENCOUNTER — HOSPITAL ENCOUNTER (OUTPATIENT)
Dept: PHYSICAL THERAPY | Age: 51
Setting detail: THERAPIES SERIES
Discharge: HOME | End: 2021-01-13
Payer: MEDICARE

## 2021-01-13 NOTE — FLOWSHEET NOTE
[] [facility] ([facility]) [facility] &  Therapy  [address]    [phone]  F: [phone]   [] [address]  [facility] [address]   [address]  P: [phone]  F: [phone]  [] [address] &  Therapy  [address]  P: [phone]  F: [phone] [] [address]  P: [phone]  F: [phone]  [] [address] [facility]   [address]   [place]: [phone]  F: [phone]   [x] [place] [address] [facility] [address]  [place]: [phone]   F: [phone]     Physical Therapy Cancel/No Show note    Date: 2021  Patient: Marline Spatz  : 1970  MRN: [id number]    Cancels/No Shows to date:     For today's appointment patient:    [x]  Cancelled    [] Rescheduled appointment    [] No-show     Reason given by patient:    [x]  Patient ill    []  Conflicting appointment    [] No transportation      [] Conflict with work    [] No reason given    [] Weather related    [] TAWDL-    [] Other:      Comments:        [] Next appointment was confirmed    Electronically signed by: Anne Decker PTA

## 2021-01-14 ASSESSMENT — ENCOUNTER SYMPTOMS
NAUSEA: 0
COLOR CHANGE: 0
SHORTNESS OF BREATH: 0
DIARRHEA: 0
BACK PAIN: 0
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
## 2021-01-18 ENCOUNTER — HOSPITAL ENCOUNTER (OUTPATIENT)
Dept: PHYSICAL THERAPY | Age: 51
Setting detail: THERAPIES SERIES
Discharge: HOME | End: 2021-01-18
Payer: MEDICARE
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
## 2021-01-22 ENCOUNTER — HOSPITAL ENCOUNTER (OUTPATIENT)
Dept: PHYSICAL THERAPY | Age: 51
Setting detail: THERAPIES SERIES
Discharge: HOME | End: 2021-01-22
Payer: MEDICARE

## 2021-01-22 PROCEDURE — 97110 THERAPEUTIC EXERCISES: CPT
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
## 2021-01-26 ENCOUNTER — TELEMEDICINE (OUTPATIENT)
Dept: FAMILY MEDICINE CLINIC | Age: 51
End: 2021-01-26
Payer: MEDICARE

## 2021-01-26 ENCOUNTER — HOSPITAL ENCOUNTER (OUTPATIENT)
Dept: PHYSICAL THERAPY | Age: 51
Setting detail: THERAPIES SERIES
Discharge: HOME | End: 2021-01-26
Payer: MEDICARE

## 2021-01-26 DIAGNOSIS — N52.9 ERECTILE DYSFUNCTION, UNSPECIFIED ERECTILE DYSFUNCTION TYPE: ICD-10-CM

## 2021-01-26 DIAGNOSIS — F31.75 BIPOLAR DISORDER, IN PARTIAL REMISSION, MOST RECENT EPISODE DEPRESSED (HCC): ICD-10-CM

## 2021-01-26 DIAGNOSIS — I10 ESSENTIAL HYPERTENSION: Primary | ICD-10-CM

## 2021-01-26 PROBLEM — T24.201A PARTIAL THICKNESS BURN OF RIGHT LOWER EXTREMITY: Status: RESOLVED | Noted: 2020-07-10 | Resolved: 2021-01-26

## 2021-01-26 PROBLEM — L03.115 CELLULITIS OF RIGHT LOWER LEG: Status: RESOLVED | Noted: 2020-11-21 | Resolved: 2021-01-26

## 2021-01-26 PROBLEM — R11.0 NAUSEA: Status: RESOLVED | Noted: 2020-12-07 | Resolved: 2021-01-26

## 2021-01-26 PROBLEM — R60.0 BILATERAL LEG EDEMA: Status: RESOLVED | Noted: 2020-05-27 | Resolved: 2021-01-26

## 2021-01-26 PROCEDURE — G8427 DOCREV CUR MEDS BY ELIG CLIN: HCPCS | Performed by: FAMILY MEDICINE

## 2021-01-26 PROCEDURE — 99213 OFFICE O/P EST LOW 20 MIN: CPT | Performed by: FAMILY MEDICINE

## 2021-01-26 RX ORDER — SILDENAFIL 100 MG/1
100 TABLET, FILM COATED ORAL PRN
Qty: 30 TABLET | Refills: 0 | Status: SHIPPED | OUTPATIENT
Start: 2021-01-26 | End: 2021-03-12 | Stop reason: SDUPTHER

## 2021-01-26 RX ORDER — LITHIUM CARBONATE 450 MG
TABLET, EXTENDED RELEASE ORAL
COMMUNITY
Start: 2021-01-17 | End: 2021-03-12 | Stop reason: ALTCHOICE

## 2021-01-26 RX ORDER — DIVALPROEX SODIUM 125 MG/1
CAPSULE, COATED PELLETS ORAL
COMMUNITY
Start: 2021-01-15 | End: 2022-01-19 | Stop reason: ALTCHOICE

## 2021-01-26 RX ORDER — VENLAFAXINE HYDROCHLORIDE 37.5 MG/1
CAPSULE, EXTENDED RELEASE ORAL
COMMUNITY
Start: 2021-01-18 | End: 2022-01-19 | Stop reason: ALTCHOICE

## 2021-01-26 RX ORDER — SILDENAFIL 100 MG/1
100 TABLET, FILM COATED ORAL PRN
Qty: 30 TABLET | Refills: 0 | Status: SHIPPED | OUTPATIENT
Start: 2021-01-26 | End: 2021-01-26 | Stop reason: CLARIF

## 2021-01-26 RX ORDER — BUPROPION HYDROBROMIDE 348 MG/1
TABLET, EXTENDED RELEASE ORAL
COMMUNITY
Start: 2020-10-28 | End: 2022-10-04 | Stop reason: ALTCHOICE

## 2021-01-26 ASSESSMENT — ENCOUNTER SYMPTOMS
SHORTNESS OF BREATH: 0
CHEST TIGHTNESS: 0
WHEEZING: 0
COUGH: 0

## 2021-01-26 ASSESSMENT — PATIENT HEALTH QUESTIONNAIRE - PHQ9
1. LITTLE INTEREST OR PLEASURE IN DOING THINGS: 2
3. TROUBLE FALLING OR STAYING ASLEEP: 2
4. FEELING TIRED OR HAVING LITTLE ENERGY: 0
6. FEELING BAD ABOUT YOURSELF - OR THAT YOU ARE A FAILURE OR HAVE LET YOURSELF OR YOUR FAMILY DOWN: 2

## 2021-01-26 NOTE — PROGRESS NOTES
Visit Information    Have you changed or started any medications since your last visit including any over-the-counter medicines, vitamins, or herbal medicines? no   Are you having any side effects from any of your medications? -  yes - effexor, depakote and aplenzin (not sure which one)  Have you stopped taking any of your medications? Is so, why? -  no    Have you seen any other physician or provider since your last visit? No  Have you had any other diagnostic tests since your last visit? No  Have you been seen in the emergency room and/or had an admission to a hospital since we last saw you? No  Have you had your routine dental cleaning in the past 6 months? yes - 08/01/2020    Have you activated your [employer] account? If not, what are your barriers?  Yes     Patient Care Team:  José Miguel Gabriel MD as PCP - General (Family Medicine)  José Miguel Gabriel MD as PCP - [facility]  Arleth Bojorquez MD (Dermatology)  Farhad Marin MD as Surgeon (Cardiology)  Sudheer Momin DPM as Consulting Physician (Podiatry)  Sophy Lofton MD as Resident (Family Medicine)    Medical History Review  Past Medical, Family, and Social History reviewed and does contribute to the patient presenting condition    Health Maintenance   Topic Date Due    A1C test (Diabetic or Prediabetic)  09/01/2021    Lipid screen  09/01/2021    Potassium monitoring  01/07/2022    Creatinine monitoring  01/07/2022    DTaP/Tdap/Td vaccine (2 - Td) 01/01/2024    Flu vaccine  Completed    Hepatitis C screen  Completed    HIV screen  Completed    Hepatitis A vaccine  Aged Out    Hepatitis B vaccine  Aged Out    Hib vaccine  Aged Out    Meningococcal (ACWY) vaccine  Aged Out    Pneumococcal 0-64 years Vaccine  Aged Out

## 2021-01-26 NOTE — PROGRESS NOTES
2021    TELEHEALTH EVALUATION -- Audio/Visual (During ILKDO-54 public health emergency)      Lorraine Galloway (:  1970) is a 52 y.o. male,Established patient, here for evaluation of the following chief complaint(s): Depression (effexor, depakote, aplenzin) and Referral - General (needs number to referral that was last given to him)        ASSESSMENT/PLAN:    1. Essential hypertension  Well controlled. Continue current treatment. Needs to stop propranolol, continue metoprolol 50 mg twice a day, hydrochlorothiazide 25 mg daily with potassium CL 20 M EQ, amlodipine 10 mg daily, clonidine 0.2 mg at night  Discussed low salt diet and BP and pulse monitoring daily    2. Erectile dysfunction, unspecified erectile dysfunction type  -trial     sildenafil (VIAGRA) 100 MG tablet; Take 1 tablet by mouth as needed for Erectile Dysfunction, Disp-30 tablet, R-0Normal  We discussed that most likely the cause of his erectile dysfunction is medications, will stop propranolol which is a newer medication he was started on, it is a beta-blocker, he cannot take 2 beta-blockers at the same time while also taking metoprolol  He is on multiple psychiatric medications which he needs, continue the same, try to adjust with psychiatrist depending on his mental health control    3. Bipolar disorder, in partial remission, most recent episode depressed ([address]  Worsening  Stop propranolol  Continue current treatment and follow up with psychiatrist and psychologist as scheduled. Patient says he is missing his counseling classes, he will discuss this with his psychiatrist    Ofe Zhu received counseling on the following healthy behaviors: nutrition, exercise and medication adherence  Reviewed prior labs and health maintenance  Discussed use, benefit, and side effects of prescribed medications. Barriers to medication compliance addressed.    Patient given educational materials - see patient instructions  All patient questions answered. Patient voiced understanding. The patient's past medical,surgical, social, and family history as well as his current medications and allergies were reviewed as documented in today's encounter. Medications, labs, diagnostic studies, consultations and follow-up as documented in this encounter. Return for KEEP APPT. Data Unavailable      SUBJECTIVE/OBJECTIVE:  Kourtney Funes (:  1970) has requested an audio/video evaluation for the following concern(s):Depression (effexor, depakote, aplenzin) and Referral - General (needs number to referral that was last given to him)    Patient complains of erectile Dysfunction worsening for the past few months and he needs something for it. Patient says he does not obtain an erection anymore. Patient says he did have of an issue before but lately he cannot even obtain an erection anymore. He thinks his medications are the problem, either blood pressure medication or his psychiatric medication. We both reviewed in detail all of his medications and apparently he is taking both propranolol and metoprolol  Patient says, his psychiatrist, Dr. Brad Madsen started him on propranolol, he is not aware probably he is also on metoprolol and clonidine  I advised the patient to print out a list of his medications from his MyChart and to take it to the  Psychiatrist.    Hypertension:    he  is not exercising and is adherent to low salt diet. Blood pressure is well controlled at home. Cardiac symptoms fatigue. Patient denies chest pain, chest pressure/discomfort, claudication, dyspnea, exertional chest pressure/discomfort, irregular heart beat, lower extremity edema, near-syncope, orthopnea, palpitations, paroxysmal nocturnal dyspnea, syncope and tachypnea. Cardiovascular risk factors: dyslipidemia, hypertension and male gender. Use of agents associated with hypertension: none. History of target organ damage: none.       Taking propranolol 60 mg, and metoprolol 50 mg BID, clonidine 0.2 mg, Amlodipine 10 mg, KCL and Hydrochlorothiazide       blood pressure is 119/78, well controlled    Bipolar disorder is worsening. Patient is taking the following medications , reviewed through care everywhere, pharmacies, and his bottles which he has in front of him. Psych meds:  Depakote 125 mg, 4 caps qhs-just started   Lithium  mg BID  Venlaflaxine 37.5 mg  Aplenzin dose was doubled, has been taking it for a while  Doxepin same dosage  Oxcarbazepine has been on it for a while  Congetin    latuda was stopped  Prazosin 1 mg was stopped    Worsening depression  Patient says due to the pandemic, \"There are no classes I can take, so he is increasing the medications instead\"    Denies suicidal ideation, plan or intent. PHQ-2 Over the past 2 weeks, how often have you been bothered by any of the following problems? Little interest or pleasure in doing things: More than half the days  Feeling down, depressed, or hopeless: Several days  PHQ-2 Score: 3  PHQ-9 Over the past 2 weeks, how often have you been bothered by any of the following problems? Trouble falling or staying asleep, or sleeping too much: More than half the days  Feeling tired or having little energy: Not at all  Poor appetite or overeating: Not at all  Feeling bad about yourself - or that you are a failure or have let yourself or your family down: More than half the days  Trouble concentrating on things, such as reading the newspaper or watching television: More than half the days  Moving or speaking so slowly that other people could have noticed.  Or the opposite - being so fidgety or restless that you have been moving around a lot more than usual: Not at all  Thoughts that you would be better off dead, or of hurting yourself in some way: Not at all  If you checked off any problems, how difficult have these problems made it for you to do your work, take care of things at home, or get along with other people?: Very difficult  PHQ-9 Total Score: 9      Mild depression  PHQ Scores 1/26/2021 5/27/2020 5/26/2020 1/10/2020 11/19/2019 4/3/2019 12/26/2018   PHQ2 Score 3 0 0 0 0 0 0   PHQ9 Score 9 0 0 0 0 0 0     Patient reports right leg wound healed, dry, putting Vaseline, having continue ankle brace which does not rub onto the wound. She is not going to wound care anymore          Review of Systems   Constitutional: Positive for fatigue. Negative for activity change, appetite change, chills, diaphoresis and fever. Respiratory: Negative for cough, chest tightness, shortness of breath and wheezing. Cardiovascular: Negative for chest pain, palpitations and leg swelling. Endocrine: Negative for cold intolerance, heat intolerance, polydipsia, polyphagia and polyuria. Genitourinary:        Erectile dysfunction   Musculoskeletal: Positive for arthralgias (right shoulder, right ankle) and gait problem (right foot drop). Skin: Negative for wound (healed right leg wound). Neurological: Positive for numbness (right dorsum of the foot, chronic). Psychiatric/Behavioral: Positive for decreased concentration and dysphoric mood. Negative for self-injury, sleep disturbance and suicidal ideas. The patient is nervous/anxious. MEDS LIST:    Depakote  125 mg, 4 caps qhs-started   Lithium 450 mg  Aplenzin dose was doubled  Not taking prazosin anymore  Prior to Visit Medications    Medication Sig Taking?  Authorizing Provider   famotidine (PEPCID) 20 MG tablet take 1 tablet by mouth twice a day for STOMACH UPSET Yes Mc Mccollum MD   cyclobenzaprine (FLEXERIL) 10 MG tablet take 1 tablet by mouth three times a day if needed for muscle spasm Yes Mc Mccollum MD   cloNIDine (CATAPRES) 0.2 MG tablet Take 1 tablet by mouth nightly Yes Historical Provider, MD   doxepin (SINEQUAN) 25 MG capsule Take 2 capsules by mouth nightly Yes Historical Provider, MD   hydrOXYzine (ATARAX) 25 MG tablet Take 2 tablets by mouth 3 times daily as needed Yes Historical Provider, MD   OXcarbazepine (TRILEPTAL) 300 MG tablet Take 2 tablets by mouth 2 times daily Yes Historical Provider, MD   propranolol (INDERAL LA) 60 MG extended release capsule Take 1 capsule by mouth daily Yes Historical Provider, MD   rOPINIRole (REQUIP) 1 MG tablet Take 1 tablet by mouth nightly Yes Historical Provider, MD   busPIRone (BUSPAR) 10 MG tablet Take 1 tablet by mouth 3 times daily take 1 tablet by mouth three times a day Yes Bandar Hidalgo MD   RA ASPIRIN EC 81 MG EC tablet take 1 tablet by mouth once daily Yes Bandar Hidalgo MD   vitamin D (CHOLECALCIFEROL) 25 MCG (1000 UT) TABS tablet Take 1 tablet by mouth daily Yes Bandar Hidalgo MD   oxyCODONE-acetaminophen (PERCOCET) 5-325 MG per tablet Take 1 tablet by mouth every 4 hours as needed for Pain.  Yes Historical Provider, MD   albuterol sulfate HFA (VENTOLIN HFA) 108 (90 Base) MCG/ACT inhaler Inhale 2 puffs into the lungs every 6 hours as needed for Wheezing Yes Historical Provider, MD   docusate sodium (COLACE) 100 MG capsule Take 1 capsule by mouth 2 times daily  Patient taking differently: Take 100 mg by mouth 2 times daily as needed  Yes Bandar Hidalgo MD   amLODIPine (NORVASC) 10 MG tablet Take 10 mg by mouth daily Yes Historical Provider, MD   Gauze Pads & Dressings 4\"X4\" PADS Using 6 per day for large wound on the right leg Yes Bandar Hidalgo MD   potassium chloride (KLOR-CON M) 10 MEQ extended release tablet Take 2 tablets by mouth daily Take with Hydrochlorothiazide Yes Bandar Hidalgo MD   pravastatin (PRAVACHOL) 80 MG tablet take 1 tablet by mouth every evening Yes Bandar Hidalgo MD   Omega-3 Fatty Acids (OMEGA-3 FISH OIL) 1200 MG CAPS Take 2 capsules by mouth 2 times daily **stop niacin** Yes Bandar Hidalgo MD   hydrochlorothiazide (HYDRODIURIL) 25 MG tablet Take 1 tablet by mouth every morning Dose increased 5/27/2020 Yes Bandar Hidalgo MD   metoprolol tartrate (LOPRESSOR) 25 MG tablet Take 2 tablets by mouth 2 times daily Dose decreased 2020 Yes Gino Giordano MD   fluticasone (FLONASE) 50 MCG/ACT nasal spray instill 2 sprays into each nostril once daily  Patient taking differently: 1 spray by Nasal route daily as needed  Yes Gino Giordano MD   fexofenadine (ALLEGRA ALLERGY) 180 MG tablet Take 1 tablet by mouth daily  Patient taking differently: Take 180 mg by mouth daily as needed  Yes Gino Giordano MD   APLENZIN 174 MG TB24 Take 174 mg by mouth daily  Yes Historical Provider, MD   vitamin B-12 (CYANOCOBALAMIN) 1000 MCG tablet Take 1,000 mcg by mouth daily Yes Historical Provider, MD   Adapalene 0.3 % GEL  Yes Historical Provider, MD   benztropine (COGENTIN) 1 MG tablet Take 1 mg by mouth daily  Yes Historical Provider, MD   erythromycin with ethanol (THERAMYCIN) 2 % external solution apply topically as directed every morning Yes Historical Provider, MD   aluminum chloride (DRYSOL) 20 % external solution Apply topically nightly. Yes Gino Giordano MD   Nutritional Supplements (ENSURE HIGH PROTEIN) LIQD Take 1 Can by mouth 3 times daily (with meals)  Guillermo Rojas MD   Gauze Pads & Dressings (María Elena Garcia MD) MISC Needs 2 times a day  Patient not taking: Reported on 2021  Gino Giordano MD   Handicap Placard MISC by Does not apply route Can't walk greater than 200 feet. Expires in 5 years.   Gino Giordano MD   lidocaine (LMX) 4 % cream Apply 2-3 times a day as needed for pain  Patient not taking: Reported on 2021  Gino Giordano MD       Social History     Tobacco Use    Smoking status: Former Smoker     Packs/day: 1.50     Years: 12.00     Pack years: 18.00     Quit date: 2002     Years since quittin.5    Smokeless tobacco: Never Used   Substance Use Topics    Alcohol use: No     Alcohol/week: 0.0 standard drinks    Drug use: No          PHYSICAL EXAMINATION:    Vital Signs: (As obtained by patient/caregiver or practitioner observation)  -vital signs stable and within normal limits   Patient-Reported Vitals 1/26/2021   Patient-Reported Weight 182   Patient-Reported Height 5'11\"   Patient-Reported Systolic 053   Patient-Reported Diastolic 78   Patient-Reported Pulse -   Patient-Reported Temperature 97.3             Constitutional: [x] Appears well-developed and well-nourished [x] No apparent distress      [] Abnormal-       Mental status  [x] Alert and awake  [x] Oriented to person/place/time [x]Able to follow commands      Eyes:  EOM    [x]  Normal  [] Abnormal-  Sclera  [x]  Normal  [] Abnormal -         Discharge [x]  None visible  [] Abnormal -    HENT:   [x] Normocephalic, atraumatic. [] Abnormal   [x] Mouth/Throat: Mucous membranes are moist.     External Ears [x] Normal  [] Abnormal-     Neck: [x] No visualized mass     Pulmonary/Chest: [x] Respiratory effort normal.  [x] No visualized signs of difficulty breathing or respiratory distress        [] Abnormal        Musculoskeletal:   [x] Normal gait with no signs of ataxia         [x] Normal range of motion of neck        [] Abnormal-       Neurological:        [x] No Facial Asymmetry (Cranial nerve 7 motor function) (limited exam to video visit)          [x] No gaze palsy        [] Abnormal-            Skin:        [x] No significant exanthematous lesions or discoloration noted on facial skin         [x] Abnormal-on the right leg, healed wound noted, he showed it to me through the camera           Psychiatric:      [x] No Hallucinations       []Mood is normal      [x]Behavior is normal      [x]Judgment is normal      [x]Thought content is normal       [x] Abnormal-anxious  Other pertinent observable physical exam findings-none    Due to this being a TeleHealth encounter, evaluation of the following organ systems is limited: Vitals/Constitutional/EENT/Resp/CV/GI//MS/Neuro/Skin/Heme-Lymph-Imm.     I personally reviewed most recent labs reviewed with the patient and all questions fully answered.    Hyperlipidemia  Otherwise labs within normal limits    Lithium checked through his psychiatrist he reports    Lab Results   Component Value Date    WBC 11.2 01/07/2021    HGB 16.0 01/07/2021    HCT 49.3 01/07/2021    MCV 94.6 01/07/2021     01/07/2021       Lab Results   Component Value Date     01/07/2021    K 4.5 01/07/2021     01/07/2021    CO2 26 01/07/2021    BUN 14 01/07/2021    CREATININE 0.91 01/07/2021    GLUCOSE 70 01/07/2021    CALCIUM 9.4 01/07/2021        Lab Results   Component Value Date    ALT 24 01/07/2021    AST 32 01/07/2021    ALKPHOS 86 01/07/2021    BILITOT 0.20 (L) 01/07/2021       Lab Results   Component Value Date    TSH 1.19 09/01/2020       Lab Results   Component Value Date    CHOL 205 (H) 09/01/2020    CHOL 187 05/28/2020    CHOL 187 11/25/2019     Lab Results   Component Value Date    TRIG 130 09/01/2020    TRIG 174 (H) 05/28/2020    TRIG 220 (H) 11/25/2019     Lab Results   Component Value Date    HDL 57 09/01/2020    HDL 41 05/28/2020    HDL 41 11/25/2019     Lab Results   Component Value Date    LDLCHOLESTEROL 122 09/01/2020    LDLCHOLESTEROL 111 05/28/2020    LDLCHOLESTEROL 102 11/25/2019       Lab Results   Component Value Date    CHOLHDLRATIO 3.6 09/01/2020    CHOLHDLRATIO 4.6 05/28/2020    CHOLHDLRATIO 4.6 11/25/2019       Lab Results   Component Value Date    LABA1C 5.8 09/01/2020    LABA1C 5.3 04/03/2019    LABA1C 5.1 10/20/2017         Lab Results   Component Value Date    QCOQKOKK44 481 09/01/2020       Lab Results   Component Value Date    VITD25 58.7 11/25/2019       Orders Placed This Encounter   Medications    DISCONTD: sildenafil (VIAGRA) 100 MG tablet     Sig: Take 1 tablet by mouth as needed for Erectile Dysfunction     Dispense:  30 tablet     Refill:  0    sildenafil (VIAGRA) 100 MG tablet     Sig: Take 1 tablet by mouth as needed for Erectile Dysfunction     Dispense:  30 tablet Refill:  0       No orders of the defined types were placed in this encounter. Medications Discontinued During This Encounter   Medication Reason    propranolol (INDERAL LA) 60 MG extended release capsule Stop Taking at Discharge    APLENZIN 174 MG TB24 DOSE ADJUSTMENT    sildenafil (VIAGRA) 100 MG tablet ERROR          Future Appointments   Date Time Provider Maribell Duran   2/15/2021  1:00  [address], MD [facility] [address]   3/12/2021  4:15 PM Osacr Sawyer MD [facility] [address] [facility]       On this date 01/26/21 I have spent 29 minutes reviewing previous notes, test results and face to face with the patient discussing the diagnosis and importance of compliance with the treatment plan as well as documenting on the day of the visit. Lazaro Moreau is a 52 y.o. male being evaluated by a Virtual Visit (video visit) encounter to address concerns as mentioned above. Patient was alone today. Due to this being a TeleHealth encounter (During QSDIS-16 public health emergency), evaluation of the following organ systems was limited: Vitals/Constitutional/EENT/Resp/CV/GI//MS/Neuro/Skin/Heme-Lymph-Imm. Pursuant to the emergency declaration under the [address] [place] [address] authority and the [employer] and [employer] Act, this Virtual Visit was conducted with patient's (and/or legal guardian's) consent, to reduce the patient's risk of exposure to COVID-19 and provide necessary medical care. The patient (and/or legal guardian) has also been advised to contact this office for worsening conditions or problems, and seek emergency medical treatment and/or call 911 if deemed necessary. Patient identification was verified at the start of the visit: Yes      Services were provided through a video synchronous discussion virtually to substitute for in-person clinic visit.  Patient was located at home, and provider was located at primary practice location. --Carmen Hugo MD on 1/26/2021 at 12:50 PM  An electronic signature was used to authenticate this note.

## 2021-01-28 ENCOUNTER — HOSPITAL ENCOUNTER (OUTPATIENT)
Age: 51
Setting detail: SPECIMEN
Discharge: HOME | End: 2021-01-28
Payer: MEDICARE

## 2021-01-28 ENCOUNTER — OFFICE VISIT (OUTPATIENT)
Dept: FAMILY MEDICINE CLINIC | Age: 51
End: 2021-01-28
Payer: MEDICARE

## 2021-01-28 VITALS
HEIGHT: 71 IN | OXYGEN SATURATION: 95 % | BODY MASS INDEX: 25.2 KG/M2 | WEIGHT: 180 LBS | HEART RATE: 48 BPM | TEMPERATURE: 97.2 F

## 2021-01-28 DIAGNOSIS — Z20.822 SUSPECTED COVID-19 VIRUS INFECTION: Primary | ICD-10-CM

## 2021-01-28 DIAGNOSIS — B34.9 VIRAL ILLNESS: ICD-10-CM

## 2021-01-28 PROCEDURE — G8482 FLU IMMUNIZE ORDER/ADMIN: HCPCS | Performed by: FAMILY MEDICINE

## 2021-01-28 PROCEDURE — G8427 DOCREV CUR MEDS BY ELIG CLIN: HCPCS | Performed by: FAMILY MEDICINE

## 2021-01-28 PROCEDURE — 99213 OFFICE O/P EST LOW 20 MIN: CPT | Performed by: FAMILY MEDICINE

## 2021-01-28 PROCEDURE — 1036F TOBACCO NON-USER: CPT | Performed by: FAMILY MEDICINE

## 2021-01-28 PROCEDURE — G8419 CALC BMI OUT NRM PARAM NOF/U: HCPCS | Performed by: FAMILY MEDICINE

## 2021-01-28 ASSESSMENT — ENCOUNTER SYMPTOMS
COUGH: 1
DIARRHEA: 0
SHORTNESS OF BREATH: 0
RHINORRHEA: 0
VOMITING: 0
WHEEZING: 0
SORE THROAT: 0
NAUSEA: 0

## 2021-01-28 ASSESSMENT — PATIENT HEALTH QUESTIONNAIRE - PHQ9
1. LITTLE INTEREST OR PLEASURE IN DOING THINGS: 0
SUM OF ALL RESPONSES TO PHQ QUESTIONS 1-9: 0
SUM OF ALL RESPONSES TO PHQ QUESTIONS 1-9: 0
SUM OF ALL RESPONSES TO PHQ9 QUESTIONS 1 & 2: 0
2. FEELING DOWN, DEPRESSED OR HOPELESS: 0

## 2021-01-28 NOTE — PATIENT INSTRUCTIONS
You were tested for COVID today. We will call you with results when they are available. If you have not heard from us in 7 days, please call our office. To help relieve your symptoms, I suggest the following over-the-counter treatments: For fevers or pain: acetaminophen (Tylenol) or ibuprofen (Advil, Motrin) or naproxen (Aleve)  For dry cough: medications containing dextromethorphan, such as Delsym, Robitussin DM or Mucinex DM and medicated throat lozenges  For congestion or sinus pressure: medications containing guaifenesin to help break up mucus, such as Mucinex or Robitussin, medications containing pseudoephedrine or phenylephrine, such as Sudafed, nasal steroid sprays, such as Flonase, Sensimist, Rhinocort or Nasonex and saline nasal sprays, neti pot or sinus rinse bottle  For runny nose, sneezing or watery/itchy eyes: less sedating antihistamines, such as loratidine (Claritin), fexofenadine (Allegra) or Cetirizine (Zyrtec)  For a combination of cold/flu symptoms: Allegra-D, Claritin-D or Zyrtec-D and multi-symptom cold/flu products, such as Dayquil, Nyquil, Theraflu and Carmina-[place] Plus     If you have high blood pressure, you should avoid medications containing pseudoephedrine or phenylephrine, such as Sudafed. Running a humidifier in your bedroom may be helpful for many of your symptoms. If your cough is keeping you awake at night, you can try raising your head with an extra pillow. If the skin around your nose and lips becomes sore, you can put some petroleum jelly on the area.     If symptoms worsen or do not improve please follow-up with PCP or return to clinic    Advance Care Planning People with COVID-19 may have no symptoms, mild symptoms, such as fever, cough, and shortness of breath or they may have more severe illness, developing severe and fatal pneumonia. As a result, Advance Care Planning with attention to naming a health care decision maker (someone you trust to make healthcare decisions for you if you could not speak for yourself) and sharing other health care preferences is important BEFORE a possible health crisis. Please contact your Primary Care Provider to discuss Advance Care Planning. Preventing the Spread of Coronavirus Disease 2019 in Homes and Residential Communities  For the most recent information go to [email or web address]    Prevention steps for People with confirmed or suspected COVID-19 (including persons under investigation) who do not need to be hospitalized  and   People with confirmed COVID-19 who were hospitalized and determined to be medically stable to go home    Your healthcare provider and public health staff will evaluate whether you can be cared for at home. If it is determined that you do not need to be hospitalized and can be isolated at home, you will be monitored by staff from your local or state health department. You should follow the prevention steps below until a healthcare provider or local or state health department says you can return to your normal activities. Stay home except to get medical care  People who are mildly ill with COVID-19 are able to isolate at home during their illness. You should restrict activities outside your home, except for getting medical care. Do not go to work, school, or public areas. Avoid using public transportation, ride-sharing, or taxis. Separate yourself from other people and animals in your home  People: As much as possible, you should stay in a specific room and away from other people in your home. Also, you should use a separate bathroom, if available. Animals: You should restrict contact with pets and other animals while you are sick with COVID-19, just like you would around other people. Although there have not been reports of pets or other animals becoming sick with COVID-19, it is still recommended that people sick with COVID-19 limit contact with animals until more information is known about the virus. When possible, have another member of your household care for your animals while you are sick. If you are sick with COVID-19, avoid contact with your pet, including petting, snuggling, being kissed or licked, and sharing food. If you must care for your pet or be around animals while you are sick, wash your hands before and after you interact with pets and wear a facemask. Call ahead before visiting your doctor  If you have a medical appointment, call the healthcare provider and tell them that you have or may have COVID-19. This will help the healthcare providers office take steps to keep other people from getting infected or exposed. Wear a facemask  You should wear a facemask when you are around other people (e.g., sharing a room or vehicle) or pets and before you enter a healthcare providers office. If you are not able to wear a facemask (for example, because it causes trouble breathing), then people who live with you should not stay in the same room with you, or they should wear a facemask if they enter your room. Cover your coughs and sneezes  Cover your mouth and nose with a tissue when you cough or sneeze. Throw used tissues in a lined trash can. Immediately wash your hands with soap and water for at least 20 seconds or, if soap and water are not available, clean your hands with an alcohol-based hand  that contains at least 60% alcohol.   Clean your hands often Wash your hands often with soap and water for at least 20 seconds, especially after blowing your nose, coughing, or sneezing; going to the bathroom; and before eating or preparing food. If soap and water are not readily available, use an alcohol-based hand  with at least 60% alcohol, covering all surfaces of your hands and rubbing them together until they feel dry. Soap and water are the best option if hands are visibly dirty. Avoid touching your eyes, nose, and mouth with unwashed hands. Avoid sharing personal household items  You should not share dishes, drinking glasses, cups, eating utensils, towels, or bedding with other people or pets in your home. After using these items, they should be washed thoroughly with soap and water. Clean all high-touch surfaces everyday  High touch surfaces include counters, tabletops, doorknobs, bathroom fixtures, toilets, phones, keyboards, tablets, and bedside tables. Also, clean any surfaces that may have blood, stool, or body fluids on them. Use a household cleaning spray or wipe, according to the label instructions. Labels contain instructions for safe and effective use of the cleaning product including precautions you should take when applying the product, such as wearing gloves and making sure you have good ventilation during use of the product.   Monitor your symptoms Seek prompt medical attention if your illness is worsening (e.g., difficulty breathing). Before seeking care, call your healthcare provider and tell them that you have, or are being evaluated for, COVID-19. Put on a facemask before you enter the facility. These steps will help the healthcare providers office to keep other people in the office or waiting room from getting infected or exposed. Ask your healthcare provider to call the local or state health department. Persons who are placed under active monitoring or facilitated self-monitoring should follow instructions provided by their local health department or occupational health professionals, as appropriate. When working with your local health department check their available hours. If you have a medical emergency and need to call 911, notify the dispatch personnel that you have, or are being evaluated for COVID-19. If possible, put on a facemask before emergency medical services arrive. Discontinuing home isolation  Patients with confirmed COVID-19 should remain under home isolation precautions until the risk of secondary transmission to others is thought to be low. The decision to discontinue home isolation precautions should be made on a case-by-case basis, in consultation with healthcare providers and state and local health departments.
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
## 2021-01-29 DIAGNOSIS — Z20.822 SUSPECTED COVID-19 VIRUS INFECTION: ICD-10-CM

## 2021-01-30 LAB
SARS-COV-2, RAPID: ABNORMAL
SARS-COV-2: ABNORMAL
SARS-COV-2: DETECTED
SOURCE: ABNORMAL

## 2021-02-15 ENCOUNTER — TELEMEDICINE (OUTPATIENT)
Dept: NEUROLOGY | Age: 51
End: 2021-02-15
Payer: MEDICARE

## 2021-02-15 DIAGNOSIS — U07.1 COVID-19 VIRUS INFECTION: ICD-10-CM

## 2021-02-15 DIAGNOSIS — Z87.828 HISTORY OF BURNS: ICD-10-CM

## 2021-02-15 DIAGNOSIS — M21.371 FOOT DROP, RIGHT: Primary | ICD-10-CM

## 2021-02-15 DIAGNOSIS — R29.898 RIGHT LEG WEAKNESS: ICD-10-CM

## 2021-02-15 DIAGNOSIS — G62.89 AXONAL NEUROPATHY: ICD-10-CM

## 2021-02-15 DIAGNOSIS — G57.31 PERONEAL NEUROPATHY, RIGHT: ICD-10-CM

## 2021-02-15 PROCEDURE — G8419 CALC BMI OUT NRM PARAM NOF/U: HCPCS | Performed by: STUDENT IN AN ORGANIZED HEALTH CARE EDUCATION/TRAINING PROGRAM

## 2021-02-15 PROCEDURE — 1036F TOBACCO NON-USER: CPT | Performed by: STUDENT IN AN ORGANIZED HEALTH CARE EDUCATION/TRAINING PROGRAM

## 2021-02-15 PROCEDURE — G8427 DOCREV CUR MEDS BY ELIG CLIN: HCPCS | Performed by: STUDENT IN AN ORGANIZED HEALTH CARE EDUCATION/TRAINING PROGRAM

## 2021-02-15 PROCEDURE — 99214 OFFICE O/P EST MOD 30 MIN: CPT | Performed by: STUDENT IN AN ORGANIZED HEALTH CARE EDUCATION/TRAINING PROGRAM

## 2021-02-15 PROCEDURE — G8482 FLU IMMUNIZE ORDER/ADMIN: HCPCS | Performed by: STUDENT IN AN ORGANIZED HEALTH CARE EDUCATION/TRAINING PROGRAM

## 2021-02-15 ASSESSMENT — ENCOUNTER SYMPTOMS
SHORTNESS OF BREATH: 0
COUGH: 0
PHOTOPHOBIA: 0
ABDOMINAL DISTENTION: 0
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
## 2021-02-17 ENCOUNTER — TELEPHONE (OUTPATIENT)
Dept: FAMILY MEDICINE CLINIC | Age: 51
End: 2021-02-17

## 2021-02-17 NOTE — TELEPHONE ENCOUNTER
Ok to stop the protein drinks as his wound healed  Please let the patient know, then call Mike to discontinue the order

## 2021-02-17 NOTE — TELEPHONE ENCOUNTER
Pt called stating Mike sent him his first batch of Protein drinks, Mike called a couple weeks ago and told him that his insurance is not accepting the order for more. A Peer to Peer needs to be done.  Please advise

## 2021-02-18 ENCOUNTER — TELEMEDICINE (OUTPATIENT)
Dept: FAMILY MEDICINE CLINIC | Age: 51
End: 2021-02-18
Payer: MEDICARE

## 2021-02-18 DIAGNOSIS — M79.7 FIBROMYALGIA MUSCLE PAIN: ICD-10-CM

## 2021-02-18 DIAGNOSIS — S39.012A ACUTE MYOFASCIAL STRAIN OF LUMBAR REGION, INITIAL ENCOUNTER: Primary | ICD-10-CM

## 2021-02-18 PROCEDURE — 99212 OFFICE O/P EST SF 10 MIN: CPT | Performed by: FAMILY MEDICINE

## 2021-02-18 PROCEDURE — G8427 DOCREV CUR MEDS BY ELIG CLIN: HCPCS | Performed by: FAMILY MEDICINE

## 2021-02-18 RX ORDER — NAPROXEN 500 MG/1
500 TABLET ORAL 2 TIMES DAILY WITH MEALS
Qty: 28 TABLET | Refills: 0 | Status: SHIPPED | OUTPATIENT
Start: 2021-02-18 | End: 2021-03-12 | Stop reason: SDUPTHER

## 2021-02-18 RX ORDER — CYCLOBENZAPRINE HCL 5 MG
5 TABLET ORAL 3 TIMES DAILY PRN
Qty: 90 TABLET | Refills: 0 | Status: SHIPPED | OUTPATIENT
Start: 2021-02-18 | End: 2021-03-20

## 2021-02-18 RX ORDER — LIDOCAINE 50 MG/G
1 PATCH TOPICAL DAILY
Qty: 30 PATCH | Refills: 0 | Status: SHIPPED | OUTPATIENT
Start: 2021-02-18 | End: 2021-09-20 | Stop reason: SDUPTHER

## 2021-02-18 ASSESSMENT — ENCOUNTER SYMPTOMS
RESPIRATORY NEGATIVE: 1
SHORTNESS OF BREATH: 0
COUGH: 0
BACK PAIN: 1
ABDOMINAL PAIN: 0

## 2021-02-18 NOTE — PATIENT INSTRUCTIONS
Schedule a Vaccine  When you qualify to receive the vaccine, call the [facility]) COVID-19 Vaccination Hotline to schedule your appointment or to get additional information about the [facility]) locations which are offering the COVID-19 vaccine. To be 94% effective, it's important that you receive two doses of one of the COVID-19 vaccines. -If you are receiving the [employer] vaccine, your second shot will be scheduled as close to 21 days after the first shot as possible. -If you are receiving the Moderna vaccine, your second shot will be scheduled as close to 28 days after the first shot as possible. [facility]) COVID-19 Vaccination Hotline: [phone]    Links to [facility]) website and [facility] website:    [facility]-monitoring-coronavirus-covid-19/covid-19-vaccine/[place]-vaccine    [email or web address]    Patient Education        Back Strain: Care Instructions  Overview     A back strain happens when you overstretch, or pull, a muscle in your back. You may hurt your back in an accident or when you exercise or lift something. Sometimes you may not know how you hurt your back. Most back pain will get better with rest and time. You can take care of yourself at home to help your back heal.  Follow-up care is a key part of your treatment and safety. Be sure to make and go to all appointments, and call your doctor if you are having problems. It's also a good idea to know your test results and keep a list of the medicines you take. How can you care for yourself at home? · Try to stay as active as you can, but stop or reduce any activity that causes pain. · Put ice or a cold pack on the sore muscle for 10 to 20 minutes at a time to stop swelling. Try this every 1 to 2 hours for 3 days (when you are awake) or until the swelling goes down. Put a thin cloth between the ice pack and your skin.   · After 2 or 3 days, apply a heating pad on low or a warm cloth to your back. Some doctors suggest that you go back and forth between hot and cold treatments. · Take pain medicines exactly as directed. ? If the doctor gave you a prescription medicine for pain, take it as prescribed. ? If you are not taking a prescription pain medicine, ask your doctor if you can take an over-the-counter medicine. · Try sleeping on your side with a pillow between your legs. Or put a pillow under your knees when you lie on your back. These measures can ease pain in your lower back. · Return to your usual level of activity slowly. When should you call for help? Call 911 anytime you think you may need emergency care. For example, call if:    · You are unable to move a leg at all. Call your doctor now or seek immediate medical care if:    · You have new or worse symptoms in your legs, belly, or buttocks. Symptoms may include:  ? Numbness or tingling. ? Weakness. ? Pain.     · You lose bladder or bowel control. Watch closely for changes in your health, and be sure to contact your doctor if:    · You have a fever, lose weight, or don't feel well.     · You are not getting better as expected. Where can you learn more? Go to https://[employer]. org and sign in to your raksul account. Enter J584 in the Makoo box to learn more about \"Back Strain: Care Instructions. \"     If you do not have an account, please click on the \"Sign Up Now\" link. Current as of: March 2, 2020               Content Version: 12.6  © 2006-2020 [employer]. Care instructions adapted under license by [facility] ([facility]). If you have questions about a medical condition or this instruction, always ask your healthcare professional. [place] [address] any warranty or liability for your use of this information.

## 2021-02-18 NOTE — PROGRESS NOTES
[address] [address]  Phone: [phone], Fax: [phone]    TELEHEALTH EVALUATION -- Audio/Visual (During JJKRY-80 public health emergency)    Patient ID verified by me prior to start of this visit    Lashonda Lance (:  1970) has requested an audio/video evaluation for the following concern(s):  Chief Complaint   Patient presents with    Back Pain      HPI:  Lashonda Lance is an established patient of Dr. Katalina Valdez. Patient has a history of fibromyalgia. Patient states that he shoveled snow a few days ago and woke up the next day with a lot of mid lumbar pain. .Patient describes the pain as constant mild ache, but gets aggravated with most activities which turns into sharp pain. He also c/o spasms especially at night. Which makes it hard to get comfortable. He denies any pain radiation, numbness, and tingling. Patient started taking Flexeril which made him very dizzy and tired during the day. []Negative depression screening. []1-4 = Minimal depression   []5-9 = Mild depression   []10-14 = Moderate depression   []15-19 = Moderately severe depression   []20-27 = Severe depression  PHQ Scores 2021 2021 2020 2020 1/10/2020 2019 4/3/2019   PHQ2 Score 0 3 0 0 0 0 0   PHQ9 Score 0 9 0 0 0 0 0     Review of Systems   Constitutional: Positive for activity change. Negative for chills and fever. HENT: Negative. Respiratory: Negative. Negative for cough and shortness of breath. Cardiovascular: Negative. Negative for chest pain and palpitations. Gastrointestinal: Negative for abdominal pain. Genitourinary: Negative for dysuria. Musculoskeletal: Positive for arthralgias, back pain and myalgias. Lumbar region   Skin: Negative for rash. Neurological: Negative for light-headedness and headaches. Psychiatric/Behavioral: Positive for sleep disturbance. Negative for suicidal ideas.  The patient is nervous/anxious.         Patient Active Problem List    Diagnosis Date Noted    Erectile dysfunction 01/26/2021    Bilateral hearing loss 12/15/2020    Peroneal neuropathy, right 11/08/2020    Axonal neuropathy 11/08/2020    Numbness and tingling of foot 08/31/2020    Foot drop, right 08/27/2020    Chronic gout of right ankle 04/23/2020    Vitamin B 12 deficiency 04/21/2020    Vitamin D deficiency 04/21/2020    Allergic rhinitis due to allergen 01/10/2020    Incomplete rotatr-cuff tear/ruptr of r shoulder, not trauma 12/12/2018    Primary osteoarthritis, right shoulder 12/12/2018    Tear of right supraspinatus tendon 10/11/2018    Impingement syndrome of right shoulder 10/01/2018    Achilles tendinitis 05/11/2018    Abnormal EKG 02/17/2018    S/P Achilles tendon repair 12/19/2017    Essential hypertension 11/13/2017    Fibromyalgia muscle pain 11/24/2016    Bipolar disorder, in partial remission, most recent episode depressed ([facility] [address] 11/08/2016    Bicipital tendinitis, right shoulder 07/29/2016    Hyperglycemia 07/29/2016    Anxiety 04/11/2016    Calcium nephrolithiasis 12/02/2015    Fatty liver disease, nonalcoholic 00/53/8451    Tattoos 12/02/2015    Right shoulder pain 09/16/2015    Hemorrhoids 05/05/2015    Right knee pain 04/01/2015    Chronic fatigue 04/01/2015    Hyperlipidemia with target LDL less than 100 04/01/2015    Seasonal allergies 04/01/2015        Past Surgical History:   Procedure Laterality Date    ACHILLES TENDON SURGERY Right     4 different surgeries for this,over the last 2 years    FOOT TENDON SURGERY Right     5 surgeries for Achilles tendon    ROTATOR CUFF REPAIR Left 8/2011    SHOULDER SURGERY  12/12/2018    SHOULDER SURGERY  right    2 surgeries    SKIN GRAFT Right 8/3/2020    DEBRIDEMENT LATERAL LOWER  LEG W/EPIFIX performed by Lynda Vigil DPM at [facility]. Right 6/82/1872    APPLICATION INTEGRA BIOLAYER GRAFT - LEG performed by Ursula Batista DPM at [facility] History   Problem Relation Age of Onset    Cancer Mother 34        leukemia& lymphoma    Heart Disease Father 61        triple bipass    Stroke Father     Breast Cancer Maternal Grandmother      Current Outpatient Medications   Medication Sig Dispense Refill    naproxen (NAPROSYN) 500 MG tablet Take 1 tablet by mouth 2 times daily (with meals) for 14 days 28 tablet 0    cyclobenzaprine (FLEXERIL) 5 MG tablet Take 1 tablet by mouth 3 times daily as needed for Muscle spasms 90 tablet 0    lidocaine (LIDODERM) 5 % Place 1 patch onto the skin daily 12 hours on, 12 hours off.  30 patch 0    busPIRone (BUSPAR) 10 MG tablet take 1 tablet by mouth three times a day 90 tablet 3    divalproex (DEPAKOTE SPRINKLE) 125 MG capsule take 4 capsules by mouth every evening      lithium (ESKALITH) 450 MG extended release tablet take 1 tablet by mouth twice a day      venlafaxine (EFFEXOR XR) 37.5 MG extended release capsule take 1 capsule by mouth every morning      APLENZIN 348 MG TB24 take 1 tablet by mouth every morning      sildenafil (VIAGRA) 100 MG tablet Take 1 tablet by mouth as needed for Erectile Dysfunction 30 tablet 0    famotidine (PEPCID) 20 MG tablet take 1 tablet by mouth twice a day for STOMACH UPSET 60 tablet 3    Nutritional Supplements (ENSURE HIGH PROTEIN) LIQD Take 1 Can by mouth 3 times daily (with meals) 90 Can 0    cloNIDine (CATAPRES) 0.2 MG tablet Take 1 tablet by mouth nightly      doxepin (SINEQUAN) 25 MG capsule Take 2 capsules by mouth nightly      hydrOXYzine (ATARAX) 25 MG tablet Take 2 tablets by mouth 3 times daily as needed      OXcarbazepine (TRILEPTAL) 300 MG tablet Take 2 tablets by mouth 2 times daily      rOPINIRole (REQUIP) 1 MG tablet Take 1 tablet by mouth nightly      RA ASPIRIN EC 81 MG EC tablet take 1 tablet by mouth once daily 90 tablet 3    vitamin D (CHOLECALCIFEROL) 25 MCG (1000 UT) TABS tablet Take 1 tablet by mouth Psychiatric:       [x] Normal Affect [x] No Hallucinations        [x] Abnormal- Anxious, with pressured speech    Other pertinent observable physical exam findings- decreased ROM lumbar spine  Lab Results   Component Value Date    WBC 11.2 01/07/2021    HGB 16.0 01/07/2021    HCT 49.3 01/07/2021    MCV 94.6 01/07/2021     01/07/2021     Lab Results   Component Value Date     01/07/2021    K 4.5 01/07/2021     01/07/2021    CO2 26 01/07/2021    BUN 14 01/07/2021    CREATININE 0.91 01/07/2021    GLUCOSE 70 01/07/2021    CALCIUM 9.4 01/07/2021        Due to this being a TeleHealth encounter, evaluation of the following organ systems is limited: Vitals/Constitutional/EENT/Resp/CV/GI//MS/Neuro/Skin/Heme-Lymph-Imm. ASSESSMENT/PLAN:  1. Acute myofascial strain of lumbar region, initial encounter  Worsening  Continue current therapy. DISCUSSED AND ADVISED TO:  Use heat packs 15 to 20 mins every 2-3 hours. Do some back stretches as tolerated. Refer to hand out for instructions. Call for worsening, numbness, weakness.      - naproxen (NAPROSYN) 500 MG tablet; Take 1 tablet by mouth 2 times daily (with meals) for 14 days  Dispense: 28 tablet; Refill: 0  - cyclobenzaprine (FLEXERIL) 5 MG tablet; Take 1 tablet by mouth 3 times daily as needed for Muscle spasms  Dispense: 90 tablet; Refill: 0  - lidocaine (LIDODERM) 5 %; Place 1 patch onto the skin daily 12 hours on, 12 hours off. Dispense: 30 patch; Refill: 0    2. Fibromyalgia muscle pain  Stable  DISCUSSED AND ADVISED TO:  Exercise often. Get a good night's sleep. Make healthy changes to diet. Try to reduce stress  Carefully take medications as discussed  Report for worsening symptoms          - cyclobenzaprine (FLEXERIL) 5 MG tablet; Take 1 tablet by mouth 3 times daily as needed for Muscle spasms  Dispense: 90 tablet; Refill: 0  - lidocaine (LIDODERM) 5 %; Place 1 patch onto the skin daily 12 hours on, 12 hours off.   Dispense: 30 patch; Refill: 0    Controlled Substance Monitoring:  Acute and Chronic Pain Monitoring:   RX Monitoring 11/19/2019   Periodic Controlled Substance Monitoring No signs of potential drug abuse or diversion identified. No orders of the defined types were placed in this encounter. Orders Placed This Encounter   Medications    naproxen (NAPROSYN) 500 MG tablet     Sig: Take 1 tablet by mouth 2 times daily (with meals) for 14 days     Dispense:  28 tablet     Refill:  0    cyclobenzaprine (FLEXERIL) 5 MG tablet     Sig: Take 1 tablet by mouth 3 times daily as needed for Muscle spasms     Dispense:  90 tablet     Refill:  0    lidocaine (LIDODERM) 5 %     Sig: Place 1 patch onto the skin daily 12 hours on, 12 hours off. Dispense:  30 patch     Refill:  0      Medications Discontinued During This Encounter   Medication Reason    cyclobenzaprine (FLEXERIL) 10 MG tablet Lida Nova received counseling on the following healthy behaviors: nutrition, exercise and medication adherence  Reviewed prior labs and health maintenance. Continue current medications, diet and exercise. Discussed use, benefit, and side effects of prescribed medications. Barriers to medication compliance addressed. Patient given educational materials - see patient instructions. All patient questions answered. Patient voiced understanding. Return for Chronic conditions, Appt w/ Dr. Art Acosta. Jameson Schlatter is a 52 y.o. male patient  being evaluated by a Virtual Visit (video visit) encounter to address concerns as mentioned above. A caregiver was present when appropriate. Due to this being a TeleHealth encounter (During [facility] public health emergency), evaluation of the following organ systems was limited:Vitals/Constitutional/EENT/Resp/CV/GI//MS/Neuro/Skin/Heme-Lymph-Imm. Services were provided through a video synchronous discussion virtually to substitute for in-person clinic visit.  This is a telehealth visit that was performed with the originating site at Patient Location: home and provider Location of [place], [place]. Verbal consent to participate in video visit was obtained. Patient ID verified by me prior to start of this visit  I discussed with the patient the nature of our telehealth visits via interactive/real-time audio/video that:  - I would evaluate the patient and recommend diagnostics and treatments based on my assessment  - Our sessions are not being recorded and that personal health information is protected  - Our team would provide follow up care in person if/when the patient needs it. Pursuant to the emergency declaration under the [facility], [address] [place] [address] authority and the [employer] Act, this Virtual Visit was conducted with patient's (and/or legal guardian's) consent, to reduce the patient's risk of exposure to COVID-19 and provide necessary medical care. The patient (and/or legal guardian) has also been advised to contact this office for worsening conditions or problems, and seek emergency medical treatment and/or call 911 if deemed necessary. This note was completed by using the assistance of a speech-recognition program. However, inadvertent computerized transcription errors may be present. Although every effort was made to ensure accuracy, no guarantees can be provided that every mistake has been identified and corrected by editing.   Electronically signed by DENNIS Choudhury CNP on 2/18/21 at 5:02 PM EST

## 2021-02-19 ENCOUNTER — APPOINTMENT (OUTPATIENT)
Dept: CT IMAGING | Age: 51
End: 2021-02-19
Payer: MEDICARE

## 2021-02-19 ENCOUNTER — HOSPITAL ENCOUNTER (EMERGENCY)
Age: 51
Discharge: HOME | End: 2021-02-19
Attending: EMERGENCY MEDICINE
Payer: MEDICARE

## 2021-02-19 VITALS
SYSTOLIC BLOOD PRESSURE: 114 MMHG | DIASTOLIC BLOOD PRESSURE: 78 MMHG | HEART RATE: 88 BPM | RESPIRATION RATE: 17 BRPM | OXYGEN SATURATION: 98 % | TEMPERATURE: 98.6 F

## 2021-02-19 DIAGNOSIS — S39.012A BACK STRAIN, INITIAL ENCOUNTER: Primary | ICD-10-CM

## 2021-02-19 LAB
-: ABNORMAL
ABSOLUTE BANDS #: 0.43 K/UL (ref 0–1)
ABSOLUTE EOS #: 0.07 K/UL (ref 0–0.4)
ABSOLUTE IMMATURE GRANULOCYTE: ABNORMAL K/UL (ref 0–0.3)
ABSOLUTE LYMPH #: 1.01 K/UL (ref 1–4.8)
ABSOLUTE MONO #: 0.36 K/UL (ref 0.1–1.3)
AMORPHOUS: ABNORMAL
ANION GAP SERPL CALCULATED.3IONS-SCNC: 10 MMOL/L (ref 9–17)
BACTERIA: ABNORMAL
BANDS: 6 % (ref 0–10)
BASOPHILS # BLD: 0 % (ref 0–2)
BASOPHILS ABSOLUTE: 0 K/UL (ref 0–0.2)
BILIRUBIN URINE: NEGATIVE
BUN BLDV-MCNC: 14 MG/DL (ref 6–20)
BUN/CREAT BLD: ABNORMAL (ref 9–20)
CALCIUM SERPL-MCNC: 9.4 MG/DL (ref 8.6–10.4)
CASTS UA: ABNORMAL /LPF
CHLORIDE BLD-SCNC: 101 MMOL/L (ref 98–107)
CO2: 28 MMOL/L (ref 20–31)
COLOR: YELLOW
COMMENT UA: ABNORMAL
CREAT SERPL-MCNC: 0.98 MG/DL (ref 0.7–1.2)
CRYSTALS, UA: ABNORMAL /HPF
CRYSTALS, UA: ABNORMAL /HPF
DIFFERENTIAL TYPE: ABNORMAL
EOSINOPHILS RELATIVE PERCENT: 1 % (ref 0–4)
EPITHELIAL CELLS UA: ABNORMAL /HPF
GFR AFRICAN AMERICAN: >60 ML/MIN
GFR NON-AFRICAN AMERICAN: >60 ML/MIN
GFR SERPL CREATININE-BSD FRML MDRD: ABNORMAL ML/MIN/{1.73_M2}
GFR SERPL CREATININE-BSD FRML MDRD: ABNORMAL ML/MIN/{1.73_M2}
GLUCOSE BLD-MCNC: 127 MG/DL (ref 70–99)
GLUCOSE URINE: NEGATIVE
HCT VFR BLD CALC: 40.7 % (ref 41–53)
HEMOGLOBIN: 13.9 G/DL (ref 13.5–17.5)
IMMATURE GRANULOCYTES: ABNORMAL %
KETONES, URINE: NEGATIVE
LEUKOCYTE ESTERASE, URINE: NEGATIVE
LYMPHOCYTES # BLD: 14 % (ref 24–44)
MCH RBC QN AUTO: 30.8 PG (ref 26–34)
MCHC RBC AUTO-ENTMCNC: 34.1 G/DL (ref 31–37)
MCV RBC AUTO: 90.2 FL (ref 80–100)
MONOCYTES # BLD: 5 % (ref 1–7)
MORPHOLOGY: NORMAL
MUCUS: ABNORMAL
NITRITE, URINE: NEGATIVE
NRBC AUTOMATED: ABNORMAL PER 100 WBC
OTHER OBSERVATIONS UA: ABNORMAL
PDW BLD-RTO: 13.1 % (ref 11.5–14.9)
PH UA: 5.5 (ref 5–8)
PLATELET # BLD: 174 K/UL (ref 150–450)
PLATELET ESTIMATE: ABNORMAL
PMV BLD AUTO: 8.6 FL (ref 6–12)
POTASSIUM SERPL-SCNC: 3.7 MMOL/L (ref 3.7–5.3)
PROTEIN UA: NEGATIVE
RBC # BLD: 4.51 M/UL (ref 4.5–5.9)
RBC # BLD: ABNORMAL 10*6/UL
RBC UA: ABNORMAL /HPF
RENAL EPITHELIAL, UA: ABNORMAL /HPF
SEG NEUTROPHILS: 74 % (ref 36–66)
SEGMENTED NEUTROPHILS ABSOLUTE COUNT: 5.33 K/UL (ref 1.3–9.1)
SODIUM BLD-SCNC: 139 MMOL/L (ref 135–144)
SPECIFIC GRAVITY UA: 1.03 (ref 1–1.03)
TRICHOMONAS: ABNORMAL
TURBIDITY: CLEAR
URINE HGB: ABNORMAL
UROBILINOGEN, URINE: NORMAL
WBC # BLD: 7.2 K/UL (ref 3.5–11)
WBC # BLD: ABNORMAL 10*3/UL
WBC UA: ABNORMAL /HPF
YEAST: ABNORMAL

## 2021-02-19 PROCEDURE — 74176 CT ABD & PELVIS W/O CONTRAST: CPT

## 2021-02-19 PROCEDURE — 96375 TX/PRO/DX INJ NEW DRUG ADDON: CPT

## 2021-02-19 PROCEDURE — 81001 URINALYSIS AUTO W/SCOPE: CPT

## 2021-02-19 PROCEDURE — 96374 THER/PROPH/DIAG INJ IV PUSH: CPT

## 2021-02-19 PROCEDURE — 2580000003 HC RX 258: Performed by: EMERGENCY MEDICINE

## 2021-02-19 PROCEDURE — 85025 COMPLETE CBC W/AUTO DIFF WBC: CPT

## 2021-02-19 PROCEDURE — 36415 COLL VENOUS BLD VENIPUNCTURE: CPT

## 2021-02-19 PROCEDURE — 80048 BASIC METABOLIC PNL TOTAL CA: CPT

## 2021-02-19 PROCEDURE — 6360000002 HC RX W HCPCS: Performed by: EMERGENCY MEDICINE

## 2021-02-19 PROCEDURE — 99284 EMERGENCY DEPT VISIT MOD MDM: CPT

## 2021-02-19 PROCEDURE — 72131 CT LUMBAR SPINE W/O DYE: CPT

## 2021-02-19 RX ORDER — MORPHINE SULFATE 4 MG/ML
4 INJECTION, SOLUTION INTRAMUSCULAR; INTRAVENOUS ONCE
Status: COMPLETED | OUTPATIENT
Start: 2021-02-19 | End: 2021-02-19

## 2021-02-19 RX ORDER — KETOROLAC TROMETHAMINE 30 MG/ML
30 INJECTION, SOLUTION INTRAMUSCULAR; INTRAVENOUS ONCE
Status: COMPLETED | OUTPATIENT
Start: 2021-02-19 | End: 2021-02-19

## 2021-02-19 RX ORDER — 0.9 % SODIUM CHLORIDE 0.9 %
1000 INTRAVENOUS SOLUTION INTRAVENOUS ONCE
Status: COMPLETED | OUTPATIENT
Start: 2021-02-19 | End: 2021-02-19

## 2021-02-19 RX ADMIN — SODIUM CHLORIDE 1000 ML: 9 INJECTION, SOLUTION INTRAVENOUS at 03:15

## 2021-02-19 RX ADMIN — MORPHINE SULFATE 4 MG: 4 INJECTION, SOLUTION INTRAMUSCULAR; INTRAVENOUS at 04:58

## 2021-02-19 RX ADMIN — KETOROLAC TROMETHAMINE 30 MG: 30 INJECTION, SOLUTION INTRAMUSCULAR at 03:15

## 2021-02-19 ASSESSMENT — ENCOUNTER SYMPTOMS
EYE PAIN: 0
SHORTNESS OF BREATH: 0
COLOR CHANGE: 0
ABDOMINAL PAIN: 0
BACK PAIN: 1

## 2021-02-19 ASSESSMENT — PAIN DESCRIPTION - LOCATION: LOCATION: BACK

## 2021-02-19 ASSESSMENT — PAIN SCALES - GENERAL: PAINLEVEL_OUTOF10: 4

## 2021-02-19 NOTE — ED NOTES
Pt ambulated to bathroom w/ steady gait attempting to provide urine sample.       Eva Muñoz RN  02/19/21 [id number]
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
## 2021-02-22 ENCOUNTER — TELEPHONE (OUTPATIENT)
Dept: FAMILY MEDICINE CLINIC | Age: 51
End: 2021-02-22

## 2021-02-25 ENCOUNTER — HOSPITAL ENCOUNTER (OUTPATIENT)
Dept: PHYSICAL THERAPY | Age: 51
Setting detail: THERAPIES SERIES
Discharge: HOME | End: 2021-02-25
Payer: MEDICARE

## 2021-02-25 PROCEDURE — 97161 PT EVAL LOW COMPLEX 20 MIN: CPT

## 2021-02-25 PROCEDURE — 97016 VASOPNEUMATIC DEVICE THERAPY: CPT

## 2021-02-25 PROCEDURE — 97110 THERAPEUTIC EXERCISES: CPT
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
## 2021-03-03 ENCOUNTER — HOSPITAL ENCOUNTER (OUTPATIENT)
Dept: PHYSICAL THERAPY | Age: 51
Setting detail: THERAPIES SERIES
Discharge: HOME | End: 2021-03-03
Payer: MEDICARE

## 2021-03-03 PROCEDURE — 97110 THERAPEUTIC EXERCISES: CPT

## 2021-03-03 NOTE — FLOWSHEET NOTE
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 Karrie Villanueva:   Visit# / total visits:   Cancels/No Shows: 5/0    Subjective:    Pain:  [x] Yes  [] No Location:  R shoulder 6/10   R ankle: 4/10  Pain altered Tx:  [x] No  [] Yes  Action:  Comments: Pt states his shoulder pain was 10/10 last night with little improvement with medication and heat. Pt reports great improvement in R ankle swelling. Objective:  Modalities: HP to R shoulder, vaso to R ankle  Precautions:  Exercises:  Exercise Reps/ Time Weight/ Level Comments   pulleys 2/2  Flex/abd         Ball on wall 10x     Should stab CW/CCW 10x 2 lbs    Door way stretch 3x30\"     Wall circles CW/CCW 5x     Finger ladder 5x  Flex/abd         Tbands      Shoulder ext 15x green    triceps 15x green    rows 15x green    Elbow flex 15x green          // bars      Tandem stance 2x20\"  2 finger support    SLS EO 2x20\"  B 2 finger support for RLE, no support for LLE   SLS EC 2x20\" add    Standing marches 10x  1 UE support    Backwards walking 4x in //bars     Side stepping 4x in //bas           Sitting:        Ankle heel/toe raises 10x     Slant board R ankle 10x  DF/PF, IN/EV, CW,CCW   [place]   10x2     Towel scrunches, IN/EV 10x     Sitting calf stretch 3x30\"           Shoulder rolls 20x     Mid trap stretch 3x30\"     Upper trap stretch 3x30\"     Other:    Manual: massage to R lateral shoulder to decrease muscle tightness - 5 min     Specific Instructions for next treatment: progress ankle ROM and strength, continue ankle stability and balance activities, progress gait to decrease impairments       Assessment: [x] Progressing toward goals. Pt's therapy has combined to address R shoulder and R ankle deficits. Pt able to progress this date with addition of various exercises to increase R shoulder ROM/stability, as well as R LE weight acceptance and ankle stability/strength. Pt requires cues throughout session to decrease shoulder elevation as well as hip hiking during balance activities. Pt continues to demonstrate decreased B step height, step length, and RLE circumduction with slight improvement after therapy and with cues. [] No change. [] Other:    [x] Patient would continue to benefit from skilled physical therapy services in order to: decrease pain, improve shoulder ROM and strength, education in HEP and posture, return to PLOF         STG: (to be met in 14 treatments)  1. ? Pain: Pt will improve pain to 2/10 to perform ADLs below shoulder height   2. ? ROM: Pt will improve R shoulder ROM by 10 degrees to don/doff jacket with no difficulty   3. ? Strength: Pt will improve B shoulder strength to 4/5 grossly   4. Independent with Home Exercise Programs          5: decrease pain R foot/lower leg by 50% so patient can tolerate walking better          6: increase AROM R ankle to full pain free          7 increase strength RLE by 1/2 grade or better                        LTG: (to be met in 24 treatments)  1.  Pt will improve R shoulder ROM to wnl to perform ADLs   2. Pt will demonstrate 4+/5 B shoulder strength grossly   3. Pt will report 2/10 pain with overhead reaching           4: improve LEFS from 13 to 23 or better           5: Pt will ambulate 100 ft with AFO with minimal gait impairments and no increase in pain           6: Pt will improve ankle ROM: DF 15, PF 40, inv 30, EV 10     7:  Pt will improve balance demonstrated by SLS on RLE for 5 seconds indep without LOB    Patient goals: more mobility and less pain    Pt. Education:  [x] Yes  [] No  [] Reviewed Prior HEP/Ed  Method of Education: [] Verbal  [] Demo  [] Written  Comprehension of Education:  [x] Verbalizes understanding. [] Demonstrates understanding. [x] Needs review. [] Demonstrates/verbalizes HEP/Ed previously given. Plan: [x] Continue per plan of care.  2x/week for 24 visits    [] Other:      Treatment Charges: Mins Units   [x]  Modalities HP/CP 10 -   [x]  Ther Exercise 55 4   []  Manual Therapy     []  Ther Activities     []  Aquatics     []  Neuromuscular     [] Vasocompression     [] Gait Training     [] Dry needling        [] 1 or 2 muscles        [] 3 or more muscles     []  Other     Total Treatment time 55 4     Time In: 9:35            Time Out: 10:40    Electronically signed by:  Jacklyn Torres PT

## 2021-03-12 ENCOUNTER — OFFICE VISIT (OUTPATIENT)
Dept: FAMILY MEDICINE CLINIC | Age: 51
End: 2021-03-12
Payer: MEDICARE

## 2021-03-12 VITALS
HEIGHT: 71 IN | DIASTOLIC BLOOD PRESSURE: 70 MMHG | WEIGHT: 183 LBS | TEMPERATURE: 97.9 F | SYSTOLIC BLOOD PRESSURE: 110 MMHG | HEART RATE: 90 BPM | OXYGEN SATURATION: 98 % | BODY MASS INDEX: 25.62 KG/M2

## 2021-03-12 DIAGNOSIS — M25.561 ACUTE PAIN OF RIGHT KNEE: ICD-10-CM

## 2021-03-12 DIAGNOSIS — R29.898 POPPING SOUND OF KNEE JOINT: ICD-10-CM

## 2021-03-12 DIAGNOSIS — M79.7 FIBROMYALGIA MUSCLE PAIN: ICD-10-CM

## 2021-03-12 DIAGNOSIS — I10 ESSENTIAL HYPERTENSION: Primary | ICD-10-CM

## 2021-03-12 DIAGNOSIS — E78.5 HYPERLIPIDEMIA WITH TARGET LDL LESS THAN 100: ICD-10-CM

## 2021-03-12 DIAGNOSIS — J30.89 SEASONAL ALLERGIC RHINITIS DUE TO OTHER ALLERGIC TRIGGER: ICD-10-CM

## 2021-03-12 DIAGNOSIS — R73.9 HYPERGLYCEMIA: ICD-10-CM

## 2021-03-12 DIAGNOSIS — N52.9 ERECTILE DYSFUNCTION, UNSPECIFIED ERECTILE DYSFUNCTION TYPE: ICD-10-CM

## 2021-03-12 DIAGNOSIS — F31.75 BIPOLAR DISORDER, IN PARTIAL REMISSION, MOST RECENT EPISODE DEPRESSED (HCC): ICD-10-CM

## 2021-03-12 DIAGNOSIS — G89.29 CHRONIC PAIN OF MULTIPLE JOINTS: ICD-10-CM

## 2021-03-12 DIAGNOSIS — M25.50 CHRONIC PAIN OF MULTIPLE JOINTS: ICD-10-CM

## 2021-03-12 LAB — HBA1C MFR BLD: 5.3 %

## 2021-03-12 PROCEDURE — 1036F TOBACCO NON-USER: CPT | Performed by: FAMILY MEDICINE

## 2021-03-12 PROCEDURE — 83036 HEMOGLOBIN GLYCOSYLATED A1C: CPT | Performed by: FAMILY MEDICINE

## 2021-03-12 PROCEDURE — G8419 CALC BMI OUT NRM PARAM NOF/U: HCPCS | Performed by: FAMILY MEDICINE

## 2021-03-12 PROCEDURE — G8427 DOCREV CUR MEDS BY ELIG CLIN: HCPCS | Performed by: FAMILY MEDICINE

## 2021-03-12 PROCEDURE — 99214 OFFICE O/P EST MOD 30 MIN: CPT | Performed by: FAMILY MEDICINE

## 2021-03-12 PROCEDURE — G8482 FLU IMMUNIZE ORDER/ADMIN: HCPCS | Performed by: FAMILY MEDICINE

## 2021-03-12 RX ORDER — FEXOFENADINE HCL 180 MG/1
180 TABLET ORAL DAILY PRN
Qty: 90 TABLET | Refills: 3 | Status: SHIPPED | OUTPATIENT
Start: 2021-03-12 | End: 2022-05-13 | Stop reason: SDUPTHER

## 2021-03-12 RX ORDER — SILDENAFIL 100 MG/1
100 TABLET, FILM COATED ORAL PRN
Qty: 30 TABLET | Refills: 0 | Status: SHIPPED | OUTPATIENT
Start: 2021-03-12 | End: 2021-06-07 | Stop reason: SDUPTHER

## 2021-03-12 RX ORDER — NAPROXEN 500 MG/1
500 TABLET ORAL 2 TIMES DAILY PRN
Qty: 60 TABLET | Refills: 0 | Status: SHIPPED | OUTPATIENT
Start: 2021-03-12 | End: 2022-01-19

## 2021-03-12 ASSESSMENT — PATIENT HEALTH QUESTIONNAIRE - PHQ9
1. LITTLE INTEREST OR PLEASURE IN DOING THINGS: 0
SUM OF ALL RESPONSES TO PHQ QUESTIONS 1-9: 0
SUM OF ALL RESPONSES TO PHQ9 QUESTIONS 1 & 2: 0

## 2021-03-12 NOTE — PROGRESS NOTES
Toni Stephen (:  1970) is a 52 y.o. male,Established patient, here for evaluation of the following chief complaint(s): Hypertension, Hyperlipidemia, Knee Pain (rt knee twisted wrong ), and Joint Pain      ASSESSMENT/PLAN:    1. Essential hypertension  Well controlled. Continue current treatment. Will recheck labs. Discussed low salt diet and BP and pulse monitoring daily    -     CBC Auto Differential; Future  -     Comprehensive Metabolic Panel; Future  -     Magnesium; Future  2. Hyperlipidemia with target LDL less than 100  Inadequately controlled  continue Pravachol and fish oil    -     Lipid Panel; Future  3. Acute pain of right knee  Failing to change as expected. Knee brace  Topical cream Griselda Dix US  -     XR KNEE RIGHT (3 VIEWS); Future  -     Cici Love MD, Orthopedic Surgery, [place]  4. Fibromyalgia muscle pain  Failing to change as expected. -     Shi Pearson MD, Rheumatology, [place]  -     naproxen (NAPROSYN) 500 MG tablet; Take 1 tablet by mouth 2 times daily as needed for Pain With food, Disp-60 tablet, R-0Normal  -     Magnesium; Future  We discussed for him  to have the venlafaxine increased by his psychiatrist or to change it to Cymbalta  He takes multiple medications which coincidentally also help with pain: Flexeril, Depakote, venlafaxine, Trileptal, Percocet   Stretching, heating pad, topical creams discussed  5. Chronic pain of multiple joints  Failing to change as expected. He follows with orthopedics for right shoulder, neurologist for right foot drop, podiatry for right ankle pain, will refer him to another orthopedic for his right knee, will refer him to rheumatologist due to fibromyalgia and pain in multiple joints  -     Shi Pearson MD, Rheumatology, [facility]  6. Popping sound of knee joint  Failing to change as expected. -     XR KNEE RIGHT (3 VIEWS); Future  -     Cici Love MD, Orthopedic Surgery, [place]  7.  Bipolar disorder, in partial remission, most recent episode depressed ([facility] [address]  Stable  Continue current treatment and follow up with psychiatrist and psychologist as scheduled. 8. Hyperglycemia  Improved  Low-carb diet discussed  -     POCT glycosylated hemoglobin (Hb A1C)  Lab Results   Component Value Date    LABA1C 5.3 03/12/2021    LABA1C 5.8 09/01/2020    LABA1C 5.3 04/03/2019     9. Seasonal allergic rhinitis due to other allergic trigger  Improved with medications  Continue Allegra and Flonase  -     fexofenadine (ALLEGRA ALLERGY) 180 MG tablet; Take 1 tablet by mouth daily as needed (as needed for allergies), Disp-90 tablet, R-3Normal  10. Erectile dysfunction, unspecified erectile dysfunction type  Likely related to multiple psych and hypertension medications  -     sildenafil (VIAGRA) 100 MG tablet; Take 1 tablet by mouth as needed for Erectile Dysfunction, Disp-30 tablet, R-0Normal      Ervin received counseling on the following healthy behaviors: nutrition, exercise and medication adherence  Reviewed prior labs and health maintenance  Discussed use, benefit, and side effects of prescribed medications. Barriers to medication compliance addressed. Patient given educational materials - see patient instructions  Was a self-tracking handout given in paper form or via [employer]? Yes  All patient questions answered. Patient voiced understanding. The patient's past medical,surgical, social, and family history as well as his current medications and allergies were reviewed as documented in today's encounter. Medications, labs, diagnostic studies, consultations and follow-up as documented in this encounter. Return in about 3 months (around 6/12/2021) for Face-2F-30mins PHYSICAL, VISION screen, PHQ9. .    Data Unavailable      SUBJECTIVE/OBJECTIVE:    Hypertension: Patient here for follow-up. He is not exercising , but staying active, and is adherent to low salt diet.     Blood pressure is well controlled at home.   Cardiac symptoms fatigue. Patient denies chest pain, chest pressure/discomfort, claudication, dyspnea, exertional chest pressure/discomfort, irregular heart beat, lower extremity edema, near-syncope, orthopnea, palpitations, paroxysmal nocturnal dyspnea, syncope and tachypnea. Cardiovascular risk factors: dyslipidemia, hypertension and male gender. Use of agents associated with hypertension: NSAIDS. History of target organ damage: none. BP controlled. Dyana Toth reports compliance with BP medications, and tolerates them well, denies side effects. BP Readings from Last 3 Encounters:   03/12/21 110/70   02/19/21 114/78   11/23/20 122/76          Pulse is Normal.    Pulse Readings from Last 3 Encounters:   03/12/21 90   02/19/21 88   01/28/21 (!) 48         Hyperlipidemia:  No new myalgias or GI upset on pravastatin (Pravachol) and OTC Fish Oil. Medication compliance: compliant all of the time. Patient is  following a low fat, low cholesterol diet. LDL is high  Lab Results   Component Value Date    LDLCHOLESTEROL 122 09/01/2020     Lab Results   Component Value Date    TRIG 130 09/01/2020    TRIG 174 (H) 05/28/2020    TRIG 220 (H) 11/25/2019       Ervin complains of Right knee twisted, pain since then, and something \"is moving it the knee\" and shows me the patella. He says there is a lot of popping int he right knee. Onset since Sunday, 5 days ago, on 3/7/21    He wears Right ankle brace, still has pain in the Achilles, pain is sharp. Has been Icing, elevates, wearing right ankle brace at all times. Has chronic Right foot drop and numbness in the right foot. The neurologist wants him to see Rheumatology due to pain in multiple joints, needs new referral as unable to establish. Note from neurology on 11/23/20 by Dr. Hilario Loomis     Also has chronic Right shoulder pain for many years, PT helping. Sees [facility]. Nima Ballard at [facility].     Has known Fibromyalgia for many years, muscle pains, worse when cold or with certain activities. Has been stretching. He is asking what he is taking for Fibromyalgia? We reviewed meds, we discussed to possibly have Effexor increased or change to Cymbalta. Goes to pain management , but the meds don't help      Right leg wound healed. the new ankle brace helps not to have the wound open. Prior inflammatory markers were negative  Lab Results   Component Value Date    SHARITA NEGATIVE 12/03/2020   ANCA negative  Lab Results   Component Value Date    SEDRATE 2 12/03/2020     RF negative    Lab Results   Component Value Date    URICACID 6.5 07/30/2014     Bipolar depression  Reports he is doing better  Denies suicidal ideation, plan or intent. Reports compliance with psych meds, tolerates the medication well except ED/ Erectile Dysfunction . PHQ-2 Over the past 2 weeks, how often have you been bothered by any of the following problems? Little interest or pleasure in doing things: Not at all  Feeling down, depressed, or hopeless: Not at all  PHQ-2 Score: 0  PHQ-9 Over the past 2 weeks, how often have you been bothered by any of the following problems? PHQ-9 Total Score: 0    PHQ Scores 3/12/2021 1/28/2021 1/26/2021 5/27/2020 5/26/2020 1/10/2020 11/19/2019   PHQ2 Score 0 0 3 0 0 0 0   PHQ9 Score 0 0 9 0 0 0 0     Hyperglycemia  A1c improved  Denies increased appetite, thirst or polyuria. unintentional weight gain 8 lbs in 2 months   Wt Readings from Last 3 Encounters:   03/12/21 183 lb (83 kg)   01/28/21 180 lb (81.6 kg)   01/12/21 175 lb (79.4 kg)         Lab Results   Component Value Date    LABA1C 5.3 03/12/2021    LABA1C 5.8 09/01/2020    LABA1C 5.3 04/03/2019     Seasonal allergies improved with Allegra, Flonase. Denies sinus pressure, pain and purulent nasal discharge    Prior to Visit Medications    Medication Sig Taking?  Authorizing Provider   cyclobenzaprine (FLEXERIL) 5 MG tablet Take 1 tablet by mouth 3 times daily as needed for Muscle spasms Yes Suhail Hodges, sodium (COLACE) 100 MG capsule Take 1 capsule by mouth 2 times daily  Patient taking differently: Take 100 mg by mouth 2 times daily as needed  Yes Lisy Lomax MD   amLODIPine (NORVASC) 10 MG tablet Take 10 mg by mouth daily Yes Historical Provider, MD   Gauze Pads & Dressings (Miguel Becker MD) [address] [facility] Needs 2 times a day Yes Lisy Lomax MD   Gauze Pads & Dressings 4\"X4\" PADS Using 6 per day for large wound on the right leg Yes Lisy Lomax MD   potassium chloride (KLOR-CON M) 10 MEQ extended release tablet Take 2 tablets by mouth daily Take with Hydrochlorothiazide Yes Lisy Lomax MD   pravastatin (PRAVACHOL) 80 MG tablet take 1 tablet by mouth every evening Yes Lisy Lomax MD   Omega-3 Fatty Acids (OMEGA-3 FISH OIL) 1200 MG CAPS Take 2 capsules by mouth 2 times daily **stop niacin** Yes Lisy Lomax MD   hydrochlorothiazide (HYDRODIURIL) 25 MG tablet Take 1 tablet by mouth every morning Dose increased 5/27/2020 Yes Lisy Lomax MD   metoprolol tartrate (LOPRESSOR) 25 MG tablet Take 2 tablets by mouth 2 times daily Dose decreased 5/27/2020 Yes Lisy Lomax MD   fluticasone (FLONASE) 50 MCG/ACT nasal spray instill 2 sprays into each nostril once daily  Patient taking differently: 1 spray by Nasal route daily as needed  Yes Lisy Lomax MD   fexofenadine (ALLEGRA ALLERGY) 180 MG tablet Take 1 tablet by mouth daily  Patient taking differently: Take 180 mg by mouth daily as needed  Yes Lisy Lomax MD   Handicap Placard MISC by Does not apply route Can't walk greater than 200 feet. Expires in 5 years.  Yes Lisy Lomax MD   vitamin B-12 (CYANOCOBALAMIN) 1000 MCG tablet Take 1,000 mcg by mouth daily Yes Historical Provider, MD   Adapalene 0.3 % GEL  Yes Historical Provider, MD   benztropine (COGENTIN) 1 MG tablet Take 1 mg by mouth daily  Yes Historical Provider, MD   erythromycin with ethanol (THERAMYCIN) 2 % external solution apply topically as directed every morning Yes Historical Provider, MD   aluminum chloride (DRYSOL) 20 % external solution Apply topically nightly. Yes Jerri Ojeda MD   naproxen (NAPROSYN) 500 MG tablet Take 1 tablet by mouth 2 times daily (with meals) for 14 days  Suhail Hodges APRN - CNP   Nutritional Supplements (ENSURE HIGH PROTEIN) LIQD Take 1 Can by mouth 3 times daily (with meals)  Caty Degroot MD   lidocaine (LMX) 4 % cream Apply 2-3 times a day as needed for pain  Patient not taking: Reported on 3/12/2021  Jerri Ojeda MD       Social History     Tobacco Use    Smoking status: Former Smoker     Packs/day: 1.50     Years: 12.00     Pack years: 18.00     Quit date: 2002     Years since quittin.6    Smokeless tobacco: Never Used   Substance Use Topics    Alcohol use: No     Alcohol/week: 0.0 standard drinks    Drug use: No         Review of Systems   Constitutional: Positive for fatigue. Negative for activity change, appetite change, chills, diaphoresis, fever and unexpected weight change. HENT: Positive for postnasal drip and rhinorrhea. Negative for congestion, sinus pressure and sinus pain. Respiratory: Negative for cough, chest tightness, shortness of breath and wheezing. Cardiovascular: Negative for chest pain, palpitations and leg swelling. Gastrointestinal: Negative for abdominal distention, abdominal pain, constipation, diarrhea, nausea and vomiting. Endocrine: Negative for cold intolerance, heat intolerance, polydipsia, polyphagia and polyuria. Musculoskeletal: Positive for arthralgias, back pain, gait problem (using right ankle brace) and myalgias. Skin: Negative for wound. Allergic/Immunologic: Positive for environmental allergies. Neurological: Positive for weakness (right foot drop) and numbness (right foot numbness). Psychiatric/Behavioral: Negative for dysphoric mood, self-injury, sleep disturbance and suicidal ideas.  The patient is He exhibits decreased range of motion, tenderness, bony tenderness and pain. Right lower leg: No edema. Left lower leg: No edema. Comments: Using right ankle brace placed on the shin  Hypersensitivity noted    Lymphadenopathy:      Cervical: No cervical adenopathy. Skin:     General: Skin is warm and dry. Capillary Refill: Capillary refill takes less than 2 seconds. Findings: No rash. Comments: Right leg wound well healed,scar is thin, but no opening noted   Neurological:      Mental Status: He is alert and oriented to person, place, and time. Deep Tendon Reflexes: Reflexes are normal and symmetric. Psychiatric:         Mood and Affect: Mood is anxious. Behavior: Behavior normal.         Thought Content: Thought content normal.         Judgment: Judgment normal.           I personally reviewed testing with patient and all questions fully answered.   Anemia  Hyperglycemia, prediabetes   Hyperlipidemia     Otherwise labs within normal limits          Lab Results   Component Value Date    WBC 7.2 02/19/2021    HGB 13.9 02/19/2021    HCT 40.7 (L) 02/19/2021    MCV 90.2 02/19/2021     02/19/2021       Lab Results   Component Value Date     02/19/2021    K 3.7 02/19/2021     02/19/2021    CO2 28 02/19/2021    BUN 14 02/19/2021    CREATININE 0.98 02/19/2021    GLUCOSE 127 02/19/2021    CALCIUM 9.4 02/19/2021        Lab Results   Component Value Date    ALT 24 01/07/2021    AST 32 01/07/2021    ALKPHOS 86 01/07/2021    BILITOT 0.20 (L) 01/07/2021       Lab Results   Component Value Date    TSH 1.19 09/01/2020       Lab Results   Component Value Date    CHOL 205 (H) 09/01/2020    CHOL 187 05/28/2020    CHOL 187 11/25/2019     Lab Results   Component Value Date    TRIG 130 09/01/2020    TRIG 174 (H) 05/28/2020    TRIG 220 (H) 11/25/2019     Lab Results   Component Value Date    HDL 57 09/01/2020    HDL 41 05/28/2020    HDL 41 11/25/2019     Lab Results Component Value Date    LDLCHOLESTEROL 122 09/01/2020    LDLCHOLESTEROL 111 05/28/2020    LDLCHOLESTEROL 102 11/25/2019     Lab Results   Component Value Date    CHOLHDLRATIO 3.6 09/01/2020    CHOLHDLRATIO 4.6 05/28/2020    CHOLHDLRATIO 4.6 11/25/2019       Lab Results   Component Value Date    CHYEJKUD61 816 09/01/2020     Lab Results   Component Value Date    FOLATE 6.2 09/01/2020     Lab Results   Component Value Date    VITD25 58.7 11/25/2019         Orders Placed This Encounter   Medications    fexofenadine (ALLEGRA ALLERGY) 180 MG tablet     Sig: Take 1 tablet by mouth daily as needed (as needed for allergies)     Dispense:  90 tablet     Refill:  3    naproxen (NAPROSYN) 500 MG tablet     Sig: Take 1 tablet by mouth 2 times daily as needed for Pain With food     Dispense:  60 tablet     Refill:  0    sildenafil (VIAGRA) 100 MG tablet     Sig: Take 1 tablet by mouth as needed for Erectile Dysfunction     Dispense:  30 tablet     Refill:  0       Orders Placed This Encounter   Procedures    XR KNEE RIGHT (3 VIEWS)     Standing Status:   Future     Standing Expiration Date:   12/12/2021    CBC Auto Differential     Standing Status:   Future     Standing Expiration Date:   12/12/2021    Comprehensive Metabolic Panel     Standing Status:   Future     Standing Expiration Date:   12/12/2021    Magnesium     Standing Status:   Future     Standing Expiration Date:   12/12/2021    Lipid Panel     Standing Status:   Future     Standing Expiration Date:   12/12/2021     Order Specific Question:   Is Patient Fasting?/# of Hours     Answer:   8-10 Hours, water ok to drink    ALFREDA - Shan Bradley MD, Rheumatology, [place]     Referral Priority:   Routine     Referral Type:   Eval and Treat     Referral Reason:   Specialty Services Required     Referred to Provider:   Syl Jaramillo MD     Requested Specialty:   Rheumatology     Number of Visits Requested:   [facility] Duc Granados MD, Orthopedic Surgery, [place] Referral Priority:   Routine     Referral Type:   Eval and Treat     Referral Reason:   Specialty Services Required     Referred to Provider:   José Luis Weeks MD     Requested Specialty:   Orthopedic Surgery     Number of Visits Requested:   1    POCT glycosylated hemoglobin (Hb A1C)       Medications Discontinued During This Encounter   Medication Reason    fexofenadine (ALLEGRA ALLERGY) 180 MG tablet REORDER    lithium (ESKALITH) 450 MG extended release tablet DISCONTINUED BY ANOTHER CLINICIAN    Gauze Pads & Dressings (Karen Dawkins MD) MISC Therapy completed    Gauze Pads & Dressings 4\"X4\" PADS Therapy completed    Nutritional Supplements (ENSURE HIGH PROTEIN) LIQD Therapy completed    lidocaine (LMX) 4 % cream Alternate therapy    naproxen (NAPROSYN) 500 MG tablet REORDER    sildenafil (VIAGRA) 100 MG tablet REORDER         On this date 3/12/2021 I have spent  35  minutes reviewing previous notes, test results and face to face with the patient discussing the diagnosis and importance of compliance with the treatment plan as well as documenting on the day of the visit. Future Appointments   Date Time Provider Maribell Duran   3/15/2021 11:00 AM Anastasia Labs, PTA [facility]   3/17/2021 11:00 AM Anastasia [facility], PTA [facility] PT [facility]   8/11/2021  3:15 PM Willy Cruz MD [id number]        This note was completed by using the assistance of a speech-recognition program. However, inadvertent computerized transcription errors may be present. Although every effort was made to ensure accuracy, no guarantees can be provided that every mistake has been identified and corrected by editing. An electronic signature was used to authenticate this note.   Electronically signed by Willy Cruz MD on 3/14/2021 at 11:01 AM

## 2021-03-12 NOTE — PATIENT INSTRUCTIONS
Patient Education        High Cholesterol: Care Instructions  Your Care Instructions     Cholesterol is a type of fat in your blood. It is needed for many body functions, such as making new cells. Cholesterol is made by your body. It also comes from food you eat. High cholesterol means that you have too much of the fat in your blood. This raises your risk of a heart attack and stroke. LDL and HDL are part of your total cholesterol. LDL is the \"bad\" cholesterol. High LDL can raise your risk for heart disease, heart attack, and stroke. HDL is the \"good\" cholesterol. It helps clear bad cholesterol from the body. High HDL is linked with a lower risk of heart disease, heart attack, and stroke. Your cholesterol levels help your doctor find out your risk for having a heart attack or stroke. You and your doctor can talk about whether you need to lower your risk and what treatment is best for you. A heart-healthy lifestyle along with medicines can help lower your cholesterol and your risk. The way you choose to lower your risk will depend on how high your risk is for heart attack and stroke. It will also depend on how you feel about taking medicines. Follow-up care is a key part of your treatment and safety. Be sure to make and go to all appointments, and call your doctor if you are having problems. It's also a good idea to know your test results and keep a list of the medicines you take. How can you care for yourself at home? · Eat a variety of foods every day. Good choices include fruits, vegetables, whole grains (like oatmeal), dried beans and peas, nuts and seeds, soy products (like tofu), and fat-free or low-fat dairy products. · Replace butter, margarine, and hydrogenated or partially hydrogenated oils with olive and canola oils. (Canola oil margarine without trans fat is fine.)  · Replace red meat with fish, poultry, and soy protein (like tofu).   · Limit processed and packaged foods like chips, crackers, and cookies. · Bake, broil, or steam foods. Don't enriquez them. · Be physically active. Get at least 30 minutes of exercise on most days of the week. Walking is a good choice. You also may want to do other activities, such as running, swimming, cycling, or playing tennis or team sports. · Stay at a healthy weight or lose weight by making the changes in eating and physical activity listed above. Losing just a small amount of weight, even 5 to 10 pounds, can reduce your risk for having a heart attack or stroke. · Do not smoke. When should you call for help? Watch closely for changes in your health, and be sure to contact your doctor if:    · You need help making lifestyle changes.     · You have questions about your medicine. Where can you learn more? Go to https://Yapmopepiceweb.Max Rumpus. org and sign in to your [employer] account. Enter Y314 in the riskmethods box to learn more about \"High Cholesterol: Care Instructions. \"     If you do not have an account, please click on the \"Sign Up Now\" link. Current as of: August 31, 2020               Content Version: 12.8  © 9918-1321 Healthwise, Incorporated. Care instructions adapted under license by [facility] ([facility]). If you have questions about a medical condition or this instruction, always ask your healthcare professional. [address] any warranty or liability for your use of this information.

## 2021-03-12 NOTE — RESULT ENCOUNTER NOTE
Addressed during office visit today, A1c 5.3 within normal limits, resolved prediabetes  Continue current treatment discussed during visit

## 2021-03-14 ASSESSMENT — ENCOUNTER SYMPTOMS
WHEEZING: 0
ABDOMINAL PAIN: 0
COUGH: 0
RHINORRHEA: 1
NAUSEA: 0
CHEST TIGHTNESS: 0
SINUS PRESSURE: 0
VOMITING: 0
DIARRHEA: 0
SHORTNESS OF BREATH: 0
CONSTIPATION: 0
ABDOMINAL DISTENTION: 0
SINUS PAIN: 0
BACK PAIN: 1

## 2021-03-15 ENCOUNTER — HOSPITAL ENCOUNTER (OUTPATIENT)
Dept: GENERAL RADIOLOGY | Age: 51
Discharge: HOME | End: 2021-03-17
Payer: MEDICARE

## 2021-03-15 ENCOUNTER — HOSPITAL ENCOUNTER (OUTPATIENT)
Age: 51
Discharge: HOME | End: 2021-03-17
Payer: MEDICARE

## 2021-03-15 DIAGNOSIS — R29.898 POPPING SOUND OF KNEE JOINT: ICD-10-CM

## 2021-03-15 DIAGNOSIS — M25.561 ACUTE PAIN OF RIGHT KNEE: ICD-10-CM

## 2021-03-15 PROCEDURE — 73562 X-RAY EXAM OF KNEE 3: CPT

## 2021-03-15 NOTE — RESULT ENCOUNTER NOTE
ABNORMAL. Please notify patient. Right knee x-ray show small amount of effusion and arthritis, follow-up with orthopedics as scheduled    Future Appointments  3/17/2021  11:00 AM   Dayana Busch PTA         [address]  3/22/2021  3:15 PM    Valencia Lang MD        [facility]       [facility]  8/11/2021  3:15 PM    Noa Vazquez MD     [facility]          [address] [facility]

## 2021-03-17 ENCOUNTER — HOSPITAL ENCOUNTER (OUTPATIENT)
Age: 51
Discharge: HOME | End: 2021-03-17
Payer: MEDICARE

## 2021-03-17 DIAGNOSIS — M79.7 FIBROMYALGIA MUSCLE PAIN: ICD-10-CM

## 2021-03-17 DIAGNOSIS — E78.5 HYPERLIPIDEMIA WITH TARGET LDL LESS THAN 100: ICD-10-CM

## 2021-03-17 DIAGNOSIS — I10 ESSENTIAL HYPERTENSION: ICD-10-CM

## 2021-03-17 LAB
ABSOLUTE EOS #: 0.2 K/UL (ref 0–0.4)
ABSOLUTE IMMATURE GRANULOCYTE: ABNORMAL K/UL (ref 0–0.3)
ABSOLUTE LYMPH #: 2.4 K/UL (ref 1–4.8)
ABSOLUTE MONO #: 0.7 K/UL (ref 0.1–1.3)
ALBUMIN SERPL-MCNC: 4.5 G/DL (ref 3.5–5.2)
ALBUMIN/GLOBULIN RATIO: ABNORMAL (ref 1–2.5)
ALP BLD-CCNC: 93 U/L (ref 40–129)
ALT SERPL-CCNC: 36 U/L (ref 5–41)
ANION GAP SERPL CALCULATED.3IONS-SCNC: 10 MMOL/L (ref 9–17)
AST SERPL-CCNC: 29 U/L
BASOPHILS # BLD: 1 % (ref 0–2)
BASOPHILS ABSOLUTE: 0.1 K/UL (ref 0–0.2)
BILIRUB SERPL-MCNC: 0.62 MG/DL (ref 0.3–1.2)
BUN BLDV-MCNC: 15 MG/DL (ref 6–20)
BUN/CREAT BLD: ABNORMAL (ref 9–20)
CALCIUM SERPL-MCNC: 9.6 MG/DL (ref 8.6–10.4)
CHLORIDE BLD-SCNC: 103 MMOL/L (ref 98–107)
CHOLESTEROL/HDL RATIO: 3.7
CHOLESTEROL: 176 MG/DL
CO2: 29 MMOL/L (ref 20–31)
CREAT SERPL-MCNC: 0.88 MG/DL (ref 0.7–1.2)
DIFFERENTIAL TYPE: ABNORMAL
EOSINOPHILS RELATIVE PERCENT: 2 % (ref 0–4)
GFR AFRICAN AMERICAN: >60 ML/MIN
GFR NON-AFRICAN AMERICAN: >60 ML/MIN
GFR SERPL CREATININE-BSD FRML MDRD: ABNORMAL ML/MIN/{1.73_M2}
GFR SERPL CREATININE-BSD FRML MDRD: ABNORMAL ML/MIN/{1.73_M2}
GLUCOSE BLD-MCNC: 106 MG/DL (ref 70–99)
HCT VFR BLD CALC: 45.2 % (ref 41–53)
HDLC SERPL-MCNC: 48 MG/DL
HEMOGLOBIN: 15.6 G/DL (ref 13.5–17.5)
IMMATURE GRANULOCYTES: ABNORMAL %
LDL CHOLESTEROL: 94 MG/DL (ref 0–130)
LYMPHOCYTES # BLD: 33 % (ref 24–44)
MAGNESIUM: 2.2 MG/DL (ref 1.6–2.6)
MCH RBC QN AUTO: 31.3 PG (ref 26–34)
MCHC RBC AUTO-ENTMCNC: 34.4 G/DL (ref 31–37)
MCV RBC AUTO: 90.8 FL (ref 80–100)
MONOCYTES # BLD: 10 % (ref 1–7)
NRBC AUTOMATED: ABNORMAL PER 100 WBC
PDW BLD-RTO: 13.3 % (ref 11.5–14.9)
PLATELET # BLD: 231 K/UL (ref 150–450)
PLATELET ESTIMATE: ABNORMAL
PMV BLD AUTO: 8.2 FL (ref 6–12)
POTASSIUM SERPL-SCNC: 4 MMOL/L (ref 3.7–5.3)
RBC # BLD: 4.98 M/UL (ref 4.5–5.9)
RBC # BLD: ABNORMAL 10*6/UL
SEG NEUTROPHILS: 54 % (ref 36–66)
SEGMENTED NEUTROPHILS ABSOLUTE COUNT: 4 K/UL (ref 1.3–9.1)
SODIUM BLD-SCNC: 142 MMOL/L (ref 135–144)
TOTAL PROTEIN: 7.5 G/DL (ref 6.4–8.3)
TRIGL SERPL-MCNC: 168 MG/DL
VLDLC SERPL CALC-MCNC: ABNORMAL MG/DL (ref 1–30)
WBC # BLD: 7.4 K/UL (ref 3.5–11)
WBC # BLD: ABNORMAL 10*3/UL

## 2021-03-17 PROCEDURE — 36415 COLL VENOUS BLD VENIPUNCTURE: CPT

## 2021-03-17 PROCEDURE — 80053 COMPREHEN METABOLIC PANEL: CPT

## 2021-03-17 PROCEDURE — 83735 ASSAY OF MAGNESIUM: CPT

## 2021-03-17 PROCEDURE — 85025 COMPLETE CBC W/AUTO DIFF WBC: CPT

## 2021-03-17 PROCEDURE — 80061 LIPID PANEL: CPT

## 2021-03-17 NOTE — RESULT ENCOUNTER NOTE
ABNORMAL. Please notify patient.   Blood glucose mildly high 106, low-carb diet advised  Improved lipids but still mildly high triglycerides cut down the sweets  Otherwise labs within normal limits    Future Appointments  3/17/2021  11:00 AM   RASHARD MedinaCZ [facility] PT    [address]  3/22/2021  3:15 PM    Hailey Leung MD        [facility]  8/11/2021  3:15 PM    José Miguel Gabriel MD     [facility]

## 2021-03-18 DIAGNOSIS — M25.561 ACUTE PAIN OF RIGHT KNEE: Primary | ICD-10-CM

## 2021-03-19 NOTE — DISCHARGE SUMMARY
[] [facility] ([facility]) @ [facility]  [address] [facility] [address] [place], [address] 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:   Visit# / total visits:                     Cancels/No Shows:   Date of initial visit:  21                [] Patient recovered from conditions. Treatment goals were met. [] Patient received maximum benefit. No further therapy indicated at this time. [] Patient demonstrated improvement from condition with  ** Of  ** Short term goals met. []Patient demonstrated improvement from condition with **   Of **  Long term goals met. [] Patient to continue exercise/home instructions independently. [] Therapy interrupted due to:    [x] Patient has 2 or more no shows/cancels, is discontinued per our policy. [] Patient has completed prescribed number of treatment sessions. [] Other:      It Is My Understanding That The:  [] Patient returned to work. [] Patient demonstrated improved level of function. [] Patient returned to previous functional level. [x] Patient's current functional status is unknown due to no-shows  [] Other:     Comments: Pt has no-showed to the last four sessions and will be discharged per attendance policy.      Treatment Included:     [x] Therapeutic Exercise   95739  [] Iontophoresis: 4 mg/mL Dexamethasone Sodium Phosphate  mAmin  36148   [] Therapeutic Activity  59365 [x] Vasopneumatic cold with compression  86280    [] Gait Training   98369 [] Ultrasound   [id number]   [] Neuromuscular Re-education  97645 [] Electrical Stimulation Unattended  91410   [x] Manual Therapy  38220 [] Electrical Stimulation Attended  [id number]   [x] Instruction in HEP  [] Lumbar/Cervical Traction  [id number]   [] Aquatic Therapy   [id number] [] Cold/hotpack    [] Massage   [id number]      [] Dry Needling, 1 or 2 muscles  84963   [] Biofeedback, first 15 minutes   53940  [] Biofeedback, additional 15 minutes   75581 [] Dry Needling, 3 or more muscles  25022                If you have any questions or concerns regarding this patient's care, please contact us.    Thank you for your referral.      Electronically signed by: Felice Willams PT

## 2021-03-22 ENCOUNTER — OFFICE VISIT (OUTPATIENT)
Dept: ORTHOPEDIC SURGERY | Age: 51
End: 2021-03-22
Payer: MEDICARE

## 2021-03-22 DIAGNOSIS — M25.561 ACUTE PAIN OF RIGHT KNEE: Primary | ICD-10-CM

## 2021-03-22 PROCEDURE — G8419 CALC BMI OUT NRM PARAM NOF/U: HCPCS | Performed by: ORTHOPAEDIC SURGERY

## 2021-03-22 PROCEDURE — G8428 CUR MEDS NOT DOCUMENT: HCPCS | Performed by: ORTHOPAEDIC SURGERY

## 2021-03-22 PROCEDURE — 1036F TOBACCO NON-USER: CPT | Performed by: ORTHOPAEDIC SURGERY

## 2021-03-22 PROCEDURE — G8482 FLU IMMUNIZE ORDER/ADMIN: HCPCS | Performed by: ORTHOPAEDIC SURGERY

## 2021-03-22 PROCEDURE — 99203 OFFICE O/P NEW LOW 30 MIN: CPT | Performed by: ORTHOPAEDIC SURGERY
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
## 2021-03-23 DIAGNOSIS — M79.7 FIBROMYALGIA MUSCLE PAIN: ICD-10-CM

## 2021-03-23 RX ORDER — CYCLOBENZAPRINE HCL 10 MG
TABLET ORAL
Qty: 90 TABLET | Refills: 0 | Status: SHIPPED | OUTPATIENT
Start: 2021-03-23 | End: 2021-04-20

## 2021-04-06 ENCOUNTER — HOSPITAL ENCOUNTER (OUTPATIENT)
Dept: MRI IMAGING | Facility: CLINIC | Age: 51
Discharge: HOME | End: 2021-04-08
Payer: MEDICARE

## 2021-04-06 DIAGNOSIS — M25.561 ACUTE PAIN OF RIGHT KNEE: ICD-10-CM

## 2021-04-06 PROCEDURE — 73721 MRI JNT OF LWR EXTRE W/O DYE: CPT

## 2021-04-07 ENCOUNTER — TELEPHONE (OUTPATIENT)
Dept: ORTHOPEDIC SURGERY | Age: 51
End: 2021-04-07

## 2021-04-07 DIAGNOSIS — M25.561 ACUTE PAIN OF RIGHT KNEE: Primary | ICD-10-CM

## 2021-04-11 DIAGNOSIS — R11.0 NAUSEA: ICD-10-CM

## 2021-04-12 RX ORDER — FAMOTIDINE 20 MG/1
TABLET, FILM COATED ORAL
Qty: 60 TABLET | Refills: 3 | Status: SHIPPED | OUTPATIENT
Start: 2021-04-12 | End: 2021-07-26

## 2021-04-20 DIAGNOSIS — M79.7 FIBROMYALGIA MUSCLE PAIN: ICD-10-CM

## 2021-04-20 RX ORDER — CYCLOBENZAPRINE HCL 10 MG
TABLET ORAL
Qty: 90 TABLET | Refills: 0 | Status: SHIPPED | OUTPATIENT
Start: 2021-04-20 | End: 2021-05-19

## 2021-04-22 ENCOUNTER — TELEPHONE (OUTPATIENT)
Dept: FAMILY MEDICINE CLINIC | Age: 51
End: 2021-04-22

## 2021-04-22 DIAGNOSIS — M76.61 ACHILLES TENDINITIS OF RIGHT LOWER EXTREMITY: ICD-10-CM

## 2021-04-22 DIAGNOSIS — M21.371 FOOT DROP, RIGHT: Primary | ICD-10-CM

## 2021-04-22 NOTE — TELEPHONE ENCOUNTER
PATIENT CALLED STATING HE NEEDS A REFERRAL FOR PHYSICAL THERAPY ON HIS RIGHT ACHILLES TENDON.  STATES THEY NEED ONE IN ORDER TO WORK ON THIS PLEASE ADVISE

## 2021-04-30 ENCOUNTER — HOSPITAL ENCOUNTER (OUTPATIENT)
Dept: PHYSICAL THERAPY | Age: 51
Setting detail: THERAPIES SERIES
Discharge: HOME | End: 2021-04-30
Payer: MEDICARE

## 2021-04-30 PROCEDURE — 97110 THERAPEUTIC EXERCISES: CPT

## 2021-04-30 PROCEDURE — 97162 PT EVAL MOD COMPLEX 30 MIN: CPT
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
## 2021-05-02 DIAGNOSIS — I10 ESSENTIAL HYPERTENSION: ICD-10-CM

## 2021-05-02 DIAGNOSIS — E78.5 HYPERLIPIDEMIA WITH TARGET LDL LESS THAN 100: ICD-10-CM

## 2021-05-02 DIAGNOSIS — R60.0 BILATERAL LEG EDEMA: ICD-10-CM

## 2021-05-02 DIAGNOSIS — E78.1 HYPERTRIGLYCERIDEMIA: ICD-10-CM

## 2021-05-03 RX ORDER — PRAVASTATIN SODIUM 80 MG/1
TABLET ORAL
Qty: 90 TABLET | Refills: 3 | Status: SHIPPED | OUTPATIENT
Start: 2021-05-03 | End: 2022-04-11

## 2021-05-03 RX ORDER — HYDROCHLOROTHIAZIDE 25 MG/1
TABLET ORAL
Qty: 90 TABLET | Refills: 3 | Status: SHIPPED | OUTPATIENT
Start: 2021-05-03 | End: 2022-04-11

## 2021-05-03 NOTE — TELEPHONE ENCOUNTER
Please Approve or Refuse.   Send to Pharmacy per Pt's Request:      Next Visit Date:  8/11/2021   Last Visit Date: 3/12/2021    Hemoglobin A1C (%)   Date Value   03/12/2021 5.3   09/01/2020 5.8   04/03/2019 5.3             ( goal A1C is < 7)   BP Readings from Last 3 Encounters:   03/12/21 110/70   02/19/21 114/78   11/23/20 122/76          (goal 120/80)  BUN   Date Value Ref Range Status   03/17/2021 15 6 - 20 mg/dL Final     CREATININE   Date Value Ref Range Status   03/17/2021 0.88 0.70 - 1.20 mg/dL Final     Potassium   Date Value Ref Range Status   03/17/2021 4.0 3.7 - 5.3 mmol/L Final

## 2021-05-04 ENCOUNTER — HOSPITAL ENCOUNTER (OUTPATIENT)
Dept: PHYSICAL THERAPY | Age: 51
Setting detail: THERAPIES SERIES
Discharge: HOME | End: 2021-05-04
Payer: MEDICARE

## 2021-05-04 PROCEDURE — 97162 PT EVAL MOD COMPLEX 30 MIN: CPT

## 2021-05-04 PROCEDURE — 97110 THERAPEUTIC EXERCISES: CPT

## 2021-05-05 NOTE — PROGRESS NOTES
X-Ray: [] MRI:  [] Other:     Medications: [x] Refer to full medical record [] None [] Other:  Allergies:      [x] Refer to full medical record [] None [] Other:     Normal Deficit Comments   Follows commands [x] []    Vision [x] []    Hearing [x] []    Orientation [x] []      Pain  Pain:  [x] Yes   [] No      Location: (L) shoulder lateral region of the deltoid. Pain Rating: (0-10 scale)  6/10   Worst: 9/10  Best: 5/10  Symptoms:  [] Improving [x] Worsening       [] Same  Descriptors: constant, sharp, stabbing, burning, aching  Aggravating factors: OH reaching, lifting and carrying  Relieving factors: rest, repositioning   Sleep: Disturbed   Other:     Function  Hand Dominance  [x] Right  [] Left  Working:  [] Normal Duty  [] Light Duty  [] Off D/T Condition  [] Retired    [x] Not Employed    []  Disability  [] Other:           Return to work:   Job/ADL Description:    ADL/IADL Previous level of function Current level of function Who currently assists the patient with task/Comments   Bathing  [x] Independent  [] Assist [x] Independent  [] Assist    Dress/grooming [x] Independent  [] Assist [x] Independent  [] Assist    Transfer/mobility [x] Independent  [] Assist [x] Independent  [] Assist    Feeding [x] Independent  [] Assist [x] Independent  [] Assist    Toileting [x] Independent  [] Assist [x] Independent  [] Assist    Driving [x] Independent  [] Assist [x] Independent  [] Assist    Housekeeping [x] Independent  [] Assist [x] Independent  [] Assist    Grocery shop/meal prep [x] Independent  [] Assist [x] Independent  [] Assist        Objective  Observation/Palpation   Normal Deficit Comments   Observation [] [] No shoulder abnormalities were grossly noted.     Posture  [] [x] Rounded shoulder(s)    Palpation [] [x] Tender to the touch lateral region of the (R) shoulder   Edema [] []    Scar [] []    Other [] []      Range of Motion  Spine - Range of Motion  Cervical  WNL in all planes   Thoracic  Limited Extension     --Scapula - Active ROM  Elevation  WNL   Retraction  WNL   Depression  WNL   Protraction  WNL     Right Upper Extremity - Active ROM = Passive ROM   Shoulder flexion  115   Shoulder extension  WNL   Shoulder abduction  105  Shoulder IR  50 @ 90/90   Shoulder ER  70 @ 90/90   Elbow flexion  WNL  Elbow extension  WNL  Forearm supination  WNL  Forearm pronation  WNL  Wrist flexion  WNL  Wrist extension  WNL    Left Upper Extremity - Active ROM  WNL in all planes of the shoulder, elbow, wrist and hand    Strength  Right Upper Extremity - Strength  Shoulder flexion  2/5  Shoulder extension  3/5  Shoulder abduction  2/5  Shoulder IR  2/5  Shoulder ER  2/5  Elbow flexion  3/5  Elbow extension  3/5  Forearm supination  5/5  Forearm pronation  5/5  Wrist flexion  5/5  Wrist extension  5/5    Left Upper Extremity - Strength  5/5 MMT within all planes of the shoulder, elbow, wrist and hand    Rhomboids  (R) lags behind the (L) 2+/5 with pain @ end range. Middle Trap   (R) lags behind the (L) 3-/5 with pain @ end range. Lower Trap  (R) lags behind the (L) 2+/5 with pain @ end range   Upper Trap   (R) lags behind the (L) 3-/5 with pain @ end range     Additional Measures    Normal Deficit Comments   Sensation   [x] [] C5-T1 with light touch (R) = (L)   Reflexes   [] []    Gross motor   [] []    Flexibility    [] [] (R) shoulder anterior and posterior capsule tightness was noted. (R) Pectoral Major tightness was noted @ 120. 90 degrees was WNL. Special Tests   [] []     strength   [] []    Other   [] []      Assessment  Patient presented with high irritability within the (L) shoulder (AROM = PROM). Aberrant motion was observed throughout the examination. Limited mobility within the (R) shoulder is apparent with pain @ end ranges.      Patient would continue to benefit from skilled physical therapy services in order to assist with pain control and motion for his (R) shoulder to allow him to perform his ADLs and ER 30 sec hold  3 reps with each one (Daily 1-2 sessions)    Method of Education: [x] Verbal  [x] Demo  [] Written  Comprehension of Education:  [] Verbalizes understanding. [] Demonstrates understanding. [x] Needs Review. [] Demonstrates/verbalizes understanding of HEP/Ed previously given. Treatment Plan:  [x] Therapeutic Exercise   73568  [] Iontophoresis: 4 mg/mL Dexamethasone Sodium Phosphate  mAmin  66845   [] Therapeutic Activity  57853 [] Vasopneumatic cold with compression  90253    [] Gait Training   16911 [] Ultrasound   97199   [] Neuromuscular Re-education  79452 [] Electrical Stimulation Unattended  58978   [] Manual Therapy  06192 [] Electrical Stimulation Attended  83565   [x] Instruction in HEP  [] Lumbar/Cervical Traction  33018   [] Aquatic Therapy   41909 [x] Cold/hotpack    [] Massage   83430      [] Dry Needling, 1 or 2 muscles  23485   [] Biofeedback, first 15 minutes   95326  [] Biofeedback, additional 15 minutes   00220 [] Dry Needling, 3 or more muscles  26737     []  Medication allergies reviewed for use of    Dexamethasone Sodium Phosphate 4mg/ml     with iontophoresis treatments. Pt is not allergic. Frequency:  2-3  x/week for 18 visits    Todays Treatment:  Modalities:   Precautions:  Exercises:  Exercise Reps/ Time Weight/ Level Comments                                 Other:    Specific Instructions for next treatment: Assess irritability, review home exercise program and initiate a therapeutic exercise program for continued stretching and strengthening in the appropriate planes.      Treatment Charges: Mins Units   [x] Evaluation       []  Low       [x]  Moderate       []  High 45 1   []  Modalities     [x]  Ther Exercise 15 1   []  Manual Therapy     []  Ther Activities     []  Aquatics     []  Neuromuscular     []  Gait Training     []  Dry Needling           1-2 muscles     []  Dry Needling           3 or more          muscles     [] Vasocompression     [] Other         TOTAL TREATMENT TIME: 60  Time in: 1300   Time Out: 1400    Electronically signed by: Frankie Tony PT DPT    Physician Signature:________________________________Date:__________________  By signing above or cosigning this note, I have reviewed this plan of care and certify a need for medically necessary rehabilitation services.      *PLEASE SIGN ABOVE AND FAX BACK ALL PAGES*

## 2021-05-13 ENCOUNTER — TELEPHONE (OUTPATIENT)
Dept: FAMILY MEDICINE CLINIC | Age: 51
End: 2021-05-13

## 2021-05-13 NOTE — TELEPHONE ENCOUNTER
I apologize but we do not give 2 handicap stickers at once for the same person, providers can get in trouble for this, he just needs to take the handicap sticker from the other car and put it in the car he is using

## 2021-05-14 ENCOUNTER — HOSPITAL ENCOUNTER (OUTPATIENT)
Dept: PHYSICAL THERAPY | Age: 51
Setting detail: THERAPIES SERIES
Discharge: HOME | End: 2021-05-14
Payer: MEDICARE

## 2021-05-14 PROCEDURE — 97110 THERAPEUTIC EXERCISES: CPT

## 2021-05-14 NOTE — PROGRESS NOTES
[address] [facility]   [address]   Phone: [phone]   Fax: [phone]    Physical Therapy Daily Treatment Note    Date:  2021  Patient: Zee May  : 1970  MRN: [id number]  Physician: UZAIR Kumari  Medical Diagnosis: Acute of the (R) knee (M25.561)               Rehab Codes: (R) knee pain/quad/LE weakness  Onset date:  2021  Next 's appt.:   PT Visit Information  PT Insurance Information: [employer] - 30 visits (26 visits remaining)  Total # of Visits Approved: 18  Total # of Visits to Date: 2  No Show: 1  Canceled Appointment: 0    Subjective  Patient reports no pain, just a sensation that it is \"about to pop\" when he straightens it out from a flexed position. Pain:  [] Yes   [x] No      Location:  Pain Ratin/10  Worst: 0/10   Best: 0/10  Descriptors: Other:     Objective  Modalities:   Precautions:   Exercises:  Exercise Reps/ Time Weight/ Level Comments   Supine (R) hip and knee flexion 10 reps  AROM   Supine (R) hip abduction 10 reps  AROM   Supine (R) heel slides 10 reps  AROM   Leg press (R) leg only 20 reps 25 lbs    (B) hamstring curls 20 reps 25 lbs    (R) forward step ups 20 reps 8\"    (R) lateral step ups 20 reps 8\"    Manual:    Specific Instructions for next treatment:    Pt. Education:  [] Yes  [x] No  [] Reviewed Prior HEP/Ed  Method of Education: [] Verbal  [] Demo  [] Written  HEP:   Comprehension of Education:  [] Verbalizes understanding. [] Demonstrates understanding. [] Needs review. [] Demonstrates/verbalizes HEP/Ed previously given. Activity Tolerance  Patient tolerated exercises well     Assessment  Patient demonstrated a gross increase in AROM within the hip and knee in the sagittal plane with no increase in pain. Hamstring tightness was observed to limit pt's ability to perform active (R) hip abduction, but did not increase his pain level.  Patient was able to perform supine (R) heel slides with no increase in pain, however, his knee \"popped\" with every extension. Patient was able to tolerate closed-chain quad and hamstring exercises with little to no increase in irritability. Goals  STG: (to be met in 6 treatments)  1. Pt will report an average pain of 1-2/10 within the (R) knee at rest/ADLs.     LTG: (to be met in 12 treatments)  1. No complaints of pain will be reported within the (R) knee at rest/ADLs. 2. Pt will demonstrate improved strength within all involved planes to assist with (I) function. 3. LEFS improved by 9 points (Nickie Fairchild Ultr[employer] 112)   4. Pt will demonstrate independence with his home exercise program     Plan: [x] Continue per plan of care.    [] Other:      Treatment Charges: Mins Units   []  Modalities     [x]  Ther Exercise 15 1   []  Manual Therapy     []  Ther Activities     []  Aquatics     []  Neuromuscular     [] Vasocompression     [] Gait Training     [] Dry needling        [] 1 or 2 muscles        [] 3 or more muscles     []  Other     Total Treatment time 15 1     Time In:1030            Time Out: 5940    Electronically signed by:  Emilie Chambers, SPT

## 2021-05-14 NOTE — PROGRESS NOTES
[address] [facility]   [address]   Phone: [phone]   Fax: [phone]    Physical Therapy Daily Treatment Note    Date:  2021  Patient: Ino Crooks  : 1970  MRN: [id number]  Physician: Ree Self MD            Medical Diagnosis: Achilles tendinitis of right lower extremity (M76.61)                  Rehab Codes: (R) Achilles Pain  Onset date:  2021  Next Dr's appt.: TBD  PT Visit Information  PT Insurance Information: [place] [employer]  Total # of Visits Approved: 18  Total # of Visits to Date: 2  No Show: 1  Canceled Appointment: 0    Subjective  Patient reports constant pain in his (R) ankle. Patient stated that his ROM in his (R) ankle decreases as the day progresses. Patient arrived wearing his AFO for (R) foot drop. Pain:  [x] Yes   [] No      Location: (R) ankle near the posterior joint line  Pain Rating: 3/10  Worst: 6/10  Best: 3/10  Descriptors: Constant, achy  Other:     Objective  Modalities:    Precautions:   Exercises:  Exercise Reps/ Time Weight/ Level Comments   Supine (R) gastroc self-stretch with belt 30 sec x 3 sets     Supine active (R) ankle DF/PF 10 reps  Perform after gastroc self-stretch   Eccentric heel raises 10 reps x 2 8\"                Manual:    Specific Instructions for next treatment: Assess irritability at the ankle and progress exercises as tolerated    Pt. Education:  [x] Yes  [] No  [x]  HEP/Ed: (R) gastroc self stretch 30 sec x 3 sets   Method of Education: [x] Verbal  [x] Demo  [] Written  HEP:   Comprehension of Education:  [] Verbalizes understanding. [] Demonstrates understanding. [x] Needs review. [] Demonstrates/verbalizes HEP/Ed previously given. Activity Tolerance  Patient tolerated exercises well. Assessment  Patient demonstrated limited (R) ankle active DF, which improved slightly after stretching the (R) gastroc.  Patient noted the tightness in his (R) calf return after performing eccentric heel raises and closed-chain exercises for his (R) knee; exercise did not increase his pain level. Patient was instructed to continue stretching his (R) gastroc and applying heat at home to help release the tension in that muscle. Patient may benefit from working on balance/proprioception in his (R) ankle to help improve his ability to ambulate. Goals  STG: (to be met in 9 treatments)  1. Pt will report decreased \"intermittent\" pain in the (R) ankle at rest and during ADLs. 2. Pt will demonstrate improved frontal and sagittal AROM, and passive DF in the (R) ankle to help improve mobility. 3. Pt will demonstrate ability to perform full (R) ankle frontal and sagittal plane motions in gravity neutral without pain to improve (I) function and mobility. LTG: (to be met in 18 treatments)  1. Pt will report pain <3/10 in the (R) ankle at rest and during ADLs. 2. Pt will demonstrate (R) ankle passive DF WNL and full AROM in the frontal and sagittal planes to help improve mobility. 3. Pt will demonstrate ability to perform (R) ankle frontal and sagittal plane motions against gravity and resistance to help improve (I) function and mobility. 4. LEFS index score will improve by 9 points (Nickie Fairchild Ultramar 112)  5. Pt will demonstrate independence with his home exercise program.       Plan: [x] Continue per plan of care.    [] Other:      Treatment Charges: Mins Units   []  Modalities     [x]  Ther Exercise 15 1   []  Manual Therapy     []  Ther Activities     []  Aquatics     []  Neuromuscular     [] Vasocompression     [] Gait Training     [] Dry needling        [] 1 or 2 muscles        [] 3 or more muscles     []  Other     Total Treatment time 15 1     Time GV:9640           Time Out: 1100    Electronically signed by:  EVELYNE Yañez

## 2021-05-14 NOTE — PROGRESS NOTES
Demonstrates/verbalizes HEP/Ed previously given. Activity Tolerance  Patient tolerated exercises well. Assessment  Patient demonstrated increased irritability with supine assisted passive stretches in (R) shoulder abduction and external rotation. AAROM within the frontal, sagittal, and horizontal planes were still observed to be limited. Patient demonstrated good motor control with supine exercises working the (R) serratus anterior. Patient demonstrated slightly limited motor control with side lying (R) ER, prone (R) shoulder extension and rows. Uneven shoulder posture was noted when patient performed standing shoulder shrugs, however, shoulders moved evenly though the motion. Goals  STG: (to be met in 9 treatments)  1. Pt will report an average pain level of a 4-5/10 within the (R) shoulder at rest/ADLs. 2. Pt will demonstrate improved AROM within all involved planes to assist with (I) function.      LTG: (to be met in 18 treatments)  1. Pt will report an average pain level of a 2-3/10 within the (R) shoulder at rest/ADLs. 2. Pt will demonstrate full AROM within all involved planes of the (R) shoulder to assist with (I) function. 3. Quick DASH improved by 10%. 4. Pt will demonstrate improved strength within all involved planes to assist with (I) function. 5. Pt will demonstrate independence with his home exercise program.     Plan: [x] Continue per plan of care.    [] Other:      Treatment Charges: Mins Units   []  Modalities     [x]  Ther Exercise 30 2   []  Manual Therapy     []  Ther Activities     []  Aquatics     []  Neuromuscular     [] Vasocompression     [] Gait Training     [] Dry needling        [] 1 or 2 muscles        [] 3 or more muscles     []  Other     Total Treatment time 30 2     Time In:1000           Time Out: 8850    Electronically signed by:  Shruti Davis, EVELYNE

## 2021-05-19 DIAGNOSIS — M79.7 FIBROMYALGIA MUSCLE PAIN: ICD-10-CM

## 2021-05-19 RX ORDER — CYCLOBENZAPRINE HCL 10 MG
TABLET ORAL
Qty: 90 TABLET | Refills: 3 | Status: SHIPPED | OUTPATIENT
Start: 2021-05-19 | End: 2021-07-12 | Stop reason: SDUPTHER

## 2021-05-24 ENCOUNTER — HOSPITAL ENCOUNTER (OUTPATIENT)
Dept: PHYSICAL THERAPY | Age: 51
Setting detail: THERAPIES SERIES
Discharge: HOME | End: 2021-05-24
Payer: MEDICARE

## 2021-05-24 PROCEDURE — 97110 THERAPEUTIC EXERCISES: CPT

## 2021-05-24 NOTE — PROGRESS NOTES
[address] [facility]   [address] [facility] [address]   Phone: [phone]   Fax: [phone]    Physical Therapy Daily Treatment Note    Date:  2021  Patient: Richard Overton  : 1970  MRN: [id number]  Physician: Remi Padgett MD            Medical Diagnosis: Achilles tendinitis of right lower extremity (M76.61)                  Rehab Codes: (R) Achilles Pain  Onset date:  2021  Next Dr's appt.: TBD  PT Visit Information  PT Insurance Information: [place] [employer]  Total # of Visits Approved: 18  Total # of Visits to Date: 3  No Show: 1  Canceled Appointment: 0    Subjective  Symptoms relatively unchanged from his previous treatment session. Pain:  [x] Yes   [] No      Location: (R) ankle near the posterior joint line  Pain Rating: 3/10  Worst: 6/10  Best: 3/10  Descriptors: Constant, achy  Other:     Objective  Modalities:    Precautions:   Exercises:  Exercise Reps/ Time Weight/ Level Comments   Standing Gastroc Stretch  30 sec x 3 reps      Standing (R) SLS  10 reps  10 sec hold With (B) UE support    Standing Heel Raises 10 reps  8\"    Standing Toe Raises 10 reps     Standing BAPS 30 reps  L2 Forward/Backward, Side/Side          Manual:    Specific Instructions for next treatment: Assess irritability at the ankle and progress exercises as tolerated    Pt. Education:  [] Yes  [] No  []  HEP/Ed:   Method of Education: [x] Verbal  [x] Demo  [] Written  HEP:   Comprehension of Education:  [] Verbalizes understanding. [] Demonstrates understanding. [] Needs review. [x] Demonstrates/verbalizes HEP/Ed previously given. Activity Tolerance  Patient tolerated exercises well. Assessment  Continued with current exercise program/able to progress with proprioceptive/balance. Demonstrated well with min cues. Goals  STG: (to be met in 9 treatments)  1. Pt will report decreased \"intermittent\" pain in the (R) ankle at rest and during ADLs.   2. Pt will demonstrate improved frontal and sagittal AROM, and passive DF in the (R) ankle to help improve mobility. 3. Pt will demonstrate ability to perform full (R) ankle frontal and sagittal plane motions in gravity neutral without pain to improve (I) function and mobility. LTG: (to be met in 18 treatments)  1. Pt will report pain <3/10 in the (R) ankle at rest and during ADLs. 2. Pt will demonstrate (R) ankle passive DF WNL and full AROM in the frontal and sagittal planes to help improve mobility. 3. Pt will demonstrate ability to perform (R) ankle frontal and sagittal plane motions against gravity and resistance to help improve (I) function and mobility. 4. LEFS index score will improve by 9 points (Nickie Fairchild Ultr[employer] 112)  5. Pt will demonstrate independence with his home exercise program.     Plan: [x] Continue per plan of care.    [] Other:      Treatment Charges: Mins Units   []  Modalities     [x]  Ther Exercise 15 1   []  Manual Therapy     []  Ther Activities     []  Aquatics     []  Neuromuscular     [] Vasocompression     [] Gait Training     [] Dry needling        [] 1 or 2 muscles        [] 3 or more muscles     []  Other     Total Treatment time 15 1     Time In: 8893          Time Out: 7859    Electronically signed by:  Narda Stephen PT  DPT

## 2021-05-24 NOTE — PROGRESS NOTES
[address] [facility]   [address]   Phone: [phone]   Fax: [phone]    Physical Therapy Daily Treatment Note    Date:  2021  Patient: Bunny Erwin  : 1970MRN: [id number]  Physician: Lachelle Maldonado MD                   Medical Diagnosis: Adhesive capsulitis of right shoulder (M75.01)    Clinical Diagnosis: (R) shoulder pain with mobility deficits   Onset date: 2021       Next Dr's appt.: TBD  PT Visit Information  PT Insurance Information: [place] [employer]   Total # of Visits Approved: 18  Total # of Visits to Date: 3  No Show: 0  Canceled Appointment: 0    Subjective  Patient stated that his (R) shoulder continues to be stiff and sore. Symptoms relatively unchanged from his previous treatment session. Pain:  [x] Yes   [] No      Location: (R) shoulder/anterior region   Pain Ratin/10 without pain medication  Worst: 6/10  Best: 4/10  Descriptors: constant, sharp, stabbing, burning, aching  Other:     Objective  Modalities: Heat x 10 minutes with supine exercises  Precautions:   Exercises:  Exercise Reps/ Time Weight/ Level Comments   Supine (R) shoulder protraction  20 reps x 2 sets  5 lbs     Supine (R) shoulder Os  30 sec x 2 sets   CW, CCW   Side lying (R) shoulder ER with towel 10 reps x 2 sets      Prone (R) shoulder extension 10 reps x 2 sets      Prone (R) shoulder row 10 reps x 2 sets      Shoulder shrugs 20 reps 20 lbs                  Passive Stretching   Supine Flexion with Wand 30 sec hold x 3 reps   Supine Abduction with Wand 30 sec hold x  3 reps  Supine ER with Wand 30 sec hold x 3 reps      Specific Instructions for next treatment: Assess irritability in the shoulder and progress accordingly    Pt. Education:  [] Yes  [] No  [x] Reviewed Prior HEP/Ed  Method of Education: [] Verbal  [] Demo  [] Written  HEP:   Comprehension of Education:  [] Verbalizes understanding. [] Demonstrates understanding.   [x] Needs review. [] Demonstrates/verbalizes HEP/Ed previously given. Activity Tolerance  Patient tolerated exercises well. Assessment  Continued with current exercise program/complaints of increase pain still noted with [address] strengthening. Goals  STG: (to be met in 9 treatments)  1. Pt will report an average pain level of a 4-5/10 within the (R) shoulder at rest/ADLs. 2. Pt will demonstrate improved AROM within all involved planes to assist with (I) function.      LTG: (to be met in 18 treatments)  1. Pt will report an average pain level of a 2-3/10 within the (R) shoulder at rest/ADLs. 2. Pt will demonstrate full AROM within all involved planes of the (R) shoulder to assist with (I) function. 3. Quick DASH improved by 10%. 4. Pt will demonstrate improved strength within all involved planes to assist with (I) function. 5. Pt will demonstrate independence with his home exercise program.     Plan: [x] Continue per plan of care.    [] Other:      Treatment Charges: Mins Units   []  Modalities     [x]  Ther Exercise 30 2   []  Manual Therapy     []  Ther Activities     []  Aquatics     []  Neuromuscular     [] Vasocompression     [] Gait Training     [] Dry needling        [] 1 or 2 muscles        [] 3 or more muscles     []  Other     Total Treatment time 30 2     Time In:1200        Time Out: 1003    Electronically signed by:  Areli Lawrence PT DPT

## 2021-05-24 NOTE — PROGRESS NOTES
797 [facility]   [address] [facility] [address]   Phone: [phone]   Fax: [phone]    Physical Therapy Daily Treatment Note    Date:  2021  Patient: Emily Dubon  : 1970  MRN: [id number]  Physician: UZAIR Ayala  Medical Diagnosis: Acute of the (R) knee (M25.561)               Rehab Codes: (R) knee pain/quad/LE weakness  Onset date:  2021  Next Dr's appt.:   PT Visit Information  PT Insurance Information: [place] Advantage - 30 visits (26 visits remaining)  Total # of Visits Approved: 18  Total # of Visits to Date: 3  No Show: 1  Canceled Appointment: 0    Subjective  Patient stated that his (R) knee still \"pops\" with transitional motions after periods of activity. Relatively pain free upon arrival.     Pain:  [] Yes   [x] No      Location:  Pain Ratin/10  Worst: 0/10   Best: 0/10  Descriptors: Other:     Objective  Modalities:   Precautions:   Exercises:  Exercise Reps/ Time Weight/ Level Comments   Standing 4 way hip  10 reps  20 lbs     Leg press (R) leg only 20 reps x 2 sets  25 lbs    (B) hamstring curls 20 reps x 2 sets  25 lbs    (R) forward step ups 20 reps 8\"    (R) lateral step ups 20 reps 8\"    Manual:    Pt. Education:  [] Yes  [] No  [] Reviewed Prior HEP/Ed  Method of Education: [] Verbal  [] Demo  [] Written  HEP:   Comprehension of Education:  [] Verbalizes understanding. [] Demonstrates understanding. [] Needs review. [] Demonstrates/verbalizes HEP/Ed previously given. Activity Tolerance  Patient tolerated exercises well     Assessment  Continued with current exercise program/able to progress with hip strengthening/closed chain. Demonstrated well with min cues. Goals  STG: (to be met in 6 treatments)  1. Pt will report an average pain of 1-2/10 within the (R) knee at rest/ADLs.     LTG: (to be met in 12 treatments)  1. No complaints of pain will be reported within the (R) knee at rest/ADLs.   2. Pt will demonstrate improved strength within all involved planes to assist with (I) function. 3. LEFS improved by 9 points (Nickie Fairchild [employer] 112)   4. Pt will demonstrate independence with his home exercise program     Plan: [x] Continue per plan of care.    [] Other:      Treatment Charges: Mins Units   []  Modalities     [x]  Ther Exercise 10 1   []  Manual Therapy     []  Ther Activities     []  Aquatics     []  Neuromuscular     [] Vasocompression     [] Gait Training     [] Dry needling        [] 1 or 2 muscles        [] 3 or more muscles     []  Other     Total Treatment time 10 1     Time In:1245            Time Out: 6541    Electronically signed by:  Demario Ford PT DPT

## 2021-05-27 ENCOUNTER — HOSPITAL ENCOUNTER (OUTPATIENT)
Dept: PHYSICAL THERAPY | Age: 51
Setting detail: THERAPIES SERIES
Discharge: HOME | End: 2021-05-27
Payer: MEDICARE

## 2021-05-27 PROCEDURE — 97110 THERAPEUTIC EXERCISES: CPT

## 2021-05-27 NOTE — PROGRESS NOTES
509 [facility] Outpatient Physical Therapy   [address] [place] [address] [address]   Phone: [phone]   Fax: [phone]    Physical Therapy Daily Treatment Note    Date:  2021  Patient: Quinn Parada  : 1970  MRN: [id number]  Physician: UZAIR Henriquez  Medical Diagnosis: Acute of the (R) knee (M25.561)               Rehab Codes: (R) knee pain/quad/LE weakness  Onset date:  2021  Next Dr's appt.:   PT Visit Information  PT Insurance Information: [place] Advantage - 30 visits (26 visits remaining)  Total # of Visits Approved: 18  Total # of Visits to Date: 4  No Show: 1  Canceled Appointment: 0    Subjective    Patient stated that his (R) knee symptoms have got worse since his last treatment session/\"sharp\" pain noted at end range of terminal knee extension (open chain). Pain:  [x] Yes   [] No      Location: (R) knee anterior region/tibial tuberosity   Pain Ratin-3/10  Worst: 2-3/10   Best: 2-3/10  Descriptors: constant, achy and sharp  Other:     Objective  Modalities:   Precautions:   Exercises:  Exercise Reps/ Time Weight/ Level Comments   Standing 4 way hip  10 reps  20 lbs     Leg press (R) leg only 20 reps x 2 sets  25 lbs    (B) hamstring curls 20 reps x 2 sets  25 lbs    (R) forward step ups 20 reps 8\"    (R) lateral step ups 20 reps 8\"    Manual:    Pt. Education:  [x] Yes  [] No  [] Reviewed Prior HEP/Ed  Method of Education: [x] Verbal  [x] Demo  [] Written  HEP: (R) hamstring stretch (seated position) 30 sec hold x 3 reps (Daily 2-3 sessions)   Comprehension of Education:  [] Verbalizes understanding. [] Demonstrates understanding. [x] Needs review. [] Demonstrates/verbalizes HEP/Ed previously given. Activity Tolerance  Patient tolerated exercises well     Assessment   Continued with current exercise program. Significant hamstring tightness was apparent with passive knee extension/possible reason for pain with active range at the terminal end.  Seated hamstring stretch provided/home exercise program.     Goals  STG: (to be met in 6 treatments)  1. Pt will report an average pain of 1-2/10 within the (R) knee at rest/ADLs. LTG: (to be met in 12 treatments)  1. No complaints of pain will be reported within the (R) knee at rest/ADLs. 2. Pt will demonstrate improved strength within all involved planes to assist with (I) function. 3. LEFS improved by 9 points (Nickie Fairchild [employer] 112)   4. Pt will demonstrate independence with his home exercise program     Plan: [x] Continue per plan of care.    [] Other:      Treatment Charges: Mins Units   []  Modalities     [x]  Ther Exercise 15 1   []  Manual Therapy     []  Ther Activities     []  Aquatics     []  Neuromuscular     [] Vasocompression     [] Gait Training     [] Dry needling        [] 1 or 2 muscles        [] 3 or more muscles     []  Other     Total Treatment time 15 1     Time In:1245            Time Out: 1300    Electronically signed by:  Elpidio Mendez PT DPT

## 2021-05-27 NOTE — PROGRESS NOTES
509 [facility] Outpatient Physical Therapy   [address]   Phone: [phone]   Fax: [phone]    Physical Therapy Daily Treatment Note    Date:  2021  Patient: Stefani Jimenez  : 1970  MRN: [id number]  Physician: Raphael Barton MD            Medical Diagnosis: Achilles tendinitis of right lower extremity (M76.61)                  Rehab Codes: (R) Achilles Pain  Onset date:  2021  Next Dr's appt.: TBD  PT Visit Information  PT Insurance Information: [place] Advantage  Total # of Visits Approved: 18  Total # of Visits to Date: 4  No Show: 1  Canceled Appointment: 0    Subjective  Patient stated that he developed increased pain with the foot/ankle since his last treatment session. Unable to wear his AFO or a shoe/had to wear sliders due to the pain     Pain:  [x] Yes   [] No      Location: (R) ankle near the posterior joint line  Pain Ratin/10  Worst: 6/10  Best: 5/10  Descriptors: Constant, achy  Other:     Objective  Modalities:    Precautions:   Exercises:  Exercise Reps/ Time Weight/ Level Comments   Standing Gastroc Stretch  30 sec x 3 reps      Standing (R) SLS  10 reps  10 sec hold With (B) UE support    Standing Heel Raises 10 reps  8\"    Standing Toe Raises 10 reps     Standing BAPS 30 reps  L2 Forward/Backward, Side/Side          Manual:    Specific Instructions for next treatment: Assess irritability at the ankle and progress exercises as tolerated    Pt. Education:  [] Yes  [] No  []  HEP/Ed:   Method of Education: [x] Verbal  [x] Demo  [] Written  HEP:   Comprehension of Education:  [] Verbalizes understanding. [] Demonstrates understanding. [] Needs review. [x] Demonstrates/verbalizes HEP/Ed previously given. Activity Tolerance  Patient tolerated exercises well.     Assessment  Continued with current exercise program/able to complete all of the exercises without an increase in pain even with the pain being elevated upon arrival. Goals  STG: (to be met in 9 treatments)  1. Pt will report decreased \"intermittent\" pain in the (R) ankle at rest and during ADLs. 2. Pt will demonstrate improved frontal and sagittal AROM, and passive DF in the (R) ankle to help improve mobility. 3. Pt will demonstrate ability to perform full (R) ankle frontal and sagittal plane motions in gravity neutral without pain to improve (I) function and mobility. LTG: (to be met in 18 treatments)  1. Pt will report pain <3/10 in the (R) ankle at rest and during ADLs. 2. Pt will demonstrate (R) ankle passive DF WNL and full AROM in the frontal and sagittal planes to help improve mobility. 3. Pt will demonstrate ability to perform (R) ankle frontal and sagittal plane motions against gravity and resistance to help improve (I) function and mobility. 4. LEFS index score will improve by 9 points (Nickie Fairchild [employer] 112)  5. Pt will demonstrate independence with his home exercise program.     Plan: [x] Continue per plan of care.    [] Other:      Treatment Charges: Mins Units   []  Modalities     [x]  Ther Exercise 15 1   []  Manual Therapy     []  Ther Activities     []  Aquatics     []  Neuromuscular     [] Vasocompression     [] Gait Training     [] Dry needling        [] 1 or 2 muscles        [] 3 or more muscles     []  Other     Total Treatment time 15 1     Time In: 3582          Time Out: 5140    Electronically signed by:  Kervin Klein PT  DPT

## 2021-05-29 DIAGNOSIS — I10 ESSENTIAL HYPERTENSION: ICD-10-CM

## 2021-06-07 DIAGNOSIS — N52.9 ERECTILE DYSFUNCTION, UNSPECIFIED ERECTILE DYSFUNCTION TYPE: ICD-10-CM

## 2021-06-07 RX ORDER — SILDENAFIL 100 MG/1
100 TABLET, FILM COATED ORAL PRN
Qty: 30 TABLET | Refills: 0 | Status: SHIPPED | OUTPATIENT
Start: 2021-06-07 | End: 2021-08-17 | Stop reason: SDUPTHER

## 2021-06-08 DIAGNOSIS — F41.9 ANXIETY: ICD-10-CM

## 2021-06-08 DIAGNOSIS — F31.75 BIPOLAR DISORDER, IN PARTIAL REMISSION, MOST RECENT EPISODE DEPRESSED (HCC): ICD-10-CM

## 2021-06-08 RX ORDER — BUSPIRONE HYDROCHLORIDE 10 MG/1
TABLET ORAL
Qty: 90 TABLET | Refills: 3 | Status: SHIPPED | OUTPATIENT
Start: 2021-06-08 | End: 2021-10-06

## 2021-06-14 ENCOUNTER — HOSPITAL ENCOUNTER (OUTPATIENT)
Dept: PHYSICAL THERAPY | Age: 51
Setting detail: THERAPIES SERIES
Discharge: HOME | End: 2021-06-14
Payer: MEDICARE

## 2021-06-14 ENCOUNTER — TELEPHONE (OUTPATIENT)
Dept: FAMILY MEDICINE CLINIC | Age: 51
End: 2021-06-14

## 2021-06-14 PROCEDURE — 97110 THERAPEUTIC EXERCISES: CPT

## 2021-06-14 NOTE — TELEPHONE ENCOUNTER
I printed the letter, if didn't come out, please reprint    I placed his problem list, surgical history, doing PT and I see the appointments done, and last Functional capacity evaluation date      Future Appointments   Date Time Provider Maribell Renee   8/11/2021  3:15 PM Andres Bright MD fp erika Beebe

## 2021-06-14 NOTE — TELEPHONE ENCOUNTER
Its not that he can't work but just needs a letter stating he is a patient of yours with medical conditions/previous injuries, that he sees physical therapy. He states he thinks they are just trying to help him obtain a job that he would be able to work.

## 2021-06-14 NOTE — TELEPHONE ENCOUNTER
Patient states he needs a letter for [place] [address] stating he is under your care, to list all his medical conditions, and that he is unable to work due to all his injuries for his insurance, medical, and food assistance. Case Number: [id number]. Fax to [phone]. Please advise.

## 2021-06-14 NOTE — TELEPHONE ENCOUNTER
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

## 2021-06-14 NOTE — PROGRESS NOTES
509 [facility] Outpatient Physical Therapy   [address]   Phone: [phone]   Fax: [phone]    Physical Therapy Daily Treatment Note    Date:  2021  Patient: Joseph Bender  : 1970MRN: [id number]  Physician: Jan Fofana MD                   Medical Diagnosis: Adhesive capsulitis of right shoulder (M75.01)    Clinical Diagnosis: (R) shoulder pain with mobility deficits   Onset date: 2021       Next Dr's appt.: TBD  PT Visit Information  PT Insurance Information: [place] Advantage   Total # of Visits Approved: 18  Total # of Visits to Date: 5  No Show: 0  Canceled Appointment: 0    Subjective  Patient stated that his shoulder has been very painful since his last treatment session. No isolated incident/trauma reported. He has been trying to stretch/use heat without success. Pain:  [x] Yes   [] No      Location: (R) shoulder/anterior region   Pain Rating: 3-4/10 without pain medication  Worst: 7/10 @ night   Best: 3/10  Descriptors: constant, sharp, stabbing, burning, aching  Other:     Objective  Modalities: Heat x 10 minutes with supine exercises  Precautions:   Exercises:  Exercise Reps/ Time Weight/ Level Comments   Supine (R) shoulder protraction  20 reps x 2 sets  5 lbs     Supine (R) shoulder Os  30 sec x 2 sets   CW, CCW   Side lying (R) shoulder ER with towel 10 reps x 2 sets      Prone (R) shoulder extension 10 reps x 2 sets      Prone (R) shoulder row 10 reps x 2 sets      Shoulder shrugs 20 reps 20 lbs    Seated shoulder depression off the edge of table  5 reps x 2 sets      Standing (R) shoulder IR  10 reps x 2 sets  20 lbs      Passive Stretching   Supine Flexion with Wand 30 sec hold x 3 reps   Supine Abduction with Wand 30 sec hold x  3 reps  Supine ER with Wand 30 sec hold x 3 reps      Specific Instructions for next treatment: Assess irritability in the shoulder and progress accordingly    Pt.  Education:  [] Yes  [] No  [x] Reviewed Prior HEP/Ed  Method of Education: [] Verbal  [] Demo  [] Written  HEP:   Comprehension of Education:  [] Verbalizes understanding. [] Demonstrates understanding. [x] Needs review. [] Demonstrates/verbalizes HEP/Ed previously given. Activity Tolerance  Patient tolerated exercises well. Assessment  Continued with current exercise program. Able to progress with RTC strengthening. Demonstrated well with min cues. Goals  STG: (to be met in 9 treatments)  1. Pt will report an average pain level of a 4-5/10 within the (R) shoulder at rest/ADLs. 2. Pt will demonstrate improved AROM within all involved planes to assist with (I) function. LTG: (to be met in 18 treatments)  1. Pt will report an average pain level of a 2-3/10 within the (R) shoulder at rest/ADLs. 2. Pt will demonstrate full AROM within all involved planes of the (R) shoulder to assist with (I) function. 3. Quick DASH improved by 10%. 4. Pt will demonstrate improved strength within all involved planes to assist with (I) function. 5. Pt will demonstrate independence with his home exercise program.     Plan: [x] Continue per plan of care.    [] Other:      Treatment Charges: Mins Units   []  Modalities     [x]  Ther Exercise 30 2   []  Manual Therapy     []  Ther Activities     []  Aquatics     []  Neuromuscular     [] Vasocompression     [] Gait Training     [] Dry needling        [] 1 or 2 muscles        [] 3 or more muscles     []  Other     Total Treatment time 30 2     Time In:1130       Time Out: 1200    Electronically signed by:  Maty Root PT DPT

## 2021-06-14 NOTE — PROGRESS NOTES
509 [facility] Outpatient Physical Therapy   [address] [facility] [address]   Phone: [phone]   Fax: [phone]    Physical Therapy Daily Treatment Note    Date:  2021  Patient: Elijah Gerber  : 1970  MRN: [id number]  Physician: UZAIR Fallon  Medical Diagnosis: Acute of the (R) knee (M25.561)               Rehab Codes: (R) knee pain/quad/LE weakness  Onset date:  2021  Next Dr's appt.:   PT Visit Information  PT Insurance Information: [place] Advantage - 30 visits (26 visits remaining)  Total # of Visits Approved: 18  Total # of Visits to Date: 5  No Show: 1  Canceled Appointment: 0    Subjective    Patient stated that his (R) knee is still \"popping\" along with a 'brief\" sharp pain within the proximal region of the (R) knee with terminal knee extension/\"giving out\" sensation at times. Pain:  [x] Yes   [] No      Location: (R) knee anterior region/tibial tuberosity   Pain Ratin-3/10  Worst: 2-3/10   Best: 2-3/10  Descriptors: constant, achy and sharp  Other:     Objective  Modalities:   Precautions:   Exercises:  Exercise Reps/ Time Weight/ Level Comments   Standing 4 way hip  10 reps  20 lbs     Leg press (R) leg only 20 reps x 2 sets  25 lbs    (B) hamstring curls 20 reps x 2 sets  25 lbs    (R) forward step ups 20 reps 8\"    (R) lateral step ups 20 reps 8\"    Manual:    Pt. Education:  [x] Yes  [] No  [x] Reviewed Prior HEP/Ed  Method of Education: [x] Verbal  [x] Demo  [] Written  HEP:   Comprehension of Education:  [] Verbalizes understanding. [] Demonstrates understanding. [] Needs review. [x] Demonstrates/verbalizes HEP/Ed previously given. Activity Tolerance  Patient tolerated exercises well     Assessment   Continued with current exercise program/demonstrated well with min cues. Goals  STG: (to be met in 6 treatments)  1. Pt will report an average pain of 1-2/10 within the (R) knee at rest/ADLs. LTG: (to be met in 12 treatments)  1.  No complaints of pain will be reported within the (R) knee at rest/ADLs. 2. Pt will demonstrate improved strength within all involved planes to assist with (I) function. 3. LEFS improved by 9 points (Nickie Fairchild Ultramar 112)   4. Pt will demonstrate independence with his home exercise program     Plan: [x] Continue per plan of care.    [] Other:      Treatment Charges: Mins Units   []  Modalities     [x]  Ther Exercise 15 1   []  Manual Therapy     []  Ther Activities     []  Aquatics     []  Neuromuscular     [] Vasocompression     [] Gait Training     [] Dry needling        [] 1 or 2 muscles        [] 3 or more muscles     []  Other     Total Treatment time 15 1     Time In:1200          Time Out: 0371    Electronically signed by:  Josias Euceda PT DPT

## 2021-06-14 NOTE — LETTER
[facility]  [address] [place], [place] [address] [address] [address]  Phone: [phone]  Fax: [phone]    Dareen Goltz, MD        2021    [address] [place] [id number]    Case Number: [id number]  Fax to [phone]    To Whom It May Concern,    Buck Dunbar,  1970,  is my patient. He has the following medical conditions,surgical history, and he is currently doing physical therapy and seeing orthopedics. His last Functional capacity evaluation was done 3/27/18 and if needed, we can fax it to you.       Patient Active Problem List   Diagnosis    Right knee pain    Chronic fatigue    Hyperlipidemia with target LDL less than 100    Seasonal allergies    Hemorrhoids    Right shoulder pain    Calcium nephrolithiasis    Fatty liver disease, nonalcoholic    Tattoos    Anxiety    Bicipital tendinitis, right shoulder    Hyperglycemia    Bipolar disorder, in partial remission, most recent episode depressed ([address]    Fibromyalgia muscle pain    Essential hypertension    S/P Achilles tendon repair    Abnormal EKG    Impingement syndrome of right shoulder    Tear of right supraspinatus tendon    Achilles tendinitis    Incomplete rotatr-cuff tear/ruptr of r shoulder, not trauma    Primary osteoarthritis, right shoulder    Allergic rhinitis due to allergen    Vitamin B 12 deficiency    Vitamin D deficiency    Chronic gout of right ankle    Foot drop, right    Numbness and tingling of foot    Peroneal neuropathy, right    Axonal neuropathy    Bilateral hearing loss    Erectile dysfunction     Past Surgical History:   Procedure Laterality Date    ACHILLES TENDON SURGERY Right     4 different surgeries for this,over the last 2 years    FOOT TENDON SURGERY Right     5 surgeries for Achilles tendon    ROTATOR CUFF REPAIR Left 2011    SHOULDER SURGERY  2018    SHOULDER SURGERY  right    2 surgeries    SKIN GRAFT Right 8/3/2020    DEBRIDEMENT LATERAL LOWER  LEG W/EPIFIX performed by Randy Nichols DPM at [facility]. Right 6/91/9058    APPLICATION INTEGRA BIOLAYER GRAFT - LEG performed by Judy Salinas DPM at [place] [address]           If you have any questions or concerns, please don't hesitate to call.     Sincerely,        Yuridia Rodriguez MD

## 2021-06-14 NOTE — PROGRESS NOTES
difficulty. Goals  STG: (to be met in 9 treatments)  1. Pt will report decreased \"intermittent\" pain in the (R) ankle at rest and during ADLs. 2. Pt will demonstrate improved frontal and sagittal AROM, and passive DF in the (R) ankle to help improve mobility. 3. Pt will demonstrate ability to perform full (R) ankle frontal and sagittal plane motions in gravity neutral without pain to improve (I) function and mobility. LTG: (to be met in 18 treatments)  1. Pt will report pain <3/10 in the (R) ankle at rest and during ADLs. 2. Pt will demonstrate (R) ankle passive DF WNL and full AROM in the frontal and sagittal planes to help improve mobility. 3. Pt will demonstrate ability to perform (R) ankle frontal and sagittal plane motions against gravity and resistance to help improve (I) function and mobility. 4. LEFS index score will improve by 9 points (Nickie Fairchild [employer] 112)  5. Pt will demonstrate independence with his home exercise program.     Plan: [x] Continue per plan of care.    [] Other:      Treatment Charges: Mins Units   []  Modalities     [x]  Ther Exercise 15 1   []  Manual Therapy     []  Ther Activities     []  Aquatics     []  Neuromuscular     [] Vasocompression     [] Gait Training     [] Dry needling        [] 1 or 2 muscles        [] 3 or more muscles     []  Other     Total Treatment time 15 1     Time In: 7594          Time Out: 2770    Electronically signed by:  Jami Dwyer PT  DPT

## 2021-06-15 DIAGNOSIS — I10 ESSENTIAL HYPERTENSION: ICD-10-CM

## 2021-06-15 DIAGNOSIS — R60.0 BILATERAL LEG EDEMA: ICD-10-CM

## 2021-06-15 RX ORDER — POTASSIUM CHLORIDE 750 MG/1
TABLET, EXTENDED RELEASE ORAL
Qty: 180 TABLET | Refills: 3 | Status: SHIPPED | OUTPATIENT
Start: 2021-06-15 | End: 2022-05-31

## 2021-06-23 ENCOUNTER — TELEPHONE (OUTPATIENT)
Dept: FAMILY MEDICINE CLINIC | Age: 51
End: 2021-06-23

## 2021-06-23 NOTE — TELEPHONE ENCOUNTER
Patient is on both clonidine and metoprolol per cardiologist  Received notification from [place] regarding risk of bradycardia  Last heart rate was normal, will continue to monitor and will notify him to self monitor blood pressure and heart rate  BP Readings from Last 3 Encounters:   03/12/21 110/70   02/19/21 114/78   11/23/20 122/76        Pulse Readings from Last 3 Encounters:   03/12/21 90   02/19/21 88   01/28/21 (!) 48       Future Appointments   Date Time Provider Maribell Duran   6/25/2021  1:45 PM Caity Hope PT [facility] [address] [place] [address]   6/28/2021  2:30 PM Caity Hope PT [facility] [address] [place] [address]   6/30/2021  1:00 PM [address] [place] [address]   8/11/2021  3:15 PM Juliana Nunez MD [facility]

## 2021-06-25 ENCOUNTER — HOSPITAL ENCOUNTER (OUTPATIENT)
Dept: PHYSICAL THERAPY | Age: 51
Setting detail: THERAPIES SERIES
Discharge: HOME | End: 2021-06-25
Payer: MEDICARE

## 2021-06-25 PROCEDURE — 97110 THERAPEUTIC EXERCISES: CPT

## 2021-06-25 NOTE — PROGRESS NOTES
509 [facility] Outpatient Physical Therapy   [address] [place] [address] [address]   Phone: [phone]   Fax: [phone]    Physical Therapy Daily Treatment Note    Date:  2021  Patient: Mark Dominguez  : 1970  MRN: [id number]  Physician: UZAIR Fallon  Medical Diagnosis: Acute of the (R) knee (M25.561)               Rehab Codes: (R) knee pain/quad/LE weakness  Onset date:  2021  Next Dr's appt.:   PT Visit Information  PT Insurance Information: [place] Advantage - 30 visits (26 visits remaining)  Total # of Visits Approved: 18  Total # of Visits to Date: 6  No Show: 4  Canceled Appointment: 1    Subjective  Patient stated that he still experiences \"giving out\" episodes within the knee but was relatively pain-free within the knee and has been over the last 24 hours. Pain:  [] Yes   [x] No      Location:   Pain Rating:  Worst:   Best:  Descriptors: Other:     Objective  Modalities:   Precautions:   Exercises:  Exercise Reps/ Time Weight/ Level Comments   Standing 4 way hip  10 reps  20 lbs     Leg press (R) leg only 20 reps x 2 sets  25 lbs    (B) hamstring curls 20 reps x 2 sets  25 lbs    (R) forward step ups 20 reps 8\"    (R) lateral step ups 20 reps 8\"    Manual:    Pt. Education:  [x] Yes  [] No  [] Reviewed Prior HEP/Ed  Method of Education: [x] Verbal  [x] Demo  [] Written  HEP:   Comprehension of Education:  [] Verbalizes understanding. [] Demonstrates understanding. [] Needs review. [] Demonstrates/verbalizes HEP/Ed previously given. Activity Tolerance  Patient tolerated exercises well     Assessment   Continued with current exercise program/demonstrated well with min cues. Goals  STG: (to be met in 6 treatments)  1. Pt will report an average pain of 1-2/10 within the (R) knee at rest/ADLs. LTG: (to be met in 12 treatments)  1. No complaints of pain will be reported within the (R) knee at rest/ADLs.   2. Pt will demonstrate improved strength within all involved planes to assist with (I) function. 3. LEFS improved by 9 points (Nickie Fairchild [employer] 112)   4. Pt will demonstrate independence with his home exercise program     Plan: [x] Continue per plan of care.    [] Other:      Treatment Charges: Mins Units   []  Modalities     [x]  Ther Exercise 15 1   []  Manual Therapy     []  Ther Activities     []  Aquatics     []  Neuromuscular     [] Vasocompression     [] Gait Training     [] Dry needling        [] 1 or 2 muscles        [] 3 or more muscles     []  Other     Total Treatment time 15 1     Time In: 1400          Time Out: 9181    Electronically signed by:  Frankie Tony PT DPT

## 2021-06-25 NOTE — PROGRESS NOTES
[address] [facility]   [address] [facility] [address]   Phone: [phone]   Fax: [phone]    Physical Therapy Daily Treatment Note     Date:  2021  Patient: Emily Dubon  : 1970MRN: [id number]  Physician: Jose Rodas MD                   Medical Diagnosis: Adhesive capsulitis of right shoulder (M75.01)    Clinical Diagnosis: (R) shoulder pain with mobility deficits   Onset date: 2021       Next Dr's appt.: TBD  PT Visit Information  PT Insurance Information: [place] [employer]   Total # of Visits Approved: 18  Total # of Visits to Date: 6  No Show: 4  Canceled Appointment: 1    Subjective  Patient stated that his symptoms are relatively unchanged since his last treatment session. Pain:  [x] Yes   [] No      Location: (R) shoulder/anterior region   Pain Rating: 3-4/10 without pain medication  Worst: 7/10 @ night   Best: 3/10  Descriptors: constant, sharp, stabbing, burning, aching  Other:     Objective  Modalities: Heat x 10 minutes with supine exercises  Precautions:   Exercises:  Exercise Reps/ Time Weight/ Level Comments   Supine (R) shoulder protraction  20 reps x 2 sets  5 lbs     Supine (R) shoulder Os  30 sec x 2 sets   CW, CCW   Side lying (R) shoulder ER with towel 10 reps x 2 sets      Prone (R) shoulder extension 10 reps x 2 sets      Prone (R) shoulder row 10 reps x 2 sets      Shoulder shrugs 20 reps 20 lbs    Seated shoulder depression off the edge of table  5 reps x 2 sets      Standing (R) shoulder IR  10 reps x 2 sets  20 lbs      Passive Stretching   Supine Flexion with Wand 30 sec hold x 3 reps   Supine Abduction with Wand 30 sec hold x  3 reps  Supine ER with Wand 30 sec hold x 3 reps      Specific Instructions for next treatment: Assess irritability in the shoulder and progress accordingly    Pt.  Education:  [] Yes  [] No  [x] Reviewed Prior HEP/Ed  Method of Education: [] Verbal  [] Demo  [] Written  HEP:   Comprehension of Education:  [] Verbalizes understanding. [] Demonstrates understanding. [x] Needs review. [] Demonstrates/verbalizes HEP/Ed previously given. Activity Tolerance  Patient tolerated exercises well. Assessment  Continued with current exercise program. Demonstrated well with min cues. Goals  STG: (to be met in 9 treatments)  1. Pt will report an average pain level of a 4-5/10 within the (R) shoulder at rest/ADLs. 2. Pt will demonstrate improved AROM within all involved planes to assist with (I) function. LTG: (to be met in 18 treatments)  1. Pt will report an average pain level of a 2-3/10 within the (R) shoulder at rest/ADLs. 2. Pt will demonstrate full AROM within all involved planes of the (R) shoulder to assist with (I) function. 3. Quick DASH improved by 10%. 4. Pt will demonstrate improved strength within all involved planes to assist with (I) function. 5. Pt will demonstrate independence with his home exercise program.     Plan: [x] Continue per plan of care.    [] Other:      Treatment Charges: Mins Units   []  Modalities     [x]  Ther Exercise 30 2   []  Manual Therapy     []  Ther Activities     []  Aquatics     []  Neuromuscular     [] Vasocompression     [] Gait Training     [] Dry needling        [] 1 or 2 muscles        [] 3 or more muscles     []  Other     Total Treatment time 30 2     Time In: 7220      Time Out: 8537    Electronically signed by:  Dominique Carpenter PT DPLALITO

## 2021-06-25 NOTE — PROGRESS NOTES
[facility] Outpatient Physical Therapy   [address]   Phone: [phone]   Fax: [phone]    Physical Therapy Daily Treatment Note    Date:  2021  Patient: Susannah Colon  : 1970  MRN: [id number]  Physician: Yuridia Rodriguez MD            Medical Diagnosis: Achilles tendinitis of right lower extremity (M76.61)                  Rehab Codes: (R) Achilles Pain  Onset date:  2021  Next Dr's appt.: TBD  PT Visit Information  PT Insurance Information: [place] [employer]  Total # of Visits Approved: 18  Total # of Visits to Date: 6  No Show: 4  Canceled Appointment: 1    Subjective  Patient stated the (R) ankle began to swell  evening/cause unknown. No isolated incident/trauma. Progressively got worse over the next several days until Wednesday night. Increased swelling, hypersensitivity and pain. Unable to wear his shoe or AFO over this period of time. Used a compression sock, ice, elevation and rested. Able to sleep better last night compared to the previous night due to the pain/swelling. Swelling still slightly present per patient/able to wear AFO and shoe since Thursday morning. Comment: Advised patient to contact his physician regarding this issue/not the first occurrence over a short period of time. Pain:  [x] Yes   [] No      Location: (R) ankle near the posterior joint line  Pain Ratin/10  Worst: 6/10  Best: 5/10  Descriptors: Constant, achy  Other:     Objective  Modalities:    Precautions:   Exercises:  Exercise Reps/ Time Weight/ Level Comments   Standing Gastroc Stretch  30 sec x 3 reps      Standing (R) SLS  5 reps  10 sec hold With (B) UE support    Standing Heel Raises 10 reps  8\"    Standing Toe Raises 10 reps     Standing BAPS 30 reps  L2 Forward/Backward, Side/Side          Manual:    Specific Instructions for next treatment: Assess irritability at the ankle and progress exercises as tolerated    Pt.  Education:  [] Yes  [] No []  HEP/Ed:   Method of Education: [x] Verbal  [x] Demo  [] Written  HEP:   Comprehension of Education:  [] Verbalizes understanding. [] Demonstrates understanding. [] Needs review. [x] Demonstrates/verbalizes HEP/Ed previously given. Activity Tolerance  Patient tolerated exercises well. Assessment  Continued with current exercise program/demonstrated well without difficulty. Goals  STG: (to be met in 9 treatments)  1. Pt will report decreased \"intermittent\" pain in the (R) ankle at rest and during ADLs. 2. Pt will demonstrate improved frontal and sagittal AROM, and passive DF in the (R) ankle to help improve mobility. 3. Pt will demonstrate ability to perform full (R) ankle frontal and sagittal plane motions in gravity neutral without pain to improve (I) function and mobility. LTG: (to be met in 18 treatments)  1. Pt will report pain <3/10 in the (R) ankle at rest and during ADLs. 2. Pt will demonstrate (R) ankle passive DF WNL and full AROM in the frontal and sagittal planes to help improve mobility. 3. Pt will demonstrate ability to perform (R) ankle frontal and sagittal plane motions against gravity and resistance to help improve (I) function and mobility. 4. LEFS index score will improve by 9 points (Nickie Fairchild [employer] 112)  5. Pt will demonstrate independence with his home exercise program.     Plan: [x] Continue per plan of care.    [] Other:      Treatment Charges: Mins Units   []  Modalities     [x]  Ther Exercise 15 1   []  Manual Therapy     []  Ther Activities     []  Aquatics     []  Neuromuscular     [] Vasocompression     [] Gait Training     [] Dry needling        [] 1 or 2 muscles        [] 3 or more muscles     []  Other     Total Treatment time 15 1     Time In: 6990         Time Out: 1400    Electronically signed by:  Maria Elena Stone PT  DPT

## 2021-06-28 DIAGNOSIS — E78.5 HYPERLIPIDEMIA WITH TARGET LDL LESS THAN 100: ICD-10-CM

## 2021-06-28 DIAGNOSIS — E78.1 HYPERTRIGLYCERIDEMIA: ICD-10-CM

## 2021-06-29 RX ORDER — AMOXICILLIN 500 MG
CAPSULE ORAL
Qty: 360 CAPSULE | Refills: 3 | Status: SHIPPED | OUTPATIENT
Start: 2021-06-29 | End: 2022-07-11 | Stop reason: SDUPTHER

## 2021-06-30 ENCOUNTER — HOSPITAL ENCOUNTER (OUTPATIENT)
Dept: PHYSICAL THERAPY | Age: 51
Setting detail: THERAPIES SERIES
Discharge: HOME | End: 2021-06-30
Payer: MEDICARE
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
## 2021-07-12 ENCOUNTER — OFFICE VISIT (OUTPATIENT)
Dept: FAMILY MEDICINE CLINIC | Age: 51
End: 2021-07-12
Payer: MEDICARE

## 2021-07-12 ENCOUNTER — NURSE TRIAGE (OUTPATIENT)
Dept: OTHER | Facility: CLINIC | Age: 51
End: 2021-07-12

## 2021-07-12 VITALS
SYSTOLIC BLOOD PRESSURE: 106 MMHG | OXYGEN SATURATION: 99 % | HEIGHT: 71 IN | TEMPERATURE: 98.4 F | DIASTOLIC BLOOD PRESSURE: 70 MMHG | BODY MASS INDEX: 25.2 KG/M2 | HEART RATE: 74 BPM | WEIGHT: 180 LBS

## 2021-07-12 DIAGNOSIS — M25.50 CHRONIC PAIN OF MULTIPLE JOINTS: ICD-10-CM

## 2021-07-12 DIAGNOSIS — G57.31 PERONEAL NEUROPATHY, RIGHT: ICD-10-CM

## 2021-07-12 DIAGNOSIS — R60.0 BILATERAL LEG EDEMA: ICD-10-CM

## 2021-07-12 DIAGNOSIS — M21.371 FOOT DROP, RIGHT: ICD-10-CM

## 2021-07-12 DIAGNOSIS — G89.29 CHRONIC PAIN OF MULTIPLE JOINTS: ICD-10-CM

## 2021-07-12 DIAGNOSIS — L03.115 CELLULITIS OF RIGHT LOWER EXTREMITY: Primary | ICD-10-CM

## 2021-07-12 PROCEDURE — G8419 CALC BMI OUT NRM PARAM NOF/U: HCPCS | Performed by: FAMILY MEDICINE

## 2021-07-12 PROCEDURE — G8427 DOCREV CUR MEDS BY ELIG CLIN: HCPCS | Performed by: FAMILY MEDICINE

## 2021-07-12 PROCEDURE — 1036F TOBACCO NON-USER: CPT | Performed by: FAMILY MEDICINE

## 2021-07-12 PROCEDURE — 99213 OFFICE O/P EST LOW 20 MIN: CPT | Performed by: FAMILY MEDICINE

## 2021-07-12 RX ORDER — CYCLOBENZAPRINE HCL 10 MG
TABLET ORAL
Qty: 90 TABLET | Refills: 1 | Status: SHIPPED | OUTPATIENT
Start: 2021-07-12 | End: 2021-09-07

## 2021-07-12 RX ORDER — CEPHALEXIN 500 MG/1
500 CAPSULE ORAL 3 TIMES DAILY
Qty: 21 CAPSULE | Refills: 0 | Status: SHIPPED | OUTPATIENT
Start: 2021-07-12 | End: 2021-07-19

## 2021-07-12 RX ORDER — METHYLPREDNISOLONE 4 MG/1
TABLET ORAL
Qty: 1 KIT | Refills: 0 | Status: SHIPPED | OUTPATIENT
Start: 2021-07-12 | End: 2021-08-11

## 2021-07-12 SDOH — ECONOMIC STABILITY: FOOD INSECURITY: WITHIN THE PAST 12 MONTHS, THE FOOD YOU BOUGHT JUST DIDN'T LAST AND YOU DIDN'T HAVE MONEY TO GET MORE.: NEVER TRUE

## 2021-07-12 SDOH — ECONOMIC STABILITY: FOOD INSECURITY: WITHIN THE PAST 12 MONTHS, YOU WORRIED THAT YOUR FOOD WOULD RUN OUT BEFORE YOU GOT MONEY TO BUY MORE.: SOMETIMES TRUE

## 2021-07-12 ASSESSMENT — PATIENT HEALTH QUESTIONNAIRE - PHQ9
SUM OF ALL RESPONSES TO PHQ QUESTIONS 1-9: 0
10. IF YOU CHECKED OFF ANY PROBLEMS, HOW DIFFICULT HAVE THESE PROBLEMS MADE IT FOR YOU TO DO YOUR WORK, TAKE CARE OF THINGS AT HOME, OR GET ALONG WITH OTHER PEOPLE: 0
8. MOVING OR SPEAKING SO SLOWLY THAT OTHER PEOPLE COULD HAVE NOTICED. OR THE OPPOSITE, BEING SO FIGETY OR RESTLESS THAT YOU HAVE BEEN MOVING AROUND A LOT MORE THAN USUAL: 0
9. THOUGHTS THAT YOU WOULD BE BETTER OFF DEAD, OR OF HURTING YOURSELF: 0
1. LITTLE INTEREST OR PLEASURE IN DOING THINGS: 0
2. FEELING DOWN, DEPRESSED OR HOPELESS: 0
4. FEELING TIRED OR HAVING LITTLE ENERGY: 0
3. TROUBLE FALLING OR STAYING ASLEEP: 0
5. POOR APPETITE OR OVEREATING: 0
6. FEELING BAD ABOUT YOURSELF - OR THAT YOU ARE A FAILURE OR HAVE LET YOURSELF OR YOUR FAMILY DOWN: 0
SUM OF ALL RESPONSES TO PHQ QUESTIONS 1-9: 0
7. TROUBLE CONCENTRATING ON THINGS, SUCH AS READING THE NEWSPAPER OR WATCHING TELEVISION: 0
SUM OF ALL RESPONSES TO PHQ9 QUESTIONS 1 & 2: 0
SUM OF ALL RESPONSES TO PHQ QUESTIONS 1-9: 0

## 2021-07-12 ASSESSMENT — ENCOUNTER SYMPTOMS
ABDOMINAL PAIN: 0
SHORTNESS OF BREATH: 0
RESPIRATORY NEGATIVE: 1

## 2021-07-12 ASSESSMENT — SOCIAL DETERMINANTS OF HEALTH (SDOH): HOW HARD IS IT FOR YOU TO PAY FOR THE VERY BASICS LIKE FOOD, HOUSING, MEDICAL CARE, AND HEATING?: SOMEWHAT HARD
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
## 2021-07-12 NOTE — TELEPHONE ENCOUNTER
Future Appointments   Date Time Provider Maribell Duran   7/12/2021  1:15 PM [facility]   7/14/2021  1:00 PM RASHARD Coyne [facility] PT [facility]   8/11/2021  3:15 PM [facility]

## 2021-07-12 NOTE — TELEPHONE ENCOUNTER
Reason for Disposition   Looks like a boil, infected sore, deep ulcer, or other infected rash (spreading redness, pus)    Answer Assessment - Initial Assessment Questions  1. ONSET: \"When did the swelling start? \" (e.g., minutes, hours, days)      Off and on for 3 weeks, but last 4-5 days the pain is continuous. 2. LOCATION: \"What part of the leg is swollen? \"  \"Are both legs swollen or just one leg? \"      Right side of calf right below knee down to foot. 3. SEVERITY: \"How bad is the swelling? \" (e.g., localized; mild, moderate, severe)   - Localized - small area of swelling localized to one leg   - MILD pedal edema - swelling limited to foot and ankle, pitting edema < 1/4 inch (6 mm) deep, rest and elevation eliminate most or all swelling   - MODERATE edema - swelling of lower leg to knee, pitting edema > 1/4 inch (6 mm) deep, rest and elevation only partially reduce swelling   - SEVERE edema - swelling extends above knee, facial or hand swelling present       Mild to moderate    4. REDNESS: \"Does the swelling look red or infected? \"      Redness around burned area     5. PAIN: \"Is the swelling painful to touch? \" If so, ask: \"How painful is it? \"   (Scale 1-10; mild, moderate or severe)      Painful even when sitting    6. FEVER: \"Do you have a fever? \" If so, ask: \"What is it, how was it measured, and when did it start? \"       I don't feel feverish. 7. CAUSE: \"What do you think is causing the leg swelling? \"      It's been coming and going,  Physical therapy has been aggravating, but didn't have any PT last week    8. MEDICAL HISTORY: \"Do you have a history of heart failure, kidney disease, liver failure, or cancer? \"      Denies    9. RECURRENT SYMPTOM: \"Have you had leg swelling before? \" If so, ask: \"When was the last time? \" \"What happened that time? \"      Once before but it's not exactly the same. ,maybe a year ago. I was hospitalized and placed on antibiotic    10.  OTHER SYMPTOMS: \"Do you have any other symptoms? \" (e.g., chest pain, difficulty breathing)        Tingling in foot(sometimes above ankle),  I have drop foot, so I have significant weakness. 11. PREGNANCY: \"Is there any chance you are pregnant? \" \"When was your last menstrual period? \"        No        Ham string \"pops\" out when I m laying down(very painful) and calf muscle cramps with charley horse. Protocols used: LEG SWELLING AND EDEMA-ADULT-OH    Received call from [facility] with [employer] Complaint. Brief description of triage: right lower leg pain, swelling and tingling. Triage indicates for patient to be seen today    Care advice provided, patient verbalizes understanding; denies any other questions or concerns; instructed to call back for any new or worsening symptoms. Writer provided warm transfer to [place] [facility] for appointment scheduling. Attention Provider: Thank you for allowing me to participate in the care of your patient. The patient was connected to triage in response to information provided to the E[facility]. Please do not respond through this encounter as the response is not directed to a shared pool.

## 2021-07-12 NOTE — PATIENT INSTRUCTIONS
scrapes, or other injuries to your skin. Cellulitis most often occurs where there is a break in the skin. · If you get a scrape, cut, mild burn, or bite, wash the wound with clean water as soon as you can to help avoid infection. Don't use hydrogen peroxide or alcohol, which can slow healing. · If you have swelling in your legs (edema), support stockings and good skin care may help prevent leg sores and cellulitis. · Take care of your feet, especially if you have diabetes or other conditions that increase the risk of infection. Wear shoes and socks. Do not go barefoot. If you have athlete's foot or other skin problems on your feet, talk to your doctor about how to treat them. When should you call for help? Call your doctor now or seek immediate medical care if:    · You have signs that your infection is getting worse, such as:  ? Increased pain, swelling, warmth, or redness. ? Red streaks leading from the area. ? Pus draining from the area. ? A fever.     · You get a rash. Watch closely for changes in your health, and be sure to contact your doctor if:    · You do not get better as expected. Where can you learn more? Go to [email or web address] and sign in to your Kyp account. Enter L397 in the [facility] box to learn more about \"Cellulitis: Care Instructions. \"     If you do not have an account, please click on the \"Sign Up Now\" link. Current as of: March 3, 2021               Content Version: 12.9  © 2006-2021 Healthwise, Incorporated. Care instructions adapted under license by [facility] ([facility]). If you have questions about a medical condition or this instruction, always ask your healthcare professional. [place] [address] any warranty or liability for your use of this information.        15

## 2021-07-15 ENCOUNTER — TELEMEDICINE (OUTPATIENT)
Dept: FAMILY MEDICINE CLINIC | Age: 51
End: 2021-07-15
Payer: MEDICARE

## 2021-07-15 DIAGNOSIS — M19.011 PRIMARY OSTEOARTHRITIS, RIGHT SHOULDER: ICD-10-CM

## 2021-07-15 DIAGNOSIS — F31.75 BIPOLAR DISORDER, IN PARTIAL REMISSION, MOST RECENT EPISODE DEPRESSED (HCC): ICD-10-CM

## 2021-07-15 DIAGNOSIS — Z98.890 S/P ACHILLES TENDON REPAIR: ICD-10-CM

## 2021-07-15 DIAGNOSIS — M75.41 IMPINGEMENT SYNDROME OF RIGHT SHOULDER: ICD-10-CM

## 2021-07-15 DIAGNOSIS — M75.21 BICIPITAL TENDINITIS, RIGHT SHOULDER: ICD-10-CM

## 2021-07-15 DIAGNOSIS — M79.7 FIBROMYALGIA MUSCLE PAIN: Primary | ICD-10-CM

## 2021-07-15 DIAGNOSIS — M79.89 RIGHT LEG SWELLING: ICD-10-CM

## 2021-07-15 DIAGNOSIS — H91.93 BILATERAL HEARING LOSS, UNSPECIFIED HEARING LOSS TYPE: ICD-10-CM

## 2021-07-15 PROCEDURE — G8427 DOCREV CUR MEDS BY ELIG CLIN: HCPCS | Performed by: FAMILY MEDICINE

## 2021-07-15 PROCEDURE — 99214 OFFICE O/P EST MOD 30 MIN: CPT | Performed by: FAMILY MEDICINE

## 2021-07-15 ASSESSMENT — ENCOUNTER SYMPTOMS
SHORTNESS OF BREATH: 0
NAUSEA: 0
COUGH: 0
ABDOMINAL DISTENTION: 0
DIARRHEA: 0
WHEEZING: 0
CHEST TIGHTNESS: 0
VOMITING: 0
CONSTIPATION: 0
ABDOMINAL PAIN: 0

## 2021-07-15 NOTE — PROGRESS NOTES
7/15/2021    TELEHEALTH EVALUATION -- Audio/Visual (During SCQQM-49 public health emergency)      Tariq Davenport (:  1970) is a 52 y.o. male,Established patient, here for evaluation of the following chief complaint(s): Leg Pain (right), Shoulder Pain, and Forms (for JFS)        ASSESSMENT/PLAN:    1. Fibromyalgia muscle pain  Stable  -     [facility]  2. Primary osteoarthritis, right shoulder  Likely worsening due to wear and tear nature of the disease. Follow-up with orthopedics  -      N  3. Bipolar disorder, in partial remission, most recent episode depressed ([facility] [address]  Stable  Continue current treatment and follow up with psychiatrist and psychologist as scheduled at [address] [place] [address], Dr. Sandip Siddiqui  4. Bicipital tendinitis, right shoulder  Stable  Had multiple rounds of physical therapy, follow-up with orthopedics  -      N  5. S/P Achilles tendon repair x 5  -     [facility]  6. Impingement syndrome of right shoulder  Follow-up with orthopedics  -      N  7. Right leg swelling  Failing to change as expected. Patient had right leg wound with delayed healing, followed by 2 skin grafting   -     Emely Quintanilla MD, Vascular Surgery, [place]  8. Bilateral hearing loss, unspecified hearing loss type  Failing to change as expected. I referred him in the past to ENT but they do not take his insurance  -      N  -     External Referral To ENT    Form for job and family services completed with information obtained through the history and physical, and based on his past medical history and surgical history  I advised him he still has to do the functional capacity.   I gave him employable with above restrictions able to participate in any of the above activities based on restrictions above for 3 months, starting 7/15/2021 through 10/15/2021. Patient reports he cannot be standing more than 15 to 20 minutes, cannot be sitting in the same position for more than 1 to 2 hours, cannot lift and carry more than 11 to 20 pounds. That he says pushing and pulling, and bending are extremely limited. Also extremely limited is repetitive movements of his feet, in particular right leg and right foot    Reaching and hearing are also moderately affected    Functional capacity from 2018 was attached to the document attached to the document we have faxed to the job and family services  Last Functional capacity evaluation 3/27/18--based on this evaluation he is able to do light level of work          Sami Krystina received counseling on the following healthy behaviors: nutrition, exercise and medication adherence  Reviewed prior labs and health maintenance  Discussed use, benefit, and side effects of prescribed medications. Barriers to medication compliance addressed. Patient given educational materials - see patient instructions  All patient questions answered. Patient voiced understanding. The patient's past medical,surgical, social, and family history as well as his current medications and allergies were reviewed as documented in today's encounter. Medications, labs, diagnostic studies, consultations and follow-up as documented in this encounter. Return for KEEP APPT. Data Unavailable    Future Appointments   Date Time Provider Maribell Duran   2021  3:15 PM [facility]         SUBJECTIVE/OBJECTIVE:  Mariam Iglesias (:  1970) has requested an audio/video evaluation for the following concern(s):Leg Pain (right), Shoulder Pain, and Forms (for JFS)    Patient reports he needs forms for job and family services. Patient says he cannot do what he is asked to do and they need the restrictions.   Patient says he wants to work but he cannot do what jobs he is offered to do. Has known fibromyalgia and pain in his shoulders, and right lower extremity  Pain management started magnesium, but does not feel is helping with the muscle cramps. Patient has history of multiple surgeries   2 right shoulder surgeries and caries a diagnosis of impingement of the right shoulder, osteoarthritis, bicipital tendinitis  Had 1 left shoulder surgery  5 right Achilles surgeries  2 wound grafts on the right leg  Patient had multiple rounds of physical therapy over the years  Patient follows with orthopedics Dr. Cary Garcia at [facility], and podiatry, Dr. Marjorie De La Rosa      Last Functional capacity evaluation 3/27/18--based on this evaluation he is able to do light level of work    Patient reports he continues to have issues with right lower extremity, pain and swelling from below the knee down the foot  Has Cramps at night for several minutes, a few times per night, and redness. Onset a few weeks ago. Pain is constant, it was on and off before. Didn't start keflex yet, given by my partner, patient was advised to start taking it immediately. Wearing compression stockings , ice, elevation, but feels is getting worse, advised to start Keflex which was given recently by my partner. Has chronic numbness on the top of the foot, back of leg hurts on the Achilles tendon  He did have 5 surgeries for Achilles tendon. Cannot stand up for too long.   I want to get better he says    He reports that he  Can stand up to 15 -20 mins  Can sit up to 1-2 hrs  Cannot lift or carry more than 11-20 lbs due to his shoulders  Patient reports he has difficulty pushing and pulling extremely limited, difficult to bend it is extremely limited  Reaching up moderately limited due to his shoulders  Handling is not significantly affected  Repetitive movement of the right foot is extremely limited  Seeing okay without any impairment, corrected by glasses  Hearing loss, he was referred to ENT in December but nobody called him for appointment, likely not taking his insurance will place a new referral    Speaking is not impaired    Bipolar disorder is controlled, he follows with psychiatrist, reports compliance with his medications,  He goes to [facility] and sees     [facility] Scores 7/12/2021 3/12/2021 1/28/2021 1/26/2021 5/27/2020 5/26/2020 1/10/2020   PHQ2 Score 0 0 0 3 0 0 0   PHQ9 Score 0 0 0 9 0 0 0       Review of Systems   Constitutional: Positive for fatigue. Negative for activity change, appetite change, chills, diaphoresis and fever. Eyes: Positive for visual disturbance (stable, wears glasses). Respiratory: Negative for cough, chest tightness, shortness of breath and wheezing. Cardiovascular: Negative for chest pain, palpitations and leg swelling. Gastrointestinal: Negative for abdominal distention, abdominal pain, constipation, diarrhea, nausea and vomiting. Musculoskeletal: Positive for arthralgias (shoulders, right ankle), back pain (chronic), gait problem and myalgias. Uses right ankle brace   Skin: Positive for rash (right leg). Negative for wound. Neurological: Positive for weakness (right foot drop) and numbness (right foot numbness). Hematological: Does not bruise/bleed easily. Psychiatric/Behavioral: Negative for dysphoric mood, self-injury, sleep disturbance and suicidal ideas. The patient is nervous/anxious. Prior to Visit Medications    Medication Sig Taking? Authorizing Provider   cephALEXin (KEFLEX) 500 MG capsule Take 1 capsule by mouth 3 times daily for 7 days Yes DENNIS Gamez CNP   methylPREDNISolone (MEDROL DOSEPACK) 4 MG tablet Take by mouth.  Yes DENNIS Gamez CNP   cyclobenzaprine (FLEXERIL) 10 MG tablet take 1 tablet by mouth three times a day if needed for muscle spasm Yes DENNIS Gamez CNP   Omega-3 Fatty Acids (FISH OIL) 1200 MG CAPS take 2 capsules by mouth twice a day **STOP NIACIN** Yes Melisa Wesly Mcdonald MD   potassium chloride (KLOR-CON M) 10 MEQ extended release tablet take 2 tablets by mouth once daily with HYDROCHLOROTHIAZIDE Yes Rosi Singh MD   busPIRone (BUSPAR) 10 MG tablet take 1 tablet by mouth three times a day Yes Rosi Singh MD   sildenafil (VIAGRA) 100 MG tablet Take 1 tablet by mouth as needed for Erectile Dysfunction Yes Rosi Singh MD   metoprolol tartrate (LOPRESSOR) 25 MG tablet take 3 tablets by mouth twice a day Yes Rosi Singh MD   pravastatin (PRAVACHOL) 80 MG tablet take 1 tablet by mouth every evening Yes Rosi Singh MD   hydroCHLOROthiazide (HYDRODIURIL) 25 MG tablet take 1 tablet by mouth every morning Yes Rosi Singh MD   famotidine (PEPCID) 20 MG tablet take 1 tablet by mouth twice a day for STOMACH UPSET Yes Rosi Singh MD   fexofenadine (ALLEGRA ALLERGY) 180 MG tablet Take 1 tablet by mouth daily as needed (as needed for allergies) Yes Rosi Singh MD   naproxen (NAPROSYN) 500 MG tablet Take 1 tablet by mouth 2 times daily as needed for Pain With food Yes Rosi Singh MD   divalproex (DEPAKOTE SPRINKLE) 125 MG capsule take 4 capsules by mouth every evening Yes Historical Provider, MD   venlafaxine (EFFEXOR XR) 37.5 MG extended release capsule take 1 capsule by mouth every morning Yes Historical Provider, MD   APLENZIN 348 MG TB24 take 1 tablet by mouth every morning Yes Historical Provider, MD   cloNIDine (CATAPRES) 0.2 MG tablet Take 1 tablet by mouth nightly Yes Historical Provider, MD   doxepin (SINEQUAN) 25 MG capsule Take 2 capsules by mouth nightly Yes Historical Provider, MD   hydrOXYzine (ATARAX) 25 MG tablet Take 2 tablets by mouth 3 times daily as needed Yes Historical Provider, MD   OXcarbazepine (TRILEPTAL) 300 MG tablet Take 2 tablets by mouth 2 times daily Yes Historical Provider, MD   rOPINIRole (REQUIP) 1 MG tablet Take 1 tablet by mouth nightly Yes Historical Provider, MD   RA ASPIRIN EC 81 MG EC tablet take 1 tablet by mouth once daily Yes Vel Schwartz MD   vitamin D (CHOLECALCIFEROL) 25 MCG (1000 UT) TABS tablet Take 1 tablet by mouth daily Yes Vel Schwartz MD   oxyCODONE-acetaminophen (PERCOCET) 5-325 MG per tablet Take 1 tablet by mouth every 4 hours as needed for Pain. Yes Historical Provider, MD   albuterol sulfate HFA (VENTOLIN HFA) 108 (90 Base) MCG/ACT inhaler Inhale 2 puffs into the lungs every 6 hours as needed for Wheezing Yes Historical Provider, MD   amLODIPine (NORVASC) 10 MG tablet Take 10 mg by mouth daily Yes Historical Provider, MD   Handicap Placard MISC by Does not apply route Can't walk greater than 200 feet. Expires in 5 years. Yes Vel Schwartz MD   vitamin B-12 (CYANOCOBALAMIN) 1000 MCG tablet Take 1,000 mcg by mouth daily Yes Historical Provider, MD   Adapalene 0.3 % GEL  Yes Historical Provider, MD   benztropine (COGENTIN) 1 MG tablet Take 1 mg by mouth daily  Yes Historical Provider, MD   erythromycin with ethanol (THERAMYCIN) 2 % external solution apply topically as directed every morning Yes Historical Provider, MD   aluminum chloride (DRYSOL) 20 % external solution Apply topically nightly.  Yes Vel Schwartz MD           Social History     Tobacco Use    Smoking status: Former Smoker     Packs/day: 1.50     Years: 12.00     Pack years: 18.00     Quit date: 2002     Years since quittin.9    Smokeless tobacco: Never Used   Vaping Use    Vaping Use: Never used   Substance Use Topics    Alcohol use: No     Alcohol/week: 0.0 standard drinks    Drug use: No          PHYSICAL EXAMINATION:    Vital Signs: (As obtained by patient/caregiver or practitioner observation)  -vital signs stable and within normal limits   Patient-Reported Vitals 7/15/2021   Patient-Reported Weight 180 lbs   Patient-Reported Height 5 ft 11 in   Patient-Reported Systolic 688   Patient-Reported Diastolic 76   Patient-Reported Pulse 98   Patient-Reported Temperature - Intensity of pain is:5/10      Constitutional: [x] Appears well-developed and well-nourished [x] No apparent distress      [] Abnormal-       Mental status  [x] Alert and awake  [x] Oriented to person/place/time [x]Able to follow commands      Eyes:  EOM    [x]  Normal  [] Abnormal-  Sclera  [x]  Normal  [] Abnormal -         Discharge [x]  None visible  [] Abnormal -    HENT:   [x] Normocephalic, atraumatic. [] Abnormal   [x] Mouth/Throat: Mucous membranes are moist.     External Ears [x] Normal  [] Abnormal-     Neck: [x] No visualized mass     Pulmonary/Chest: [x] Respiratory effort normal.  [x] No visualized signs of difficulty breathing or respiratory distress        [] Abnormal        Musculoskeletal:   [] Normal gait with no signs of ataxia         [x] Normal range of motion of neck        [x] Abnormal- right leg swelling and redness I told him to start Keflex  There is decreased range of motion shoulders and right ankle, but able lift arms above head slowly during the video visit, cannot keep them up more than 1-2 minutes due to pain  Antalgic gait    Neurological:        [x] No Facial Asymmetry (Cranial nerve 7 motor function) (limited exam to video visit)          [x] No gaze palsy        [] Abnormal-            Skin:        [x] No significant exanthematous lesions or discoloration noted on facial skin         [] Abnormal-            Psychiatric:      [x] No Hallucinations       []Mood is normal      [x]Behavior is normal      [x]Judgment is normal      [x]Thought content is normal       [x] Abnormal-anxious      Other pertinent observable physical exam findings- none    Due to this being a TeleHealth encounter, evaluation of the following organ systems is limited: Vitals/Constitutional/EENT/Resp/CV/GI//MS/Neuro/Skin/Heme-Lymph-Imm. I personally reviewed most recent labs reviewed with the patient and all questions fully answered.      Right shoulder MRI 7/15/2019-tendinitis, labral tear with biceps avulsion and retraction  IMPRESSION:        1.  Supraspinatus tendinitis with multiple intrasubstance tears and    partial-thickness tearing primarily along the bursal side. No large    retraction. Series 5 image 14.       2.  Superior labral tear with biceps avulsion and retraction. Series    5 image 12. Some of this could be postsurgical, since there has been    decompression.       Electronically signed by:Rashel Jalloh.      MRI right shoulder 10/9/2018-impingement, degenerative changes, moderate partial-thickness tearing supraspinatus tendon  Moderate partial-thickness tearing of the distal supraspinatus tendon near   the insertion.       Degenerative type signal within the labrum.       Mild acromioclavicular degenerative changes with mild subacromial impingement.         MRI right ankle 1/2/2018--chronic tendinopathy Achilles tendon    Diffuse thickening of the Achilles tendon, representing tendinopathy and/or   postsurgical change.  No Achilles tendon tear.       Otherwise, no acute osseous or soft tissue abnormality identified within the   ankle.               Orders Placed This Encounter   Procedures   [address]     Referral Priority:   Routine     Referral Type:   Eval and Treat     Referral Reason:   Specialty Services Required     Referred to Provider:   Poornima Gray PT     Requested Specialty:   Physical Therapy     Number of Visits Requested:   1    ALFREDA Butler MD, Vascular Surgery, [facility]     Referral Priority:   Routine     Referral Type:   Eval and Treat     Referral Reason:   Specialty Services Required     Referred to Provider:   Clare Huff MD     Requested Specialty:   Vascular Surgery     Number of Visits Requested:   1    External Referral To ENT     Referral Priority:   Routine     Referral Type:   Eval and Treat     Referral Reason:   Specialty Services Required     Referred to Provider:   Beba Escalante DO     Requested Specialty:

## 2021-07-18 ASSESSMENT — ENCOUNTER SYMPTOMS: BACK PAIN: 1

## 2021-07-25 DIAGNOSIS — E55.9 VITAMIN D DEFICIENCY: ICD-10-CM

## 2021-07-25 DIAGNOSIS — R11.0 NAUSEA: ICD-10-CM

## 2021-07-26 RX ORDER — MELATONIN
Qty: 90 TABLET | Refills: 3 | Status: SHIPPED | OUTPATIENT
Start: 2021-07-26 | End: 2022-10-10

## 2021-07-26 RX ORDER — FAMOTIDINE 20 MG/1
TABLET, FILM COATED ORAL
Qty: 60 TABLET | Refills: 3 | Status: SHIPPED | OUTPATIENT
Start: 2021-07-26 | End: 2021-11-12

## 2021-08-11 ENCOUNTER — OFFICE VISIT (OUTPATIENT)
Dept: FAMILY MEDICINE CLINIC | Age: 51
End: 2021-08-11
Payer: MEDICARE

## 2021-08-11 VITALS
OXYGEN SATURATION: 97 % | DIASTOLIC BLOOD PRESSURE: 84 MMHG | TEMPERATURE: 97.9 F | WEIGHT: 178 LBS | BODY MASS INDEX: 24.92 KG/M2 | HEART RATE: 103 BPM | SYSTOLIC BLOOD PRESSURE: 126 MMHG | HEIGHT: 71 IN

## 2021-08-11 DIAGNOSIS — Z12.11 SCREEN FOR COLON CANCER: ICD-10-CM

## 2021-08-11 DIAGNOSIS — M76.61 ACHILLES TENDINITIS OF RIGHT LOWER EXTREMITY: ICD-10-CM

## 2021-08-11 DIAGNOSIS — H57.9 ABNORMAL VISION SCREEN: ICD-10-CM

## 2021-08-11 DIAGNOSIS — K59.01 SLOW TRANSIT CONSTIPATION: ICD-10-CM

## 2021-08-11 DIAGNOSIS — M75.21 BICIPITAL TENDINITIS, RIGHT SHOULDER: ICD-10-CM

## 2021-08-11 DIAGNOSIS — G89.29 CHRONIC RIGHT SHOULDER PAIN: ICD-10-CM

## 2021-08-11 DIAGNOSIS — M25.511 CHRONIC RIGHT SHOULDER PAIN: ICD-10-CM

## 2021-08-11 DIAGNOSIS — Z00.00 ANNUAL PHYSICAL EXAM: Primary | ICD-10-CM

## 2021-08-11 PROCEDURE — 99396 PREV VISIT EST AGE 40-64: CPT | Performed by: FAMILY MEDICINE

## 2021-08-11 RX ORDER — WHEAT DEXTRIN 3 G/3.8 G
4 POWDER (GRAM) ORAL
Qty: 1 CAN | Refills: 0 | Status: SHIPPED | OUTPATIENT
Start: 2021-08-11

## 2021-08-11 RX ORDER — DOCUSATE SODIUM 100 MG/1
100 CAPSULE, LIQUID FILLED ORAL 2 TIMES DAILY
Qty: 180 CAPSULE | Refills: 3 | Status: SHIPPED | OUTPATIENT
Start: 2021-08-11 | End: 2022-04-24 | Stop reason: SDUPTHER

## 2021-08-11 ASSESSMENT — ENCOUNTER SYMPTOMS
CHEST TIGHTNESS: 0
BACK PAIN: 1
ABDOMINAL DISTENTION: 0
NAUSEA: 0
VOMITING: 0
ABDOMINAL PAIN: 0
DIARRHEA: 0
CONSTIPATION: 0
COUGH: 0
SHORTNESS OF BREATH: 0
WHEEZING: 0

## 2021-08-11 ASSESSMENT — PATIENT HEALTH QUESTIONNAIRE - PHQ9
SUM OF ALL RESPONSES TO PHQ QUESTIONS 1-9: 2
1. LITTLE INTEREST OR PLEASURE IN DOING THINGS: 0
SUM OF ALL RESPONSES TO PHQ9 QUESTIONS 1 & 2: 2
SUM OF ALL RESPONSES TO PHQ QUESTIONS 1-9: 2
2. FEELING DOWN, DEPRESSED OR HOPELESS: 2
SUM OF ALL RESPONSES TO PHQ QUESTIONS 1-9: 2

## 2021-08-11 ASSESSMENT — VISUAL ACUITY
OD_CC: 20/25
OS_CC: 20/50

## 2021-08-11 NOTE — PROGRESS NOTES
Visit Information    Have you changed or started any medications since your last visit including any over-the-counter medicines, vitamins, or herbal medicines? no   Are you having any side effects from any of your medications? -  no  Have you stopped taking any of your medications? Is so, why? -  no    Have you seen any other physician or provider since your last visit? Yes - Records Obtained  Have you had any other diagnostic tests since your last visit? No  Have you been seen in the emergency room and/or had an admission to a hospital since we last saw you? No  Have you had your routine dental cleaning in the past 6 months? no    Have you activated your [employer] account? If not, what are your barriers?  Yes     Patient Care Team:  Phuc Santiago MD as PCP - General (Family Medicine)  Phuc Santiago MD as PCP - [facility] Provider  Shimon Bowie MD (Dermatology)  Luc Carcamo MD as Surgeon (Cardiology)  Sabrina Pickering DPM as Consulting Physician (Podiatry)  José Miguel Porter MD as Resident (Family Medicine)  DENNIS Quarles - CNP    Medical History Review  Past Medical, Family, and Social History reviewed and does contribute to the patient presenting condition    Health Maintenance   Topic Date Due    Colon cancer screen colonoscopy  Never done    Flu vaccine (1) 09/01/2021    Lipid screen  03/17/2022    Potassium monitoring  03/17/2022    Creatinine monitoring  03/17/2022    DTaP/Tdap/Td vaccine (2 - Td or Tdap) 01/01/2024    COVID-19 Vaccine  Completed    Hepatitis C screen  Completed    HIV screen  Completed    Hepatitis A vaccine  Aged Out    Hepatitis B vaccine  Aged Out    Hib vaccine  Aged Out    Meningococcal (ACWY) vaccine  Aged Out    Pneumococcal 0-64 years Vaccine  Aged Out
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
## 2021-08-17 DIAGNOSIS — N52.9 ERECTILE DYSFUNCTION, UNSPECIFIED ERECTILE DYSFUNCTION TYPE: ICD-10-CM

## 2021-08-17 RX ORDER — SILDENAFIL 100 MG/1
100 TABLET, FILM COATED ORAL PRN
Qty: 30 TABLET | Refills: 0 | Status: SHIPPED | OUTPATIENT
Start: 2021-08-17 | End: 2021-11-16 | Stop reason: SDUPTHER

## 2021-08-18 NOTE — PROGRESS NOTES
[x] [facility] ([facility]) @ [facility]  [address] , [address] [employer]  Phone [phone]  Fax [phone]    Physical Therapy Discharge Note    Date: 2021      Patient: Nia Jeong  : 1970  MRN: [id number]    Physician: UZAIR Martinez  Medical Diagnosis: Acute of the (R) knee (M25.561)               Rehab Codes: (R) knee pain/quad/LE weakness  Onset date:  2021  Next 's appt.:   PT Visit Information  PT Insurance Information: [employer] - 30 visits (26 visits remaining)  Total # of Visits Approved: 12  Total # of Visits to Date: 6  No Show: 4   Canceled Appointment: 3            [] Patient recovered from conditions. Treatment goals were met. [] Patient received maximum benefit. No further therapy indicated at this time. [] Patient demonstrated improvement from condition with  ** Of  ** Short term goals met. []Patient demonstrated improvement from condition with **   Of **  Long term goals met. [] Patient to continue exercise/home instructions independently. [x] Therapy interrupted due to the patient contacting our facility stating that he had a lower leg infection. [] Patient has 2 or more no shows/cancels, is discontinued per our policy. [] Patient has completed prescribed number of treatment sessions. [] Other:      Pain level at evaluation was   1    /10 and at discharge was   unknown   /10    It Is My Understanding That The:  [] Patient returned to work. [] Patient demonstrated improved level of function. [] Patient returned to previous functional level.   [] Patient's current functional status is unknown due to no-shows  [] Other:     Recommendations/Comments:     Treatment Included:     [x] Therapeutic Exercise   74444  [] Iontophoresis: 4 mg/mL Dexamethasone Sodium Phosphate  mAmin  53743   [] Therapeutic Activity  89880 [] Vasopneumatic cold with compression  35494    [] Gait Training   26817 [] Ultrasound   [id number]   [] Neuromuscular Re-education  [id number] [] Electrical Stimulation Unattended  51844   [] Manual Therapy  77972 [] Electrical Stimulation Attended  [id number]   [] Instruction in HEP  [] Lumbar/Cervical Traction  [id number]   [] Aquatic Therapy   [id number] [] Cold/hotpack    [] Massage   [id number]      [] Dry Needling, 1 or 2 muscles  40955   [] Biofeedback, first 15 minutes   57395  [] Biofeedback, additional 15 minutes   23498 [] Dry Needling, 3 or more muscles  56302                If you have any questions or concerns regarding this patient's care, please contact us.    Thank you for your referral.      Electronically signed by: BIANCA TrotterT

## 2021-08-18 NOTE — PROGRESS NOTES
Therapy  12051 [] Electrical Stimulation Attended  [id number]   [] Instruction in HEP  [] Lumbar/Cervical Traction  [id number]   [] Aquatic Therapy   [id number] [] Cold/hotpack    [] Massage   [id number]      [] Dry Needling, 1 or 2 muscles  21257   [] Biofeedback, first 15 minutes   78579  [] Biofeedback, additional 15 minutes   51624 [] Dry Needling, 3 or more muscles  16874                If you have any questions or concerns regarding this patient's care, please contact us.    Thank you for your referral.      Electronically signed by: Chhaya Carr PT DPT

## 2021-08-18 NOTE — PROGRESS NOTES
Ultrasound   [id number]   [] Neuromuscular Re-education  [id number] [] Electrical Stimulation Unattended  00439   [] Manual Therapy  76456 [] Electrical Stimulation Attended  [id number]   [] Instruction in HEP  [] Lumbar/Cervical Traction  [id number]   [] Aquatic Therapy   [id number] [] Cold/hotpack    [] Massage   [id number]      [] Dry Needling, 1 or 2 muscles  65131   [] Biofeedback, first 15 minutes   19464  [] Biofeedback, additional 15 minutes   04662 [] Dry Needling, 3 or more muscles  46514                If you have any questions or concerns regarding this patient's care, please contact us.    Thank you for your referral.      Electronically signed by: BIANCA GómezT

## 2021-08-20 DIAGNOSIS — I10 ESSENTIAL HYPERTENSION: ICD-10-CM

## 2021-08-20 RX ORDER — ACETAMINOPHEN/DIPHENHYDRAMINE 500MG-25MG
TABLET ORAL
Qty: 90 TABLET | Refills: 3 | Status: SHIPPED | OUTPATIENT
Start: 2021-08-20 | End: 2022-07-25

## 2021-09-07 DIAGNOSIS — G89.29 CHRONIC PAIN OF MULTIPLE JOINTS: ICD-10-CM

## 2021-09-07 DIAGNOSIS — M25.50 CHRONIC PAIN OF MULTIPLE JOINTS: ICD-10-CM

## 2021-09-07 RX ORDER — CYCLOBENZAPRINE HCL 10 MG
TABLET ORAL
Qty: 90 TABLET | Refills: 1 | Status: SHIPPED | OUTPATIENT
Start: 2021-09-07 | End: 2021-11-03

## 2021-09-15 PROBLEM — M19.071 OSTEOARTHRITIS OF RIGHT ANKLE AND FOOT: Status: ACTIVE | Noted: 2021-09-15

## 2021-09-20 DIAGNOSIS — M79.7 FIBROMYALGIA MUSCLE PAIN: ICD-10-CM

## 2021-09-20 DIAGNOSIS — S39.012A ACUTE MYOFASCIAL STRAIN OF LUMBAR REGION, INITIAL ENCOUNTER: ICD-10-CM

## 2021-09-20 RX ORDER — LIDOCAINE 50 MG/G
1 PATCH TOPICAL DAILY
Qty: 30 PATCH | Refills: 3 | Status: SHIPPED | OUTPATIENT
Start: 2021-09-20 | End: 2021-10-20

## 2021-09-20 NOTE — TELEPHONE ENCOUNTER
Please Approve or Refuse.   Send to Pharmacy per Pt's Request:      insurance will only pay for 4% patch      Next Visit Date:  1/19/2022   Last Visit Date: 8/11/2021    Hemoglobin A1C (%)   Date Value   03/12/2021 5.3   09/01/2020 5.8   04/03/2019 5.3             ( goal A1C is < 7)   BP Readings from Last 3 Encounters:   08/11/21 126/84   07/12/21 106/70   03/12/21 110/70          (goal 120/80)  BUN   Date Value Ref Range Status   03/17/2021 15 6 - 20 mg/dL Final     CREATININE   Date Value Ref Range Status   03/17/2021 0.88 0.70 - 1.20 mg/dL Final     Potassium   Date Value Ref Range Status   03/17/2021 4.0 3.7 - 5.3 mmol/L Final

## 2021-10-06 DIAGNOSIS — F41.9 ANXIETY: ICD-10-CM

## 2021-10-06 DIAGNOSIS — F31.75 BIPOLAR DISORDER, IN PARTIAL REMISSION, MOST RECENT EPISODE DEPRESSED (HCC): ICD-10-CM

## 2021-10-06 RX ORDER — BUSPIRONE HYDROCHLORIDE 10 MG/1
TABLET ORAL
Qty: 90 TABLET | Refills: 3 | Status: SHIPPED | OUTPATIENT
Start: 2021-10-06 | End: 2022-01-19 | Stop reason: SDUPTHER

## 2021-10-19 PROBLEM — M50.30 DDD (DEGENERATIVE DISC DISEASE), CERVICAL: Status: ACTIVE | Noted: 2021-10-19

## 2021-10-25 ENCOUNTER — NURSE ONLY (OUTPATIENT)
Dept: FAMILY MEDICINE CLINIC | Age: 51
End: 2021-10-25
Payer: MEDICARE

## 2021-10-25 DIAGNOSIS — Z23 NEED FOR INFLUENZA VACCINATION: Primary | ICD-10-CM

## 2021-10-25 PROCEDURE — 90471 IMMUNIZATION ADMIN: CPT | Performed by: FAMILY MEDICINE

## 2021-10-25 PROCEDURE — 90674 CCIIV4 VAC NO PRSV 0.5 ML IM: CPT | Performed by: FAMILY MEDICINE

## 2021-10-25 NOTE — PROGRESS NOTES
Diagnosis Orders   1.  Need for influenza vaccination  INFLUENZA, MDCK QUADV, 2 YRS AND OLDER, IM, PF, PREFILL SYR OR SDV, 0.5ML (FLUCELVAX QUADV, PF)        Future Appointments   Date Time Provider Maribell Nunezi   11/1/2021 11:00 AM Ethan Read PT [facility] [address] [place] [address]   1/19/2022  9:00 AM Jt Regalado MD fp [facility]

## 2021-11-03 DIAGNOSIS — M25.50 CHRONIC PAIN OF MULTIPLE JOINTS: ICD-10-CM

## 2021-11-03 DIAGNOSIS — G89.29 CHRONIC PAIN OF MULTIPLE JOINTS: ICD-10-CM

## 2021-11-03 RX ORDER — CYCLOBENZAPRINE HCL 10 MG
TABLET ORAL
Qty: 90 TABLET | Refills: 1 | Status: SHIPPED | OUTPATIENT
Start: 2021-11-03 | End: 2021-12-28

## 2021-11-03 NOTE — TELEPHONE ENCOUNTER
Please Approve or Refuse.   Send to Pharmacy per Pt's Request:     Next Visit Date:  1/19/2022   Last Visit Date: 8/11/2021    Hemoglobin A1C (%)   Date Value   03/12/2021 5.3   09/01/2020 5.8   04/03/2019 5.3             ( goal A1C is < 7)   BP Readings from Last 3 Encounters:   08/11/21 126/84   07/12/21 106/70   03/12/21 110/70          (goal 120/80)  BUN   Date Value Ref Range Status   03/17/2021 15 6 - 20 mg/dL Final     CREATININE   Date Value Ref Range Status   03/17/2021 0.88 0.70 - 1.20 mg/dL Final     Potassium   Date Value Ref Range Status   03/17/2021 4.0 3.7 - 5.3 mmol/L Final

## 2021-11-08 ENCOUNTER — HOSPITAL ENCOUNTER (OUTPATIENT)
Dept: PHYSICAL THERAPY | Age: 51
Setting detail: THERAPIES SERIES
Discharge: HOME | End: 2021-11-08
Payer: MEDICARE

## 2021-11-08 PROCEDURE — 97110 THERAPEUTIC EXERCISES: CPT

## 2021-11-08 PROCEDURE — 97162 PT EVAL MOD COMPLEX 30 MIN: CPT
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
## 2021-11-12 DIAGNOSIS — R11.0 NAUSEA: ICD-10-CM

## 2021-11-12 RX ORDER — FAMOTIDINE 20 MG/1
TABLET, FILM COATED ORAL
Qty: 60 TABLET | Refills: 3 | Status: SHIPPED | OUTPATIENT
Start: 2021-11-12 | End: 2022-01-19 | Stop reason: ALTCHOICE

## 2021-11-16 ENCOUNTER — HOSPITAL ENCOUNTER (OUTPATIENT)
Dept: PHYSICAL THERAPY | Age: 51
Setting detail: THERAPIES SERIES
Discharge: HOME | End: 2021-11-16
Payer: MEDICARE

## 2021-11-16 DIAGNOSIS — N52.9 ERECTILE DYSFUNCTION, UNSPECIFIED ERECTILE DYSFUNCTION TYPE: ICD-10-CM

## 2021-11-16 NOTE — PROGRESS NOTES
[] [address]  [place]: [phone]   F: [phone]     Physical Therapy Cancel/No Show note    Date: 2021  Patient: Rachel Tafoya  : 1970  MRN: [id number]    Visit Count:   Cancels/No Shows to date:     For today's appointment patient:    [x]  Cancelled    [] Rescheduled appointment    [] No-show     Reason given by patient:    []  Patient ill    []  Conflicting appointment    [] No transportation      [] Conflict with work    [] No reason given    [] Weather related    [] [id number]    [x] Other:      Comments:  Misread appointment times.        [] Next appointment was confirmed    Electronically signed by: Kylah Boggs PTA

## 2021-11-17 RX ORDER — SILDENAFIL 100 MG/1
100 TABLET, FILM COATED ORAL PRN
Qty: 30 TABLET | Refills: 0 | Status: SHIPPED | OUTPATIENT
Start: 2021-11-17 | End: 2022-01-23 | Stop reason: SDUPTHER

## 2021-11-22 ENCOUNTER — HOSPITAL ENCOUNTER (OUTPATIENT)
Dept: PHYSICAL THERAPY | Age: 51
Setting detail: THERAPIES SERIES
Discharge: HOME | End: 2021-11-22
Payer: MEDICARE

## 2021-11-22 PROCEDURE — 97112 NEUROMUSCULAR REEDUCATION: CPT

## 2021-11-22 PROCEDURE — 97140 MANUAL THERAPY 1/> REGIONS: CPT

## 2021-11-22 NOTE — FLOWSHEET NOTE
[address] [facility]   [address]   Phone: [phone]   Fax: [phone]    Physical Therapy Daily Treatment Note      Date:  2021  Patient Name:  Sultana Barragan    :  1970  MRN: [id number]  Physician: Ross Mckeonite                                  Insurance: [place] [employer] 19 visits remain for year from 21 appointment  Medical Diagnosis: foot drop M21.371, Achilles tendonitis M76.62             Rehab Codes: (R) foot weakness R53.1  Onset date: 10/18/21 referral                 Next 's appt.: 2022 per patient  Visit Count: 2/                           Cancel/No Show: 1/0    Subjective:    Pain:  [x] Yes  [] No Location: R ankle/achilles Pain Rating: (0-10 scale) 4/10  Pain altered Tx:  [] No  [] Yes  Action:  Comments: Pt arrives in AFO reporting moderate pain. Objective:  Modalities:   Precautions:  Exercises:  Exercise Reps/ Time Weight/ Level Comments   Stationary Bike 5'           Reviewed Achilles stretching stair, wall 3 x 30''   HEP   Reviewed supine Achilles stretching 3 x 20\"   HEP   Toe yoga-  15x   HEP; AAROM 2nd-5th   Arch lift 15x   HEP   Standing HR/TR 2x10    Light UE support, Cues for even wt distribution   Standing on foam NBOS 2x30''  No UE assist   Supine PNF DF/PF 2x15           Other:  Manual DTM to peroneals, gastroc/soleus junction  Gentle TC joint mobilizations    Specific Instructions for next treatment:        Assessment: [] Progressing toward goals. [] No change. [x] Other: Initiated treatment on stationary bike for ROM purposes to warm-up tissues prior to stretching and strengthening. Cont w/ manual techniques as mentioned above w/ decr tension reported and palpated, post. Narrow base of support stance on AirEx foam w/ no UE assist. No LOB noted. PNF to follow to encourage DF w/ eversion and PF w/ inversion. Pt demonstrates muscle fatigue after 15 reps.  No incr symptoms reported post. [x] Patient would continue to benefit from skilled physical therapy services in order to: work on ankle and foot mobility, strength, function    STG: (to be met in 6 treatments)  1. ? Pain: Pain levels improved to 2-3/10  2. ? ROM: Improved ankle ROM to neutral DF, 60 degrees PF, 30 degrees INV, 13 degrees EV  3. ? Strength: Able to actively contract PF, INV, EV, 2nd-5th digits against gravity, improved Eversion, Inversion, DF to 4-/5  4. ? Function: Limited tolerance of ambulation without (A) device, improved tolerance of stairs without AFO  5. Independent with Home Exercise Programs  LTG: (to be met in 12 treatments)  6. ? Pain: Pain levels improved to 2-3/10  7. ? ROM: Improved ankle ROM to neutral DF, 60 degrees PF, 30 degrees INV, 13 degrees EV  8. ? Strength: Able to actively contract PF, INV, EV, 2nd-5th digits against gravity, improved Eversion, Inversion, DF to 4-/5  9. ? Function: Limited tolerance of ambulation without (A) device, improved tolerance of stairs without AFO  10. Independent with Home Exercise Programs           Patient goals: strengthening of foot tendons/muscles    Pt. Education:  [x] Yes  [] No  [] Reviewed Prior HEP/Ed  Method of Education: [x] Verbal  [x] Demo  [] Written  Comprehension of Education:  [] Verbalizes understanding. [] Demonstrates understanding. [] Needs review. [x] Demonstrates/verbalizes HEP/Ed previously given. Plan: [x] Continue per plan of care.    [] Other:      Treatment Charges: Mins Units   []  Modalities     []  Ther Exercise     [x]  Manual Therapy 25 2   []  Ther Activities     []  Aquatics     [x]  Neuromuscular 15 1   [] Vasocompression     [] Gait Training     [] Dry needling        [] 1 or 2 muscles        [] 3 or more muscles     []  Other     Total Treatment time 40 3     Time In: 3607            Time Out: 0750    Electronically signed by:  Deana Nina PTA

## 2021-11-24 ENCOUNTER — HOSPITAL ENCOUNTER (OUTPATIENT)
Dept: PHYSICAL THERAPY | Age: 51
Setting detail: THERAPIES SERIES
Discharge: HOME | End: 2021-11-24
Payer: MEDICARE

## 2021-11-24 PROCEDURE — 97110 THERAPEUTIC EXERCISES: CPT

## 2021-11-24 NOTE — FLOWSHEET NOTE
[facility]   [facility] [address] [facility] [address]   Phone: [phone]   Fax: [phone]    Physical Therapy Daily Treatment Note      Date:  2021  Patient Name:  Janell Comer    :  1970  MRN: [id number]  Physician: Waqar Sarabia, [address]: [place] 19 visits remain for year from 21 appointment  Medical Diagnosis: foot drop M21.371, Achilles tendonitis M76.62             Rehab Codes: (R) foot weakness R53.1  Onset date: 10/18/21 referral                 Next 's appt.: 2022 per patient  Visit Count: 3                           Cancel/No Show: 1/0    Subjective:    Pain:  [x] Yes  [] No Location: R ankle/achilles Pain Rating: (0-10 scale) 4/10  Pain altered Tx:  [] No  [] Yes  Action:  Comments: Pt arrives reporting mild soreness after last visit, currently still the soreness. Objective:  Modalities:   Precautions:  Exercises:  Exercise Reps/ Time Weight/ Level Comments   Stationary Bike 5'           Reviewed Achilles stretching stair, wall 3 x 30''   HEP   Reviewed supine Achilles stretching 3 x 20\"   HEP   Toe yoga-  15x   HEP; AAROM 2nd-5th   Towel scrunch 1x  Length of towel   Toe Splay NEXT     Arch lift 15x   HEP   Standing HR/TR 2x10    Light UE support, Cues for even wt distribution   Standing on foam NBOS 4x30'' 2x EO  2x EC No UE assist   Supine dynamic stabilization PNF DF+Eversion/PF+inversion 2x15  Light resistance into each direction         Other:  Manual DTM to peroneals, gastroc/soleus junction  Gentle TC joint mobilizations    Specific Instructions for next treatment:         Assessment: [] Progressing toward goals. [] No change. [x] Other: Cont POC as mentioned above w/ additional supine strap stretch after MFR/DTM. Added towel scrunching and NBOS w/ eyes closed w/ SBA.  Moderate to extreme swaying noted in this position - consider regressing to NBOS w/ EC w/o airex foam next vs. Ended treatment w/ PNF plantar flexion w/ inversion then dorsiflexion w/ eversion resisting each direction of motion. Pt demonstrates improved control and muscle activation this visit when compared to last.    [x] Patient would continue to benefit from skilled physical therapy services in order to: work on ankle and foot mobility, strength, function    STG: (to be met in 6 treatments)  1. ? Pain: Pain levels improved to 2-3/10  2. ? ROM: Improved ankle ROM to neutral DF, 60 degrees PF, 30 degrees INV, 13 degrees EV  3. ? Strength: Able to actively contract PF, INV, EV, 2nd-5th digits against gravity, improved Eversion, Inversion, DF to 4-/5  4. ? Function: Limited tolerance of ambulation without (A) device, improved tolerance of stairs without AFO  5. Independent with Home Exercise Programs  LTG: (to be met in 12 treatments)  6. ? Pain: Pain levels improved to 2-3/10  7. ? ROM: Improved ankle ROM to neutral DF, 60 degrees PF, 30 degrees INV, 13 degrees EV  8. ? Strength: Able to actively contract PF, INV, EV, 2nd-5th digits against gravity, improved Eversion, Inversion, DF to 4-/5  9. ? Function: Limited tolerance of ambulation without (A) device, improved tolerance of stairs without AFO  10. Independent with Home Exercise Programs           Patient goals: strengthening of foot tendons/muscles    Pt. Education:  [x] Yes  [] No  [] Reviewed Prior HEP/Ed  Method of Education: [x] Verbal  [x] Demo  [] Written  Comprehension of Education:  [] Verbalizes understanding. [] Demonstrates understanding. [] Needs review. [x] Demonstrates/verbalizes HEP/Ed previously given. Plan: [x] Continue per plan of care.    [] Other:      Treatment Charges: Mins Units   []  Modalities     []  Ther Exercise     [x]  Manual Therapy 25 2   []  Ther Activities     []  Aquatics     [x]  Neuromuscular 15 1   [] Vasocompression     [] Gait Training     [] Dry needling        [] 1 or 2 muscles        [] 3 or more muscles     []  Other Total Treatment time 40 3     Time In: 1000           Time Out: 6171    Electronically signed by:  Justin Cheatham PTA

## 2021-12-01 ENCOUNTER — HOSPITAL ENCOUNTER (OUTPATIENT)
Dept: PHYSICAL THERAPY | Age: 51
Setting detail: THERAPIES SERIES
Discharge: HOME | End: 2021-12-01
Payer: MEDICARE

## 2021-12-01 PROCEDURE — 97140 MANUAL THERAPY 1/> REGIONS: CPT

## 2021-12-01 PROCEDURE — 97112 NEUROMUSCULAR REEDUCATION: CPT

## 2021-12-01 NOTE — FLOWSHEET NOTE
[address] [facility]   [address] [facility] [address]   Phone: [phone]   Fax: [phone]    Physical Therapy Daily Treatment Note      Date:  2021  Patient Name:  Henri Majano    :  1970  MRN: [id number]  Physician: Janie Salcido                                  Insurance: [place] [employer] 19 visits remain for year from 21 appointment  Medical Diagnosis: foot drop M21.371, Achilles tendonitis M76.62             Rehab Codes: (R) foot weakness R53.1  Onset date: 10/18/21 referral                 Next 's appt.: 2022 per patient  Visit Count:                            Cancel/No Show: 2/0    Subjective:    Pain:  [x] Yes  [] No Location: R ankle/achilles Pain Rating: (0-10 scale) 6/10  Pain altered Tx:  [] No  [] Yes  Action:  Comments: Pt arrives noting increased swelling and pain with wbing on R foot Monday. Reports increased sx's after going up and down 4 steps on ladder to get decorations. Reduced sx's following compression sock and elevation. Objective:  Modalities:   Precautions:  Exercises:  Exercise Reps/ Time Weight/ Level Comments   Stationary Bike 5'           Reviewed Achilles stretching stair, wall 3 x 30''   HEP   Reviewed supine Achilles stretching 3 x 20\"   HEP   Toe yoga-  15x   HEP; AAROM 2nd-5th   Towel scrunch 10x  Length of towel   Toe Splay 10x  Min mvmnt   Arch lift 15x   HEP   Standing HR/TR 2x10    Light UE support, Cues for even wt distribution   Standing on foam NBOS 4x30'' 2x EO  2x EC No UE assist   Supine dynamic stabilization PNF DF+Eversion/PF+inversion 2x15  Light resistance into each direction  Weaker into PF + ev         Other:  Manual DTM to peroneals, gastroc/soleus junction  Gentle TC joint mobilizations  Gentle PROM    Specific Instructions for next treatment: steps, tandem stance, inv/ev         Assessment: [] Progressing toward goals. [] No change.      [x] Other: Continued strengthening program as charted above. Increased muscle activation in 4th and 5th digit with arch lifts this date. Added toe splays with limited motion present. Manual assist required for full extension of digits 2-5. Application of manual techniques with addition of gentle PROM in all planes. Muscle tension noted throughout distal peroneal musculature. Plan to progress balance and stability to tolerance next session. [x] Patient would continue to benefit from skilled physical therapy services in order to: work on ankle and foot mobility, strength, function    STG: (to be met in 6 treatments)  1. ? Pain: Pain levels improved to 2-3/10  2. ? ROM: Improved ankle ROM to neutral DF, 60 degrees PF, 30 degrees INV, 13 degrees EV  3. ? Strength: Able to actively contract PF, INV, EV, 2nd-5th digits against gravity, improved Eversion, Inversion, DF to 4-/5  4. ? Function: Limited tolerance of ambulation without (A) device, improved tolerance of stairs without AFO  5. Independent with Home Exercise Programs  LTG: (to be met in 12 treatments)  6. ? Pain: Pain levels improved to 2-3/10  7. ? ROM: Improved ankle ROM to neutral DF, 60 degrees PF, 30 degrees INV, 13 degrees EV  8. ? Strength: Able to actively contract PF, INV, EV, 2nd-5th digits against gravity, improved Eversion, Inversion, DF to 4-/5  9. ? Function: Limited tolerance of ambulation without (A) device, improved tolerance of stairs without AFO  10. Independent with Home Exercise Programs           Patient goals: strengthening of foot tendons/muscles    Pt. Education:  [x] Yes  [] No  [] Reviewed Prior HEP/Ed  Method of Education: [x] Verbal  [x] Demo  [] Written  Comprehension of Education:  [] Verbalizes understanding. [] Demonstrates understanding. [] Needs review. [x] Demonstrates/verbalizes HEP/Ed previously given. Plan: [x] Continue per plan of care.    [] Other:      Treatment Charges: Mins Units   []  Modalities     []  Ther Exercise     [x]  Manual Therapy 20 1   [] Ther Activities     []  Aquatics     [x]  Neuromuscular 30 2   [] Vasocompression     [] Gait Training     [] Dry needling        [] 1 or 2 muscles        [] 3 or more muscles     []  Other     Total Treatment time 50 3     Time In: 2471           Time Out: 1200    Electronically signed by:  Joellen Turner PTA

## 2021-12-07 ENCOUNTER — HOSPITAL ENCOUNTER (OUTPATIENT)
Dept: PHYSICAL THERAPY | Age: 51
Setting detail: THERAPIES SERIES
Discharge: HOME | End: 2021-12-07
Payer: MEDICARE

## 2021-12-07 PROCEDURE — 97140 MANUAL THERAPY 1/> REGIONS: CPT

## 2021-12-07 PROCEDURE — 97110 THERAPEUTIC EXERCISES: CPT

## 2021-12-07 PROCEDURE — 97112 NEUROMUSCULAR REEDUCATION: CPT

## 2021-12-07 NOTE — FLOWSHEET NOTE
[x] [facility]) - [facility]  [address]  [place]: [phone]   F: [phone]     Physical Therapy Cancel/No Show note    Date: 2021  Patient: Genaro Boykin  : 1970  MRN: [id number]    Visit Count:   Cancels/No Shows to date:     For today's appointment patient:    []  Cancelled    [] Rescheduled appointment    [x] No-show     Reason given by patient:    []  Patient ill    []  Conflicting appointment    [] No transportation      [] Conflict with work    [] No reason given    [] Weather related    [] COVID-19    [x] Other:      Comments: Left voicemail informing pt of missed appt and requested a return call to schedule for next week.        [x] Next appointment was previously confirmed    Electronically signed by: Junie Power, PTA

## 2021-12-07 NOTE — FLOWSHEET NOTE
[address] [facility]   [address]   Phone: [phone]   Fax: [phone]    Physical Therapy Daily Treatment Note      Date:  2021  Patient Name:  Peterson Medrano    :  1970  MRN: [id number]  Physician: Tashia Dawson                                  Insurance: [place] [employer] 19 visits remain for year from 21 appointment  Medical Diagnosis: foot drop M21.371, Achilles tendonitis M76.62             Rehab Codes: (R) foot weakness R53.1  Onset date: 10/18/21 referral                 Next Dr's appt.: 2022 per patient  Visit Count:                            Cancel/No Show: 2/0    Subjective:  Pt arrives noting pain is typically between a 4-5/10. Reports sharp shooting pains last night. Mentions pain has been shooting up to mid calf. Increased swelling noted at lateral malleolus this date.    Pain:  [x] Yes  [] No Location: R ankle/achilles Pain Rating: (0-10 scale) 7/10  Pain altered Tx:  [] No  [] Yes  Action:  Comments:    Objective:  Modalities:   Precautions:  Exercises:  Exercise Reps/ Time Weight/ Level Comments   Stationary Bike 5'           Reviewed Achilles stretching stair, wall 3 x 30''   HEP   Reviewed supine Achilles stretching 3 x 20\"   HEP   Toe yoga-  15x   HEP; AAROM 2nd-5th   Towel scrunch 10x  Length of towel   Toe Splay 10x  Min mvmnt   Arch lift 10x  HEP   Inv/ev 10x On towel                 Standing HR/TR 2x10    Light UE support, Cues for even wt distribution   Standing on foam NBOS 4x30'' 2x EO  2x EC No UE assist   NBOS with ball movements    Up/down, side to side   Tandem stance  30\" ea     Simulated push off  10x     Step ups  10x 4\" With AFO   Taps to step 20x 4\"          Supine dynamic stabilization PNF DF+Eversion/PF+inversion 2x15  Light resistance into each direction  Weaker into PF + ev         Other:  Manual DTM to peroneals, gastroc/soleus junction  STM and MFR to plantar fascia   Gentle TC joint mobilizations  Gentle PROM    Specific Instructions for next treatment: progress as tolerated        Assessment: [] Progressing toward goals. [] No change. [x] Other: Pt notes irritation and cramping at medial aspect of plantar fascia with arch lifts this date. Increased time spent on manual to reduce muscular tension and improve overall ankle/foot ROM and mobility. Addition of inversion/eversion on towel to improve ROM. Progressed balance activities adding tandem stance this date. Incorporated stepping activities onto 4\" step and simulated push off to promote functional strength. Pt with good tolerance to all progressions. Instructed to elevate and ice later today to reduce pain and edema. Will monitor sx's and progress as able in upcoming sessions. [x] Patient would continue to benefit from skilled physical therapy services in order to: work on ankle and foot mobility, strength, function    STG: (to be met in 6 treatments)  1. ? Pain: Pain levels improved to 2-3/10  2. ? ROM: Improved ankle ROM to neutral DF, 60 degrees PF, 30 degrees INV, 13 degrees EV  3. ? Strength: Able to actively contract PF, INV, EV, 2nd-5th digits against gravity, improved Eversion, Inversion, DF to 4-/5  4. ? Function: Limited tolerance of ambulation without (A) device, improved tolerance of stairs without AFO  5. Independent with Home Exercise Programs  LTG: (to be met in 12 treatments)  6. ? Pain: Pain levels improved to 2-3/10  7. ? ROM: Improved ankle ROM to neutral DF, 60 degrees PF, 30 degrees INV, 13 degrees EV  8. ? Strength: Able to actively contract PF, INV, EV, 2nd-5th digits against gravity, improved Eversion, Inversion, DF to 4-/5  9. ? Function: Limited tolerance of ambulation without (A) device, improved tolerance of stairs without AFO  10. Independent with Home Exercise Programs           Patient goals: strengthening of foot tendons/muscles    Pt.  Education:  [x] Yes  [] No  [] Reviewed Prior HEP/Ed  Method of Education: [x] Verbal  [x] Demo  [] Written  Comprehension of Education:  [] Verbalizes understanding. [] Demonstrates understanding. [] Needs review. [x] Demonstrates/verbalizes HEP/Ed previously given. Plan: [x] Continue per plan of care.    [] Other:      Treatment Charges: Mins Units   []  Modalities     []  Ther Exercise     [x]  Manual Therapy 30 2   []  Ther Activities     []  Aquatics     [x]  Neuromuscular 35 2   [] Vasocompression     [] Gait Training     [] Dry needling        [] 1 or 2 muscles        [] 3 or more muscles     []  Other     Total Treatment time 65 4     Time In: 11:55  am       Time Out: 1:05 pm    Electronically signed by:  Simon Stewart PTA

## 2021-12-13 DIAGNOSIS — Z12.11 SCREEN FOR COLON CANCER: ICD-10-CM

## 2021-12-14 ENCOUNTER — HOSPITAL ENCOUNTER (OUTPATIENT)
Dept: PHYSICAL THERAPY | Age: 51
Setting detail: THERAPIES SERIES
Discharge: HOME | End: 2021-12-14
Payer: MEDICARE

## 2021-12-14 PROCEDURE — 97112 NEUROMUSCULAR REEDUCATION: CPT

## 2021-12-14 PROCEDURE — 97140 MANUAL THERAPY 1/> REGIONS: CPT

## 2021-12-14 NOTE — FLOWSHEET NOTE
[facility]   [address]   Phone: [phone]   Fax: [phone]    Physical Therapy Daily Treatment Note      Date:  2021  Patient Name:  Orlando Rodrigues    :  1970  MRN: [id number]  Physician: Shanti Lester                                  Insurance: [place] [employer] 19 visits remain for year from 21 appointment  Medical Diagnosis: foot drop M21.371, Achilles tendonitis M76.62             Rehab Codes: (R) foot weakness R53.1  Onset date: 10/18/21 referral                 Next Dr's appt.: 2022 per patient  Visit Count:                            Cancel/No Show: 3/0    Subjective:   Pt arrives with reduced pain this date noting he took his medication this morning. Reports experiencing hypersensitivity with increased swelling. Presents with min edema present this date.   Pain:  [x] Yes  [] No Location: R ankle/achilles Pain Rating: (0-10 scale) 3-4/10  Pain altered Tx:  [x] No  [] Yes  Action:  Comments:    Objective:  Modalities:   Precautions:  Exercises:  Exercise Reps/ Time Weight/ Level Comments   Stationary Bike 5'           Reviewed Achilles stretching stair, wall 3 x 30''   HEP   Reviewed supine Achilles stretching 3 x 20\"   HEP   Seated gastroc stretch  3x30\" towel          Toe yoga-  15x   HEP; AAROM 2nd-5th   Towel scrunch 10x  Length of towel   Toe Splay 10x  Min mvmnt   Arch lift 10x  HEP   Inv/ev 10x On towel                 Standing HR/TR 2x10    Light UE support, Cues for even wt distribution   Standing on foam NBOS 4x30'' 2x EO  2x EC No UE assist   NBOS with ball movements  next  Up/down, side to side   Tandem stance  30\" ea     Simulated push off  15x     Step ups   4\" With AFO   Taps to step-alternating 15x 4\"          Supine dynamic stabilization PNF DF+Eversion/PF+inversion 2x15  Light resistance into each direction  Weaker into PF + ev         Other:  Manual DTM to peroneals, gastroc/soleus junction  Gentle TC joint mobilizations  Gentle PROM    Specific Instructions for next treatment: progress as tolerated        Assessment: [] Progressing toward goals. [] No change. [x] Other: Continued with stretching and intrinsic strengthening followed by manual techniques. Taps to 4\" step completed with alternating LE's this date. Cues for equal pressure b/w B ankles and feet with standing activities. Encouraged to maintain a pain free ROM with heel raises d/t min irritation at lateral calf and achilles. Will progress as tolerated. [x] Patient would continue to benefit from skilled physical therapy services in order to: work on ankle and foot mobility, strength, function    STG: (to be met in 6 treatments)  1. ? Pain: Pain levels improved to 2-3/10  2. ? ROM: Improved ankle ROM to neutral DF, 60 degrees PF, 30 degrees INV, 13 degrees EV  3. ? Strength: Able to actively contract PF, INV, EV, 2nd-5th digits against gravity, improved Eversion, Inversion, DF to 4-/5  4. ? Function: Limited tolerance of ambulation without (A) device, improved tolerance of stairs without AFO  5. Independent with Home Exercise Programs  LTG: (to be met in 12 treatments)  6. ? Pain: Pain levels improved to 2-3/10  7. ? ROM: Improved ankle ROM to neutral DF, 60 degrees PF, 30 degrees INV, 13 degrees EV  8. ? Strength: Able to actively contract PF, INV, EV, 2nd-5th digits against gravity, improved Eversion, Inversion, DF to 4-/5  9. ? Function: Limited tolerance of ambulation without (A) device, improved tolerance of stairs without AFO  10. Independent with Home Exercise Programs           Patient goals: strengthening of foot tendons/muscles    Pt. Education:  [x] Yes  [] No  [] Reviewed Prior HEP/Ed  Method of Education: [x] Verbal  [x] Demo  [] Written  Comprehension of Education:  [] Verbalizes understanding. [] Demonstrates understanding. [] Needs review. [x] Demonstrates/verbalizes HEP/Ed previously given.      Plan: [x] Continue per plan of care.    [] Other:      Treatment Charges: Mins Units   []  Modalities     []  Ther Exercise     [x]  Manual Therapy 25 2   []  Ther Activities     []  Aquatics     [x]  Neuromuscular 35 2   [] Vasocompression     [] Gait Training     [] Dry needling        [] 1 or 2 muscles        [] 3 or more muscles     []  Other     Total Treatment time 60 4     Time In: 11:30      Time Out:  12:35    Electronically signed by:  Stuart Moreno PTA

## 2021-12-16 ENCOUNTER — HOSPITAL ENCOUNTER (OUTPATIENT)
Dept: PHYSICAL THERAPY | Age: 51
Setting detail: THERAPIES SERIES
Discharge: HOME | End: 2021-12-16
Payer: MEDICARE

## 2021-12-16 PROCEDURE — 97112 NEUROMUSCULAR REEDUCATION: CPT

## 2021-12-16 PROCEDURE — 97140 MANUAL THERAPY 1/> REGIONS: CPT

## 2021-12-16 NOTE — FLOWSHEET NOTE
[address] [facility]   [address]   Phone: [phone]   Fax: [phone]    Physical Therapy Daily Treatment Note      Date:  2021  Patient Name:  Jay Crooks    :  1970  MRN: [id number]  Physician: Curtis Cruz                                  Insurance: [place] [employer] 19 visits remain for year from 21 appointment  Medical Diagnosis: foot drop M21.371, Achilles tendonitis M76.62             Rehab Codes: (R) foot weakness R53.1  Onset date: 10/18/21 referral                 Next Dr's appt.: 2022 per patient  Visit Count:                            Cancel/No Show: 3/0    Subjective:   Pt arrives with slight increase in pain compared to last session. States he has been experiencing cramping in plantar fascia more often. Notes increased swelling less than an hour following therapy sessions which is able to be alleviated with compression + elevation.    Pain:  [x] Yes  [] No Location: R ankle/achilles Pain Rating: (0-10 scale) 5/10  Pain altered Tx:  [x] No  [] Yes  Action:  Comments:    Objective:  Modalities:   Precautions:  Exercises:  Exercise Reps/ Time Weight/ Level Comments   Stationary Bike            Reviewed Achilles stretching stair, wall 3 x 30''   HEP   Reviewed supine Achilles stretching 3 x 20\"   HEP   Seated gastroc stretch  3x30\" towel          Toe yoga-  15x   HEP; AAROM 2nd-5th   Towel scrunch 10x  Length of towel   Toe Splay 10x  Min mvmnt   Arch lift 10x  HEP   Inv/ev 10x On towel                 Standing HR/TR x10    Light UE support, Cues for even wt distribution   Standing on foam NBOS x30'' ea 1x EO  1x EC No UE assist   NBOS with ball movements  10x ea 2# yellow ball Up/down, side to side   Tandem stance  x30\" ea     Simulated push off  10x     Step ups  10x 4\" With AFO;  1 UE A   Taps to step-alternating 10x 4\"          Supine dynamic stabilization PNF DF+Eversion/PF+inversion 2x15  Light resistance into each direction  Weaker into PF + ev         Other:  Manual DTM to peroneals, gastroc/soleus junction  MFR to medial/lateral gastroc   Gentle TC joint mobilizations  Gentle PROM    Specific Instructions for next treatment: progress as tolerated        Assessment: [] Progressing toward goals. [] No change. [x] Other: Initiated session with intrinsic strengthening and gastroc stretches. Application of manual techniques detailed above to reduce muscular tension and improve mobility. Trigger points throughout gastroc musculature, therefore, added MFR to R medial/lateral gastroc to further reduce tension. Incorporated weighted ball movements with UE's while in NBOS to challenge balance this date. Instructed to massage bottom of R foot using a small ball or frozen water bottle to reduce tightness and frequency of ms cramping. Will monitor sx's and progress as tolerated. [x] Patient would continue to benefit from skilled physical therapy services in order to: work on ankle and foot mobility, strength, function    STG: (to be met in 6 treatments)  1. ? Pain: Pain levels improved to 2-3/10  2. ? ROM: Improved ankle ROM to neutral DF, 60 degrees PF, 30 degrees INV, 13 degrees EV  3. ? Strength: Able to actively contract PF, INV, EV, 2nd-5th digits against gravity, improved Eversion, Inversion, DF to 4-/5  4. ? Function: Limited tolerance of ambulation without (A) device, improved tolerance of stairs without AFO  5. Independent with Home Exercise Programs  LTG: (to be met in 12 treatments)  6. ? Pain: Pain levels improved to 2-3/10  7. ? ROM: Improved ankle ROM to neutral DF, 60 degrees PF, 30 degrees INV, 13 degrees EV  8. ? Strength: Able to actively contract PF, INV, EV, 2nd-5th digits against gravity, improved Eversion, Inversion, DF to 4-/5  9. ? Function: Limited tolerance of ambulation without (A) device, improved tolerance of stairs without AFO  10.  Independent with Home Exercise Programs

## 2021-12-21 ENCOUNTER — HOSPITAL ENCOUNTER (OUTPATIENT)
Dept: PHYSICAL THERAPY | Age: 51
Setting detail: THERAPIES SERIES
Discharge: HOME | End: 2021-12-21
Payer: MEDICARE

## 2021-12-21 PROCEDURE — 97112 NEUROMUSCULAR REEDUCATION: CPT

## 2021-12-21 PROCEDURE — 97140 MANUAL THERAPY 1/> REGIONS: CPT

## 2021-12-21 NOTE — FLOWSHEET NOTE
[address] [facility]   [address]   Phone: [phone]   Fax: [phone]    Physical Therapy Daily Treatment Note      Date:  2021  Patient Name:  Christopher Medina    :  1970  MRN: [id number]  Physician: John Sheppard                                  Insurance: [place] [employer] 19 visits remain for year from 21 appointment  Medical Diagnosis: foot drop M21.371, Achilles tendonitis M76.62             Rehab Codes: (R) foot weakness R53.1  Onset date: 10/18/21 referral                 Next 's appt.: 2022 per patient  Visit Count:                            Cancel/No Show: 3/0    Subjective:   Pt arrives noting his sx's seem to be getting worse. Took pain meds at 7:30 this morning. Reports increased instances of sharp pain at plantar fascia and mid-lateral calf near skin graft. Continues to stretch and complete intrinsic exercises at home along with ambulation to tolerance. Increased pain and edema at the end of the day which reduces with compression + elevation.   Pain:  [x] Yes  [] No Location: R ankle/achilles Pain Rating: (0-10 scale) 4-5/10  Pain altered Tx:  [x] No  [] Yes  Action:  Comments:    Objective:  Modalities:   Precautions:  Exercises: bolded completed 21  Exercise Reps/ Time Weight/ Level Comments   Stationary Bike            Reviewed Achilles stretching stair, wall 3 x 30''   HEP   Reviewed supine Achilles stretching 3 x 20\"   HEP   Seated gastroc stretch  3x30\" towel    Big toe ext stretch-passive 3x10\"     Toe yoga-  15x  AA HEP; AAROM 2nd-5th   Towel scrunch 10x  Length of towel   Toe Splay 10x  Min mvmnt; notes recent sharp pain in this area    Arch lift 10x  HEP   Inv/ev 10x On towel     Heel/toe raise  10x        RESUME next as time allows   Standing HR/TR x10    Light UE support, Cues for even wt distribution   Standing on foam NBOS x30'' ea 1x EO  1x EC No UE assist   NBOS with ball movements  10x ea 2# yellow ball Up/down, side to side   Tandem stance  x30\" ea     Simulated push off  10x     Step ups  10x 4\" With AFO;  1 UE A   Taps to step-alternating 10x 4\"          Supine dynamic stabilization PNF DF+Eversion/PF+inversion 2x15  Light resistance into each direction  Weaker into PF + inv         Other:  Manual DTM to peroneals, gastroc/soleus junction  MFR to medial/lateral gastroc   Gentle TC joint mobilizations  Gentle PROM    Specific Instructions for next treatment: progress as tolerated        Assessment: [] Progressing toward goals. [] No change. [x] Other: Initiated session completing intrinsic strengthening with addition of heel/toe raises this date. All exercises completed slow and controlled. Applied active assist with toe yoga to isolate motion. Addition of big toe extension stretch to improve flexibility. Continued with manual techniques to reduce muscular tension and improve overall mobility. Muscle fatigue noted towards last 5-10 reps of resisted PNF. Pt notes relief and reduced pain levels until the next day following manual interventions. Held standing balance and strengthening exercises this date, plan to resume next session. [x] Patient would continue to benefit from skilled physical therapy services in order to: work on ankle and foot mobility, strength, function    STG: (to be met in 6 treatments)  1. ? Pain: Pain levels improved to 2-3/10  2. ? ROM: Improved ankle ROM to neutral DF, 60 degrees PF, 30 degrees INV, 13 degrees EV  3. ? Strength: Able to actively contract PF, INV, EV, 2nd-5th digits against gravity, improved Eversion, Inversion, DF to 4-/5  4. ? Function: Limited tolerance of ambulation without (A) device, improved tolerance of stairs without AFO  5.  Independent with Home Exercise Programs  LTG: (to be met in 12 treatments)  6. ? Pain: Pain levels improved to 2-3/10  7. ? ROM: Improved ankle ROM to neutral DF, 60 degrees PF, 30 degrees INV, 13 degrees EV  8. ? Strength: Able to actively contract PF, INV, EV, 2nd-5th digits against gravity, improved Eversion, Inversion, DF to 4-/5  9. ? Function: Limited tolerance of ambulation without (A) device, improved tolerance of stairs without AFO  10. Independent with Home Exercise Programs           Patient goals: strengthening of foot tendons/muscles    Pt. Education:  [x] Yes  [] No  [] Reviewed Prior HEP/Ed  Method of Education: [x] Verbal  [x] Demo  [] Written  Comprehension of Education:  [] Verbalizes understanding. [] Demonstrates understanding. [] Needs review. [x] Demonstrates/verbalizes HEP/Ed previously given. Plan: [x] Continue per plan of care.    [] Other:      Treatment Charges: Mins Units   []  Modalities     []  Ther Exercise     [x]  Manual Therapy 25 2   []  Ther Activities     []  Aquatics     [x]  Neuromuscular 20 1   [] Vasocompression     [] Gait Training     [] Dry needling        [] 1 or 2 muscles        [] 3 or more muscles     []  Other     Total Treatment time 45 3     Time In: 11:35      Time Out:  12:20 pm    Electronically signed by:  Cherie Campbell PTA

## 2021-12-28 DIAGNOSIS — M25.50 CHRONIC PAIN OF MULTIPLE JOINTS: ICD-10-CM

## 2021-12-28 DIAGNOSIS — G89.29 CHRONIC PAIN OF MULTIPLE JOINTS: ICD-10-CM

## 2021-12-28 RX ORDER — CYCLOBENZAPRINE HCL 10 MG
TABLET ORAL
Qty: 90 TABLET | Refills: 1 | Status: SHIPPED | OUTPATIENT
Start: 2021-12-28 | End: 2022-02-28

## 2021-12-29 ENCOUNTER — HOSPITAL ENCOUNTER (OUTPATIENT)
Dept: PHYSICAL THERAPY | Age: 51
Setting detail: THERAPIES SERIES
Discharge: HOME | End: 2021-12-29
Payer: MEDICARE

## 2021-12-29 PROCEDURE — 97140 MANUAL THERAPY 1/> REGIONS: CPT

## 2021-12-29 PROCEDURE — 97112 NEUROMUSCULAR REEDUCATION: CPT

## 2021-12-29 NOTE — FLOWSHEET NOTE
509 [facility]   [address]   Phone: [phone]   Fax: [phone]    Physical Therapy Daily Treatment Note      Date:  2021  Patient Name:  Gala Thao    :  1970  MRN: [id number]  Physician: Andrew Peterson                                  Insurance: [employer] 19 visits remain for year from 21 appointment  Medical Diagnosis: foot drop M21.371, Achilles tendonitis M76.62             Rehab Codes: (R) foot weakness R53.1  Onset date: 10/18/21 referral                 Next Dr's appt. : 1/10/22  Visit Count:                            Cancel/No Show: 3/1    Subjective:   Pt arrives noting he took pain meds at 6 am. States he moved his doctors appt from 22 to 1/10/22 to be evaluated by his doctor sooner. Continues to experience cramping at arch of foot and pain up calf muscle.    Pain:  [x] Yes  [] No Location: R ankle/achilles Pain Rating: (0-10 scale) 5/10  Pain altered Tx:  [x] No  [] Yes  Action:  Comments:    Objective:  Modalities:   Precautions:  Exercises: bolded completed 21  Exercise Reps/ Time Weight/ Level Comments   Stationary Bike 5'           Reviewed Achilles stretching stair, wall 3 x 30''   HEP   Reviewed supine Achilles stretching 3 x 20\"   HEP   Seated gastroc stretch  3x30\" towel    Big toe ext stretch-passive 3x15\"     Toe yoga-  15x  AA HEP; AAROM 2nd-5th   Towel scrunch 10x  Length of towel   Toe Splay 10x  Min mvmnt; notes recent sharp pain in this area    Arch lift 10x  HEP   Inv/ev 10x On towel     Heel/toe raise  10x           Standing HR/TR x10    Light UE support, Cues for even wt distribution   Standing on foam NBOS with eyes closed 2x30''    2x EC No UE assist   NBOS with ball movements  10x ea 2# yellow ball Up/down, side to side   Tandem stance  x30\" ea  More challenging with R foot posterior    10x     Step ups  10x 4\" With AFO;  1 UE A   Taps to step R LE 10x 4\"          Supine dynamic stabilization PNF DF+Eversion/PF+inversion 2x15  Light resistance into each direction  Weaker into PF + inv         Other:  Manual DTM to peroneals, gastroc/soleus junction with use of IASTM-side side kick tool  MFR to medial/lateral gastroc   Gentle TC joint mobilizations  Gentle PROM    Specific Instructions for next treatment: progress as tolerated        Assessment: [] Progressing toward goals. [] No change. [x] Other: Initiated session on bike for warm up followed by seated gastroc stretch. Continued with intrinsic strengthening with help from clinician to isolate and assist motion as needed. Increased range noted with arch lifts this date. Application of manual techniques with use of IASTM tool to further reduce tension. Challenged in tandem stance with RLE placed posteriorly this date. Encouraged pt to continue with stretches and intrinsic strengthening at home. [x] Patient would continue to benefit from skilled physical therapy services in order to: work on ankle and foot mobility, strength, function    STG: (to be met in 6 treatments)  1. ? Pain: Pain levels improved to 2-3/10  2. ? ROM: Improved ankle ROM to neutral DF, 60 degrees PF, 30 degrees INV, 13 degrees EV  3. ? Strength: Able to actively contract PF, INV, EV, 2nd-5th digits against gravity, improved Eversion, Inversion, DF to 4-/5  4. ? Function: Limited tolerance of ambulation without (A) device, improved tolerance of stairs without AFO  5. Independent with Home Exercise Programs  LTG: (to be met in 12 treatments)  6. ? Pain: Pain levels improved to 2-3/10  7. ? ROM: Improved ankle ROM to neutral DF, 60 degrees PF, 30 degrees INV, 13 degrees EV  8. ? Strength: Able to actively contract PF, INV, EV, 2nd-5th digits against gravity, improved Eversion, Inversion, DF to 4-/5  9. ? Function: Limited tolerance of ambulation without (A) device, improved tolerance of stairs without AFO  10.  Independent with Home Exercise Programs Patient goals: strengthening of foot tendons/muscles    Pt. Education:  [x] Yes  [] No  [] Reviewed Prior HEP/Ed  Method of Education: [x] Verbal  [x] Demo  [] Written  Comprehension of Education:  [] Verbalizes understanding. [] Demonstrates understanding. [] Needs review. [x] Demonstrates/verbalizes HEP/Ed previously given. Plan: [x] Continue per plan of care.    [] Other:      Treatment Charges: Mins Units   []  Modalities     []  Ther Exercise     [x]  Manual Therapy 12 1   []  Ther Activities     []  Aquatics     [x]  Neuromuscular 33 2   [] Vasocompression     [] Gait Training     [] Dry needling        [] 1 or 2 muscles        [] 3 or more muscles     []  Other     Total Treatment time 45 3     Time In: 12:45     Time Out:  1:35 pm    Electronically signed by:  Nia Rocha PTA

## 2022-01-03 ENCOUNTER — HOSPITAL ENCOUNTER (OUTPATIENT)
Dept: PHYSICAL THERAPY | Age: 52
Setting detail: THERAPIES SERIES
Discharge: HOME | End: 2022-01-03
Payer: MEDICARE

## 2022-01-03 PROCEDURE — 97140 MANUAL THERAPY 1/> REGIONS: CPT

## 2022-01-03 PROCEDURE — 97110 THERAPEUTIC EXERCISES: CPT

## 2022-01-05 ENCOUNTER — HOSPITAL ENCOUNTER (OUTPATIENT)
Dept: PHYSICAL THERAPY | Age: 52
Setting detail: THERAPIES SERIES
Discharge: HOME | End: 2022-01-05
Payer: MEDICARE

## 2022-01-05 PROCEDURE — 97140 MANUAL THERAPY 1/> REGIONS: CPT

## 2022-01-05 PROCEDURE — 97112 NEUROMUSCULAR REEDUCATION: CPT

## 2022-01-05 NOTE — FLOWSHEET NOTE
509 [facility]   [address]   Phone: [phone]   Fax: [phone]    Physical Therapy Daily Treatment Note      Date:  2022  Patient Name:  Shannon Renteria    :  1970  MRN: [id number]  Physician: Kait Steel                                  Insurance: [employer] 19 visits remain for year from 21 appointment  Medical Diagnosis: foot drop M21.371, Achilles tendonitis M76.62             Rehab Codes: (R) foot weakness R53.1  Onset date: 10/18/21 referral                 Next Dr's appt. : 1/10/22  Visit Count:                            Cancel/No Show: 3/1    Subjective:   Pt arrives with reduced pain this date. Notes decreased swelling lately and has been using more ice at home. Mentions continued cramping at plantar fascia. Reports he will see surgeon on Monday for follow up.    Pain:  [x] Yes  [] No Location: R ankle/achilles Pain Rating: (0-10 scale) 4/10  Pain altered Tx:  [x] No  [] Yes  Action:  Comments:    Objective:  Modalities:   Precautions:  Exercises: bolded completed 22  Exercise Reps/ Time Weight/ Level Comments   Stationary Bike 5'           Reviewed Achilles stretching stair, wall 3 x 30''   HEP   Reviewed supine Achilles stretching 3 x 20\"   HEP   Seated gastroc stretch  3x30\" towel    Big toe ext stretch-passive 3x15\" manual    Toe yoga-  15x  AA HEP; AAROM 2nd-5th   Towel scrunch 10x  Length of towel   Toe Splay 10x  Min mvmnt; notes recent sharp pain in this area    Arch lift 10x  HEP   Inv/ev 10x On towel     Heel/toe raise  10x     BAPS board  10x ea L2 Added 22; inv/ev, cw, ccw               Standing HR/TR x10    Light UE support, Cues for even wt distribution   Standing on foam NBOS with eyes closed 2x30''   No UE assist   NBOS with ball movements  10x ea 2# yellow ball Up/down, side to side   Foam alt marches 10x2\" B UE A Added 22         Tandem stance  x30\" ea  More challenging with R foot posterior    10x     Step ups  10x 4\" With AFO;  1 UE A   Taps to step R LE 10x 4\"          Supine dynamic stabilization PNF DF+Eversion/PF+inversion 2x15  Light resistance into each direction  Weaker into PF + inv         Other:  Manual DTM to peroneals, gastroc/soleus junction   MFR to lateral gastroc   Gentle TC joint mobilizations  Gentle PROM  STM + MFR to plantar fascia     Specific Instructions for next treatment: progress as tolerated        Assessment: [x] Progressing toward goals. Initiated session on bike for warm up followed by seated stretches and intrinsic strengthening. Continued with manual interventions with addition of MFR and STM to plantar fascia to reduce muscular tension. Addition of BAPS board into ev/inv and circles with focus on controlling motion. Improved strength noted with resisted ankle PNF patterns. Posterior sway present during balance on foam while maintaining NBOS with eyes closed this date. Vc's for full knee extension following step up onto 4\" step. Implemented alternating marches on foam to challenge LE strength and stability. Encouraged pt to use frozen water bottle for ice massage at plantar fascia to help reduce frequency of muscle cramping. [] No change. [] Other:     [x] Patient would continue to benefit from skilled physical therapy services in order to: work on ankle and foot mobility, strength, function    STG: (to be met in 6 treatments)  ? Pain: Pain levels improved to 2-3/10  ? ROM: Improved ankle ROM to neutral DF, 60 degrees PF, 30 degrees INV, 13 degrees EV  ? Strength: Able to actively contract PF, INV, EV, 2nd-5th digits against gravity, improved Eversion, Inversion, DF to 4-/5  ? Function: Limited tolerance of ambulation without (A) device, improved tolerance of stairs without AFO  Independent with Home Exercise Programs  LTG: (to be met in 12 treatments)  ? Pain: Pain levels improved to 2-3/10  ?  ROM: Improved ankle ROM to neutral DF, 60 degrees PF, 30 degrees INV, 13 degrees EV  ? Strength: Able to actively contract PF, INV, EV, 2nd-5th digits against gravity, improved Eversion, Inversion, DF to 4-/5  ? Function: Limited tolerance of ambulation without (A) device, improved tolerance of stairs without AFO  Independent with Home Exercise Programs           Patient goals: strengthening of foot tendons/muscles    Pt. Education:  [x] Yes  [] No  [x] Reviewed Prior HEP/Ed  Method of Education: [x] Verbal  [x] Demo  [] Written  Comprehension of Education:  [] Verbalizes understanding. [] Demonstrates understanding. [] Needs review. [x] Demonstrates/verbalizes HEP/Ed previously given. Plan: [x] Continue per plan of care.    [] Other:      Treatment Charges: Mins Units   []  Modalities     []  Ther Exercise     [x]  Manual Therapy 20 1   []  Ther Activities     []  Aquatics     [x]  Neuromuscular 45 3   [] Vasocompression     [] Gait Training     [] Dry needling        [] 1 or 2 muscles        [] 3 or more muscles     []  Other     Total Treatment time 65 4     Time In: 11:30    Time Out:  12:40    Electronically signed by:  Bhaskar Vaughan PTA

## 2022-01-10 NOTE — FLOWSHEET NOTE
509 [facility]   [facility]   Phone: [phone]   Fax: [phone]    Physical Therapy Daily Treatment Note/PROGRESS NOTE      Date:  1/3/2022  Patient Name:  Rony Lynn    :  1970  MRN: [id number]  Physician: Megan Gaytan MD                                Insurance: [place] [place] [employer] 19 visits remain for year from 21 appointment  Medical Diagnosis: foot drop M21.371, Achilles tendonitis M76.62             Rehab Codes: (R) foot weakness R53.1  Onset date: 10/18/21 referral                 Next 's appt. : 1/10/22  Visit Count: 10/12                           Cancel/No Show: 3/1    Subjective:   Pt has made some progress overall with pain levels, with ankle mobility, and with strength testing with clinical testing at this time. Patient still notes significant complaints of pain at the distal Achilles/calcaneal insertion region at this time. Treatment has included manual mobilization to help improve ankle ROM, particularly posterior talar glide to improve DF ROM. Patient has also been working on posterior tibialis and foot strengthening at this time and ankle strengthening program at this time. Wanted to return to physician at this time secondary to continued deficits with pain at the distal Achilles and limited strength functionally and with full strength testing today. May benefit from additional therapy to work on LE strengthening at this time and to work on full mobility. Pain:  [x] Yes  [] No Location: R ankle/achilles Pain Rating: (0-10 scale) 3-4/10  Pain altered Tx:  [x] No  [] Yes  Action:  Comments:    Objective:   Ankle/foot ROM:  DF (R) lacks 5 degrees, (L) 15 degrees  PF (R) 55 degrees, (L) 70 degrees  INV (R) 30 degrees, (L) 40 degrees  EV (R) 10 degrees, (L) 20 degrees     Constant numbness in 4th and 5th digits     Strength:  4-/5 DF,INV  (R) Eversion 4-/5  PF 3+/5  GTE 3+/5  2nd-3rd extension 2-/5  4th and 5th MP extension trace movement     Able to do (B) heel raise with decreased WB affected ankle  Unable to do SL heel raise on affected leg.     OBSERVATION No Deficit Deficit Not Tested Comments   Posture           Forward Head []?  []?  []?      Rounded Shoulders []?  []?  []?      Kyphosis []?  []?  []?      Lordosis []?  []?  []?      Lateral Shift []?  []?  []?      Scoliosis []?  []?  []?      Iliac Crest []?  []?  []?      PSIS []?  []?  []?      ASIS []?  []?  []?      Genu Valgus []?  []?  []?      Genu Varus []?  []?  []?      Genu Recurvatum []?  []?  []?      Pronation []?  []?  []?      Supination []?  []?  []?      Leg Length Discrp []?  []?  []?      Slumped Sitting []?  []?  []?      Palpation []?  []?  []?      Sensation []?  []?  []?      Edema []?  []?  []?      Neurological []?  []?  []?      Patellar Mobility []?  []?  []?      Patellar Orientation []?  []?  []?      Gait []?  []?  []?  Analysis: Uses AFO for (R) ankle restriction         FUNCTIONAL TESTS PAIN NO PAIN COMMENTS   Step Test 4 []?  []?      6 []?  []?      8 []?  []?      Squat []?  []?            Modalities:   Precautions:  Exercises: bolded completed 01/3/22  Exercise Reps/ Time Weight/ Level Comments   Stationary Bike 5'           Reviewed Achilles stretching stair, wall 3 x 30''   HEP   Reviewed supine Achilles stretching 3 x 20\"   HEP   Seated gastroc stretch  3x30\" towel    Big toe ext stretch-passive 3x15\"     Toe yoga-  15x  AA HEP; AAROM 2nd-5th   Towel scrunch 10x  Length of towel   Toe Splay 10x  Min mvmnt; notes recent sharp pain in this area    Arch lift 10x  HEP   Inv/ev 10x On towel     Heel/toe raise  10x           Standing HR/TR x10    Light UE support, Cues for even wt distribution   Standing on foam NBOS with eyes closed 2x30''    2x EC No UE assist   NBOS with ball movements  10x ea 2# yellow ball Up/down, side to side   Tandem stance  x30\" ea  More challenging with R foot posterior    10x     Step ups  10x 4\" With AFO;  1 UE A   Taps to step R LE 10x 4\"          Supine dynamic stabilization PNF DF+Eversion/PF+inversion 2x15  Light resistance into each direction  Weaker into PF + inv         Other: 12' manual  Manual posterior talar mobilization  MFR to medial/lateral gastroc   PROM      Specific Instructions for next treatment: progress as tolerated        Assessment: [] Progressing toward goals. [] No change. [x] Other: Possible discharge back to [facility] at this time with fair overall improvement. Was noting mild improvement of Achilles pain, was demonstrating improved PROM with clinical testing at this time. Patient was still demonstrating limited active movement in the lateral foot- 4th and 5th digits, and limited ankle strength overall at this time. HEP included extensive intrinsic, ankle strengthening as well as exercises to improve mobility deficits. Patient returning to physician to see if any additional therapy recommended/warranted at this time. [] Patient would continue to benefit from skilled physical therapy services in order to: work on ankle and foot mobility, strength, function    STG: (to be met in 6 treatments)  1. ? Pain: Pain levels improved to 2-3/10 PROGRESSING  2. ? ROM: Improved ankle ROM to neutral DF, 60 degrees PF, 30 degrees INV, 13 degrees EV PROGRESSING  3. ? Strength: Able to actively contract PF, INV, EV MET, 2nd-5th digits against gravity NOT MET, improved Eversion, Inversion, DF to 4-/5 MET  4. ? Function: Limited tolerance of ambulation without (A) device, improved tolerance of stairs without AFO NOT MET  5.  Independent with Home Exercise Programs MET  LTG: (to be met in 12 treatments)  6. ? Pain: Pain levels improved to 0-1/10 PROGRESSING MINIMALLY  7. ? ROM: Improved ankle ROM to 5 degrees DF, 65 degrees PF, 35 degrees INV, 15 degrees EV PROGRESSING  8. ? Strength: Able to actively contract PF, INV, EV PROGRESSING, 2nd-5th digits against gravity NOT MET, improved Eversion, Inversion, DF to 4-/5 MET  9. ? Function: Limited tolerance of ambulation without (A) device , improved tolerance of stairs without AFO NOT MET  10. Independent with Home Exercise Programs          MET  Patient goals: strengthening of foot tendons/muscles PROGRESSING    Pt. Education:  [x] Yes  [] No  [] Reviewed Prior HEP/Ed  Method of Education: [x] Verbal  [x] Demo  [] Written  Comprehension of Education:  [] Verbalizes understanding. [] Demonstrates understanding. [] Needs review. [x] Demonstrates/verbalizes HEP/Ed previously given. Plan: [] Continue per plan of care.    [x] Other:Additional 2-3 times per week, 8 additional visits (24 total)      Treatment Charges: Mins Units   []  Modalities     []  Ther Exercise 30 2   [x]  Manual Therapy 12 1   []  Ther Activities     []  Aquatics     [x]  Neuromuscular     [] Vasocompression     [] Gait Training     [] Dry needling        [] 1 or 2 muscles        [] 3 or more muscles     []  Other     Total Treatment time 42 3     Time In: 2    Time Out:  250    Electronically signed by:  Ruben Johnson PT       Physician Signature:________________________________Date:__________________  By signing above or cosigning this note, I have reviewed this plan of care and certify a need for medically necessary rehabilitation services.      *PLEASE SIGN ABOVE AND FAX BACK ALL PAGES*

## 2022-01-11 ENCOUNTER — HOSPITAL ENCOUNTER (OUTPATIENT)
Dept: PHYSICAL THERAPY | Age: 52
Setting detail: THERAPIES SERIES
Discharge: HOME | End: 2022-01-11
Payer: MEDICARE

## 2022-01-11 PROCEDURE — 97112 NEUROMUSCULAR REEDUCATION: CPT

## 2022-01-11 PROCEDURE — 97140 MANUAL THERAPY 1/> REGIONS: CPT

## 2022-01-11 NOTE — FLOWSHEET NOTE
509 [facility]   [address]   Phone: [phone]   Fax: [phone]    Physical Therapy Daily Treatment Note      Date:  2022  Patient Name:  Mateo Contreras    :  1970  MRN: [id number]  Physician: Mercy Felipe                                  Insurance: [place] [employer] 19 visits remain for year from 21 appointment  Medical Diagnosis: foot drop M21.371, Achilles tendonitis M76.62             Rehab Codes: (R) foot weakness R53.1  Onset date: 10/18/21 referral                 Next Dr's appt. : 1/10/22  Visit Count:                            Cancel/No Show: 3/1    Subjective:  Pt saw his surgeon Monday stating he should continue participating in PT for 3 months. Needs to increase inversion strength to 4/5. Did not take pain meds this morning since they are not making him feel good, therefore, presents with increased pain.    Pain:  [x] Yes  [] No Location: R ankle/achilles Pain Rating: (0-10 scale) 7/10  Pain altered Tx:  [x] No  [] Yes  Action:  Comments:    Objective:  Modalities:   Precautions:  Exercises: bolded completed 22  Exercise Reps/ Time Weight/ Level Comments   Stationary Bike 5'           Reviewed Achilles stretching stair, wall 3 x 30''   HEP   Reviewed supine Achilles stretching 3 x 20\"   HEP   Seated gastroc stretch  3x30\" towel    Big toe ext stretch-passive 3x15\" manual    Toe yoga-  15x  AA HEP; AAROM 2nd-5th   Towel scrunch 10x  Length of towel   Toe Splay 10x  Min mvmnt; notes recent sharp pain in this area    Arch lift 10x  HEP   Inv/ev 10x On towel     Heel/toe raise  10x     BAPS board  10x ea L2 Added 22; inv/ev, cw, ccw               Standing HR/TR x10    Light UE support, Cues for even wt distribution   Standing on foam NBOS with eyes closed 2x30''   No UE assist   NBOS eyes open x30\"     NBOS with ball movements  10x ea 2# yellow ball Up/down, side to side   Foam alt marches 10x2\" B UE A Added 1/5/22         Tandem stance  x40\" ea  More challenging with R foot posterior    10x     Step ups  10x 4\" With AFO;  1 UE A   Taps to step R LE 10x 6\" Increased step height 1/11/22         Supine dynamic stabilization PNF DF+Eversion/PF+inversion 2x15  Light resistance into each direction  Weaker into PF + inv         Other:  Manual DTM to peroneals, gastroc/soleus junction   MFR to distal peroneals    Gentle TC joint mobilizations  PROM    Specific Instructions for next treatment: progress as tolerated        Assessment: [x] Progressing toward goals. Continued with intrinsic exercises providing AA as needed to improve ROM followed by manual techniques. MFR applied to distal peroneals to reduce muscular tension. Pt able to move toes more with completion of toe splays this date. Increased step height to 6\" for taps to step with R LE. Increased time spent in tandem stance with good tolerance. Encouraged to ice at home post treatment for pain relief. [] No change. [x] Other: Talked with primary PT about extending therapy visits since pt would benefit from further strengthening    [x] Patient would continue to benefit from skilled physical therapy services in order to: work on ankle and foot mobility, strength, function    STG: (to be met in 6 treatments)  1. ? Pain: Pain levels improved to 2-3/10  2. ? ROM: Improved ankle ROM to neutral DF, 60 degrees PF, 30 degrees INV, 13 degrees EV  3. ? Strength: Able to actively contract PF, INV, EV, 2nd-5th digits against gravity, improved Eversion, Inversion, DF to 4-/5  4. ? Function: Limited tolerance of ambulation without (A) device, improved tolerance of stairs without AFO  5.  Independent with Home Exercise Programs  LTG: (to be met in 12 treatments)  6. ? Pain: Pain levels improved to 2-3/10  7. ? ROM: Improved ankle ROM to neutral DF, 60 degrees PF, 30 degrees INV, 13 degrees EV  8. ? Strength: Able to actively contract PF, INV, EV, 2nd-5th digits against gravity, improved Eversion, Inversion, DF to 4-/5  9. ? Function: Limited tolerance of ambulation without (A) device, improved tolerance of stairs without AFO  10. Independent with Home Exercise Programs           Patient goals: strengthening of foot tendons/muscles    Pt. Education:  [x] Yes  [] No  [x] Reviewed Prior HEP/Ed  Method of Education: [x] Verbal  [x] Demo  [] Written  Comprehension of Education:  [] Verbalizes understanding. [] Demonstrates understanding. [] Needs review. [x] Demonstrates/verbalizes HEP/Ed previously given. Plan: [x] Continue per plan of care.    [] Other:      Treatment Charges: Mins Units   []  Modalities     []  Ther Exercise     [x]  Manual Therapy 15 1   []  Ther Activities     []  Aquatics     [x]  Neuromuscular 45 3   [] Vasocompression     [] Gait Training     [] Dry needling        [] 1 or 2 muscles        [] 3 or more muscles     []  Other     Total Treatment time 60 4     Time In: 11:33    Time Out:  12:38    Electronically signed by:  Rambo Lam PTA

## 2022-01-13 ENCOUNTER — HOSPITAL ENCOUNTER (OUTPATIENT)
Dept: PHYSICAL THERAPY | Age: 52
Setting detail: THERAPIES SERIES
Discharge: HOME | End: 2022-01-13
Payer: MEDICARE

## 2022-01-13 PROCEDURE — 97140 MANUAL THERAPY 1/> REGIONS: CPT

## 2022-01-13 PROCEDURE — 97112 NEUROMUSCULAR REEDUCATION: CPT

## 2022-01-13 NOTE — FLOWSHEET NOTE
[address] [facility] Physical [facility]   [facility]   Phone: [phone]   Fax: [phone]    Physical Therapy Daily Treatment Note      Date:  2022  Patient Name:  Shannon Renteria    :  1970  MRN: [id number]  Physician: Kait Steel                                  Insurance: [employer] 19 visits remain for year from 21 appointment  Medical Diagnosis: foot drop M21.371, Achilles tendonitis M76.62             Rehab Codes: (R) foot weakness R53.1  Onset date: 10/18/21 referral                 Next Dr's appt.: unknown  Visit Count:                            Cancel/No Show: 3/1    Subjective:  Pt saw new specialist yesterday with xray showing compression of lower cervical vertebra and he has an MRI scheduled for the beginning of February. Took pain meds early this morning. Reports sporadic cramping in arch of R foot. Notes occasional tripping when twisting or pivoting d/t reduced R ankle ROM.   Pain:  [x] Yes  [] No Location: R ankle/achilles Pain Rating: (0-10 scale) 4/10  Pain altered Tx:  [x] No  [] Yes  Action:  Comments:    Objective:  Modalities:   Precautions:  Exercises: bolded completed 22  Exercise Reps/ Time Weight/ Level Comments   Stationary Bike 5'           Reviewed Achilles stretching stair, wall 3 x 30''   HEP   Reviewed supine Achilles stretching 3 x 20\"   HEP   Seated gastroc stretch  3x30\" towel    Big toe ext stretch-passive 3x15\" manual    Toe yoga-  15x  AA HEP; AAROM 2nd-5th   Towel scrunch 10x  Length of towel   Toe Splay 10x  Min mvmnt; notes recent sharp pain in this area    Arch lift 10x  HEP   Inv/ev 10x On towel     Heel/toe raise  10x     BAPS board  10x ea L3 Added 22; PF/DF inv/ev, cw, ccw   LAQ with x3 ankle pumps 8x  Difficult to get leg fully straight          Supine    Add R hip/knee strengthening next          Standing HR/TR x10    Light UE support, Cues for even wt distribution   Standing on foam NBOS with eyes closed 2x30''   No UE assist   NBOS eyes open x30\"     NBOS with ball movements  10x ea 2# yellow ball Up/down, side to side   With foam and w/o alt marches 10x2\" Light UE A as needed  Added 1/5/22   Fitter board next     Tandem stance  x40\" ea  More challenging with R foot posterior         Step ups  10x 4\" With AFO;  1 UE A   Taps to step R LE 10x 6\" Increased step height 1/11/22   Lunge with weight shift  7x  R LE in front    Supine dynamic stabilization PNF DF+Eversion/PF+inversion 2x15  Light resistance into each direction  Weaker into PF + inv         Other:  Manual DTM to peroneals, gastroc/soleus junction   MFR to medial/lateral gastroc     TC joint mobilizations  Ankle/toes PROM  STM to plantar surface    Specific Instructions for next treatment: progress as tolerated        Assessment: [x] Progressing toward goals. Initiated session with stretches followed by foot intrinsic exercises. Increased level for BAPS board this date noting slight shooting pain through heel with eversion. Continued with manual interventions providing MFR to medial/lateral gastroc d/t trigger points noticed throughout. Added PROM to toes as well as R ankle this date to further improve mobility. LAQ's with ankle pumps added with reduced knee extension noted. Pt attempted marches w/o UE support, however, tends to shift side to side and unable to control motion. Implemented forward lunge onto R LE with weight shift to improve strength and stabilization. Will progress patient as tolerated in upcoming sessions. [] No change.      [x] Other: Primary PT will request an extension of therapy visits since pt would benefit from further strengthening    [x] Patient would continue to benefit from skilled physical therapy services in order to: work on ankle and foot mobility, strength, function    STG: (to be met in 6 treatments)  1. ? Pain: Pain levels improved to 2-3/10  2. ? ROM: Improved ankle ROM to neutral DF, 60 degrees PF, 30 degrees INV, 13 degrees EV  3. ? Strength: Able to actively contract PF, INV, EV, 2nd-5th digits against gravity, improved Eversion, Inversion, DF to 4-/5  4. ? Function: Limited tolerance of ambulation without (A) device, improved tolerance of stairs without AFO  5. Independent with Home Exercise Programs  LTG: (to be met in 12 treatments)  6. ? Pain: Pain levels improved to 2-3/10  7. ? ROM: Improved ankle ROM to neutral DF, 60 degrees PF, 30 degrees INV, 13 degrees EV  8. ? Strength: Able to actively contract PF, INV, EV, 2nd-5th digits against gravity, improved Eversion, Inversion, DF to 4-/5  9. ? Function: Limited tolerance of ambulation without (A) device, improved tolerance of stairs without AFO  10. Independent with Home Exercise Programs           Patient goals: strengthening of foot tendons/muscles    Pt. Education:  [x] Yes  [] No  [x] Reviewed Prior HEP/Ed  Method of Education: [x] Verbal  [x] Demo  [] Written  Comprehension of Education:  [] Verbalizes understanding. [] Demonstrates understanding. [] Needs review. [x] Demonstrates/verbalizes HEP/Ed previously given. Plan: [x] Continue per plan of care.    [] Other:      Treatment Charges: Mins Units   []  Modalities     []  Ther Exercise     [x]  Manual Therapy 15 1   []  Ther Activities     []  Aquatics     [x]  Neuromuscular 45 3   [] Vasocompression     [] Gait Training     [] Dry needling        [] 1 or 2 muscles        [] 3 or more muscles     []  Other     Total Treatment time 60 4     Time In: 11:50    Time Out:  12:55    Electronically signed by:  Mac Lucas PTA

## 2022-01-18 ENCOUNTER — HOSPITAL ENCOUNTER (OUTPATIENT)
Dept: PHYSICAL THERAPY | Age: 52
Setting detail: THERAPIES SERIES
Discharge: HOME | End: 2022-01-18
Payer: MEDICARE

## 2022-01-18 PROCEDURE — 97140 MANUAL THERAPY 1/> REGIONS: CPT

## 2022-01-18 PROCEDURE — 97112 NEUROMUSCULAR REEDUCATION: CPT

## 2022-01-18 NOTE — FLOWSHEET NOTE
[address] [facility]   [address]   Phone: [phone]   Fax: [phone]    Physical Therapy Daily Treatment Note      Date:  2022  Patient Name:  Maximiliano Deal    :  1970  MRN: [id number]  Physician: Xiomara London                                  Insurance: [employer] 19 visits remain for year from 21 appointment  Medical Diagnosis: foot drop M21.371, Achilles tendonitis M76.62             Rehab Codes: (R) foot weakness R53.1  Onset date: 10/18/21 referral                 Next 's appt.: unknown  Visit Count:                            Cancel/No Show: 3/1    Subjective: Pt arrives noting increased swelling over the weekend d/t increased work load.    Pain:  [x] Yes  [] No Location: R ankle/achilles Pain Rating: (0-10 scale) 6/10  Pain altered Tx:  [x] No  [] Yes  Action:  Comments:    Objective:  Modalities:   Precautions:  Exercises: bolded completed 22  Exercise Reps/ Time Weight/ Level Comments   Stationary Bike 5'           Reviewed Achilles stretching stair, wall 3 x 30''   HEP   Reviewed supine Achilles stretching 3 x 20\"   HEP   Seated gastroc stretch  3x30\" towel    Big toe ext stretch-passive 3x15\" manual    Toe yoga-  15x  AA HEP; AAROM 2nd-5th   Towel scrunch 10x  Length of towel   Toe Splay 10x  Min mvmnt; notes recent sharp pain in this area    Arch lift 10x  HEP   Inv/ev 10x On towel     Heel/toe raise  10x     Heel raise- 2 way Next   Toes in, toes out   BAPS board  10x ea L3 Added 22; PF/DF inv/ev, cw, ccw   LAQ with x3 ankle pumps 8x  Difficult to get leg fully straight                Supine    Add R hip/knee strengthening next          Standing    Without brace next   Slant board stretch next      HR/TR x10    Light UE support, Cues for even wt distribution   Standing on foam NBOS with eyes closed 2x30''   No UE assist   NBOS eyes open x30\"     NBOS with ball movements  10x ea 2# yellow ball Up/down, side to side   Alt marches 10x2\" Light UE A as needed  Added 1/5/22   Fitter board 1' ea  A/P, M/L   Fitter board for balance  Next   A/P, M/L   Tandem stance  x30\" ea  More challenging with R foot posterior   Foot intrinsics in standing  Trial next     Step ups  10x 4\" With AFO;  1 UE A   Taps to step R LE 10x2 6\" Increased step height 1/11/22   Lunge with weight shift  8x  R LE in front          Supine dynamic stabilization PNF DF+Eversion/PF+inversion 2x15  Light resistance into each direction  Weaker into PF + inv         Other:  Manual  DTM to peroneals, gastroc/soleus junction   MFR to medial/lateral gastroc     TC joint mobilizations  Ankle PROM  STM to plantar surface    Specific Instructions for next treatment: progress as tolerated, standing ex's w/o brace        Assessment: [x] Progressing toward goals. Initiated session with stretches and foot intrinsics followed by manual techniques. Addition of fitter board this date noting irritation with lateral motion d/t brace rubbing against RLE. Improved control noted with standing exercises with reduced reliance on B UE support. Will progress strengthening and stability as tolerated next session. [] No change. [x] Other: Primary PT will request an extension of therapy visits since pt would benefit from further strengthening, will try to ween out of AFO as strength tolerates per surgeon     [x] Patient would continue to benefit from skilled physical therapy services in order to: work on ankle and foot mobility, strength, function    STG: (to be met in 6 treatments)  1. ? Pain: Pain levels improved to 2-3/10  2. ? ROM: Improved ankle ROM to neutral DF, 60 degrees PF, 30 degrees INV, 13 degrees EV  3. ? Strength: Able to actively contract PF, INV, EV, 2nd-5th digits against gravity, improved Eversion, Inversion, DF to 4-/5  4. ? Function: Limited tolerance of ambulation without (A) device, improved tolerance of stairs without AFO  5.  Independent with Home Exercise Programs  LTG: (to be met in 12 treatments)  6. ? Pain: Pain levels improved to 2-3/10  7. ? ROM: Improved ankle ROM to neutral DF, 60 degrees PF, 30 degrees INV, 13 degrees EV  8. ? Strength: Able to actively contract PF, INV, EV, 2nd-5th digits against gravity, improved Eversion, Inversion, DF to 4-/5  9. ? Function: Limited tolerance of ambulation without (A) device, improved tolerance of stairs without AFO  10. Independent with Home Exercise Programs           Patient goals: strengthening of foot tendons/muscles    Pt. Education:  [x] Yes  [] No  [x] Reviewed Prior HEP/Ed  Method of Education: [x] Verbal  [x] Demo  [] Written  Comprehension of Education:  [] Verbalizes understanding. [] Demonstrates understanding. [] Needs review. [x] Demonstrates/verbalizes HEP/Ed previously given. Plan: [x] Continue per plan of care.    [] Other:      Treatment Charges: Mins Units   []  Modalities     []  Ther Exercise     [x]  Manual Therapy 15 1   []  Ther Activities     []  Aquatics     [x]  Neuromuscular 45 3   [] Vasocompression     [] Gait Training     [] Dry needling        [] 1 or 2 muscles        [] 3 or more muscles     []  Other     Total Treatment time 60 4     Time In: 11:02    Time Out:  12:02    Electronically signed by:  Shana Reid PTA

## 2022-01-19 ENCOUNTER — OFFICE VISIT (OUTPATIENT)
Dept: FAMILY MEDICINE CLINIC | Age: 52
End: 2022-01-19
Payer: MEDICARE

## 2022-01-19 VITALS
WEIGHT: 194 LBS | SYSTOLIC BLOOD PRESSURE: 118 MMHG | HEART RATE: 73 BPM | DIASTOLIC BLOOD PRESSURE: 68 MMHG | HEIGHT: 71 IN | OXYGEN SATURATION: 98 % | TEMPERATURE: 97.8 F | BODY MASS INDEX: 27.16 KG/M2

## 2022-01-19 DIAGNOSIS — R73.9 HYPERGLYCEMIA: ICD-10-CM

## 2022-01-19 DIAGNOSIS — I10 ESSENTIAL HYPERTENSION: Primary | ICD-10-CM

## 2022-01-19 DIAGNOSIS — F31.75 BIPOLAR DISORDER, IN PARTIAL REMISSION, MOST RECENT EPISODE DEPRESSED (HCC): ICD-10-CM

## 2022-01-19 DIAGNOSIS — K21.9 GASTROESOPHAGEAL REFLUX DISEASE WITHOUT ESOPHAGITIS: ICD-10-CM

## 2022-01-19 DIAGNOSIS — E78.5 HYPERLIPIDEMIA WITH TARGET LDL LESS THAN 100: ICD-10-CM

## 2022-01-19 DIAGNOSIS — Z23 ENCOUNTER FOR IMMUNIZATION: ICD-10-CM

## 2022-01-19 DIAGNOSIS — M79.7 FIBROMYALGIA MUSCLE PAIN: ICD-10-CM

## 2022-01-19 DIAGNOSIS — F41.9 ANXIETY: ICD-10-CM

## 2022-01-19 DIAGNOSIS — M76.61 ACHILLES TENDINITIS OF RIGHT LOWER EXTREMITY: ICD-10-CM

## 2022-01-19 LAB — HBA1C MFR BLD: 5.8 %

## 2022-01-19 PROCEDURE — G8419 CALC BMI OUT NRM PARAM NOF/U: HCPCS | Performed by: FAMILY MEDICINE

## 2022-01-19 PROCEDURE — G8427 DOCREV CUR MEDS BY ELIG CLIN: HCPCS | Performed by: FAMILY MEDICINE

## 2022-01-19 PROCEDURE — 99214 OFFICE O/P EST MOD 30 MIN: CPT | Performed by: FAMILY MEDICINE

## 2022-01-19 PROCEDURE — 83036 HEMOGLOBIN GLYCOSYLATED A1C: CPT | Performed by: FAMILY MEDICINE

## 2022-01-19 PROCEDURE — G8482 FLU IMMUNIZE ORDER/ADMIN: HCPCS | Performed by: FAMILY MEDICINE

## 2022-01-19 PROCEDURE — 1036F TOBACCO NON-USER: CPT | Performed by: FAMILY MEDICINE

## 2022-01-19 RX ORDER — PRAZOSIN HYDROCHLORIDE 2 MG/1
CAPSULE ORAL
COMMUNITY
Start: 2021-12-15 | End: 2022-06-24

## 2022-01-19 RX ORDER — PROPRANOLOL HYDROCHLORIDE 10 MG/1
TABLET ORAL
COMMUNITY
End: 2022-01-19 | Stop reason: ALTCHOICE

## 2022-01-19 RX ORDER — MAGNESIUM OXIDE 400 MG/1
TABLET ORAL
COMMUNITY
End: 2022-10-04 | Stop reason: SDUPTHER

## 2022-01-19 RX ORDER — LIDOCAINE 50 MG/G
PATCH TOPICAL
COMMUNITY
End: 2022-10-24

## 2022-01-19 RX ORDER — DOXEPIN HYDROCHLORIDE 75 MG/1
CAPSULE ORAL
COMMUNITY
Start: 2021-12-18

## 2022-01-19 RX ORDER — MINOCYCLINE HYDROCHLORIDE 100 MG/1
CAPSULE ORAL
COMMUNITY
Start: 2021-12-01

## 2022-01-19 RX ORDER — OMEPRAZOLE 20 MG/1
20 CAPSULE, DELAYED RELEASE ORAL
Qty: 90 CAPSULE | Refills: 1 | Status: SHIPPED | OUTPATIENT
Start: 2022-01-19 | End: 2022-04-18 | Stop reason: SDUPTHER

## 2022-01-19 RX ORDER — CLINDAMYCIN PHOSPHATE 11.9 MG/ML
SOLUTION TOPICAL
COMMUNITY
Start: 2021-11-01

## 2022-01-19 RX ORDER — BUSPIRONE HYDROCHLORIDE 10 MG/1
10 TABLET ORAL 3 TIMES DAILY PRN
Qty: 90 TABLET | Refills: 3 | Status: SHIPPED | OUTPATIENT
Start: 2022-01-19 | End: 2022-05-31

## 2022-01-19 RX ORDER — DIVALPROEX SODIUM 250 MG/1
TABLET, EXTENDED RELEASE ORAL
COMMUNITY
Start: 2021-11-01 | End: 2022-06-24

## 2022-01-19 RX ORDER — CLONAZEPAM 0.5 MG/1
TABLET ORAL
COMMUNITY
Start: 2021-12-03

## 2022-01-19 ASSESSMENT — ANXIETY QUESTIONNAIRES
IF YOU CHECKED OFF ANY PROBLEMS ON THIS QUESTIONNAIRE, HOW DIFFICULT HAVE THESE PROBLEMS MADE IT FOR YOU TO DO YOUR WORK, TAKE CARE OF THINGS AT HOME, OR GET ALONG WITH OTHER PEOPLE: VERY DIFFICULT
GAD7 TOTAL SCORE: 9
7. FEELING AFRAID AS IF SOMETHING AWFUL MIGHT HAPPEN: 0
3. WORRYING TOO MUCH ABOUT DIFFERENT THINGS: 1
6. BECOMING EASILY ANNOYED OR IRRITABLE: 2
1. FEELING NERVOUS, ANXIOUS, OR ON EDGE: 3
5. BEING SO RESTLESS THAT IT IS HARD TO SIT STILL: 0
2. NOT BEING ABLE TO STOP OR CONTROL WORRYING: 0
4. TROUBLE RELAXING: 3

## 2022-01-19 ASSESSMENT — PATIENT HEALTH QUESTIONNAIRE - PHQ9
SUM OF ALL RESPONSES TO PHQ QUESTIONS 1-9: 1
2. FEELING DOWN, DEPRESSED OR HOPELESS: 1
SUM OF ALL RESPONSES TO PHQ QUESTIONS 1-9: 1
1. LITTLE INTEREST OR PLEASURE IN DOING THINGS: 0
SUM OF ALL RESPONSES TO PHQ QUESTIONS 1-9: 1
SUM OF ALL RESPONSES TO PHQ9 QUESTIONS 1 & 2: 1
SUM OF ALL RESPONSES TO PHQ QUESTIONS 1-9: 1

## 2022-01-19 ASSESSMENT — ENCOUNTER SYMPTOMS
ABDOMINAL DISTENTION: 0
SHORTNESS OF BREATH: 0
CONSTIPATION: 0
DIARRHEA: 0
ABDOMINAL PAIN: 0
NAUSEA: 0
VOMITING: 0
COUGH: 0
WHEEZING: 0
CHEST TIGHTNESS: 0

## 2022-01-19 NOTE — PROGRESS NOTES
Deann Martinez (:  1970) is a 54 y.o. male,Established patient, here for evaluation of the following chief complaint(s): Hypertension, Insomnia, Anxiety, and Pain      ASSESSMENT/PLAN:    1. Essential hypertension  Well controlled. Continue current treatment. Metoprolol 75 mg twice a day prazosin 2 mg, hydrochlorothiazide 25 mg with potassium 20 mEq    Will recheck labs. Discussed low salt diet and BP and pulse monitoring.    -     CBC; Future  -     Comprehensive Metabolic Panel; Future  -     TSH without Reflex; Future  -     Magnesium; Future  2. Hyperlipidemia with target LDL less than 100  Well-controlled  Continue pravastatin 80 mg daily  Lab Results   Component Value Date    LDLCHOLESTEROL 94 2021       -     Lipid Panel; Future  3. Fibromyalgia muscle pain  Continue stretching, repositioning, follow-up with pain management and physical therapy. Foam top mattress advised  Aromatherapy  Stretching  Currently doing PT  I advised him to Call insurance if cannot cancel the prescriptions at the pharmacy  I discussed with patient possible meds interaction, and to avoid benzodiazepines with oxycodone      4. Hyperglycemia  Worsening  -     POCT glycosylated hemoglobin (Hb A1C) 5.8 worsening diabetes  Lab Results   Component Value Date    LABA1C 5.8 2022    LABA1C 5.3 2021    LABA1C 5.8 2020   Low carb, low fat diet, increase fruits and vegetables, and exercise 4-5 times a week 30-40 minutes a day, or walk 1-2 hours per day, or wear a pedometer and get at least 10,000 steps per day. 5. Bipolar disorder, in partial remission, most recent episode depressed (HCC)   Improving   -     busPIRone (BUSPAR) 10 MG tablet;  Take 1 tablet by mouth 3 times daily as needed (ANXIETY), Disp-90 tablet, R-3Normal     To continue with current psychiatric medications, Depakote, doxepin, prazosin, Klonopin as needed, Aplenzin, BuSpar as needed  Continue current treatment and follow up with psychiatrist and psychologist as scheduled. 6. Gastroesophageal reflux disease without esophagitis  Inadequately controlled    - start    omeprazole (PRILOSEC) 20 MG delayed release capsule; Take 1 capsule by mouth every morning (before breakfast) STOP PEPCID, Disp-90 capsule, R-1Normal  7. Anxiety  Improved with meds    -     busPIRone (BUSPAR) 10 MG tablet; Take 1 tablet by mouth 3 times daily as needed (ANXIETY), Disp-90 tablet, R-3Normal  8. Achilles tendinitis of right lower extremity  Failing to change as expected. follow up with othopedics surgeon    9. Encounter for immunization  -     COVID-19 mRNA Vacc, Moderna, (MODERNA COVID-19 VACCINE) 100 MCG/0.5ML SUSP injection; Inject 0.25 mLs into the muscle once for 1 dose Needs Moderna Booster, Disp-0.25 mL, R-0Normal      01/08/2022 01/05/2022   1  Oxycodone-Acetaminophen 5-325   90.00  30  Karin Abelson  [id number]  Rit ([id number])  0  22.50 MME  Medicaid [place]     01/04/2022 12/28/2021   1  Cyclobenzaprine 10 Mg Tablet   90.00  30  Fl Chi  [id number]  Rit ([id number])  0   Medicaid [place]     01/02/2022 12/03/2021   1  Clonazepam 0.5 Mg Tablet   16.00  16  As Far  [id number]  Cathy            Ervin received counseling on the following healthy behaviors: nutrition, exercise, medication adherence and weight loss    Reviewed prior labs and health maintenance  Discussed use, benefit, and side effects of prescribed medications. Barriers to medication compliance addressed. Patient given educational materials - see patient instructions  Was a self-tracking handout given in paper form or via [employer]? Yes  All patient questions answered. Patient voiced understanding. The patient's past medical,surgical, social, and family history as well as his current medications and allergies were reviewed as documented in today's encounter. Medications, labs, diagnostic studies, consultations and follow-up as documented in this encounter.     Return in about 4 months (around 5/19/2022) for PHQ-9 in EPIC, LABS F/U, HTN, PAIN. Data Unavailable    Future Appointments   Date Time Provider Maribell Duran   1/25/2022  9:00 AM SCHEDULE, WILMAR ABDI [facility]   1/27/2022  9:00 AM SCHEDULE, STBRITTANEY ABDI [facility]   5/19/2022  2:45 PM Tye Alamo MD [facility]         SUBJECTIVE/OBJECTIVE:    Hypertension: Patient here for follow-up. He is exercising, doing physical therapy, and is adherent to low salt diet. Blood pressure is well controlled at home. Cardiac symptoms fatigue. Patient denies chest pain, chest pressure/discomfort, claudication, dyspnea, exertional chest pressure/discomfort, irregular heart beat, lower extremity edema, near-syncope, orthopnea, palpitations, paroxysmal nocturnal dyspnea, syncope and tachypnea. Cardiovascular risk factors: dyslipidemia, hypertension, male gender and smoking/ tobacco exposure. Use of agents associated with hypertension: none. History of target organ damage: none. He did have evaluation by cardiology    BP controlled. Meg Farfan reports compliance with BP medications, and tolerates them well, denies side effects. BP Readings from Last 3 Encounters:   01/19/22 118/68   08/11/21 126/84   07/12/21 106/70          Pulse is Low. Pulse Readings from Last 3 Encounters:   01/19/22 73   08/11/21 103   07/12/21 74         Hyperlipidemia:  No new myalgias or GI upset on pravastatin (Pravachol). Medication compliance: compliant all of the time. Patient is  following a low fat, low cholesterol diet. LDL is  Normal and mildly high triglycerides    Lab Results   Component Value Date    LDLCHOLESTEROL 94 03/17/2021     Lab Results   Component Value Date    TRIG 168 (H) 03/17/2021    TRIG 130 09/01/2020    TRIG 174 (H) 05/28/2020       Fibromyalgia pain  Has muscle aches everywhere worse in his back, neck and right shoulder and right ankle.   Right ankle pain is chronic, has multiple surgeries for Achilles tendinitis, currently he is doing PT and seeing orthopedics. Wearing right ankle brace has been helping. Intensity of pain is 5/10  Neck pain, doing MRI   For right shoulder pain, he sees orthopedic surgeon at [facility]. He feels the pain is not well controlled, going to pain management, and he is on pain medications. We discussed other options    Hyperglycemia and prediabetes worsening  Denies increased appetite, thirst or polyuria. Lab Results   Component Value Date    LABA1C 5.8 01/19/2022    LABA1C 5.3 03/12/2021    LABA1C 5.8 09/01/2020       Weight has been increasing , unintentional weight gain of 16 lbs in 5 months. Wt Readings from Last 3 Encounters:   01/19/22 194 lb (88 kg)   08/11/21 178 lb (80.7 kg)   07/12/21 180 lb (81.6 kg)         Bipolar disorder and anxiety    Cannot sleep for days at a time. He follows with psychiatrist who changed his medications again, and he is hoping that they are helping. Anxiety, he says BuSpar helps, but apparently his psychiatrist also gave him a small dosage of benzodiazepines. PHQ-2 Over the past 2 weeks, how often have you been bothered by any of the following problems? Little interest or pleasure in doing things: Not at all  Feeling down, depressed, or hopeless: Several days  PHQ-2 Score: 1  PHQ-9 Over the past 2 weeks, how often have you been bothered by any of the following problems? PHQ-9 Total Score: 1  PHQ-9 Total Score: 1    PHQ Scores 1/19/2022 8/11/2021 7/12/2021 3/12/2021 1/28/2021 1/26/2021 5/27/2020   PHQ2 Score 1 2 0 0 0 3 0   PHQ9 Score 1 2 0 0 0 9 0       This is what he is taking right now, careful review of his meds      GERD and acid reflux  Pepcid doesn't help  Denies nausea, vomiting, diarrhea or constipation.   Denies abdominal pain      Current Outpatient Medications on File Prior to Visit   Medication Sig Dispense Refill    clindamycin (CLEOCIN T) 1 % external solution apply topically every morning      divalproex (DEPAKOTE ER) 250 MG extended release tablet take 1 tablet by mouth every evening      doxepin (SINEQUAN) 75 MG capsule take 1 capsule by mouth at bedtime      lidocaine (LIDODERM) 5 % lidocaine 5 % topical patch   apply 1 patch ONTO THE SKIN once daily 12 hours ON 12 hours off      magnesium oxide (MAG-OX) 400 MG tablet magnesium oxide 400 mg (241.3 mg magnesium) tablet      minocycline (MINOCIN;DYNACIN) 100 MG capsule take 1 capsule by mouth once daily with dinner      prazosin (MINIPRESS) 2 MG capsule take 1 capsule by mouth at bedtime      clonazePAM (KLONOPIN) 0.5 MG tablet take 1/2 tablet by mouth twice a day      cyclobenzaprine (FLEXERIL) 10 MG tablet take 1 tablet by mouth three times a day if needed for muscle spasm 90 tablet 1    sildenafil (VIAGRA) 100 MG tablet Take 1 tablet by mouth as needed for Erectile Dysfunction 30 tablet 0    RA ASPIRIN EC 81 MG EC tablet take 1 tablet by mouth once daily 90 tablet 3    Wheat Dextrin (BENEFIBER) POWD Take 4 g by mouth 3 times daily (with meals) 1 Can 0    docusate sodium (COLACE) 100 MG capsule Take 1 capsule by mouth 2 times daily For constipation 180 capsule 3    vitamin D3 (CHOLECALCIFEROL) 25 MCG (1000 UT) TABS tablet take 1 tablet by mouth once daily 90 tablet 3    Omega-3 Fatty Acids (FISH OIL) 1200 MG CAPS take 2 capsules by mouth twice a day **STOP NIACIN** 360 capsule 3    potassium chloride (KLOR-CON M) 10 MEQ extended release tablet take 2 tablets by mouth once daily with HYDROCHLOROTHIAZIDE 180 tablet 3    metoprolol tartrate (LOPRESSOR) 25 MG tablet take 3 tablets by mouth twice a day 540 tablet 3    pravastatin (PRAVACHOL) 80 MG tablet take 1 tablet by mouth every evening 90 tablet 3    hydroCHLOROthiazide (HYDRODIURIL) 25 MG tablet take 1 tablet by mouth every morning 90 tablet 3    fexofenadine (ALLEGRA ALLERGY) 180 MG tablet Take 1 tablet by mouth daily as needed (as needed for allergies) 90 tablet 3    APLENZIN 348 MG TB24 take 1 tablet by mouth every morning      oxyCODONE-acetaminophen (PERCOCET) 5-325 MG per tablet Take 1 tablet by mouth every 4 hours as needed for Pain.  Handicap Placard MISC by Does not apply route Can't walk greater than 200 feet. Expires in 5 years. 1 each 0    vitamin B-12 (CYANOCOBALAMIN) 1000 MCG tablet Take 1,000 mcg by mouth daily      Adapalene 0.3 % GEL   1    erythromycin with ethanol (THERAMYCIN) 2 % external solution apply topically as directed every morning  0    aluminum chloride (DRYSOL) 20 % external solution Apply topically nightly. 37.5 mL 3     No current facility-administered medications on file prior to visit. Social History     Tobacco Use    Smoking status: Former Smoker     Packs/day: 1.50     Years: 12.00     Pack years: 18.00     Quit date: 2002     Years since quittin.4    Smokeless tobacco: Never Used   Vaping Use    Vaping Use: Never used   Substance Use Topics    Alcohol use: No     Alcohol/week: 0.0 standard drinks    Drug use: No         Review of Systems   Constitutional: Positive for fatigue and unexpected weight change. Negative for activity change, appetite change, chills, diaphoresis and fever. Respiratory: Negative for cough, chest tightness, shortness of breath and wheezing. Cardiovascular: Negative for chest pain, palpitations and leg swelling. Gastrointestinal: Negative for abdominal distention, abdominal pain, constipation, diarrhea, nausea and vomiting. Endocrine: Negative for cold intolerance, heat intolerance, polydipsia, polyphagia and polyuria. Musculoskeletal: Positive for arthralgias (right ankle, right shoulder), back pain, gait problem, myalgias and neck pain. Psychiatric/Behavioral: Positive for decreased concentration and dysphoric mood. Negative for hallucinations, self-injury, sleep disturbance and suicidal ideas.  The patient is nervous/anxious.          -vital signs stable and within normal limits except overweight per BMI    /68 (Site: Left Upper Arm, Position: Sitting, Cuff Size: Large Adult)   Pulse 73   Temp 97.8 °F (36.6 °C) (Temporal)   Ht 5' 11\" (1.803 m)   Wt 194 lb (88 kg)   SpO2 98%   BMI 27.06 kg/m²        Physical Exam  Vitals and nursing note reviewed. Constitutional:       General: He is not in acute distress. Appearance: Normal appearance. He is well-developed. He is not diaphoretic. HENT:      Head: Normocephalic and atraumatic. Right Ear: External ear normal. Decreased hearing noted. Left Ear: External ear normal. Decreased hearing noted. Mouth/Throat:      Comments: I did not examine the mouth due to coronavirus pandemic and wearing masks. Eyes:      General: Lids are normal. No scleral icterus. Right eye: No discharge. Left eye: No discharge. Extraocular Movements: Extraocular movements intact. Conjunctiva/sclera: Conjunctivae normal.   Neck:      Thyroid: No thyromegaly. Cardiovascular:      Rate and Rhythm: Normal rate and regular rhythm. Heart sounds: Normal heart sounds. No murmur heard. Pulmonary:      Effort: Pulmonary effort is normal. No respiratory distress. Breath sounds: Normal breath sounds. No wheezing or rales. Chest:      Chest wall: No tenderness. Abdominal:      General: Bowel sounds are normal. There is no distension. Palpations: Abdomen is soft. There is no hepatomegaly or splenomegaly. Tenderness: There is no abdominal tenderness. Musculoskeletal:      Right shoulder: Tenderness present. Decreased range of motion. Decreased strength (mild). Cervical back: Neck supple. Spasms, tenderness and bony tenderness present. Decreased range of motion. Lumbar back: Spasms and tenderness present. No bony tenderness. Normal range of motion. Right lower leg: No edema. Left lower leg: No edema. Right ankle: Tenderness present. Decreased range of motion.       Comments: Wearing right leg brace. Antalgic gait noted. Lymphadenopathy:      Cervical: No cervical adenopathy. Skin:     General: Skin is warm and dry. Capillary Refill: Capillary refill takes less than 2 seconds. Findings: No rash. Comments: Right leg with skin discoloration, post wound, scar is thin, 20 cm x 15 cm   Neurological:      Mental Status: He is alert and oriented to person, place, and time. Deep Tendon Reflexes: Reflexes are normal and symmetric. Psychiatric:         Mood and Affect: Mood is anxious. Behavior: Behavior normal.         Thought Content: Thought content normal.         Judgment: Judgment normal.           I personally reviewed testing with patient and all questions fully answered.   Hyperglycemia  High triglycerides    Otherwise labs within normal limits    Prediabetes   Lab Results   Component Value Date    LABA1C 5.8 01/19/2022    LABA1C 5.3 03/12/2021    LABA1C 5.8 09/01/2020       Hospital Outpatient Visit on 03/17/2021   Component Date Value Ref Range Status    WBC 03/17/2021 7.4  3.5 - 11.0 k/uL Final    RBC 03/17/2021 4.98  4.5 - 5.9 m/uL Final    Hemoglobin 03/17/2021 15.6  13.5 - 17.5 g/dL Final    Hematocrit 03/17/2021 45.2  41 - 53 % Final    MCV 03/17/2021 90.8  80 - 100 fL Final    MCH 03/17/2021 31.3  26 - 34 pg Final    MCHC 03/17/2021 34.4  31 - 37 g/dL Final    RDW 03/17/2021 13.3  11.5 - 14.9 % Final    Platelets 02/47/1210 231  150 - 450 k/uL Final    MPV 03/17/2021 8.2  6.0 - 12.0 fL Final    NRBC Automated 03/17/2021 NOT REPORTED  per 100 WBC Final    Differential Type 03/17/2021 NOT REPORTED   Final    Seg Neutrophils 03/17/2021 54  36 - 66 % Final    Lymphocytes 03/17/2021 33  24 - 44 % Final    Monocytes 03/17/2021 10* 1 - 7 % Final    Eosinophils % 03/17/2021 2  0 - 4 % Final    Basophils 03/17/2021 1  0 - 2 % Final    Immature Granulocytes 03/17/2021 NOT REPORTED  0 % Final    Segs Absolute 03/17/2021 4.00  1.3 - 9.1 k/uL Final  Absolute Lymph # 03/17/2021 2.40  1.0 - 4.8 k/uL Final    Absolute Mono # 03/17/2021 0.70  0.1 - 1.3 k/uL Final    Absolute Eos # 03/17/2021 0.20  0.0 - 0.4 k/uL Final    Basophils Absolute 03/17/2021 0.10  0.0 - 0.2 k/uL Final    Absolute Immature Granulocyte 03/17/2021 NOT REPORTED  0.00 - 0.30 k/uL Final    WBC Morphology 03/17/2021 NOT REPORTED   Final    RBC Morphology 03/17/2021 NOT REPORTED   Final    Platelet Estimate 88/85/1169 NOT REPORTED   Final    Glucose 03/17/2021 106* 70 - 99 mg/dL Final    BUN 03/17/2021 15  6 - 20 mg/dL Final    CREATININE 03/17/2021 0.88  0.70 - 1.20 mg/dL Final    Bun/Cre Ratio 03/17/2021 NOT REPORTED  9 - 20 Final    Calcium 03/17/2021 9.6  8.6 - 10.4 mg/dL Final    Sodium 03/17/2021 142  135 - 144 mmol/L Final    Potassium 03/17/2021 4.0  3.7 - 5.3 mmol/L Final    Chloride 03/17/2021 103  98 - 107 mmol/L Final    CO2 03/17/2021 29  20 - 31 mmol/L Final    Anion Gap 03/17/2021 10  9 - 17 mmol/L Final    Alkaline Phosphatase 03/17/2021 93  40 - 129 U/L Final    ALT 03/17/2021 36  5 - 41 U/L Final    AST 03/17/2021 29  <40 U/L Final    Total Bilirubin 03/17/2021 0.62  0.3 - 1.2 mg/dL Final    Total Protein 03/17/2021 7.5  6.4 - 8.3 g/dL Final    Albumin 03/17/2021 4.5  3.5 - 5.2 g/dL Final    Albumin/Globulin Ratio 03/17/2021 NOT REPORTED  1.0 - 2.5 Final    GFR Non- 03/17/2021 >60  >60 mL/min Final    GFR  03/17/2021 >60  >60 mL/min Final    GFR Comment 03/17/2021        Final    Comment: Average GFR for 38-51 years old:   80 mL/min/1.73sq m  Chronic Kidney Disease:   <60 mL/min/1.73sq m  Kidney failure:   <15 mL/min/1.73sq m              eGFR calculated using average adult body mass.  Additional eGFR calculator available at:        [email or web address].ho.[email or web address]            GFR Staging 03/17/2021 NOT REPORTED   Final    Magnesium 03/17/2021 2.2  1.6 - 2.6 mg/dL Final    Cholesterol 03/17/2021 176 <200 mg/dL Final    Comment:    Cholesterol Guidelines:      <200  Desirable   200-240  Borderline      >240  Undesirable         HDL 03/17/2021 48  >40 mg/dL Final    Comment:    HDL Guidelines:    <40     Undesirable   40-59    Borderline    >59     Desirable         LDL Cholesterol 03/17/2021 94  0 - 130 mg/dL Final    Comment:    LDL Guidelines:     <100    Desirable   100-129   Near to/above Desirable   130-159   Borderline      >159   Undesirable     Direct (measured) LDL and calculated LDL are not interchangeable tests.  Chol/HDL Ratio 03/17/2021 3.7  <5 Final            Triglycerides 03/17/2021 168* <150 mg/dL Final    Comment:    Triglyceride Guidelines:     <150   Desirable   150-199  Borderline   200-499  High     >499   Very high   Based on AHA Guidelines for fasting triglyceride, October 2012.          VLDL 03/17/2021 NOT REPORTED* 1 - 30 mg/dL Final       Lab Results   Component Value Date    TSH 1.19 09/01/2020       Lab Results   Component Value Date    CHOL 176 03/17/2021    CHOL 205 (H) 09/01/2020    CHOL 187 05/28/2020     Lab Results   Component Value Date    TRIG 168 (H) 03/17/2021    TRIG 130 09/01/2020    TRIG 174 (H) 05/28/2020     Lab Results   Component Value Date    HDL 48 03/17/2021    HDL 57 09/01/2020    HDL 41 05/28/2020     Lab Results   Component Value Date    LDLCHOLESTEROL 94 03/17/2021    LDLCHOLESTEROL 122 09/01/2020    LDLCHOLESTEROL 111 05/28/2020     Lab Results   Component Value Date    CHOLHDLRATIO 3.7 03/17/2021    CHOLHDLRATIO 3.6 09/01/2020    CHOLHDLRATIO 4.6 05/28/2020       Lab Results   Component Value Date    ZKLSBWUQ17 038 09/01/2020     Lab Results   Component Value Date    FOLATE 6.2 09/01/2020     Lab Results   Component Value Date    VITD25 58.7 11/25/2019         Orders Placed This Encounter   Medications    COVID-19 mRNA Vacc, Moderna, (MODERNA COVID-19 VACCINE) 100 MCG/0.5ML SUSP injection     Sig: Inject 0.25 mLs into the muscle once for 1 dose Needs Moderna Booster     Dispense:  0.25 mL     Refill:  0    omeprazole (PRILOSEC) 20 MG delayed release capsule     Sig: Take 1 capsule by mouth every morning (before breakfast) STOP PEPCID     Dispense:  90 capsule     Refill:  1    busPIRone (BUSPAR) 10 MG tablet     Sig: Take 1 tablet by mouth 3 times daily as needed (ANXIETY)     Dispense:  90 tablet     Refill:  3       Orders Placed This Encounter   Procedures    CBC     Standing Status:   Future     Standing Expiration Date:   1/19/2023    Comprehensive Metabolic Panel     Standing Status:   Future     Standing Expiration Date:   3/18/2022    Lipid Panel     Standing Status:   Future     Standing Expiration Date:   1/19/2023     Order Specific Question:   Is Patient Fasting?/# of Hours     Answer:   8-10 Hours, water ok to drink    TSH without Reflex     Standing Status:   Future     Standing Expiration Date:   1/19/2023    Magnesium     Standing Status:   Future     Standing Expiration Date:   1/19/2023    POCT glycosylated hemoglobin (Hb A1C)       Medications Discontinued During This Encounter   Medication Reason    doxepin (SINEQUAN) 25 MG capsule DISCONTINUED BY ANOTHER CLINICIAN    divalproex (DEPAKOTE SPRINKLE) 125 MG capsule DISCONTINUED BY ANOTHER CLINICIAN    OXcarbazepine (TRILEPTAL) 300 MG tablet DISCONTINUED BY ANOTHER CLINICIAN    venlafaxine (EFFEXOR XR) 37.5 MG extended release capsule DISCONTINUED BY ANOTHER CLINICIAN    famotidine (PEPCID) 20 MG tablet Alternate therapy    busPIRone (BUSPAR) 10 MG tablet REORDER    propranolol (INDERAL) 10 MG tablet Alternate therapy    naproxen (NAPROSYN) 500 MG tablet Patient Choice    cloNIDine (CATAPRES) 0.2 MG tablet Alternate therapy    hydrOXYzine (ATARAX) 25 MG tablet Alternate therapy    rOPINIRole (REQUIP) 1 MG tablet Therapy completed    albuterol sulfate HFA (VENTOLIN HFA) 108 (90 Base) MCG/ACT inhaler Therapy completed    amLODIPine (NORVASC) 10 MG tablet Alternate therapy    benztropine (COGENTIN) 1 MG tablet DISCONTINUED BY ANOTHER CLINICIAN         On this date 1/19/2022 I have spent 35 minutes reviewing previous notes, test results and face to face with the patient discussing the diagnosis and importance of compliance with the treatment plan as well as documenting on the day of the visit. This note was completed by using the assistance of a speech-recognition program. However, inadvertent computerized transcription errors may be present. Although every effort was made to ensure accuracy, no guarantees can be provided that every mistake has been identified and corrected by editing. An electronic signature was used to authenticate this note.   Electronically signed by Kiana Boothe MD on 1/24/2022 at 6:04 PM

## 2022-01-19 NOTE — PROGRESS NOTES
Visit Information    Have you changed or started any medications since your last visit including any over-the-counter medicines, vitamins, or herbal medicines? no   Have you stopped taking any of your medications? Is so, why? -  no  Are you having any side effects from any of your medications? - no    Have you seen any other physician or provider since your last visit? yes - [facility] ortho    Have you had any other diagnostic tests since your last visit?  no   Have you been seen in the emergency room and/or had an admission in a hospital since we last saw you?  no   Have you had your routine dental cleaning in the past 6 months?  no     Do you have an active MyChart account? If no, what is the barrier?   Yes    Patient Care Team:  Emeka Robles MD as PCP - General (Family Medicine)  Emeka Robles MD as PCP - [facility]  Mya Coyne MD (Dermatology)  Cecilia Pollack MD as Surgeon (Cardiology)  Waynetta Cheadle, DPM as Consulting Physician (Podiatry)  Purvi Patterson MD as Resident (Family Medicine)  DENNIS Glez - CNP    Medical History Review  Past Medical, Family, and Social History reviewed and does contribute to the patient presenting condition    Health Maintenance   Topic Date Due    COVID-19 Vaccine (3 - Booster for Dorann Ano series) 12/05/2021    Lipid screen  03/17/2022    Potassium monitoring  03/17/2022    Creatinine monitoring  03/17/2022    Depression Monitoring  08/11/2022    DTaP/Tdap/Td vaccine (2 - Td or Tdap) 01/01/2024    Colon cancer screen fecal DNA test (Cologuard)  12/06/2024    Flu vaccine  Completed    Hepatitis C screen  Completed    HIV screen  Completed    Hepatitis A vaccine  Aged Out    Hepatitis B vaccine  Aged Out    Hib vaccine  Aged Out    Meningococcal (ACWY) vaccine  Aged Out    Pneumococcal 0-64 years Vaccine  Aged Out

## 2022-01-20 ENCOUNTER — APPOINTMENT (OUTPATIENT)
Dept: PHYSICAL THERAPY | Age: 52
End: 2022-01-20
Payer: MEDICARE

## 2022-01-20 ENCOUNTER — HOSPITAL ENCOUNTER (OUTPATIENT)
Dept: PHYSICAL THERAPY | Age: 52
Setting detail: THERAPIES SERIES
Discharge: HOME | End: 2022-01-20
Payer: MEDICARE

## 2022-01-20 PROCEDURE — 97112 NEUROMUSCULAR REEDUCATION: CPT

## 2022-01-20 PROCEDURE — 97140 MANUAL THERAPY 1/> REGIONS: CPT

## 2022-01-20 NOTE — FLOWSHEET NOTE
[address]  Outpatient Physical Therapy   [address] [facility] [address]   Phone: [phone]   Fax: [phone]    Physical Therapy Daily Treatment Note      Date:  2022  Patient Name:  Neris Lizarraga    :  1970  MRN: [id number]  Physician: Sybil Meckel                                  Insurance: [employer] 19 visits remain for year from 21 appointment  Medical Diagnosis: foot drop M21.371, Achilles tendonitis M76.62             Rehab Codes: (R) foot weakness R53.1  Onset date: 10/18/21 referral                 Next Dr's appt.: unknown  Visit Count: 15/12                           Cancel/No Show: 3/1    Subjective: Pt arrives feeling alright this date.   Pain:  [x] Yes  [] No Location: R ankle/achilles Pain Rating: (0-10 scale) 6/10  Pain altered Tx:  [x] No  [] Yes  Action:  Comments:    Objective:  Modalities:   Precautions:  Exercises: bolded completed 22  Exercise Reps/ Time Weight/ Level Comments   Stationary Bike 5'           Reviewed Achilles stretching stair, wall 3 x 30''   HEP   Reviewed supine Achilles stretching 3 x 20\"   HEP   Seated gastroc then soleus stretch  3x30\" ea towel Added soleus 22   Big toe ext stretch-passive 3x15\" manual    Toe yoga-  15x  AA HEP; AAROM 2nd-5th   Towel scrunch 10x  Length of towel   Toe Splay 10x  Min mvmnt; notes recent sharp pain in this area    Arch lift 10x  HEP   Inv/ev 10x On towel     Heel/toe raise  10x     Heel raise- 2 way 10x ea  Toes in, toes out; added 22- small range while in eversion w/small range   BAPS board  10x ea L3 Added 22; PF/DF inv/ev, cw, ccw   LAQ with x3 ankle pumps 8x  Difficult to get leg fully straight    Ankle circles  10x ea  cw/ccw         Supine       SLR 10x  Small range    Side lying hip abd  10x     SAQ 10x3\"           Standing    Without brace 22   Slant board stretch Next       HR/TR x10    Light UE support, Cues for even wt distribution   Standing on foam NBOS with eyes closed 2x30''   No UE assist   NBOS eyes open x30\"     NBOS with ball movements  10x ea 2# yellow ball Up/down, side to side   Alt marches 10x2\" Light UE A as needed  Added 1/5/22   Fitter board 15x ea  A/P, M/L   Fitter board for balance  x30\" ea  A/P, M/L   Tandem stance  x30\" ea  More challenging with R foot posterior   Foot intrinsics in standing  Trial in upcoming sessions     Step ups  10x 4\" With AFO;  1 UE A   Taps to step R LE 10x2 4\" Increase height next   Lunge with weight shift  8x  R LE in front          Supine dynamic stabilization PNF DF+Eversion/PF+inversion 2x15  Light resistance into each direction  Weaker into PF + inv         Other:  Manual  DTM to peroneals, gastroc/soleus junction   MFR to proximal gastroc     TC joint mobilizations  Ankle/toes PROM      Specific Instructions for next treatment: progress as tolerated, standing ex's w/o brace        Assessment: [x] Progressing toward goals. Addition of soleus stretch with towel this date. Able to resume arch lifts without presence of muscle cramping. Pt with sharp/stabbing pain at proximo lateral aspect of lower leg with foot intrinsics this date. Added 2 way heel raises noting increased difficulty with foot in eversion. MFR to proximal gastroc d/t multiple trigger points throughout with good release noted. Incorporated hip/knee strengthening exercises in supine with good tolerance. Completed all standing balance and strengthening exercises without AFO. Implemented balance board with light resistance on // bars for balance. Cues for equal pressure throughout foot with exercises. Progress as able. [] No change.      [x] Other: Primary PT will request an extension of therapy visits since pt would benefit from further strengthening, will try to ween out of AFO as strength tolerates per surgeon     [x] Patient would continue to benefit from skilled physical therapy services in order to: work on ankle and foot mobility, strength,

## 2022-01-23 DIAGNOSIS — N52.9 ERECTILE DYSFUNCTION, UNSPECIFIED ERECTILE DYSFUNCTION TYPE: ICD-10-CM

## 2022-01-24 PROBLEM — K21.9 GASTROESOPHAGEAL REFLUX DISEASE WITHOUT ESOPHAGITIS: Status: ACTIVE | Noted: 2022-01-24

## 2022-01-24 RX ORDER — SILDENAFIL 100 MG/1
100 TABLET, FILM COATED ORAL PRN
Qty: 30 TABLET | Refills: 0 | Status: SHIPPED | OUTPATIENT
Start: 2022-01-24 | End: 2022-02-03 | Stop reason: SDUPTHER

## 2022-01-24 ASSESSMENT — ENCOUNTER SYMPTOMS: BACK PAIN: 1

## 2022-01-24 NOTE — TELEPHONE ENCOUNTER
Please Approve or Refuse.   Send to Pharmacy per Pt's Request:      Next Visit Date:  5/19/2022   Last Visit Date: 1/19/2022    Hemoglobin A1C (%)   Date Value   01/19/2022 5.8   03/12/2021 5.3   09/01/2020 5.8             ( goal A1C is < 7)   BP Readings from Last 3 Encounters:   01/19/22 118/68   08/11/21 126/84   07/12/21 106/70          (goal 120/80)  BUN   Date Value Ref Range Status   03/17/2021 15 6 - 20 mg/dL Final     CREATININE   Date Value Ref Range Status   03/17/2021 0.88 0.70 - 1.20 mg/dL Final     Potassium   Date Value Ref Range Status   03/17/2021 4.0 3.7 - 5.3 mmol/L Final

## 2022-01-27 ENCOUNTER — HOSPITAL ENCOUNTER (OUTPATIENT)
Dept: PHYSICAL THERAPY | Age: 52
Setting detail: THERAPIES SERIES
Discharge: HOME | End: 2022-01-27
Payer: MEDICARE

## 2022-01-27 PROCEDURE — 97110 THERAPEUTIC EXERCISES: CPT

## 2022-01-27 NOTE — FLOWSHEET NOTE
[address] [facility] Outpatient Physical Therapy   [address]   Phone: [phone]   Fax: [phone]    Physical Therapy Daily Treatment Note      Date:  2022  Patient Name:  Jeniffer Lieberman    :  1970  MRN: [id number]  Physician: Isaac Arce                                  Insurance: [place] [employer] 19 visits remain for year from 21 appointment  Medical Diagnosis: foot drop M21.371, Achilles tendonitis M76.62             Rehab Codes: (R) foot weakness R53.1  Onset date: 10/18/21 referral                 Next 's appt.: unknown  Visit Count: 15/12                           Cancel/No Show: 3/1    Subjective: Pt arrives no medications on board today. Complains of cramping in calf and foot with \"no rhyme or reason for cramping or pain. \"  Pain:  [x] Yes  [] No Location: R ankle/achilles Pain Rating: (0-10 scale) 4/10  Pain altered Tx:  [x] No  [] Yes  Action:  Comments:    Objective:  Modalities:   Precautions:  Exercises: bolded completed 22  Exercise Reps/ Time Weight/ Level Comments   Stationary Bike 5'   Brace       All exercises without brace -  22   Reviewed Achilles stretching stair, wall 3 x 30''   HEP   Reviewed supine Achilles stretching 3 x 20\"   HEP   Seated gastroc then soleus stretch  3x30\" ea towel Added soleus 22   Big toe ext stretch-passive 3x15\" manual    Toe yoga-  15x  AA HEP; AAROM 2nd-5th   Towel scrunch 10x  Length of towel   Toe Splay 10x  Min movement; notes recent sharp pain in this area    Arch lift 10x  HEP   Inv/ev 10x On towel     Heel/toe raise  10x     Heel raise- 2 way 10x ea  Toes in, toes out; added 22- small range while in eversion w/small range   BAPS board  10x ea L2 Added 22; PF/DF inv/ev, cw, ccw   LAQ with x 3 ankle pumps 8x  Difficult to get leg fully straight    Ankle circles  10x ea  cw/ccw         Supine       SLR 10x  Small range    Side lying hip abd  10x     SAQ 10x3\"     Dips 8'' box 3 x Standing       Slant board stretch Next       HR/TR x10    Light UE support, Cues for even wt distribution   Standing on foam NBOS with eyes closed 2x30''   No UE assist   NBOS eyes open x30\"     NBOS with ball movements  10x ea 2# yellow ball Up/down, side to side   Alt marches 10x2\" Light UE A as needed  Added 1/5/22   Fitter board 15x ea  A/P, M/L   Fitter board for balance  x30\" ea  A/P, M/L   Tandem stance  x30\" ea  More challenging with R foot posterior   Foot intrinsics in standing  Trial in upcoming sessions     Step ups  10x 8\" With AFO;  1 UE A   Taps to step R LE 10x2 8\" Increase height next   Lunge with weight shift  8x  R LE in front          Supine dynamic stabilization PNF DF+Eversion/PF+inversion 1 x 10   Light resistance into each direction  Weaker into PF + inv         Other:  Manual  DTM to peroneals, gastroc/soleus junction   MFR to proximal gastroc     TC joint mobilizations  Ankle/toes PROM      Specific Instructions for next treatment: progress as tolerated, standing and sitting ex's w/o brace. Assessment: [x] Progressing toward goals. Addition dips the pt was only able to perform 3 reps before the pain built up to the point where he had to stop. Muscle cramping and pain did occur throughout his exercise program.   No MFR to proximal gastroc d/t multiple trigger points to his lower leg today. Held hip/knee strengthening exercises today. Completed all standing balance and strengthening exercises without AFO and tolerated well. Lowered the BAPS board Lv to 2 and slowed the speed of the exercises to focus on the eversion aspect of the movement. Progress as tolerated. [] No change.      [] Other:    [x] Patient would continue to benefit from skilled physical therapy services in order to: work on ankle and foot mobility, strength, function    STG: (to be met in 6 treatments)  1. ? Pain: Pain levels improved to 2-3/10 (Not Met)  2. ? ROM: Improved ankle PROM to neutral DF, 60 degrees PF, 30 degrees INV, 13 degrees EV - (Met)  3. ? Strength: Able to actively contract PF, INV, EV, 2nd-5th digits against gravity, improved Eversion, Inversion, DF to 4-/5 - (Not Met)  4. ? Function: Limited tolerance of ambulation without (A) device, improved tolerance of stairs without AFO - (Not Met)  5. Independent with Home Exercise Programs - (Met)  LTG: (to be met in 12 treatments)  6. ? Pain: Pain levels improved to 1-2/10 - (Not Met)  7. ? ROM: Improved ankle AROM to neutral DF, 60 degrees PF, 30 degrees INV, 13 degrees EV - (Not Met)  8. ? Strength: Able to actively contract PF, INV, EV, 2nd-5th digits against gravity, improved Eversion, Inversion, DF to 4-/5 (Not Met)  9. ? Function: Limited tolerance of ambulation without (A) device, improved tolerance of stairs without AFO - (Met)  10. Independent with Home Exercise Programs - (Met)    Patient goals: strengthening of foot tendons/muscles    Pt. Education:  [x] Yes  [] No  [x] Reviewed Prior HEP/Ed  Method of Education: [x] Verbal  [x] Demo  [] Written  Comprehension of Education:  [x] Verbalizes understanding. [x] Demonstrates understanding. [] Needs review. [x] Demonstrates/verbalizes HEP/Ed previously given. Plan: [x] Continue per plan of care.    [] Other:      Treatment Charges: Mins Units   []  Modalities     []  Ther Exercise     [x]  Manual Therapy 5 0   []  Ther Activities     []  Aquatics     [x]  Neuromuscular 40 3   [] Vasocompression     [] Gait Training     [] Dry needling        [] 1 or 2 muscles        [] 3 or more muscles     []  Other     Total Treatment time 45 3     Time In: 9:00  Time Out:  9:45     Electronically signed by:  Boaz Zayas PT

## 2022-02-02 ENCOUNTER — HOSPITAL ENCOUNTER (OUTPATIENT)
Dept: PHYSICAL THERAPY | Age: 52
Setting detail: THERAPIES SERIES
Discharge: HOME | End: 2022-02-02
Payer: MEDICARE

## 2022-02-02 PROCEDURE — 97112 NEUROMUSCULAR REEDUCATION: CPT

## 2022-02-02 PROCEDURE — 97140 MANUAL THERAPY 1/> REGIONS: CPT

## 2022-02-02 NOTE — FLOWSHEET NOTE
[address] [facility] Outpatient Physical Therapy   [address]   Phone: [phone]   Fax: [phone]    Physical Therapy Daily Treatment Note      Date:  2022  Patient Name:  Jeniffer Lieberman    :  1970  MRN: [id number]  Physician: Isaac Arce                                  Insurance: [place] [employer] 19 visits remain for year from 21 appointment  Medical Diagnosis: foot drop M21.371, Achilles tendonitis M76.62             Rehab Codes: (R) foot weakness R53.1  Onset date: 10/18/21 referral                 Next Dr's appt.: unknown  Visit Count:                            Cancel/No Show: 3/1    Subjective: Pt arrives with increased pain noting he has not taken any medication yet today. States his doctor changed his medications yesterday. Reports noticing increased edema and continues to experience muscle cramping.    Pain:  [x] Yes  [] No Location: R ankle/achilles Pain Rating: (0-10 scale) 6/10  Pain altered Tx:  [x] No  [] Yes  Action:  Comments:    Objective:  Modalities:   Precautions:  Exercises: bolded completed 22  Exercise Reps/ Time Weight/ Level Comments   Stationary Bike 6'   Brace       All exercises without brace -  22   Reviewed Achilles stretching stair, wall 3 x 30''   HEP   Reviewed supine Achilles stretching 3 x 20\"   HEP   Seated gastroc then soleus stretch  3x30\" towel Added soleus 22   Big toe ext stretch-passive 3x15\" manual    Toe yoga-  15x  AA HEP; AAROM 2nd-5th   Towel scrunch 10x  Length of towel   Toe Splay 10x  Min movement; notes recent sharp pain in this area    Arch lift 10x  HEP; increased toe flexion noted 22   Inv/ev 10x On towel     Heel/toe raise  10x ea     Heel raise- 2 way 10x ea  Toes in, toes out; added 22- small range while in eversion w/small range   BAPS board  10x ea L2 Added 22; PF/DF inv/ev, cw, ccw   LAQ with x 3 ankle pumps 8x  Difficult to get leg fully straight    Ankle circles w/ knee extended 10x ea  Cw/ccw; only able to slightly extend knee         Supine       SLR 10x  Small range    Side lying hip abd  10x     SAQ 10x3\" Bolster     Dips 8'' box 3 x           Standing    Without brace 2/2/22   Slant board stretch 2x20\"  Added 2/2/22    HR/TR x10    Light UE support, Cues for even wt distribution   Standing on foam NBOS with eyes closed 2x30''   No UE assist   NBOS eyes open x30\"     NBOS with ball movements  10x ea 2# yellow ball Up/down, side to side   Alt marches 10x2\" Light UE A as needed  Added 1/5/22   Fitter board 15x ea  A/P, M/L   Fitter board for balance  x30\" ea  A/P, M/L   Tandem stance  x30\" ea  More challenging with R foot posterior   Foot intrinsics in standing  Trial in upcoming sessions     Step ups  10x 8\" With AFO;  1 UE A   Taps to step R LE 10x2 9.5\" Top build   Lunge with weight shift  8x  R LE in front          Supine dynamic stabilization PNF DF+Eversion/PF+inversion x20  Light resistance into each direction  Weaker into PF + inv   Ankle isometrics  next     Other:  Manual x15'  DTM to peroneals, gastroc/soleus junction   MFR to proximal gastroc     TC joint mobilizations  Ankle/toes PROM  Desensitization w/wash cloth to dorsal surface of forefoot & toes 3-5 x2'        Specific Instructions for next treatment: progress as tolerated, standing and sitting ex's w/o brace. Assessment: [x] Progressing toward goals. Increased muscle tension and multiple trigger points noted at proximal gastroc, therefore, applied releases and DTM to this area. Addition of desensitization using wash cloth to forefoot and toes 3-5 to help improve sensation. Instructed to trial desensitization techniques at home to further reduce numbness. Added standing gastroc stretch on wedge to reduce muscular tension. Increased step height for taps to step with good tolerance. Will continue to progress as pt tolerates. [] No change.      [] Other:    [x] Patient would continue to benefit from skilled physical therapy services in order to: work on ankle and foot mobility, strength, function    STG: (to be met in 6 treatments)  1. ? Pain: Pain levels improved to 2-3/10 (Not Met)  2. ? ROM: Improved ankle PROM to neutral DF, 60 degrees PF, 30 degrees INV, 13 degrees EV - (Met)  3. ? Strength: Able to actively contract PF, INV, EV, 2nd-5th digits against gravity, improved Eversion, Inversion, DF to 4-/5 - (Not Met)  4. ? Function: Limited tolerance of ambulation without (A) device, improved tolerance of stairs without AFO - (Not Met)  5. Independent with Home Exercise Programs - (Met)  LTG: (to be met in 12 treatments)  6. ? Pain: Pain levels improved to 1-2/10 - (Not Met)  7. ? ROM: Improved ankle AROM to neutral DF, 60 degrees PF, 30 degrees INV, 13 degrees EV - (Not Met)  8. ? Strength: Able to actively contract PF, INV, EV, 2nd-5th digits against gravity, improved Eversion, Inversion, DF to 4-/5 (Not Met)  9. ? Function: Limited tolerance of ambulation without (A) device, improved tolerance of stairs without AFO - (Met)  10. Independent with Home Exercise Programs - (Met)    Patient goals: strengthening of foot tendons/muscles    Pt. Education:  [x] Yes  [] No  [x] Reviewed Prior HEP/Ed  Method of Education: [x] Verbal  [x] Demo  [] Written  Comprehension of Education:  [x] Verbalizes understanding. [x] Demonstrates understanding. [] Needs review. [x] Demonstrates/verbalizes HEP/Ed previously given. Plan: [x] Continue per plan of care.    [] Other:      Treatment Charges: Mins Units   []  Modalities     []  Ther Exercise     [x]  Manual Therapy 15 1   []  Ther Activities     []  Aquatics     [x]  Neuromuscular 45 3   [] Vasocompression     [] Gait Training     [] Dry needling        [] 1 or 2 muscles        [] 3 or more muscles     []  Other     Total Treatment time 60 4     Time In: 11:48   Time Out: 12:54     Electronically signed by:  Nidia Zepeda PTA

## 2022-02-03 ENCOUNTER — APPOINTMENT (OUTPATIENT)
Dept: PHYSICAL THERAPY | Age: 52
End: 2022-02-03
Payer: MEDICARE

## 2022-02-03 DIAGNOSIS — N52.9 ERECTILE DYSFUNCTION, UNSPECIFIED ERECTILE DYSFUNCTION TYPE: ICD-10-CM

## 2022-02-04 RX ORDER — SILDENAFIL 100 MG/1
100 TABLET, FILM COATED ORAL PRN
Qty: 30 TABLET | Refills: 0 | Status: SHIPPED | OUTPATIENT
Start: 2022-02-04 | End: 2022-05-03 | Stop reason: SDUPTHER

## 2022-02-07 ENCOUNTER — HOSPITAL ENCOUNTER (OUTPATIENT)
Age: 52
Discharge: HOME | End: 2022-02-07
Payer: MEDICARE

## 2022-02-07 DIAGNOSIS — I10 ESSENTIAL HYPERTENSION: ICD-10-CM

## 2022-02-07 DIAGNOSIS — E78.5 HYPERLIPIDEMIA WITH TARGET LDL LESS THAN 100: ICD-10-CM

## 2022-02-07 LAB
ALBUMIN SERPL-MCNC: 4.7 G/DL (ref 3.5–5.2)
ALBUMIN/GLOBULIN RATIO: ABNORMAL (ref 1–2.5)
ALP BLD-CCNC: 81 U/L (ref 40–129)
ALT SERPL-CCNC: 29 U/L (ref 5–41)
ANION GAP SERPL CALCULATED.3IONS-SCNC: 11 MMOL/L (ref 9–17)
AST SERPL-CCNC: 28 U/L
BILIRUB SERPL-MCNC: 0.39 MG/DL (ref 0.3–1.2)
BUN BLDV-MCNC: 14 MG/DL (ref 6–20)
BUN/CREAT BLD: ABNORMAL (ref 9–20)
CALCIUM SERPL-MCNC: 9.3 MG/DL (ref 8.6–10.4)
CHLORIDE BLD-SCNC: 100 MMOL/L (ref 98–107)
CHOLESTEROL/HDL RATIO: 4.8
CHOLESTEROL: 217 MG/DL
CO2: 29 MMOL/L (ref 20–31)
CREAT SERPL-MCNC: 0.97 MG/DL (ref 0.7–1.2)
GFR AFRICAN AMERICAN: >60 ML/MIN
GFR NON-AFRICAN AMERICAN: >60 ML/MIN
GFR SERPL CREATININE-BSD FRML MDRD: ABNORMAL ML/MIN/{1.73_M2}
GFR SERPL CREATININE-BSD FRML MDRD: ABNORMAL ML/MIN/{1.73_M2}
GLUCOSE BLD-MCNC: 108 MG/DL (ref 70–99)
HCT VFR BLD CALC: 44.2 % (ref 41–53)
HDLC SERPL-MCNC: 45 MG/DL
HEMOGLOBIN: 15.5 G/DL (ref 13.5–17.5)
LDL CHOLESTEROL: 123 MG/DL (ref 0–130)
MAGNESIUM: 2.2 MG/DL (ref 1.6–2.6)
MCH RBC QN AUTO: 31.9 PG (ref 26–34)
MCHC RBC AUTO-ENTMCNC: 35.1 G/DL (ref 31–37)
MCV RBC AUTO: 90.7 FL (ref 80–100)
NRBC AUTOMATED: NORMAL PER 100 WBC
PDW BLD-RTO: 13 % (ref 11.5–14.9)
PLATELET # BLD: 199 K/UL (ref 150–450)
PMV BLD AUTO: 8.5 FL (ref 6–12)
POTASSIUM SERPL-SCNC: 4.3 MMOL/L (ref 3.7–5.3)
RBC # BLD: 4.87 M/UL (ref 4.5–5.9)
SODIUM BLD-SCNC: 140 MMOL/L (ref 135–144)
TOTAL PROTEIN: 7.4 G/DL (ref 6.4–8.3)
TRIGL SERPL-MCNC: 246 MG/DL
TSH SERPL DL<=0.05 MIU/L-ACNC: 2.57 MIU/L (ref 0.3–5)
VLDLC SERPL CALC-MCNC: ABNORMAL MG/DL (ref 1–30)
WBC # BLD: 5.5 K/UL (ref 3.5–11)

## 2022-02-07 PROCEDURE — 80061 LIPID PANEL: CPT

## 2022-02-07 PROCEDURE — 85027 COMPLETE CBC AUTOMATED: CPT

## 2022-02-07 PROCEDURE — 83735 ASSAY OF MAGNESIUM: CPT

## 2022-02-07 PROCEDURE — 36415 COLL VENOUS BLD VENIPUNCTURE: CPT

## 2022-02-07 PROCEDURE — 84443 ASSAY THYROID STIM HORMONE: CPT

## 2022-02-07 PROCEDURE — 80053 COMPREHEN METABOLIC PANEL: CPT

## 2022-02-07 NOTE — RESULT ENCOUNTER NOTE
Please notify patient: Mild increased blood glucose 108, low-carb diet advised. Triglycerides high    Low carb, low fat diet, increase fruits and vegetables, and exercise 4-5 times a week 30-40 minutes a day, or walk 1-2 hours per day, or wear a pedometer and get at least 10,000 steps per day.     Otherwise labs within normal limits  continue current treatment    Future Appointments  2/9/2022   10:45 AM   SCHEDULE, [facility]ERA* [facility]         [facility]  2/11/2022  9:30 AM    Baby Bakari, PTA         [facility]         [facility]  5/19/2022  2:45 PM    Remi Padgett MD     fp sc               [id number]

## 2022-02-09 ENCOUNTER — HOSPITAL ENCOUNTER (OUTPATIENT)
Dept: PHYSICAL THERAPY | Age: 52
Setting detail: THERAPIES SERIES
Discharge: HOME | End: 2022-02-09
Payer: MEDICARE

## 2022-02-09 PROCEDURE — 97112 NEUROMUSCULAR REEDUCATION: CPT

## 2022-02-09 PROCEDURE — 97140 MANUAL THERAPY 1/> REGIONS: CPT

## 2022-02-09 NOTE — FLOWSHEET NOTE
[address] [facility] Outpatient Physical Therapy   [address]   Phone: [phone]   Fax: [phone]    Physical Therapy Daily Treatment Note      Date:  2022  Patient Name:  Cait Farfan    :  1970  MRN: [id number]  Physician: Tresia Severs                                  Insurance: [place] [employer] 19 visits remain for year from 21 appointment  Medical Diagnosis: foot drop M21.371, Achilles tendonitis M76.62             Rehab Codes: (R) foot weakness R53.1  Onset date: 10/18/21 referral                 Next Dr's appt.: unknown  Visit Count:                            Cancel/No Show: 3/1    Subjective: Pt arrives with reduced pain levels noting new medication has been helping but notices it does not last as long throughout the day as previous script. Continues to experience cramping in calf musculature and plantar surface of foot. Reports on Monday he was ambulating around house without brace and tripped over toes but able to catch himself with the wall. Presents with scab on 2nd digit and slight bruising on dorsal surface of foot. Pain:  [x] Yes  [] No Location: R ankle/achilles Pain Rating: (0-10 scale) 4/10  Pain altered Tx:  [x] No  [] Yes  Action:  Comments: Pt received a new referral for cervical radiculopathy and scheduled with primary PT Monday to address this.      Objective:  Modalities:   Precautions:  Exercises: bolded completed 22  Exercise Reps/ Time Weight/ Level Comments   Stationary Bike 5'   Brace       All exercises without brace -  22   Reviewed Achilles stretching stair, wall 3 x 30''   HEP   Reviewed supine Achilles stretching 3 x 20\"   HEP   Seated gastroc then soleus stretch  3x30\" towel Added soleus 22   Big toe ext stretch-passive 3x15\" manual    Toe yoga-  15x  AA HEP; AAROM 2nd-5th   Towel scrunch 10x  Length of towel   Toe Splay 10x  Min movement; notes recent sharp pain in this area    Arch lift 10x  HEP; increased toe flexion noted 2/2/22   Inv/ev 10x On towel     Heel/toe raise  10x ea     Heel raise- 2 way 10x ea  Toes in, toes out; added 1/20/22- small range while in eversion w/small range   BAPS board  10x ea L2 Added 1/5/22; PF/DF inv/ev, cw, ccw   LAQ with x 3 ankle pumps 8x  Difficult to get leg fully straight    Ankle circles w/ knee extended 10x ea  Cw/ccw; only able to slightly extend knee   Ankle isometrics  10x3\" ea  Added 2/9         Supine       SLR 10x  Small range    Side lying hip abd  10x 1#    SAQ 10x3\" Bolster ; 1#    Dips 8'' box 3 x           Standing    Without brace 2/2/22   Slant board stretch 2x30\"  Added 2/2/22    HR/TR x10    Light UE support, Cues for even wt distribution   Standing on foam NBOS with eyes closed 2x30''   No UE assist   NBOS eyes open x30\"     NBOS with ball movements  10x ea 2# yellow ball Up/down, side to side   Alt marches 10x2\" Light UE A as needed  Added 1/5/22   Fitter board 10x ea  A/P, M/L   Fitter board for balance  x30\" ea  A/P, M/L   Tandem stance  x30\" ea  More challenging with R foot posterior   Foot intrinsics in standing  Trial in upcoming sessions     Step ups  10x 6\"  lead with R; B UE support   Taps to step R LE 10x2 9.5\" Top build   Lunge with weight shift  8x  R LE in front          Prone       Supine dynamic stabilization PNF DF+Eversion/PF+inversion x20  Light resistance into each direction  Weaker into PF + inv   Hs curls  10x     Other:  Manual x15'  DTM to peroneals, gastroc/soleus junction   MFR to proximal gastroc and distal hamstring (more medial)  TC joint mobilizations  Ankle/toes PROM  Desensitization w/wash cloth to dorsal surface of forefoot & toes 3-5 x2'        Specific Instructions for next treatment: progress ROM, strength, and stability as tolerated        Assessment: [x] Progressing toward goals. Added weight to mat exercises this date.  Continued manual with increased muscular tension at medial gastroc and distal hamstring, therefore, applied MFR to these areas with good releases noted. Incorporated ankle intrinsics noting quick fatigue after 5 reps into DF. Improved tolerance to standing balance exercises this date. Implemented step ups onto 6\" step without brace and B UE support which was challenging for patient but able to complete. Encouraged desensitization techniques at home and to monitor sensation. [] No change. [] Other:    [x] Patient would continue to benefit from skilled physical therapy services in order to: work on ankle and foot mobility, strength, function    STG: (to be met in 6 treatments)  1. ? Pain: Pain levels improved to 2-3/10 (Not Met)  2. ? ROM: Improved ankle PROM to neutral DF, 60 degrees PF, 30 degrees INV, 13 degrees EV - (Met)  3. ? Strength: Able to actively contract PF, INV, EV, 2nd-5th digits against gravity, improved Eversion, Inversion, DF to 4-/5 - (Not Met)  4. ? Function: Limited tolerance of ambulation without (A) device, improved tolerance of stairs without AFO - (Not Met)  5. Independent with Home Exercise Programs - (Met)  LTG: (to be met in 12 treatments)  6. ? Pain: Pain levels improved to 1-2/10 - (Not Met)  7. ? ROM: Improved ankle AROM to neutral DF, 60 degrees PF, 30 degrees INV, 13 degrees EV - (Not Met)  8. ? Strength: Able to actively contract PF, INV, EV, 2nd-5th digits against gravity, improved Eversion, Inversion, DF to 4-/5 (Not Met)  9. ? Function: Limited tolerance of ambulation without (A) device, improved tolerance of stairs without AFO - (Met)  10. Independent with Home Exercise Programs - (Met)    Patient goals: strengthening of foot tendons/muscles    Pt. Education:  [x] Yes  [] No  [x] Reviewed Prior HEP/Ed  Method of Education: [x] Verbal  [x] Demo  [] Written  Comprehension of Education:  [x] Verbalizes understanding. [x] Demonstrates understanding. [] Needs review. [x] Demonstrates/verbalizes HEP/Ed previously given.      Plan: [x] Continue per plan of care.   [] Other:      Treatment Charges: Mins Units   []  Modalities     []  Ther Exercise     [x]  Manual Therapy 15 1   []  Ther Activities     []  Aquatics     [x]  Neuromuscular 50 3   [] Vasocompression     [] Gait Training     [] Dry needling        [] 1 or 2 muscles        [] 3 or more muscles     []  Other     Total Treatment time 65 4     Time In: 10:50   Time Out: 12:00    Electronically signed by:  Alberto Meade PTA

## 2022-02-14 ENCOUNTER — HOSPITAL ENCOUNTER (OUTPATIENT)
Dept: PHYSICAL THERAPY | Age: 52
Setting detail: THERAPIES SERIES
Discharge: HOME | End: 2022-02-14
Payer: MEDICARE

## 2022-02-14 PROCEDURE — 97110 THERAPEUTIC EXERCISES: CPT

## 2022-02-14 PROCEDURE — 97112 NEUROMUSCULAR REEDUCATION: CPT

## 2022-02-16 ENCOUNTER — HOSPITAL ENCOUNTER (OUTPATIENT)
Dept: PHYSICAL THERAPY | Age: 52
Setting detail: THERAPIES SERIES
Discharge: HOME | End: 2022-02-16
Payer: MEDICARE

## 2022-02-16 PROCEDURE — 97140 MANUAL THERAPY 1/> REGIONS: CPT

## 2022-02-16 PROCEDURE — 97112 NEUROMUSCULAR REEDUCATION: CPT

## 2022-02-16 NOTE — FLOWSHEET NOTE
509 [facility] Outpatient Physical Therapy   [facility] [address]   Phone: [phone]   Fax: [phone]    Physical Therapy Daily Treatment Note      Date:  2022  Patient Name:  Kellie Iglesias    :  1970  MRN: [id number]  Physician: Artur Rose                                  Insurance: [place] [employer]age 19 visits remain for year from 21 appointment  Medical Diagnosis: foot drop M21.371, Achilles tendonitis M76.62             Rehab Codes: (R) foot weakness R53.1  Onset date: 10/18/21 referral                 Next Dr's appt.: unknown  Visit Count:                            Cancel/No Show:     Subjective: Pt arrives noting most pain present in R UT and notes increased numbness at posterior aspect of B arms. Reports ankle feels weak this date.    Pain:  [x] Yes  [] No Location: R ankle/achilles, neck Pain Rating: (0-10 scale) 5/10 ankle; consistently 7/10 in neck  Pain altered Tx:  [x] No  [] Yes  Action:  Comments:    Objective:  Modalities:   Precautions:  Exercises: bolded completed 22  Exercise Reps/ Time Weight/ Level Comments   Stationary Bike 5'   Brace       All exercises without brace -  22   Reviewed Achilles stretching stair, wall 3 x 30''   HEP   Reviewed supine Achilles stretching 3 x 20\"   HEP   Seated gastroc then soleus stretch  3x30\" towel Added soleus 22   Big toe ext stretch-passive 3x15\" manual    Toe yoga-  15x  AA HEP; AAROM 2nd-5th   Towel scrunch 10x  Length of towel   Toe Splay 10x  Min movement; notes recent sharp pain in this area    Arch lift 10x  HEP; increased toe flexion noted 22   Inv/ev 10x On towel     Heel/toe raise  10x ea     Heel raise- 2 way 10x ea  Toes in, toes out; added 22- small range while in eversion w/small range   BAPS board  10x ea L2 Added 22; PF/DF inv/ev, cw, ccw   LAQ  10x 2# Difficult to get leg fully straight    Ankle circles w/ knee extended 10x ea  Cw/ccw; only able to slightly extend knee   Ankle isometrics  10x3\" ea  Added 2/9         Supine       SLR 10x 2# Small range    Side lying hip abd  10x 2#    SAQ 10x3\" Bolster ; 2#    Dips 8'' box 3 x           Standing    Without brace 2/2/22; RESUME next    Slant board stretch 2x30\"  Added 2/2/22    HR/TR x10    Light UE support, Cues for even wt distribution   Standing on foam NBOS with eyes closed 2x30''   No UE assist   NBOS eyes open x30\"     NBOS with ball movements  10x ea 2# yellow ball Up/down, side to side   Alt marches 10x2\" Light UE A as needed  Added 1/5/22   Fitter board 10x ea  A/P, M/L   Fitter board for balance  x30\" ea  A/P, M/L   Tandem stance  x30\" ea  More challenging with R foot posterior   Foot intrinsics in standing  Trial in upcoming sessions     Step ups  10x 6\"  lead with R; B UE support   Taps to step R LE 10x2 9.5\" Top build   Lunge with weight shift  8x  R LE in front          Prone       Supine dynamic stabilization PNF DF+Eversion/PF+inversion x20  Light resistance into each direction  Weaker into PF + inv   Hs curls  10x           CERVICAL      Manual traction 5'     Seated + supine cervical retraction 10x3\" ea     Retro shoulder rolls  10x     scap squeezes 10x5\"     Other:  Manual x15'  DTM to peroneals, gastroc/soleus junction   MFR to proximal gastroc and distal hamstring (more medial)  TC joint mobilizations  Ankle/toes PROM  Desensitization w/wash cloth to dorsal surface of forefoot & toes 3-5 x2'         Specific Instructions for next treatment: progress ROM, strength, and stability as tolerated        Assessment: [x] Progressing toward goals. Initiated session with gastroc stretch and intrinsic exercises. Increased weight for mat strengthening this date. Continued manual with increased muscular tension at medial gastroc and distal hamstring, therefore, applied MFR to these areas with good releases noted. Implemented manual traction and postural exercises this date to address cervical radiculopathy.  Plan to resume standing strengthening and balance exercises along with progressing cervical postural exercises to tolerance. [] No change. [x] Other:  Pt recently received a new referral to be seen for cervical radiculopathy as well. Primary PT did an evaluation on 2/14/22    [x] Patient would continue to benefit from skilled physical therapy services in order to: work on ankle and foot mobility, strength, function    STG: (to be met in 6 treatments)  ? Pain: Pain levels improved to 2-3/10 (Not Met)  ? ROM: Improved ankle PROM to neutral DF, 60 degrees PF, 30 degrees INV, 13 degrees EV - (Met)  ? Strength: Able to actively contract PF, INV, EV, 2nd-5th digits against gravity, improved Eversion, Inversion, DF to 4-/5 - (Not Met)  ? Function: Limited tolerance of ambulation without (A) device, improved tolerance of stairs without AFO - (Not Met)  Independent with Home Exercise Programs - (Met)  LTG: (to be met in 12 treatments)  ? Pain: Pain levels improved to 1-2/10 - (Not Met)  ? ROM: Improved ankle AROM to neutral DF, 60 degrees PF, 30 degrees INV, 13 degrees EV - (Not Met)  ? Strength: Able to actively contract PF, INV, EV, 2nd-5th digits against gravity, improved Eversion, Inversion, DF to 4-/5 (Not Met)  ? Function: Limited tolerance of ambulation without (A) device, improved tolerance of stairs without AFO - (Met)  Independent with Home Exercise Programs - (Met)    Patient goals: strengthening of foot tendons/muscles    Pt. Education:  [x] Yes  [] No  [x] Reviewed Prior HEP/Ed  Method of Education: [x] Verbal  [x] Demo  [] Written  Comprehension of Education:  [x] Verbalizes understanding. [x] Demonstrates understanding. [] Needs review. [x] Demonstrates/verbalizes HEP/Ed previously given. Plan: [x] Continue per plan of care.    [] Other:      Treatment Charges: Mins Units   []  Modalities     []  Ther Exercise     [x]  Manual Therapy 20 1   []  Ther Activities     []  Aquatics     [x]  Neuromuscular 40 3   [] Vasocompression     [] Gait Training     [] Dry needling        [] 1 or 2 muscles        [] 3 or more muscles     []  Other     Total Treatment time 60 4     Time In: 10:45   Time Out: 11:45    Electronically signed by:  Marion Armenta PTA

## 2022-02-22 ENCOUNTER — HOSPITAL ENCOUNTER (OUTPATIENT)
Dept: PHYSICAL THERAPY | Age: 52
Setting detail: THERAPIES SERIES
Discharge: HOME | End: 2022-02-22
Payer: MEDICARE

## 2022-02-22 NOTE — PROGRESS NOTES
[]? [facility] ([facility]) [facility] &  Therapy  [address]     [phone]  F: [phone]   []? [address]  [facility]   [address]  P: [phone]  F: [phone]  []? [place]  [address] [place] [address]  P: [phone]  F: [phone] []? [address]  P: [phone]  F: [phone]  []? [address] [facility]   [address]   P: [phone]  F: [phone]   [x]? [place] [address] [facility] [address]  [place]: [phone]   F: [phone]      Physical Therapy Cancel/No Show note     Date: 2022  Patient: Carin Peña                   : 1970                      MRN: [id number]  Visit Count:    Cancels/No Shows to date: 4/3     For today's appointment patient:    []? Cancelled    []? Rescheduled appointment    [x]? No-show     Reason given by patient:    []? Patient ill    []? Conflicting appointment    []? No transportation                 []? Conflict with work    []? No reason given    []? Weather related    []? COVID-19    []? Other:                 Comments: Patient no show. Called and L/M regarding missed visit and informed of next scheduled visit time and date.        [x]?  Next appointment was confirmed    Jefe Mcghee PTA

## 2022-02-24 ENCOUNTER — HOSPITAL ENCOUNTER (OUTPATIENT)
Dept: PHYSICAL THERAPY | Age: 52
Setting detail: THERAPIES SERIES
Discharge: HOME | End: 2022-02-24
Payer: MEDICARE

## 2022-02-24 NOTE — FLOWSHEET NOTE
[] [facility]) [facility] &  Therapy  [address]    [phone]  F: [phone]   [] [address]  [facility]   [address]  P: [phone]  F: [phone]  [] [address] &  Therapy  [address]  P: [phone]  F: [phone] [] [address]  P: [phone]  F: [phone]  [] [address]  [facility]   [address]   [place]: [phone]  F: [phone]   [x] [place] [address] [facility] [address]  [place]: [phone]   F: [phone]     Physical Therapy Cancel/No Show note    Date: 2022  Patient: Dominick Morales  : 1970  MRN: [id number]    Cancels/No Shows to date: 5/3    For today's appointment patient:    [x]  Cancelled    [] Rescheduled appointment    [] No-show     Reason given by patient:    [x]  Patient ill    []  Conflicting appointment    [] No transportation      [] Conflict with work    [] No reason given    [] Weather related    [] COVID-19    [] Other:      Comments:        [] Next appointment was confirmed    Electronically signed by:  Nata Lemos PTA

## 2022-02-26 DIAGNOSIS — G89.29 CHRONIC PAIN OF MULTIPLE JOINTS: ICD-10-CM

## 2022-02-26 DIAGNOSIS — M25.50 CHRONIC PAIN OF MULTIPLE JOINTS: ICD-10-CM

## 2022-02-28 RX ORDER — CYCLOBENZAPRINE HCL 10 MG
TABLET ORAL
Qty: 90 TABLET | Refills: 1 | Status: SHIPPED | OUTPATIENT
Start: 2022-02-28 | End: 2022-05-02

## 2022-02-28 NOTE — TELEPHONE ENCOUNTER
Please Approve or Refuse.   Send to Pharmacy per Pt's Request: [facility]     Next Visit Date:  5/19/2022   Last Visit Date: 1/19/2022    Hemoglobin A1C (%)   Date Value   01/19/2022 5.8   03/12/2021 5.3   09/01/2020 5.8             ( goal A1C is < 7)   BP Readings from Last 3 Encounters:   01/19/22 118/68   08/11/21 126/84   07/12/21 106/70          (goal 120/80)  BUN   Date Value Ref Range Status   02/07/2022 14 6 - 20 mg/dL Final     CREATININE   Date Value Ref Range Status   02/07/2022 0.97 0.70 - 1.20 mg/dL Final     Potassium   Date Value Ref Range Status   02/07/2022 4.3 3.7 - 5.3 mmol/L Final

## 2022-03-08 ENCOUNTER — HOSPITAL ENCOUNTER (OUTPATIENT)
Dept: PHYSICAL THERAPY | Age: 52
Setting detail: THERAPIES SERIES
Discharge: HOME | End: 2022-03-08
Payer: MEDICARE

## 2022-03-08 PROCEDURE — 97112 NEUROMUSCULAR REEDUCATION: CPT

## 2022-03-08 PROCEDURE — 97140 MANUAL THERAPY 1/> REGIONS: CPT

## 2022-03-08 NOTE — FLOWSHEET NOTE
509 [facility] Outpatient Physical Therapy   [address]   Phone: [phone]   Fax: [phone]    Physical Therapy Daily Treatment Note      Date:  3/8/2022  Patient Name:  Tamra Postal    :  1970  MRN: [id number]  Physician: Alexandre Srivastava                                  Insurance: [place] [employer] 19 visits remain for year from 21 appointment  Medical Diagnosis: foot drop M21.371, Achilles tendonitis M76.62             Rehab Codes: (R) foot weakness R53.1  Onset date: 10/18/21 referral                 Next 's appt.: unknown  Visit Count:                            Cancel/No Show:     Subjective: Pt states he had a bad 'kink' in his neck for 1.5 days making him limited into R rotation. Continues to note pain and N/T down R UE. Reports numbness continues in R ankle 2 years post op stating doctor said it may not get better at this point.    Pain:  [x] Yes  [] No Location: R ankle/achilles, neck Pain Rating: (0-10 scale) 4.5-5/10 R ankle; consistently 7/10 in neck  Pain altered Tx:  [x] No  [] Yes  Action:  Comments:    Objective:  Modalities:   Precautions:  Exercises: bolded completed 22  Exercise Reps/ Time Weight/ Level Comments   Stationary Bike 5'   Brace       All exercises without brace -  22   Reviewed Achilles stretching stair, wall 3 x 30''   HEP   Reviewed supine Achilles stretching 3 x 20\"   HEP   Seated gastroc then soleus stretch  3x30\" towel Added soleus 22   Big toe ext stretch-passive 3x15\" manual    Toe yoga-  15x  AA HEP; AAROM 2nd-   Towel scrunch 10x  Length of towel   Toe Splay 10x  Min movement; notes recent sharp pain in this area    Arch lift 10x  HEP; increased toe flexion noted 22   Inv/ev 10x On towel     Heel/toe raise  10x ea     Heel raise- 2 way 10x ea  Toes in, toes out; added 22- small range while in eversion w/small range   BAPS board  10x ea L2 Added 22; PF/DF inv/ev, cw, ccw   LAQ  10x 2# Difficult to get leg fully straight    Ankle circles w/ knee extended 10x ea  Cw/ccw; only able to slightly extend knee   Ankle isometrics  10x3\" ea  Added 2/9         Supine       SLR 10x 2# Small range    Side lying hip abd  10x 2#    SAQ 10x3\" Bolster ; 2#    Dips 8'' box 3 x           Standing    Without brace 2/2/22; RESUME next    Slant board stretch 2x30\"  Added 2/2/22    HR/TR x10    Light UE support, Cues for even wt distribution   Standing on foam NBOS with eyes closed 2x30''   No UE assist   NBOS eyes open x30\"     NBOS with ball movements  10x ea 2# yellow ball Up/down, side to side   Alt marches 10x2\" Light UE A as needed  Added 1/5/22   Fitter board 10x ea  A/P, M/L   Fitter board for balance  x30\" ea  A/P, M/L   Tandem stance  x30\" ea  More challenging with R foot posterior   Foot intrinsics in standing  Trial in upcoming sessions     Step ups  10x 6\"  lead with R; B UE support   Taps to step R LE 10x2 9.5\" Top build   Lunge with weight shift  8x  R LE in front                Supine dynamic stabilization PNF DF+Eversion/PF+inversion x20  Light resistance into each direction  Weaker into PF + inv   Hs curls  10x           CERVICAL      Manual- traction, MFR to R UT, STM to cervical musculature  10'  In supine   Seated + supine cervical retraction 10x3\" ea     Supine cane flexion 15x  2# cane     cervical ROM Next      Retro shoulder rolls  10x     scap squeezes 10x5\"     t band rows 10x2 yellow Progress next   t band extension 10x2 yellow    B ER 10x Yellow     Other:  Manual x15'  DTM to peroneals, gastroc/soleus junction   MFR to proximal gastroc and distal hamstring (more medial)  TC joint mobilizations  Ankle/toes PROM  Desensitization w/wash cloth to dorsal surface of forefoot & toes 3-5 x2'         Specific Instructions for next treatment: standing balance, strength and stability for R ankle, cervical stabilization ex's         Assessment: [x] Progressing toward goals.  Initiated session with manual to reduce cervical tension and N/T sensation. Progressed shoulder strengthening and stabilization exercises with good tolerance. Continued foot/ankle ROM and mobility exercises with pt able to complete toe yoga with increased ease. Vc's for increased DF with PNF patterns this date. Will progress as pt tolerates. [] No change. [x] Other:  Pt recently received a new referral to be seen for cervical radiculopathy as well. Primary PT did an evaluation on 2/14/22    [x] Patient would continue to benefit from skilled physical therapy services in order to: work on ankle and foot mobility, strength, function    STG: (to be met in 6 treatments)  ? Pain: Pain levels improved to 2-3/10 (Not Met)  ? ROM: Improved ankle PROM to neutral DF, 60 degrees PF, 30 degrees INV, 13 degrees EV - (Met)  ? Strength: Able to actively contract PF, INV, EV, 2nd-5th digits against gravity, improved Eversion, Inversion, DF to 4-/5 - (Not Met)  ? Function: Limited tolerance of ambulation without (A) device, improved tolerance of stairs without AFO - (Not Met)  Independent with Home Exercise Programs - (Met)  LTG: (to be met in 12 treatments)  ? Pain: Pain levels improved to 1-2/10 - (Not Met)  ? ROM: Improved ankle AROM to neutral DF, 60 degrees PF, 30 degrees INV, 13 degrees EV - (Not Met)  ? Strength: Able to actively contract PF, INV, EV, 2nd-5th digits against gravity, improved Eversion, Inversion, DF to 4-/5 (Not Met)  ? Function: Limited tolerance of ambulation without (A) device, improved tolerance of stairs without AFO - (Met)  Independent with Home Exercise Programs - (Met)    Patient goals: strengthening of foot tendons/muscles    Pt. Education:  [x] Yes  [] No  [x] Reviewed Prior HEP/Ed  Method of Education: [x] Verbal  [x] Demo  [] Written  Comprehension of Education:  [x] Verbalizes understanding. [x] Demonstrates understanding. [] Needs review. [x] Demonstrates/verbalizes HEP/Ed previously given.      Plan: [x] Continue per plan of care.    [] Other:      Treatment Charges: Mins Units   []  Modalities     []  Ther Exercise     [x]  Manual Therapy 10 1   []  Ther Activities     []  Aquatics     [x]  Neuromuscular 35 2   [] Vasocompression     [] Gait Training     [] Dry needling        [] 1 or 2 muscles        [] 3 or more muscles     []  Other     Total Treatment time 45 3     Time In: 9:40   Time Out: 10:25    Electronically signed by:  Geno Pretty PTA

## 2022-03-10 ENCOUNTER — HOSPITAL ENCOUNTER (OUTPATIENT)
Dept: PHYSICAL THERAPY | Age: 52
Setting detail: THERAPIES SERIES
Discharge: HOME | End: 2022-03-10
Payer: MEDICARE

## 2022-03-10 PROCEDURE — 97112 NEUROMUSCULAR REEDUCATION: CPT

## 2022-03-10 NOTE — FLOWSHEET NOTE
509 [facility] Outpatient Physical Therapy   [address]   Phone: [phone]   Fax: [phone]    Physical Therapy Daily Treatment Note      Date:  3/10/2022  Patient Name:  Neris Lizarraga    :  1970  MRN: [id number]  Physician: Sybil Meckel                                  Insurance: [place] [employer] 19 visits remain for year from 21 appointment  Medical Diagnosis: foot drop M21.371, Achilles tendonitis M76.62             Rehab Codes: (R) foot weakness R53.1  Onset date: 10/18/21 referral                 Next Dr's appt.: unknown  Visit Count:                            Cancel/No Show:     Subjective:  Pt arrives with reduced pain in neck and denies N/T this date. Reports soreness in R UT this date and was more sore following last therapy session. States continued cramping in R calf musculature noting he has been massaging, stretching, and applying heat to try to help reduce tension.    Pain:  [x] Yes  [] No Location: R ankle/achilles, neck Pain Rating: (0-10 scale) 5/10 R ankle; consistently 3/10 in neck  Pain altered Tx:  [x] No  [] Yes  Action:  Comments:    Objective:  Modalities:   Precautions:  Exercises: bolded completed 03/10/22  Exercise Reps/ Time Weight/ Level Comments   Stationary Bike 5'   Brace       All exercises without brace -  22   Reviewed Achilles stretching stair, wall 3 x 30''   HEP   Reviewed supine Achilles stretching 3 x 20\"   HEP   Seated gastroc then soleus stretch  3x30\" towel Added soleus 22   Big toe ext stretch-passive 3x15\" manual    Toe yoga-  15x  AA for big toe HEP   Towel scrunch 10x  Length of towel   Toe Splay 10x  Min movement; notes recent sharp pain in this area    Arch lift 10x  HEP; increased toe flexion noted 22   Inv/ev 10x On towel     Heel/toe raise  10x ea     Heel raise- 2 way 10x ea  Toes in, toes out; added 22- small range while in eversion w/small range   BAPS board  10x ea L2 Added 1/5/22; PF/DF inv/ev, cw, ccw   LAQ  10x 2# Difficult to get leg fully straight    Ankle circles w/ knee extended 10x ea  Cw/ccw; only able to slightly extend knee   Ankle isometrics  10x3\" ea  Added 2/9         Supine       SLR 10x 2# Small range    Side lying hip abd  10x 2#    SAQ 10x3\" Bolster ; 2#    Dips 8'' box 3 x           Standing    Without brace starting 2/2/22   Slant board stretch 3x30\"  Added 2/2/22    HR/TR x10    Light UE support, Cues for even wt distribution   Standing on foam NBOS with eyes closed 2x30''   No UE assist   NBOS on foam eyes open/eyes closed X30\" ea  Added foam 3/10   NBOS with ball movements  10x ea 2# yellow ball Up/down, side to side   Alt marches 10x2\" Light UE A as needed  Added 1/5/22   Fitter board 20x ea  A/P, M/L   Fitter board for balance  x30\" ea  A/P, M/L   Tandem stance  x30\" ea  More challenging with R foot posterior   Foot intrinsics in standing  Trial in upcoming sessions     Step ups  10x 8\"  lead with R; B UE support   Alt taps to step 10x 8\"    Lunge with weight shift  10x  R LE in front                Supine dynamic stabilization PNF DF+Eversion/PF+inversion x20  Light resistance into each direction  Weaker into PF + inv; in seated with R LE propped on stool 3/10   Hs curls  10x           CERVICAL      Manual- traction, MFR to R UT, STM to cervical musculature  10'  In supine   Seated + supine cervical retraction 10x3\" ea     Supine cane flexion 15x  2# cane     cervical ROM 5x5\"  More tight rotating to L   Retro shoulder rolls  10x     scap squeezes 10x5\"     t band rows 10x2 red    t band extension 10x2 red    HAB 10x2 yellow    B ER 10x2 Yellow     Other:  Manual x15'  DTM to peroneals, gastroc/soleus junction   MFR to proximal gastroc and distal hamstring (more medial)  TC joint mobilizations  Ankle/toes PROM  Desensitization w/wash cloth to dorsal surface of forefoot & toes 3-5 x2'         Specific Instructions for next treatment: standing balance, strength and stability for R ankle, cervical stabilization ex's         Assessment: [x] Progressing toward goals. Pt demonstrated improved strength and ROM of digits 2-5 with toe yoga exercise this date. Continues to require min A for extension of big toe. Progressed resistance for rows and shoulder extension with good tolerance. Added HAB with cues to reduce UT activation. Held manual this date d/t reduce pain in cervical spine and focused on interventions to improve strength and stabilization. Foam added to NBOS on foam with eyes open and closed to challenge balance and stability. Additon of cervical ROM to improve flexibility with most tension noted when rotating to the L.    [] No change. [x] Other:  Pt recently received a new referral to be seen for cervical radiculopathy as well. Primary PT did an evaluation on 2/14/22    [x] Patient would continue to benefit from skilled physical therapy services in order to: work on ankle and foot mobility, strength, function    STG: (to be met in 6 treatments)  ? Pain: Pain levels improved to 2-3/10 (Not Met)  ? ROM: Improved ankle PROM to neutral DF, 60 degrees PF, 30 degrees INV, 13 degrees EV - (Met)  ? Strength: Able to actively contract PF, INV, EV, 2nd-5th digits against gravity, improved Eversion, Inversion, DF to 4-/5 - (Not Met)  ? Function: Limited tolerance of ambulation without (A) device, improved tolerance of stairs without AFO - (Not Met)  Independent with Home Exercise Programs - (Met)  LTG: (to be met in 12 treatments)  ? Pain: Pain levels improved to 1-2/10 - (Not Met)  ? ROM: Improved ankle AROM to neutral DF, 60 degrees PF, 30 degrees INV, 13 degrees EV - (Not Met)  ? Strength: Able to actively contract PF, INV, EV, 2nd-5th digits against gravity, improved Eversion, Inversion, DF to 4-/5 (Not Met)  ?  Function: Limited tolerance of ambulation without (A) device, improved tolerance of stairs without AFO - (Met)  Independent with Home Exercise Programs - (Met) Patient goals: strengthening of foot tendons/muscles    Pt. Education:  [x] Yes  [] No  [x] Reviewed Prior HEP/Ed  Method of Education: [x] Verbal  [x] Demo  [] Written  Comprehension of Education:  [x] Verbalizes understanding. [x] Demonstrates understanding. [] Needs review. [x] Demonstrates/verbalizes HEP/Ed previously given. Plan: [x] Continue per plan of care.    [] Other:      Treatment Charges: Mins Units   []  Modalities     []  Ther Exercise     [x]  Manual Therapy     []  Ther Activities     []  Aquatics     [x]  Neuromuscular 45 3   [] Vasocompression     [] Gait Training     [] Dry needling        [] 1 or 2 muscles        [] 3 or more muscles     []  Other     Total Treatment time 45 3     Time In: 9:30   Time Out: 10:15    Electronically signed by:  Alberto Meade PTA

## 2022-03-14 ENCOUNTER — HOSPITAL ENCOUNTER (OUTPATIENT)
Dept: PHYSICAL THERAPY | Age: 52
Setting detail: THERAPIES SERIES
Discharge: HOME | End: 2022-03-14
Payer: MEDICARE

## 2022-03-14 PROCEDURE — 97140 MANUAL THERAPY 1/> REGIONS: CPT

## 2022-03-14 PROCEDURE — 97110 THERAPEUTIC EXERCISES: CPT

## 2022-03-14 PROCEDURE — 97112 NEUROMUSCULAR REEDUCATION: CPT

## 2022-03-14 NOTE — FLOWSHEET NOTE
[facility]   [address]   Phone: [phone]   Fax: [phone]    Physical Therapy Daily Treatment Note      Date:  3/14/2022  Patient Name:  Richard Overton    :  1970  MRN: [id number]  Physician: Cassie Junior                                  Insurance: [place] [employer] 19 visits remain for year from 21 appointment  Medical Diagnosis: foot drop M21.371, Achilles tendonitis M76.62             Rehab Codes: (R) foot weakness R53.1  Onset date: 10/18/21 referral                 Next Dr's appt.: unknown  Visit Count:                            Cancel/No Show:     Subjective:   Pt arrives with increased pain/cramping in R calf and plantar surface of foot. States his insurance approved injection for his neck and he is scheduled for injection this Friday.    Pain:  [x] Yes  [] No Location: R ankle/achilles, neck Pain Rating: (0-10 scale) 5-6/10 R ankle; consistently 3/10 in neck/R shoulder  Pain altered Tx:  [x] No  [] Yes  Action:  Comments:    Objective:  Modalities:   Precautions:  Exercises: bolded completed 22  Exercise Reps/ Time Weight/ Level Comments   Stationary Bike 5'   Brace       All exercises without brace -  22   Reviewed Achilles stretching stair, wall 3 x 30''   HEP   Reviewed supine Achilles stretching 3 x 20\"   HEP   Seated gastroc then soleus stretch  3x30\" ea towel Added soleus 22   Big toe ext stretch-passive 3x15\" manual    Toe yoga-  15x  AA for big toe HEP   Towel scrunch 10x  Length of towel   Toe Splay 10x  Min movement; notes recent sharp pain in this area    Arch lift 10x  HEP; increased toe flexion noted 22   Inv/ev 10x On towel     Heel/toe raise  10x ea     Heel raise- 2 way 10x ea  Toes in, toes out; added 22- small range while in eversion w/small range   BAPS board  10x ea L2 Added 22; PF/DF inv/ev, cw, ccw   LAQ  10x 2# Difficult to get leg fully straight    Ankle circles w/ knee extended 10x ea  Cw/ccw; only able to slightly extend knee   Ankle isometrics  10x3\" ea  Added 2/9         Supine       SLR 10x 2# Small range    Side lying hip abd  10x 2#    SAQ 10x3\" Bolster ; 2#    Dips 8'' box 3 x           Standing    Without brace starting 2/2/22   Slant board stretch 2x30\"  Added 2/2/22    HR/TR x10    Light UE support, Cues for even wt distribution   Standing on foam NBOS with eyes closed 2x30''   No UE assist   NBOS on foam eyes open/eyes closed X30\"   Added foam 3/10   NBOS with ball movements  10x ea 2# yellow ball Up/down, side to side   Alt marches 10x2\" Light UE A as needed  Added 1/5/22   Fitter board 20x ea  A/P, M/L   Fitter board for balance  x30\" ea  A/P, M/L   Tandem stance on foam x30\" ea  More challenging with R foot posterior; added foam 3/14   R SLS 3x10\"  Added 3/14   Foot intrinsics in standing  Trial in upcoming sessions     Step ups  10x 8\"  lead with R; B UE support at hand rails 3/14   Alt taps to step 10x ea  8\" No UE's    10x  R LE in front    Lunge with weight shift  10x           SUPINE dynamic stabilization PNF DF+Eversion/PF+inversion 2x15  Light resistance into each direction  Weaker into PF + inv; in seated with R LE propped on stool 3/10         Prone      Hip ext  10x 2# Added 3/14   Hs curls  10x 2#          CERVICAL      Manual- traction, MFR to R UT, STM to cervical musculature  10'  In supine   Seated + supine cervical retraction 10x3\" ea     Supine cane flexion 15x  2# cane     R UT stretch  3x20\"     cervical ROM 5x5\"  More tight rotating to L   Cervical rot to L 10x5\"     Retro shoulder rolls  10x     scap squeezes 10x5\"     t band rows 10x2 red    t band extension 10x2 red    HAB 10x2 yellow    B ER 10x2 Yellow     Shoulder flex, abd 10x ea A Pain free range; added 3/14                     Other:  Manual x10'  DTM to peroneals, gastroc/soleus junction   MFR to proximal gastroc and distal hamstring (more medial)  TC joint mobilizations  Ankle/toes PROM  MFR to plantar fascia with passive ext of big toe  Desensitization w/wash cloth to dorsal surface of forefoot & toes 3-5 x2'         Specific Instructions for next treatment: standing balance, strength and stability for R ankle, cervical stabilization ex's         Assessment: [x] Progressing toward goals. Initiated session with gastroc/soleus seated stretch using towel followed by foot intrinsics. Completed hip/knee strengthening on mat table in multiple planes using 2# ankle weight with addition of prone hip extension to improve overall R LE strength. Resumed manual to R gastroc and foot to improve mobility and reduce muscular tension this date. Added R SLS to challenge pt's balance and stability with slight bucking of knee present. Foam added to tandem stance to further challenge LE stabilization. Focus on stretches to improve flexibility of R UT and cervical rotation to the left. Implemented AROM shoulder flexion and abduction to increase B UE strength. Will continue progressions as tolerated by patient. [] No change. [x] Other:  Pt recently received a new referral to be seen for cervical radiculopathy as well. Primary PT did an evaluation on 2/14/22    [x] Patient would continue to benefit from skilled physical therapy services in order to: work on ankle and foot mobility, strength, function    STG: (to be met in 6 treatments)  ? Pain: Pain levels improved to 2-3/10 (Not Met)  ? ROM: Improved ankle PROM to neutral DF, 60 degrees PF, 30 degrees INV, 13 degrees EV - (Met)  ? Strength: Able to actively contract PF, INV, EV, 2nd-5th digits against gravity, improved Eversion, Inversion, DF to 4-/5 - (Not Met)  ? Function: Limited tolerance of ambulation without (A) device, improved tolerance of stairs without AFO - (Not Met)  Independent with Home Exercise Programs - (Met)  LTG: (to be met in 12 treatments)  ? Pain: Pain levels improved to 1-2/10 - (Not Met)  ?  ROM: Improved ankle AROM to neutral DF, 60 degrees PF, 30 degrees INV, 13 degrees EV - (Not Met)  ? Strength: Able to actively contract PF, INV, EV, 2nd-5th digits against gravity, improved Eversion, Inversion, DF to 4-/5 (Not Met)  ? Function: Limited tolerance of ambulation without (A) device, improved tolerance of stairs without AFO - (Met)  Independent with Home Exercise Programs - (Met)    Patient goals: strengthening of foot tendons/muscles    Pt. Education:  [x] Yes  [] No  [x] Reviewed Prior HEP/Ed  Method of Education: [x] Verbal  [x] Demo  [] Written  Comprehension of Education:  [x] Verbalizes understanding. [x] Demonstrates understanding. [] Needs review. [x] Demonstrates/verbalizes HEP/Ed previously given. Plan: [x] Continue per plan of care.    [] Other:      Treatment Charges: Mins Units   []  Modalities     [x]  Ther Exercise 20 1   [x]  Manual Therapy 10 1   []  Ther Activities     []  Aquatics     [x]  Neuromuscular 34 2   [] Vasocompression     [] Gait Training     [] Dry needling        [] 1 or 2 muscles        [] 3 or more muscles     []  Other     Total Treatment time 64 4     Time In: 11:53  Time Out: 12:57     Electronically signed by:  Jaylon Galvan PTA

## 2022-03-23 NOTE — TELEPHONE ENCOUNTER
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 He states he has tried to get in touch with the people he sold it to and has not had luck. He states he needs a new placard for that vehicle as it is too difficult to grab or remember to grab the one from the other vehicle. Please advise. [phone]

## 2022-03-24 ENCOUNTER — HOSPITAL ENCOUNTER (OUTPATIENT)
Dept: PHYSICAL THERAPY | Age: 52
Setting detail: THERAPIES SERIES
Discharge: HOME | End: 2022-03-24
Payer: MEDICARE

## 2022-03-24 PROCEDURE — 97110 THERAPEUTIC EXERCISES: CPT

## 2022-03-24 NOTE — FLOWSHEET NOTE
[facility]   [address]   Phone: [phone]   Fax: [phone]    Physical Therapy Daily Treatment Note      Date:  3/24/2022  Patient Name:  Jolene Eddy    :  1970  MRN: [id number]  Physician: Concetta Westbrook                                  Insurance: [place] [employer] 19 visits remain for year from 21 appointment  Medical Diagnosis: foot drop M21.371, Achilles tendonitis M76.62             Rehab Codes: (R) foot weakness R53.1  Onset date: 10/18/21 referral                 Next Dr's appt.: unknown  Visit Count:                            Cancel/No Show:     Subjective:   Pt arrives to therapy wearing AFO Brace. States wearing brace when more active on feet. States intermittent muscle cramping occurring over last few days. Pt reports having injection for his neck and to keep treatment focused on R ankle and achilles.   Pain:  [x] Yes  [] No Location: R ankle/achilles, neck Pain Rating: (0-10 scale) 4/10 R ankle; consistently 3/10 in neck/R shoulder  Pain altered Tx:  [x] No  [] Yes  Action:  Comments:    Objective:  Modalities:   Precautions:  Exercises: bolded completed 22  Exercise Reps/ Time Weight/ Level Comments   Stationary Bike 5'   Brace       All exercises without brace -  22   Reviewed Achilles stretching stair, wall 3 x 30''   HEP   Reviewed supine Achilles stretching 3 x 20\"   HEP   Seated gastroc then soleus stretch  3x30\" ea towel Added soleus 22   Big toe ext stretch-passive 3x15\" manual    Toe yoga-  15x  AA for big toe HEP   Towel scrunch 10x  Length of towel   Toe Splay 10x  Min movement; notes recent sharp pain in this area    Arch lift 10x  HEP; increased toe flexion noted 22   Inv/ev 10x On towel     Heel/toe raise  15x ea     Heel raise- 2 way 15x ea  Toes in, toes out; added 22- small range while in eversion w/small range   BAPS board  10x ea L2 Added 22; PF/DF inv/ev, cw, ccw   LAQ  15x 2# Difficult to get leg fully straight    Ankle circles w/ knee extended 10x ea  Cw/ccw; only able to slightly extend knee   Ankle isometrics  10x3\" ea  Added 2/9         Supine       SLR 15x 2# Small range    Side lying hip abd  10x 2#    SAQ 15x3\" Bolster ; 2#    Dips 8'' box 3 x           Standing    Without brace starting 2/2/22   Slant board stretch 2x30\"  Added 2/2/22    HR/TR x10   Light UE support, Cues for even wt distribution   Standing on foam NBOS with eyes closed 2x30''   No UE assist   NBOS on foam eyes open/eyes closed X30\"   Added foam 3/10   NBOS with ball movements  10x ea 2# yellow ball Up/down, side to side   Alt marches 10x2\" Light UE A as needed  Added 1/5/22   Fitter board 20x ea  A/P, M/L   Fitter board for balance  x30\" ea  A/P, M/L   Tandem stance on foam x30\" ea  More challenging with R foot posterior; added foam 3/14   R SLS 3x10\"  Added 3/14   Foot intrinsics in standing  Trial in upcoming sessions     Step ups  15x 8\"  lead with R; B UE support at hand rails 3/14   Alt taps to step 10x ea  8\" No UE's    10x  R LE in front    Lunge with weight shift  10x           SUPINE dynamic stabilization PNF DF+Eversion/PF+inversion 2x15  Light resistance into each direction  Weaker into PF + inv; in seated with R LE propped on stool 3/10         Prone      Hip ext  10x 2# Added 3/14   Hs curls  10x 2#          CERVICAL      Manual- traction, MFR to R UT, STM to cervical musculature  10'  In supine   Seated + supine cervical retraction 10x3\" ea     Supine cane flexion 15x  2# cane     R UT stretch  3x20\"     cervical ROM 5x5\"  More tight rotating to L   Cervical rot to L 10x5\"     Retro shoulder rolls  10x     scap squeezes 10x5\"     t band rows 10x2 red    t band extension 10x2 red    HAB 10x2 yellow    B ER 10x2 Yellow     Shoulder flex, abd 10x ea A Pain free range; added 3/14                     Other:  Manual x10'  DTM to peroneals, gastroc/soleus junction   MFR to proximal gastroc and distal hamstring (more medial)  TC joint mobilizations  Ankle/toes PROM  MFR to plantar fascia with passive ext of big toe  Desensitization w/wash cloth to dorsal surface of forefoot & toes 3-5 x2'         Specific Instructions for next treatment: standing balance, strength and stability for R ankle, cervical stabilization ex's         Assessment: [x] Progressing toward goals. Initiated treatment on stationary bike as warmup to improve blood circulation and endurance and continued with standing stretching and strengthening program. Progressions made with increased reps with Heel/toe raises, 2-way Heel/toe, step ups, and supine mat program to further improve endurance and strengthen RLE. Added progressed SLS to foam pad, with difficulty noted, requires 1 UE assist as needed to maintain balance. No increase in pain reported with progressions. [] No change. [x] Other:  Pt recently received a new referral to be seen for cervical radiculopathy as well. Primary PT did an evaluation on 2/14/22    [x] Patient would continue to benefit from skilled physical therapy services in order to: work on ankle and foot mobility, strength, function    STG: (to be met in 6 treatments)  ? Pain: Pain levels improved to 2-3/10 (Not Met)  ? ROM: Improved ankle PROM to neutral DF, 60 degrees PF, 30 degrees INV, 13 degrees EV - (Met)  ? Strength: Able to actively contract PF, INV, EV, 2nd-5th digits against gravity, improved Eversion, Inversion, DF to 4-/5 - (Not Met)  ? Function: Limited tolerance of ambulation without (A) device, improved tolerance of stairs without AFO - (Not Met)  Independent with Home Exercise Programs - (Met)  LTG: (to be met in 12 treatments)  ? Pain: Pain levels improved to 1-2/10 - (Not Met)  ? ROM: Improved ankle AROM to neutral DF, 60 degrees PF, 30 degrees INV, 13 degrees EV - (Not Met)  ?  Strength: Able to actively contract PF, INV, EV, 2nd-5th digits against gravity, improved Eversion, Inversion, DF to 4-/5 (Not Met)  ? Function: Limited tolerance of ambulation without (A) device, improved tolerance of stairs without AFO - (Met)  Independent with Home Exercise Programs - (Met)    Patient goals: strengthening of foot tendons/muscles    Pt. Education:  [x] Yes  [] No  [x] Reviewed Prior HEP/Ed  Method of Education: [x] Verbal  [x] Demo  [] Written  Comprehension of Education:  [x] Verbalizes understanding. [x] Demonstrates understanding. [] Needs review. [x] Demonstrates/verbalizes HEP/Ed previously given. Plan: [x] Continue per plan of care. [] Other:      Treatment Charges: Mins Units   []  Modalities     [x]  Ther Exercise     [x]  Manual Therapy 42 3   []  Ther Activities     []  Aquatics     [x]  Neuromuscular     [] Vasocompression     [] Gait Training     [] Dry needling        [] 1 or 2 muscles        [] 3 or more muscles     []  Other     Total Treatment time 42 3     Time In: 8:48  Time Out: 9:30     Electronically signed by:   Yulissa Frances PTA

## 2022-03-28 ENCOUNTER — HOSPITAL ENCOUNTER (OUTPATIENT)
Dept: PHYSICAL THERAPY | Age: 52
Setting detail: THERAPIES SERIES
Discharge: HOME | End: 2022-03-28
Payer: MEDICARE

## 2022-03-28 PROCEDURE — 97110 THERAPEUTIC EXERCISES: CPT

## 2022-03-28 PROCEDURE — 97140 MANUAL THERAPY 1/> REGIONS: CPT

## 2022-03-31 NOTE — FLOWSHEET NOTE
[address] [facility]   [address] [facility] [address]   Phone: [phone]   Fax: [phone]    Physical Therapy Daily Treatment Note/PROGRESS NOTE      Date:  3/28/2022  Patient Name:  Josiah Salazar    :  1970  MRN: [id number]  Physician: Tutu Ritchie MD                                Insurance: [place] [employer] 19 visits remain for year from 21 appointment  Medical Diagnosis: foot drop M21.371, Achilles tendonitis M76.62             Rehab Codes: (R) foot weakness R53.1  Onset date: 10/18/21 referral                 Next 's appt. : 1/10/22  Visit Count:                           Cancel/No Show:     Subjective:   Pt with much improved PROM in the ankle at this time, much improved passive and active movement of the ankle at this time. Patient with continued pain/discomfort at the distal Achilles/calcaneal insertion. Patient noted that he recently with to orthopedic physician, who recommended he emphasize continued home therapy with the ankle and continue with HEP. Not planning on doing surgical intervention in the near future, patient states he wants to continue to work on HEP at this time. Fair overall pronosis for full function- has made gains with balance, ROM, and strength, but still requires use of AFO for community ambulation due to the weakness at this time. Pain:  [x] Yes  [] No Location: R ankle/achilles Pain Rating: (0-10 scale) 3-4/10  Pain altered Tx:  [x] No  [] Yes  Action:  Comments:    Objective: Ankle/foot ROM:  DF (R) 5 degrees, (L) 15 degrees  PF (R) 60 degrees, (L) 70 degrees  INV (R) 35 degrees, (L) 40 degrees  EV (R) 15 degrees, (L) 20 degrees     Constant numbness in 4th and 5th digits     Strength:  4/5 DF,INV  (R) Eversion 4-/5  PF 4/5   GTE 4/5     Able to do (B) heel raise with decreased WB affected ankle  Unable to do SL heel raise on affected leg.     OBSERVATION No Deficit Deficit Not Tested Comments   Posture           Forward Head []?  []?  []?      Rounded Shoulders []?  []?  []?      Kyphosis []?  []?  []?      Lordosis []?  []?  []?      Lateral Shift []?  []?  []?      Scoliosis []?  []?  []?      Iliac Crest []?  []?  []?      PSIS []?  []?  []?      ASIS []?  []?  []?      Genu Valgus []?  []?  []?      Genu Varus []?  []?  []?      Genu Recurvatum []?  []?  []?      Pronation []?  []?  []?      Supination []?  []?  []?      Leg Length Discrp []?  []?  []?      Slumped Sitting []?  []?  []?      Palpation []?  []?  []?      Sensation []?  []?  []?      Edema []?  []?  []?      Neurological []?  []?  []?      Patellar Mobility []?  []?  []?      Patellar Orientation []?  []?  []?      Gait []?  []?  []?  Analysis: Uses AFO for (R) ankle restriction         FUNCTIONAL TESTS PAIN NO PAIN COMMENTS   Step Test 4 []?  []?      6 []?  []?      8 []?  []?      Squat []?  []?            Modalities:   Precautions:  Exercises: bolded completed 01/3/22  Exercise Reps/ Time Weight/ Level Comments   Stationary Bike 5'           Reviewed Achilles stretching stair, wall 3 x 30''   HEP   Reviewed supine Achilles stretching 3 x 20\"   HEP   Seated gastroc stretch  3x30\" towel    Big toe ext stretch-passive 3x15\"     Toe yoga-  15x  AA HEP; AAROM 2nd-5th   Towel scrunch 10x  Length of towel   Toe Splay 10x  Min mvmnt; notes recent sharp pain in this area    Arch lift 10x  HEP   Inv/ev 10x On towel     Heel/toe raise  10x           Standing HR/TR x10    Light UE support, Cues for even wt distribution   Standing on foam NBOS with eyes closed 2x30''    2x EC No UE assist   Tandem stance  x30\" ea  More challenging with R foot posterior    10x     Step ups  10x 4\" With AFO;  1 UE A   Taps to step R LE 10x 4\"          Supine dynamic stabilization PNF DF+Eversion/PF+inversion 2x15  Light resistance into each direction  Weaker into PF + inv           OTHER- Manual therapy- posterior talar mobilization, PROM.     Specific Instructions for next treatment: progress as tolerated        Assessment:  Patient to discharge with fair overall improvement and goal achievement. Much improved PROM and AROM, ankle strength testing, functional (B) heel raise and functional stair stepping in the clinic. Continues to require the use of AFO for home use and for community ambulation. Recommend that the patient discharge to HEP, fair overall improvement, and gave the patient an extensive HEP at this time. [] Progressing toward goals. [] No change. [] Other:     [] Patient would continue to benefit from skilled physical therapy services in order to: work on ankle and foot mobility, strength, function    STG: (to be met in 6 treatments)  1. ? Pain: Pain levels improved to 2-3/10 MET  2. ? ROM: Improved ankle ROM to neutral DF, 60 degrees PF, 30 degrees INV, 13 degrees EV MET  3. ? Strength: Able to actively contract PF, INV, EV MET, 2nd-5th digits against gravity NOT MET, improved Eversion, Inversion, DF to 4-/5 MET  4. ? Function: Limited tolerance of ambulation without (A) device, improved tolerance of stairs without AFO NOT MET  5. Independent with Home Exercise Programs MET  LTG: (to be met in 12 treatments)  6. ? Pain: Pain levels improved to 0-1/10 MET  7. ? ROM: Improved ankle ROM to 5 degrees DF, 65 degrees PF, 35 degrees INV, 15 degrees EV MET  8. ? Strength:2nd-5th digits against gravity NOT MET, improved Eversion, Inversion, DF to 4-/5 MET  9. ? Function: Limited tolerance of ambulation without (A) device , improved tolerance of stairs without AFO NOT MET  10. Independent with Home Exercise Programs          MET  Patient goals: strengthening of foot tendons/muscles PROGRESSING    Pt. Education:  [x] Yes  [] No  [] Reviewed Prior HEP/Ed  Method of Education: [x] Verbal  [x] Demo  [] Written  Comprehension of Education:  [] Verbalizes understanding. [] Demonstrates understanding. [] Needs review.   [x] Demonstrates/verbalizes HEP/Ed previously given. Plan: [] Continue per plan of care. [x] Other: Discharge to [facility] after next scheduled visit- one additional visit for full review of [facility] at this time, 25 total visits.       Treatment Charges: Mins Units   []  Modalities     []  Ther Exercise 30 2   [x]  Manual Therapy 10 1   []  Ther Activities     []  Aquatics     [x]  Neuromuscular     [] Vasocompression     [] Gait Training     [] Dry needling        [] 1 or 2 muscles        [] 3 or more muscles     []  Other     Total Treatment time 40 3     Time In:1030    Time Out:  1410    Electronically signed by:  Alvin Mcgrath PT       Physician Signature:________________________________Date:__________________  By signing above or cosigning this note, I have reviewed this plan of care and certify a need for medically necessary rehabilitation services.      *PLEASE SIGN ABOVE AND FAX BACK ALL PAGES*

## 2022-04-04 ENCOUNTER — HOSPITAL ENCOUNTER (OUTPATIENT)
Dept: PHYSICAL THERAPY | Age: 52
Setting detail: THERAPIES SERIES
Discharge: HOME | End: 2022-04-04
Payer: MEDICARE

## 2022-04-08 NOTE — FLOWSHEET NOTE
[address] [facility]   [id number] [facility] [address]   Phone: [phone]   Fax: [phone]    Physical Therapy Daily Treatment Note/PROGRESS NOTE      Date:  2022  Patient Name:  Deann Martinez    :  1970  MRN: [id number]  Physician: Peggy De Guzman                                  Insurance: [employer] [place] [employer] 19 visits remain for year from 21 appointment  Medical Diagnosis: foot drop M21.371, Achilles tendonitis M76.62             Rehab Codes: (R) foot weakness R53.1 ADD neck pain with new script  Onset date: 10/18/21 referral                 Next Dr's appt.: unknown  Visit Count:                            Cancel/No Show:     Subjective: Pt arrives with new script for his neck. Demonstrates generally functional neck ROM with end range restrictions with (R) rotation and (R) SB limitations. Patient notes increased symptoms with overhead motion and with arm \"hanging in dependent position\" intermittently with neck pain, trap pain, and then symptoms into the 3rd and 4th digit in the median nerve distribution.    Pain:  [x] Yes  [] No Location: R ankle/achilles, neck Pain Rating: (0-10 scale) 5/10 ankle; consistently 7/10 in neck  Pain altered Tx:  [x] No  [] Yes  Action:  Comments:    Objective:  Modalities:   Precautions:  Exercises: bolded completed 22  Exercise Reps/ Time Weight/ Level Comments   Stationary Bike 5'   Brace       All exercises without brace -  22   Reviewed Achilles stretching stair, wall 3 x 30''   HEP   Reviewed supine Achilles stretching 3 x 20\"   HEP   Seated gastroc then soleus stretch  3x30\" towel Added soleus 22   Big toe ext stretch-passive 3x15\" manual    Toe yoga-  15x  AA HEP; AAROM 2nd-5th   Towel scrunch 10x  Length of towel   Toe Splay 10x  Min movement; notes recent sharp pain in this area    Arch lift 10x  HEP; increased toe flexion noted 22   Inv/ev 10x On towel     Heel/toe raise  10x ea     Heel raise- 2 way 10x ea  Toes in, toes out; added 1/20/22- small range while in eversion w/small range   BAPS board  10x ea L2 Added 1/5/22; PF/DF inv/ev, cw, ccw   LAQ  10x 2# Difficult to get leg fully straight    Ankle circles w/ knee extended 10x ea  Cw/ccw; only able to slightly extend knee   Ankle isometrics  10x3\" ea  Added 2/9         Supine       SLR 10x 2# Small range    Side lying hip abd  10x 2#    SAQ 10x3\" Bolster ; 2#    Dips 8'' box 3 x           Standing    Without brace 2/2/22; RESUME next    Slant board stretch 2x30\"  Added 2/2/22    HR/TR x10    Light UE support, Cues for even wt distribution   Standing on foam NBOS with eyes closed 2x30''   No UE assist   NBOS eyes open x30\"     NBOS with ball movements  10x ea 2# yellow ball Up/down, side to side   Alt marches 10x2\" Light UE A as needed  Added 1/5/22   Fitter board 10x ea  A/P, M/L   Fitter board for balance  x30\" ea  A/P, M/L   Tandem stance  x30\" ea  More challenging with R foot posterior   Foot intrinsics in standing  Trial in upcoming sessions     Step ups  10x 6\"  lead with R; B UE support   Taps to step R LE 10x2 9.5\" Top build   Lunge with weight shift  8x  R LE in front          Prone       Supine dynamic stabilization PNF DF+Eversion/PF+inversion x20  Light resistance into each direction  Weaker into PF + inv   Hs curls  10x           CERVICAL      Manual traction 5'     Seated + supine cervical retraction 10x3\" ea     Retro shoulder rolls  10x     scap squeezes 10x5\"     Other:  Manual x15'  DTM to peroneals, gastroc/soleus junction   MFR to proximal gastroc and distal hamstring (more medial)  TC joint mobilizations  Ankle/toes PROM  Desensitization w/wash cloth to dorsal surface of forefoot & toes 3-5 x2'     Cervical MOBILITY:  Rotation (R) 65 degrees (R) trap discomfort, (L) 75 degrees  Sidebending (L) 30 degrees, (R) 30 degrees; end range pain with (L) SB in the (R) trap  Flexion to chest  Extension 55 degrees     (R) 75#, (L) 85#  Key  (R) 24#, (L) 20#  Pinch (R) 19#, (L)18#    Repeated protrusion produce, no worse  Retraction no effect  Flexion no effect  Given retraction for home. Specific Instructions for next treatment: progress ROM, strength, and stability as tolerated        Assessment: [x] Progressing toward goals. Presents with limitations with (R) sided median nerve and numbness from cervical radicular issue. Presents as a possible C6-C7 disc issue and given retraction for home as well as general mobility exercises. May benefit from therapy to reduce/abolish symptoms as well as work on  strength limitations, mobility limitations, and improve overhead use of the arm. [] No change. [x] Other:  Pt recently received a new referral to be seen for cervical radiculopathy as well. Primary PT did an evaluation on 2/14/22    [x] Patient would continue to benefit from skilled physical therapy services in order to: work on ankle and foot mobility, strength, function    STG: (to be met in 6 treatments)  1. ? Pain: Pain levels improved to 2-3/10 (Not Met)  2. ? ROM: Improved ankle PROM to neutral DF, 60 degrees PF, 30 degrees INV, 13 degrees EV - (Met)  3. ? Strength: Able to actively contract PF, INV, EV, 2nd-5th digits against gravity, improved Eversion, Inversion, DF to 4-/5 - (Not Met)  4. ? Function: Limited tolerance of ambulation without (A) device, improved tolerance of stairs without AFO - (Not Met)  5. Independent with Home Exercise Programs - (Met)  LTG: (to be met in 12 treatments)  6. ? Pain: Pain levels improved to 1-2/10 - (Not Met)  7. ? ROM: Improved ankle AROM to neutral DF, 60 degrees PF, 30 degrees INV, 13 degrees EV - (Not Met)  8. ? Strength: Able to actively contract PF, INV, EV, 2nd-5th digits against gravity, improved Eversion, Inversion, DF to 4-/5 (Not Met)  9. ? Function: Limited tolerance of ambulation without (A) device, improved tolerance of stairs without AFO - (Met)  10.  Independent with Home Exercise Programs - (Met)    Patient goals: strengthening of foot tendons/muscles    Pt. Education:  [x] Yes  [] No  [x] Reviewed Prior HEP/Ed  Method of Education: [x] Verbal  [x] Demo  [] Written  Comprehension of Education:  [x] Verbalizes understanding. [x] Demonstrates understanding. [] Needs review. [x] Demonstrates/verbalizes HEP/Ed previously given. Plan: [x] Continue per plan of care. [] Other: added treatment for cervical issues.       Treatment Charges: Mins Units   []  Modalities     []  Ther Exercise 30 2   [x]  Manual Therapy     []  Ther Activities     []  Aquatics     [x]  Neuromuscular 10 1   [] Vasocompression     [] Gait Training     [] Dry needling        [] 1 or 2 muscles        [] 3 or more muscles     []  Other     Total Treatment time 40 3     Time In: 115PM   Time Out: 2PM    Electronically signed by:  Justin Garcia PT

## 2022-04-09 DIAGNOSIS — E78.1 HYPERTRIGLYCERIDEMIA: ICD-10-CM

## 2022-04-09 DIAGNOSIS — R60.0 BILATERAL LEG EDEMA: ICD-10-CM

## 2022-04-09 DIAGNOSIS — I10 ESSENTIAL HYPERTENSION: ICD-10-CM

## 2022-04-09 DIAGNOSIS — E78.5 HYPERLIPIDEMIA WITH TARGET LDL LESS THAN 100: ICD-10-CM

## 2022-04-11 RX ORDER — HYDROCHLOROTHIAZIDE 25 MG/1
TABLET ORAL
Qty: 90 TABLET | Refills: 3 | Status: SHIPPED | OUTPATIENT
Start: 2022-04-11 | End: 2022-11-03 | Stop reason: ALTCHOICE

## 2022-04-11 RX ORDER — PRAVASTATIN SODIUM 80 MG/1
TABLET ORAL
Qty: 90 TABLET | Refills: 3 | Status: SHIPPED | OUTPATIENT
Start: 2022-04-11

## 2022-04-11 NOTE — TELEPHONE ENCOUNTER
.  Please Approve or Refuse.   Send to Pharmacy per Pt's Request:      Next Visit Date:  5/19/2022   Last Visit Date: 1/19/2022    Hemoglobin A1C (%)   Date Value   01/19/2022 5.8   03/12/2021 5.3   09/01/2020 5.8             ( goal A1C is < 7)   BP Readings from Last 3 Encounters:   01/19/22 118/68   08/11/21 126/84   07/12/21 106/70          (goal 120/80)  BUN   Date Value Ref Range Status   02/07/2022 14 6 - 20 mg/dL Final     CREATININE   Date Value Ref Range Status   02/07/2022 0.97 0.70 - 1.20 mg/dL Final     Potassium   Date Value Ref Range Status   02/07/2022 4.3 3.7 - 5.3 mmol/L Final

## 2022-04-18 ENCOUNTER — E-VISIT (OUTPATIENT)
Dept: FAMILY MEDICINE CLINIC | Age: 52
End: 2022-04-18
Payer: MEDICARE

## 2022-04-18 DIAGNOSIS — K21.9 GASTROESOPHAGEAL REFLUX DISEASE WITHOUT ESOPHAGITIS: ICD-10-CM

## 2022-04-18 DIAGNOSIS — B34.9 ACUTE VIRAL SYNDROME: Primary | ICD-10-CM

## 2022-04-18 PROCEDURE — 99422 OL DIG E/M SVC 11-20 MIN: CPT | Performed by: FAMILY MEDICINE

## 2022-04-18 RX ORDER — OMEPRAZOLE 20 MG/1
20 CAPSULE, DELAYED RELEASE ORAL
Qty: 90 CAPSULE | Refills: 0 | Status: SHIPPED | OUTPATIENT
Start: 2022-04-18 | End: 2022-07-05

## 2022-04-18 RX ORDER — COVID-19 MOLECULAR TEST ASSAY
KIT MISCELLANEOUS
Qty: 1 KIT | Refills: 3 | Status: SHIPPED | OUTPATIENT
Start: 2022-04-18 | End: 2022-05-24 | Stop reason: SDUPTHER

## 2022-04-18 RX ORDER — BENZONATATE 100 MG/1
100 CAPSULE ORAL 3 TIMES DAILY PRN
Qty: 21 CAPSULE | Refills: 0 | Status: SHIPPED | OUTPATIENT
Start: 2022-04-18 | End: 2022-04-25

## 2022-04-18 ASSESSMENT — LIFESTYLE VARIABLES
PACKS_PER_DAY: 1
SMOKING_STATUS: NO, I'M A FORMER SMOKER
SMOKING_YEARS: 15

## 2022-04-18 NOTE — PROGRESS NOTES
HPI: per patient's questionnaire    EXAM: not applicable    Diagnoses and all orders for this visit:    1. Acute viral syndrome  Worsening  - nystatin (MYCOSTATIN) 852014 UNIT/ML suspension; Take 5 mLs by mouth 4 times daily Swish and swallow  Dispense: 240 mL; Refill: 0  - benzonatate (TESSALON PERLES) 100 MG capsule; Take 1 capsule by mouth 3 times daily as needed for Cough  Dispense: 21 capsule; Refill: 0  - COVID-19 Test (ID NOW COVID-19) KIT; Patient needs to self test at home  Dispense: 1 kit; Refill: 3  If yellow mucus, will add an antibiotic at that time    2. Gastroesophageal reflux disease without esophagitis  Worsening  - omeprazole (PRILOSEC) 20 MG delayed release capsule; Take 1 capsule by mouth every morning (before breakfast)  Dispense: 90 capsule; Refill: 0      No orders of the defined types were placed in this encounter. Patient was advised to contact PCP if symptoms worsen or failing to change as expected        11-20 minutes were spent on the digital evaluation and management of this patient.   Electronically signed by Eliz Oneill MD on 4/18/22 at  2:04 PM

## 2022-04-20 ENCOUNTER — HOSPITAL ENCOUNTER (OUTPATIENT)
Dept: PHYSICAL THERAPY | Age: 52
Setting detail: THERAPIES SERIES
Discharge: HOME | End: 2022-04-20
Payer: MEDICARE

## 2022-04-20 PROCEDURE — 97110 THERAPEUTIC EXERCISES: CPT

## 2022-04-20 PROCEDURE — 97140 MANUAL THERAPY 1/> REGIONS: CPT

## 2022-04-20 NOTE — FLOWSHEET NOTE
[address] [facility]   [address]   Phone: [phone]   Fax: [phone]    Physical Therapy Daily Treatment Note      Date:  3/28/2022  Patient Name:  Rony Mott    :  1970  MRN: [id number]  Physician: Romana Peal MD                                Insurance: [employer] [employer] 19 visits remain for year from 21 appointment  Medical Diagnosis: foot drop M21.371, Achilles tendonitis M76.62             Rehab Codes: (R) foot weakness R53.1  Onset date: 10/18/21 referral                 Next Dr's appt. : 1/10/22  Visit Count:                           Cancel/No Show:     Subjective:        Pain:  [x] Yes  [] No Location: R ankle/achilles Pain Rating: (0-10 scale) 3-4/10 ankle; 4/10 R UT  Pain altered Tx:  [x] No  [] Yes  Action:  Comments: Pt arrives noting pain levels are the same in R ankle. Continues to have cramping and tightness in arch and calf. Reports completing exercises throughout each day. States he should be getting scheduled for neck injections soon. Objective: Ankle/foot ROM:  DF (R) 5 degrees, (L) 15 degrees  PF (R) 60 degrees, (L) 70 degrees  INV (R) 35 degrees, (L) 40 degrees  EV (R) 15 degrees, (L) 20 degrees     Constant numbness in 4th and 5th digits     Strength:  4/5 DF,INV  (R) Eversion 4-/5  PF 4/5   GTE 4/5     Able to do (B) heel raise with decreased WB affected ankle  Unable to do SL heel raise on affected leg.       Modalities:   Precautions:  Exercises: bolded completed 22    Exercise Reps/ Time Weight/ Level Comments   Stationary Bike 5'           Reviewed Achilles stretching stair, wall 3 x 30''   HEP   Reviewed supine Achilles stretching 3 x 20\"   HEP   Seated gastroc stretch  3x30\" towel    Big toe ext stretch-passive 3x15\"     Toe yoga-  15x  AA HEP; AAROM 2nd-5th   Towel scrunch 10x  Length of towel   Toe Splay 10x  Min mvmnt; notes recent sharp pain in this area    Arch lift 10x  HEP   Inv/ev 10x On towel     Heel/toe raise  10x           Standing HR/TR 2x10    Light UE support, Cues for even wt distribution   Standing on foam NBOS with eyes closed x30''    2x EC No UE assist   Tandem stance  x30\" ea  More challenging with R foot posterior    10x     Step ups  10x 4\" Without AFO;  1 UE A   Taps to step R LE 10x 4\"          Supine dynamic stabilization PNF DF+Eversion/PF+inversion 2x15  Light resistance into each direction  Weaker into PF + inv           OTHER- Manual therapy- posterior talar mobilization, PROM. Specific Instructions for next treatment: progress as tolerated        Assessment:                  [x] Progressing toward goals. Initiated session on bike followed by intrinsic strengthening. Application of manual and PROM to improve ankle mobility. Pt with ms fatigue following resisted PNF techniques. Progressed reps for standing heel/toe raises to increase endurance and further challenge musculature. Completed and reviewed HEP with pt ensuring understanding to all. Standing exercises completed without brace. Pt discharged at this time to complete HEP independently. REVIEWED HEP AT THIS TIME- DISCHARGE PER MOST RECENT PROGRESS NOTE. GOOD IMPROVEMENT THUS FAR.- Dilan Amin    [] No change. [] Other:     [x] Patient would continue to benefit from skilled physical therapy services in order to: work on ankle and foot mobility, strength, function    STG: (to be met in 6 treatments)  ? Pain: Pain levels improved to 2-3/10 MET  ? ROM: Improved ankle ROM to neutral DF, 60 degrees PF, 30 degrees INV, 13 degrees EV MET  ? Strength: Able to actively contract PF, INV, EV MET, 2nd-5th digits against gravity NOT MET, improved Eversion, Inversion, DF to 4-/5 MET  ? Function: Limited tolerance of ambulation without (A) device, improved tolerance of stairs without AFO NOT MET  Independent with Home Exercise Programs MET  LTG: (to be met in 12 treatments)  ?  Pain: Pain levels improved to 0-1/10 MET  ? ROM: Improved ankle ROM to 5 degrees DF, 65 degrees PF, 35 degrees INV, 15 degrees EV MET  ? Strength:2nd-5th digits against gravity NOT MET, improved Eversion, Inversion, DF to 4-/5 MET  ? Function: Limited tolerance of ambulation without (A) device , improved tolerance of stairs without AFO NOT MET  Independent with Home Exercise Programs          MET  Patient goals: strengthening of foot tendons/muscles PROGRESSING    Pt. Education:  [x] Yes  [] No  [] Reviewed Prior HEP/Ed  Method of Education: [x] Verbal  [x] Demo  [] Written  Comprehension of Education:  [] Verbalizes understanding. [] Demonstrates understanding. [] Needs review. [x] Demonstrates/verbalizes HEP/Ed previously given. Plan: [] Continue per plan of care. [x] Other: Discharge to [facility] after next scheduled visit- one additional visit for full review of HEP at this time, 25 total visits.       Treatment Charges: Mins Units   []  Modalities     [x]  Ther Exercise 33 2   [x]  Manual Therapy 10 1   []  Ther Activities     []  Aquatics     [x]  Neuromuscular     [] Vasocompression     [] Gait Training     [] Dry needling        [] 1 or 2 muscles        [] 3 or more muscles     []  Other     Total Treatment time 43 3     Time In:1032     Time Out:  5521    Electronically signed by:  Melvi Welsh PTA

## 2022-04-24 DIAGNOSIS — K59.01 SLOW TRANSIT CONSTIPATION: ICD-10-CM

## 2022-04-25 ENCOUNTER — APPOINTMENT (OUTPATIENT)
Dept: PHYSICAL THERAPY | Age: 52
End: 2022-04-25
Payer: MEDICARE

## 2022-04-25 RX ORDER — DOCUSATE SODIUM 100 MG/1
100 CAPSULE, LIQUID FILLED ORAL 2 TIMES DAILY
Qty: 180 CAPSULE | Refills: 3 | Status: SHIPPED | OUTPATIENT
Start: 2022-04-25 | End: 2022-10-04 | Stop reason: SDUPTHER

## 2022-04-27 ENCOUNTER — APPOINTMENT (OUTPATIENT)
Dept: PHYSICAL THERAPY | Age: 52
End: 2022-04-27
Payer: MEDICARE

## 2022-04-30 DIAGNOSIS — G89.29 CHRONIC PAIN OF MULTIPLE JOINTS: ICD-10-CM

## 2022-04-30 DIAGNOSIS — M25.50 CHRONIC PAIN OF MULTIPLE JOINTS: ICD-10-CM

## 2022-05-02 RX ORDER — CYCLOBENZAPRINE HCL 10 MG
TABLET ORAL
Qty: 90 TABLET | Refills: 1 | Status: SHIPPED | OUTPATIENT
Start: 2022-05-02 | End: 2022-06-27

## 2022-05-03 DIAGNOSIS — N52.9 ERECTILE DYSFUNCTION, UNSPECIFIED ERECTILE DYSFUNCTION TYPE: ICD-10-CM

## 2022-05-04 ENCOUNTER — HOSPITAL ENCOUNTER (EMERGENCY)
Age: 52
Discharge: HOME | End: 2022-05-04
Attending: EMERGENCY MEDICINE
Payer: MEDICARE

## 2022-05-04 ENCOUNTER — APPOINTMENT (OUTPATIENT)
Dept: GENERAL RADIOLOGY | Age: 52
End: 2022-05-04
Payer: MEDICARE

## 2022-05-04 VITALS
WEIGHT: 195 LBS | RESPIRATION RATE: 16 BRPM | BODY MASS INDEX: 27.2 KG/M2 | SYSTOLIC BLOOD PRESSURE: 140 MMHG | HEART RATE: 85 BPM | DIASTOLIC BLOOD PRESSURE: 96 MMHG | OXYGEN SATURATION: 100 % | TEMPERATURE: 98.8 F

## 2022-05-04 DIAGNOSIS — S69.92XA FINGER INJURY, LEFT, INITIAL ENCOUNTER: Primary | ICD-10-CM

## 2022-05-04 PROCEDURE — 73140 X-RAY EXAM OF FINGER(S): CPT

## 2022-05-04 PROCEDURE — 90715 TDAP VACCINE 7 YRS/> IM: CPT | Performed by: STUDENT IN AN ORGANIZED HEALTH CARE EDUCATION/TRAINING PROGRAM

## 2022-05-04 PROCEDURE — 99284 EMERGENCY DEPT VISIT MOD MDM: CPT

## 2022-05-04 PROCEDURE — 90471 IMMUNIZATION ADMIN: CPT | Performed by: STUDENT IN AN ORGANIZED HEALTH CARE EDUCATION/TRAINING PROGRAM

## 2022-05-04 PROCEDURE — 6360000002 HC RX W HCPCS: Performed by: STUDENT IN AN ORGANIZED HEALTH CARE EDUCATION/TRAINING PROGRAM

## 2022-05-04 RX ORDER — SILDENAFIL 100 MG/1
100 TABLET, FILM COATED ORAL PRN
Qty: 30 TABLET | Refills: 0 | Status: SHIPPED | OUTPATIENT
Start: 2022-05-04 | End: 2022-06-16 | Stop reason: SDUPTHER

## 2022-05-04 RX ADMIN — TETANUS TOXOID, REDUCED DIPHTHERIA TOXOID AND ACELLULAR PERTUSSIS VACCINE, ADSORBED 0.5 ML: 5; 2.5; 8; 8; 2.5 SUSPENSION INTRAMUSCULAR at 20:53

## 2022-05-04 ASSESSMENT — ENCOUNTER SYMPTOMS
SHORTNESS OF BREATH: 0
DIARRHEA: 0
NAUSEA: 0
COLOR CHANGE: 0
RHINORRHEA: 0
COUGH: 0
VOMITING: 0
EYE PAIN: 0
ABDOMINAL PAIN: 0
SORE THROAT: 0
EYE REDNESS: 0
FACIAL SWELLING: 0
BACK PAIN: 0

## 2022-05-04 ASSESSMENT — PAIN DESCRIPTION - LOCATION: LOCATION: FINGER (COMMENT WHICH ONE)

## 2022-05-04 ASSESSMENT — PAIN - FUNCTIONAL ASSESSMENT: PAIN_FUNCTIONAL_ASSESSMENT: 0-10

## 2022-05-04 ASSESSMENT — PAIN SCALES - GENERAL: PAINLEVEL_OUTOF10: 7

## 2022-05-04 ASSESSMENT — PAIN DESCRIPTION - ORIENTATION: ORIENTATION: LEFT

## 2022-05-05 NOTE — ED NOTES
upon d/c pt is alert, oriented, ambulatory, and free of distress. Writer RN educated pt on follow-up care with PCP. Opportunities for questions offered, no further needs at this time. Finger splinted + wrapped.        Varsha Minaya RN  05/04/22 [id number]
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
## 2022-05-05 NOTE — ED TRIAGE NOTES
Mode of arrival (squad #, walk in, police, etc) : walk in        Chief complaint(s): finger innjury        Arrival Note (brief scenario, treatment PTA, etc). : pt states he was helping a friend re-side a shed and he accidentally hit his 2nd digit on his left hand. Pt states it was a 28 oz hammer. Pt states he put on a bandaid to help control the bleeding. Pt states the pain is radiating up his hand. Pt believes he go this last tetanus shot 5 years ago. C= \"Have you ever felt that you should Cut down on your drinking? \"  No  A= \"Have people Annoyed you by criticizing your drinking? \"  No  G= \"Have you ever felt bad or Guilty about your drinking? \"  No  E= \"Have you ever had a drink as an Eye-opener first thing in the morning to steady your nerves or to help a hangover? \"  No      Deferred []      Reason for deferring: N/A      *If yes to two or more: probable alcohol abuse. * DISPLAY PLAN FREE TEXT

## 2022-05-08 DIAGNOSIS — I10 ESSENTIAL HYPERTENSION: ICD-10-CM

## 2022-05-13 ENCOUNTER — TELEPHONE (OUTPATIENT)
Dept: FAMILY MEDICINE CLINIC | Age: 52
End: 2022-05-13

## 2022-05-13 DIAGNOSIS — J30.89 SEASONAL ALLERGIC RHINITIS DUE TO OTHER ALLERGIC TRIGGER: ICD-10-CM

## 2022-05-13 RX ORDER — FEXOFENADINE HCL 180 MG/1
180 TABLET ORAL DAILY PRN
Qty: 90 TABLET | Refills: 3 | Status: SHIPPED | OUTPATIENT
Start: 2022-05-13 | End: 2022-07-09 | Stop reason: SDUPTHER

## 2022-05-13 NOTE — TELEPHONE ENCOUNTER
.  Please Approve or Refuse.   Send to Pharmacy per Pt's Request:      Next Visit Date:  5/19/2022   Last Visit Date: 4/18/2022    Hemoglobin A1C (%)   Date Value   01/19/2022 5.8   03/12/2021 5.3   09/01/2020 5.8             ( goal A1C is < 7)   BP Readings from Last 3 Encounters:   05/04/22 (!) 140/96   01/19/22 118/68   08/11/21 126/84          (goal 120/80)  BUN   Date Value Ref Range Status   02/07/2022 14 6 - 20 mg/dL Final     CREATININE   Date Value Ref Range Status   02/07/2022 0.97 0.70 - 1.20 mg/dL Final     Potassium   Date Value Ref Range Status   02/07/2022 4.3 3.7 - 5.3 mmol/L Final

## 2022-05-24 ENCOUNTER — E-VISIT (OUTPATIENT)
Dept: FAMILY MEDICINE CLINIC | Age: 52
End: 2022-05-24
Payer: MEDICARE

## 2022-05-24 DIAGNOSIS — J20.9 ACUTE BRONCHITIS DUE TO INFECTION: Primary | ICD-10-CM

## 2022-05-24 PROCEDURE — 99422 OL DIG E/M SVC 11-20 MIN: CPT | Performed by: FAMILY MEDICINE

## 2022-05-24 RX ORDER — COVID-19 MOLECULAR TEST ASSAY
KIT MISCELLANEOUS
Qty: 1 KIT | Refills: 3 | Status: SHIPPED | OUTPATIENT
Start: 2022-05-24 | End: 2022-06-01 | Stop reason: ALTCHOICE

## 2022-05-24 RX ORDER — BENZONATATE 100 MG/1
100 CAPSULE ORAL 3 TIMES DAILY PRN
Qty: 21 CAPSULE | Refills: 0 | Status: SHIPPED | OUTPATIENT
Start: 2022-05-24 | End: 2022-05-31

## 2022-05-24 RX ORDER — AZITHROMYCIN 250 MG/1
TABLET, FILM COATED ORAL
Qty: 6 TABLET | Refills: 0 | Status: SHIPPED | OUTPATIENT
Start: 2022-05-24 | End: 2022-05-29

## 2022-05-24 ASSESSMENT — LIFESTYLE VARIABLES
SMOKING_YEARS: 9
PACKS_PER_DAY: 1
SMOKING_STATUS: NO, I'M A FORMER SMOKER

## 2022-05-24 NOTE — PROGRESS NOTES
HPI: per patient's questionnaire    EXAM: not applicable    Diagnoses and all orders for this visit:    1. Acute bronchitis due to infection  Failing to resolve  - benzonatate (TESSALON PERLES) 100 MG capsule; Take 1 capsule by mouth 3 times daily as needed for Cough  Dispense: 21 capsule; Refill: 0  - COVID-19 Test (ID NOW COVID-19) KIT; Patient needs to self test at home  Dispense: 1 kit; Refill: 3  - azithromycin (ZITHROMAX) 250 MG tablet; 500 mg orally on day one followed by 250 mg daily on days two through five  Dispense: 6 tablet; Refill: 0      No orders of the defined types were placed in this encounter. Patient was advised to contact PCP if symptoms worsen or failing to change as expected        11-20 minutes were spent on the digital evaluation and management of this patient.   Electronically signed by Eve Barton MD on 5/24/22 at  4:05 PM

## 2022-05-28 DIAGNOSIS — F31.75 BIPOLAR DISORDER, IN PARTIAL REMISSION, MOST RECENT EPISODE DEPRESSED (HCC): ICD-10-CM

## 2022-05-28 DIAGNOSIS — R60.0 BILATERAL LEG EDEMA: ICD-10-CM

## 2022-05-28 DIAGNOSIS — I10 ESSENTIAL HYPERTENSION: ICD-10-CM

## 2022-05-28 DIAGNOSIS — F41.9 ANXIETY: ICD-10-CM

## 2022-05-31 RX ORDER — BUSPIRONE HYDROCHLORIDE 10 MG/1
TABLET ORAL
Qty: 90 TABLET | Refills: 3 | Status: SHIPPED | OUTPATIENT
Start: 2022-05-31 | End: 2022-09-26

## 2022-05-31 RX ORDER — POTASSIUM CHLORIDE 750 MG/1
TABLET, EXTENDED RELEASE ORAL
Qty: 180 TABLET | Refills: 3 | Status: SHIPPED | OUTPATIENT
Start: 2022-05-31 | End: 2022-11-03 | Stop reason: SDUPTHER

## 2022-06-01 ENCOUNTER — OFFICE VISIT (OUTPATIENT)
Dept: FAMILY MEDICINE CLINIC | Age: 52
End: 2022-06-01
Payer: COMMERCIAL

## 2022-06-01 VITALS
OXYGEN SATURATION: 99 % | WEIGHT: 186.4 LBS | HEIGHT: 71 IN | BODY MASS INDEX: 26.1 KG/M2 | DIASTOLIC BLOOD PRESSURE: 72 MMHG | HEART RATE: 74 BPM | SYSTOLIC BLOOD PRESSURE: 104 MMHG | TEMPERATURE: 97.7 F

## 2022-06-01 DIAGNOSIS — J20.9 ACUTE BRONCHITIS DUE TO INFECTION: ICD-10-CM

## 2022-06-01 DIAGNOSIS — R20.2 NUMBNESS AND TINGLING OF RIGHT LEG: ICD-10-CM

## 2022-06-01 DIAGNOSIS — Z12.5 PROSTATE CANCER SCREENING: ICD-10-CM

## 2022-06-01 DIAGNOSIS — R35.1 NOCTURIA: ICD-10-CM

## 2022-06-01 DIAGNOSIS — R20.0 NUMBNESS AND TINGLING OF RIGHT LEG: ICD-10-CM

## 2022-06-01 DIAGNOSIS — J01.80 ACUTE NON-RECURRENT SINUSITIS OF OTHER SINUS: Primary | ICD-10-CM

## 2022-06-01 DIAGNOSIS — R39.198 DIFFICULTY URINATING: ICD-10-CM

## 2022-06-01 DIAGNOSIS — E78.5 HYPERLIPIDEMIA WITH TARGET LDL LESS THAN 100: ICD-10-CM

## 2022-06-01 DIAGNOSIS — R73.9 HYPERGLYCEMIA: ICD-10-CM

## 2022-06-01 DIAGNOSIS — F31.75 BIPOLAR DISORDER, IN PARTIAL REMISSION, MOST RECENT EPISODE DEPRESSED (HCC): ICD-10-CM

## 2022-06-01 DIAGNOSIS — I10 ESSENTIAL HYPERTENSION: ICD-10-CM

## 2022-06-01 PROCEDURE — 1036F TOBACCO NON-USER: CPT | Performed by: FAMILY MEDICINE

## 2022-06-01 PROCEDURE — G8419 CALC BMI OUT NRM PARAM NOF/U: HCPCS | Performed by: FAMILY MEDICINE

## 2022-06-01 PROCEDURE — G8427 DOCREV CUR MEDS BY ELIG CLIN: HCPCS | Performed by: FAMILY MEDICINE

## 2022-06-01 PROCEDURE — 99214 OFFICE O/P EST MOD 30 MIN: CPT | Performed by: FAMILY MEDICINE

## 2022-06-01 RX ORDER — FLUTICASONE PROPIONATE 50 MCG
2 SPRAY, SUSPENSION (ML) NASAL DAILY
Qty: 16 G | Refills: 0 | Status: SHIPPED | OUTPATIENT
Start: 2022-06-01 | End: 2022-06-16 | Stop reason: SDUPTHER

## 2022-06-01 RX ORDER — BUPROPION HYDROBROMIDE 174 MG/1
TABLET, EXTENDED RELEASE ORAL
COMMUNITY
Start: 2022-05-30

## 2022-06-01 RX ORDER — OXCARBAZEPINE 300 MG/1
TABLET, FILM COATED ORAL
COMMUNITY
Start: 2022-05-30

## 2022-06-01 RX ORDER — AMOXICILLIN 500 MG
CAPSULE ORAL
COMMUNITY
End: 2022-07-11 | Stop reason: SDUPTHER

## 2022-06-01 RX ORDER — PROPRANOLOL HYDROCHLORIDE 10 MG/1
TABLET ORAL
COMMUNITY
Start: 2022-05-07 | End: 2022-06-01 | Stop reason: HOSPADM

## 2022-06-01 RX ORDER — ROPINIROLE 1 MG/1
TABLET, FILM COATED ORAL
COMMUNITY
Start: 2022-05-31 | End: 2022-06-24

## 2022-06-01 RX ORDER — LUMATEPERONE 42 MG/1
CAPSULE ORAL
COMMUNITY

## 2022-06-01 RX ORDER — CLONIDINE HYDROCHLORIDE 0.2 MG/1
TABLET ORAL
COMMUNITY
Start: 2022-05-31

## 2022-06-01 RX ORDER — LANOLIN ALCOHOL/MO/W.PET/CERES
CREAM (GRAM) TOPICAL
COMMUNITY
Start: 2022-05-27

## 2022-06-01 RX ORDER — AZITHROMYCIN 250 MG/1
TABLET, FILM COATED ORAL
Qty: 6 TABLET | Refills: 0 | Status: SHIPPED | OUTPATIENT
Start: 2022-06-01 | End: 2022-06-06

## 2022-06-01 RX ORDER — AMLODIPINE BESYLATE 10 MG/1
TABLET ORAL
COMMUNITY
Start: 2022-05-08

## 2022-06-01 RX ORDER — CRANBERRY FRUIT 307 MG
1 TABLET ORAL DAILY
Qty: 90 CAPSULE | Refills: 1 | Status: SHIPPED | OUTPATIENT
Start: 2022-06-01

## 2022-06-01 ASSESSMENT — ENCOUNTER SYMPTOMS
BACK PAIN: 1
ABDOMINAL DISTENTION: 0
RHINORRHEA: 1
SINUS PAIN: 1
COUGH: 1
SHORTNESS OF BREATH: 0
WHEEZING: 0
SINUS PRESSURE: 1
NAUSEA: 0
VOMITING: 0
DIARRHEA: 0
CONSTIPATION: 1
CHEST TIGHTNESS: 0
ABDOMINAL PAIN: 0

## 2022-06-01 ASSESSMENT — PATIENT HEALTH QUESTIONNAIRE - PHQ9
10. IF YOU CHECKED OFF ANY PROBLEMS, HOW DIFFICULT HAVE THESE PROBLEMS MADE IT FOR YOU TO DO YOUR WORK, TAKE CARE OF THINGS AT HOME, OR GET ALONG WITH OTHER PEOPLE: 1
5. POOR APPETITE OR OVEREATING: 0
9. THOUGHTS THAT YOU WOULD BE BETTER OFF DEAD, OR OF HURTING YOURSELF: 0
SUM OF ALL RESPONSES TO PHQ9 QUESTIONS 1 & 2: 0
SUM OF ALL RESPONSES TO PHQ QUESTIONS 1-9: 3
6. FEELING BAD ABOUT YOURSELF - OR THAT YOU ARE A FAILURE OR HAVE LET YOURSELF OR YOUR FAMILY DOWN: 0
4. FEELING TIRED OR HAVING LITTLE ENERGY: 0
2. FEELING DOWN, DEPRESSED OR HOPELESS: 0
8. MOVING OR SPEAKING SO SLOWLY THAT OTHER PEOPLE COULD HAVE NOTICED. OR THE OPPOSITE, BEING SO FIGETY OR RESTLESS THAT YOU HAVE BEEN MOVING AROUND A LOT MORE THAN USUAL: 0
SUM OF ALL RESPONSES TO PHQ QUESTIONS 1-9: 3
SUM OF ALL RESPONSES TO PHQ QUESTIONS 1-9: 3
1. LITTLE INTEREST OR PLEASURE IN DOING THINGS: 0
3. TROUBLE FALLING OR STAYING ASLEEP: 3
SUM OF ALL RESPONSES TO PHQ QUESTIONS 1-9: 3
7. TROUBLE CONCENTRATING ON THINGS, SUCH AS READING THE NEWSPAPER OR WATCHING TELEVISION: 0

## 2022-06-01 NOTE — PROGRESS NOTES
Al Latif (:  1970) is a 54 y.o. male,Established patient, here for evaluation of the following chief complaint(s): Hypertension, Discuss Labs, Cough (been coughing for the past 3 weeks, especially at night, negative covid tests), Sinusitis, and Manic Behavior      ASSESSMENT/PLAN:    1. Acute non-recurrent sinusitis of other sinus  Worsening  -     fluticasone (FLONASE) 50 MCG/ACT nasal spray; 2 sprays by Nasal route daily, Disp-16 g, R-0Normal  -     azithromycin (ZITHROMAX) 250 MG tablet; 500 mg orally on day one followed by 250 mg daily on days two through five, Disp-6 tablet, R-0Normal  2. Essential hypertension  Well controlled. Continue current treatment. Amlodipine 10 mg, metoprolol 75 mg twice a day hydrochlorothiazide 25 mg with potassium CL 20 M EQ,  Will recheck labs. Discussed low salt diet and BP and pulse monitoring. To start propranolol 10 mg, he will talk with a psychiatrist.  -     Comprehensive Metabolic Panel; Future  -     Urinalysis with Reflex to Culture; Future  -     Uric Acid; Future  -     Magnesium; Future  3. Hyperlipidemia with target LDL less than 100  Inadequately controlled  Recheck lipid panel  To continue with fish oil and pravastatin 80 mg daily    -     Lipid Panel; Future  4. Difficulty urinating  Worsening  -Advised to start     misc Natural Products (SM SAW PALMETTO COMPLEX) CAPS; DAILY Starting Wed 2022, Disp-90 capsule, R-1, NormalOK to substitute  -     PSA Screening; Future  -     Pramod Patel MD, Urology, [place]  I declined prostate exam due to referral and he will be better served by urology  Will do the testing, and start supplementation and referral placed  5. Numbness and tingling of right leg  Failing to resolve but slowly improving, likely neuropathy  -     Vitamin B12 & Folate; Future  6.  Nocturia  Most likely he is developing BPH, however he is young for it, will refer to urology  -     [facility] Natural Products (SM SAW PALMETTO COMPLEX) CAPS; DAILY Starting Wed 6/1/2022, Disp-90 capsule, R-1, NormalOK to substitute  -     PSA Screening; Future  -     Landry Koyanagi, MD, Urology, [place]  7. Acute bronchitis due to infection  Worsening  -     azithromycin (ZITHROMAX) 250 MG tablet; 500 mg orally on day one followed by 250 mg daily on days two through five, Disp-6 tablet, R-0Normal  8. Hyperglycemia  Mild prediabetes, previously worsening  Lab Results   Component Value Date    LABA1C 5.8 01/19/2022    LABA1C 5.3 03/12/2021       Low-carb diet advised  -     Hemoglobin A1C; Future  9. Bipolar disorder, in partial remission, most recent episode depressed ([facility] [address]  Stable  Continue current treatment and follow up with psychiatrist and psychologist as scheduled. To stop taking propranolol due to taking metoprolol  10. Prostate cancer screening  -     PSA Screening; Future  The natural history of prostate cancer and ongoing controversy regarding screening and potential treatment outcomes of prostate cancer has been discussed with the patient. The meaning of a false positive PSA and a false negative PSA has been discussed. He indicates understanding of the limitations of this screening test and wishes to proceed with screening PSA testing. Isai Villalba received counseling on the following healthy behaviors: nutrition, exercise and medication adherence  Reviewed prior labs and health maintenance  Discussed use, benefit, and side effects of prescribed medications. Barriers to medication compliance addressed. Patient given educational materials - see patient instructions  Was a self-tracking handout given in paper form or via [employer]? Yes  All patient questions answered. Patient voiced understanding. The patient's past medical,surgical, social, and family history as well as his current medications and allergies were reviewed as documented in today's encounter.     Medications, labs, diagnostic studies, consultations and follow-up as documented in this encounter. Return in about 4 months (around 10/1/2022). Data Unavailable    Future Appointments   Date Time Provider Maribell Duran   10/4/2022 11:15 AM Lynann Apgar, MD [email or web address]         SUBJECTIVE/OBJECTIVE:      Marge Sires complains of cough and sinus congestion. Onset of symptoms was 3 weeks ago, came and and went away, a few times, now is back again, progressively worsening, reports clear and yellow mucus in moderate amount. Allergies flaring up  Has been having sinus pressure and pain bilateral next to the nose. He reports deep heavy cough and shortness of breath when coughing so hard or when in the yard . He reports the cough is worse at nighttime   COVID test was negative at home per his report. The cough is nocturnal and productive of yellow sputum and is aggravated by infection, pollens and reclining position. He denies fever, chills, night sweats. He denies wheezing. He otherwise denies shortness of breath when walking. Patient does not have a history of asthma. Patient does have a history of environmental allergens. Patient has not traveled recently. Patient does not have a history of smoking. Patient has not had a previous chest x-ray. Patient has not had a PPD done. Hypertension: Patient here for follow-up. He is exercising and is adherent to low salt diet. Blood pressure is well controlled at home. Cardiac symptoms fatigue. Patient denies chest pain, chest pressure/discomfort, claudication, dyspnea, exertional chest pressure/discomfort, irregular heart beat, lower extremity edema, near-syncope, orthopnea, palpitations, paroxysmal nocturnal dyspnea, syncope and tachypnea. Cardiovascular risk factors: dyslipidemia, hypertension and male gender. Use of agents associated with hypertension: none. History of target organ damage: none.     Was started on propranolol 10 mg by psychiatrist, advised to stop it as he is on metoprolol already. BP controlled. Olimpia Robledo reports compliance with BP medications, and tolerates them well, denies side effects. BP Readings from Last 3 Encounters:   06/01/22 104/72   05/04/22 (!) 140/96   01/19/22 118/68          Pulse is Normal.    Pulse Readings from Last 3 Encounters:   06/01/22 74   05/04/22 85   01/19/22 73     Mild hyperglycemia and mild prediabetes before  Weight has been  Improved 8 lbs in 5 months . Has been more active and has been eating healthier  Wt Readings from Last 3 Encounters:   06/01/22 186 lb 6.4 oz (84.6 kg)   05/04/22 195 lb (88.5 kg)   01/19/22 194 lb (88 kg)     Lab Results   Component Value Date    LABA1C 5.8 01/19/2022    LABA1C 5.3 03/12/2021         Patient complains of nighttime urination 2-3 times, despite not drinking very late in the evening. He reports having difficulty initiating urinating  He wants to have a prostate exam.  He denies burning with urination or incomplete urination. He denies family history of prostate cancer. I suggested referral to urologist and he is agreeable. No results found for: PSA, PSADIA    Lab Results   Component Value Date    URICACID 6.5 07/30/2014           Hyperlipidemia:  No new myalgias or GI upset on pravastatin (Pravachol) and OTC Fish Oil. Medication compliance: compliant all of the time. Patient is  following a low fat, low cholesterol diet. LDL is HIGH and high triglycerides    Lab Results   Component Value Date    LDLCHOLESTEROL 123 02/07/2022     Lab Results   Component Value Date    TRIG 246 (H) 02/07/2022    TRIG 168 (H) 03/17/2021    TRIG 130 09/01/2020     Reports right leg doing better, still numb but is getting better. He is still wearing the right leg brace. Bipolar disorder  Has been stable, has been exercising in the pool. He reports compliance with his medications, follows with psychiatry    PHQ-2 Over the past 2 weeks, how often have you been bothered by any of the following problems?   Little interest or pleasure in doing things: Not at all  Feeling down, depressed, or hopeless: Not at all  PHQ-2 Score: 0  PHQ-9 Over the past 2 weeks, how often have you been bothered by any of the following problems? Trouble falling or staying asleep, or sleeping too much: Nearly every day (staying asleep)  Feeling tired or having little energy: Not at all  Poor appetite or overeating: Not at all  Feeling bad about yourself - or that you are a failure or have let yourself or your family down: Not at all  Trouble concentrating on things, such as reading the newspaper or watching television: Not at all  Moving or speaking so slowly that other people could have noticed. Or the opposite - being so fidgety or restless that you have been moving around a lot more than usual: Not at all  Thoughts that you would be better off dead, or of hurting yourself in some way: Not at all  If you checked off any problems, how difficult have these problems made it for you to do your work, take care of things at home, or get along with other people?: Somewhat difficult  PHQ-9 Total Score: 3  PHQ-9 Total Score: 3        PHQ Scores 6/1/2022 1/19/2022 8/11/2021 7/12/2021 3/12/2021 1/28/2021 1/26/2021   PHQ2 Score 0 1 2 0 0 0 3   PHQ9 Score 3 1 2 0 0 0 9       Prior to Visit Medications    Medication Sig Taking?  Authorizing Provider   Omega-3 Fatty Acids (FISH OIL) 1200 MG CAPS Fish Oil 1,200 mg (144 mg-216 mg) capsule   take 2 capsules by mouth twice a day **STOP NIACIN** Yes Historical Provider, MD   amLODIPine (NORVASC) 10 MG tablet take 1 tablet by mouth once daily Yes Historical Provider, MD   cloNIDine (CATAPRES) 0.2 MG tablet  Yes Historical Provider, MD   Lumateperone Tosylate (CAPLYTA) 42 MG CAPS Caplyta 42 mg capsule   take 1 capsule by mouth at bedtime Yes Historical Provider, MD   magnesium oxide (MAG-OX) 400 (240 Mg) MG tablet  Yes Historical Provider, MD   OXcarbazepine (TRILEPTAL) 300 MG tablet  Yes Historical Provider, MD   rOPINIRole (REQUIP) 1 MG tablet  Yes Historical Provider, MD   APLENZIN 174 MG TB24  Yes Historical Provider, MD   fluticasone (FLONASE) 50 MCG/ACT nasal spray 2 sprays by Nasal route daily Yes Shirin Valentino MD   azithromycin (ZITHROMAX) 250 MG tablet 500 mg orally on day one followed by 250 mg daily on days two through five Yes Shirin Valentino MD   potassium chloride (KLOR-CON M) 10 MEQ extended release tablet take 2 tablets by mouth once daily with HYDROCHLOROTHIAZIDE Yes Shirin Valentino MD   busPIRone (BUSPAR) 10 MG tablet take 1 tablet by mouth three times a day if needed for anxiety Yes Shirin Valentino MD   fexofenadine (ALLEGRA ALLERGY) 180 MG tablet Take 1 tablet by mouth daily as needed (as needed for allergies) Yes Shirin Valentino MD   metoprolol tartrate (LOPRESSOR) 25 MG tablet take 3 tablets by mouth twice a day Yes Shirin Valentino MD   sildenafil (VIAGRA) 100 MG tablet Take 1 tablet by mouth as needed for Erectile Dysfunction Yes Shirin Valentino MD   cyclobenzaprine (FLEXERIL) 10 MG tablet take 1 tablet by mouth three times a day if needed for muscle spasm Yes Shirin Valentino MD   docusate sodium (COLACE) 100 MG capsule Take 1 capsule by mouth 2 times daily For constipation Yes Shirin Valentino MD   omeprazole (PRILOSEC) 20 MG delayed release capsule Take 1 capsule by mouth every morning (before breakfast) STOP PEPCID Yes Shirin Valentino MD   pravastatin (PRAVACHOL) 80 MG tablet take 1 tablet by mouth every evening Yes Shirin Valentino MD   hydroCHLOROthiazide (HYDRODIURIL) 25 MG tablet take 1 tablet by mouth every morning Yes Shirin Valentino MD   clindamycin (CLEOCIN T) 1 % external solution apply topically every morning Yes Historical Provider, MD   divalproex (DEPAKOTE ER) 250 MG extended release tablet take 1 tablet by mouth every evening Yes Historical Provider, MD   doxepin (SINEQUAN) 75 MG capsule take 1 capsule by mouth at bedtime Yes Historical Provider, MD lidocaine (LIDODERM) 5 % lidocaine 5 % topical patch   apply 1 patch ONTO THE SKIN once daily 12 hours ON 12 hours off Yes Historical Provider, MD   magnesium oxide (MAG-OX) 400 MG tablet magnesium oxide 400 mg (241.3 mg magnesium) tablet Yes Historical Provider, MD   minocycline (MINOCIN;DYNACIN) 100 MG capsule take 1 capsule by mouth once daily with dinner Yes Historical Provider, MD   prazosin (MINIPRESS) 2 MG capsule take 1 capsule by mouth at bedtime Yes Historical Provider, MD   clonazePAM (KLONOPIN) 0.5 MG tablet take 1/2 tablet by mouth twice a day Yes Historical Provider, MD   RA ASPIRIN EC 81 MG EC tablet take 1 tablet by mouth once daily Yes Brittanie Escudero MD   Wheat Dextrin (BENEFIBER) POWD Take 4 g by mouth 3 times daily (with meals) Yes Brittanie Escudero MD   vitamin D3 (CHOLECALCIFEROL) 25 MCG (1000 UT) TABS tablet take 1 tablet by mouth once daily Yes Brittanie Escudero MD   Omega-3 Fatty Acids (FISH OIL) 1200 MG CAPS take 2 capsules by mouth twice a day **STOP NIACIN** Yes Brittanie Escudero MD   APLENZIN 348 MG TB24 take 1 tablet by mouth every morning Yes Historical Provider, MD   oxyCODONE-acetaminophen (PERCOCET) 5-325 MG per tablet Take 1 tablet by mouth every 4 hours as needed for Pain. Yes Historical Provider, MD   Handicap Placard MISC by Does not apply route Can't walk greater than 200 feet. Expires in 5 years. Yes Brittanie Escudero MD   vitamin B-12 (CYANOCOBALAMIN) 1000 MCG tablet Take 1,000 mcg by mouth daily Yes Historical Provider, MD   Adapalene 0.3 % GEL  Yes Historical Provider, MD   erythromycin with ethanol (THERAMYCIN) 2 % external solution apply topically as directed every morning Yes Historical Provider, MD   aluminum chloride (DRYSOL) 20 % external solution Apply topically nightly.  Yes Brittanie Escudero MD       Social History     Tobacco Use    Smoking status: Former Smoker     Packs/day: 1.50     Years: 12.00     Pack years: 18.00     Quit date: 8/1/2002 Years since quittin.8    Smokeless tobacco: Never Used   Vaping Use    Vaping Use: Never used   Substance Use Topics    Alcohol use: No     Alcohol/week: 0.0 standard drinks    Drug use: No         Review of Systems   Constitutional: Positive for fatigue and unexpected weight change. Negative for activity change, appetite change, chills, diaphoresis and fever. HENT: Positive for congestion, postnasal drip, rhinorrhea, sinus pressure and sinus pain. Respiratory: Positive for cough. Negative for chest tightness, shortness of breath and wheezing. Cardiovascular: Negative for chest pain, palpitations and leg swelling. Gastrointestinal: Positive for constipation. Negative for abdominal distention, abdominal pain, diarrhea, nausea and vomiting. Endocrine: Negative for cold intolerance, heat intolerance, polydipsia, polyphagia and polyuria. Genitourinary: Positive for difficulty urinating and frequency. Musculoskeletal: Positive for arthralgias (right ankle, right shoulder), back pain, gait problem, myalgias and neck pain. Has known fibromyalgia   Allergic/Immunologic: Positive for environmental allergies. Neurological: Positive for weakness (right leg brace) and numbness (RLE). Hematological: Does not bruise/bleed easily. Psychiatric/Behavioral: Positive for decreased concentration and dysphoric mood. Negative for hallucinations, self-injury, sleep disturbance and suicidal ideas. The patient is nervous/anxious.          -vital signs stable and within normal limits except Overweight per BMI. /72   Pulse 74   Temp 97.7 °F (36.5 °C)   Ht 5' 11\" (1.803 m)   Wt 186 lb 6.4 oz (84.6 kg)   SpO2 99%   BMI 26.00 kg/m²        Physical Exam  Vitals and nursing note reviewed. Constitutional:       General: He is not in acute distress. Appearance: Normal appearance. He is well-developed. He is not diaphoretic. HENT:      Head: Normocephalic and atraumatic.       Right Ear: External ear normal. Decreased hearing noted. Left Ear: External ear normal. Decreased hearing noted. Mouth/Throat:      Comments: I did not examine the mouth due to coronavirus pandemic and wearing masks. Eyes:      General: Lids are normal. No scleral icterus. Right eye: No discharge. Left eye: No discharge. Extraocular Movements: Extraocular movements intact. Conjunctiva/sclera: Conjunctivae normal.   Neck:      Thyroid: No thyromegaly. Cardiovascular:      Rate and Rhythm: Normal rate and regular rhythm. Heart sounds: Normal heart sounds. No murmur heard. Pulmonary:      Effort: Pulmonary effort is normal. No respiratory distress. Breath sounds: Normal breath sounds. No wheezing or rales. Chest:      Chest wall: No tenderness. Abdominal:      General: Bowel sounds are normal. There is no distension. Palpations: Abdomen is soft. There is no hepatomegaly or splenomegaly. Tenderness: There is no abdominal tenderness. Genitourinary:     Comments: I deferred prostate exam to the urologist  Musculoskeletal:      Right shoulder: Tenderness present. Decreased range of motion. Decreased strength (mild). Cervical back: Neck supple. Spasms, tenderness and bony tenderness present. Decreased range of motion. Lumbar back: Spasms and tenderness present. No bony tenderness. Normal range of motion. Right lower leg: No edema. Left lower leg: No edema. Right ankle: Tenderness present. Decreased range of motion. Comments: Wearing right leg brace. Antalgic gait noted. Lymphadenopathy:      Cervical: No cervical adenopathy. Skin:     General: Skin is warm and dry. Capillary Refill: Capillary refill takes less than 2 seconds. Findings: No rash.       Comments: Right leg with skin discoloration, post wound, scar is thin, 20 cm x 15 cm, flat, no opening   Neurological:      Mental Status: He is alert and oriented to person, place, and time. Deep Tendon Reflexes: Reflexes are normal and symmetric. Psychiatric:         Mood and Affect: Mood is anxious. Behavior: Behavior normal.         Thought Content: Thought content normal.         Judgment: Judgment normal.           I personally reviewed testing with patient and all questions fully answered.   Hyperglycemia  Hyperlipidemia  High triglycerides    Otherwise labs within normal limits    Hospital Outpatient Visit on 02/07/2022   Component Date Value Ref Range Status    WBC 02/07/2022 5.5  3.5 - 11.0 k/uL Final    RBC 02/07/2022 4.87  4.5 - 5.9 m/uL Final    Hemoglobin 02/07/2022 15.5  13.5 - 17.5 g/dL Final    Hematocrit 02/07/2022 44.2  41 - 53 % Final    MCV 02/07/2022 90.7  80 - 100 fL Final    MCH 02/07/2022 31.9  26 - 34 pg Final    MCHC 02/07/2022 35.1  31 - 37 g/dL Final    RDW 02/07/2022 13.0  11.5 - 14.9 % Final    Platelets 76/76/0080 199  150 - 450 k/uL Final    MPV 02/07/2022 8.5  6.0 - 12.0 fL Final    NRBC Automated 02/07/2022 NOT REPORTED  per 100 WBC Final    Glucose 02/07/2022 108* 70 - 99 mg/dL Final    BUN 02/07/2022 14  6 - 20 mg/dL Final    CREATININE 02/07/2022 0.97  0.70 - 1.20 mg/dL Final    Bun/Cre Ratio 02/07/2022 NOT REPORTED  9 - 20 Final    Calcium 02/07/2022 9.3  8.6 - 10.4 mg/dL Final    Sodium 02/07/2022 140  135 - 144 mmol/L Final    Potassium 02/07/2022 4.3  3.7 - 5.3 mmol/L Final    Chloride 02/07/2022 100  98 - 107 mmol/L Final    CO2 02/07/2022 29  20 - 31 mmol/L Final    Anion Gap 02/07/2022 11  9 - 17 mmol/L Final    Alkaline Phosphatase 02/07/2022 81  40 - 129 U/L Final    ALT 02/07/2022 29  5 - 41 U/L Final    AST 02/07/2022 28  <40 U/L Final    Total Bilirubin 02/07/2022 0.39  0.3 - 1.2 mg/dL Final    Total Protein 02/07/2022 7.4  6.4 - 8.3 g/dL Final    Albumin 02/07/2022 4.7  3.5 - 5.2 g/dL Final    Albumin/Globulin Ratio 02/07/2022 NOT REPORTED  1.0 - 2.5 Final    GFR Non- 02/07/2022 >60  >60 mL/min Final    GFR  02/07/2022 >60  >60 mL/min Final    GFR Comment 02/07/2022        Final    Comment: Average GFR for 38-51 years old:   80 mL/min/1.73sq m  Chronic Kidney Disease:   <60 mL/min/1.73sq m  Kidney failure:   <15 mL/min/1.73sq m              eGFR calculated using average adult body mass. Additional eGFR calculator available at:        [email or web address][employer]            GFR Staging 02/07/2022 NOT REPORTED   Final    Cholesterol 02/07/2022 217* <200 mg/dL Final    Comment:    Cholesterol Guidelines:      <200  Desirable   200-240  Borderline      >240  Undesirable         HDL 02/07/2022 45  >40 mg/dL Final    Comment:    HDL Guidelines:    <40     Undesirable   40-59    Borderline    >59     Desirable         LDL Cholesterol 02/07/2022 123  0 - 130 mg/dL Final    Comment:    LDL Guidelines:     <100    Desirable   100-129   Near to/above Desirable   130-159   Borderline      >159   Undesirable     Direct (measured) LDL and calculated LDL are not interchangeable tests.  Chol/HDL Ratio 02/07/2022 4.8  <5 Final            Triglycerides 02/07/2022 246* <150 mg/dL Final    Comment:    Triglyceride Guidelines:     <150   Desirable   150-199  Borderline   200-499  High     >499   Very high   Based on AHA Guidelines for fasting triglyceride, October 2012.          VLDL 02/07/2022 NOT REPORTED* 1 - 30 mg/dL Final    TSH 02/07/2022 2.57  0.30 - 5.00 mIU/L Final    Magnesium 02/07/2022 2.2  1.6 - 2.6 mg/dL Final          Lab Results   Component Value Date    LABA1C 5.8 01/19/2022    LABA1C 5.3 03/12/2021    LABA1C 5.8 09/01/2020           Lab Results   Component Value Date    CHOL 217 (H) 02/07/2022    CHOL 176 03/17/2021    CHOL 205 (H) 09/01/2020     Lab Results   Component Value Date    TRIG 246 (H) 02/07/2022    TRIG 168 (H) 03/17/2021    TRIG 130 09/01/2020     Lab Results   Component Value Date    HDL 45 02/07/2022    HDL 48 03/17/2021    HDL 57 2020     Lab Results   Component Value Date    LDLCHOLESTEROL 123 2022    LDLCHOLESTEROL 94 2021    LDLCHOLESTEROL 122 2020     Lab Results   Component Value Date    CHOLHDLRATIO 4.8 2022    CHOLHDLRATIO 3.7 2021    CHOLHDLRATIO 3.6 2020       Lab Results   Component Value Date    NESLVBDQ34 481 2020     Lab Results   Component Value Date    FOLATE 6.2 2020     Lab Results   Component Value Date    VITD25 58.7 2019         Orders Placed This Encounter   Medications    fluticasone (FLONASE) 50 MCG/ACT nasal spray     Si sprays by Nasal route daily     Dispense:  16 g     Refill:  0    azithromycin (ZITHROMAX) 250 MG tablet     Si mg orally on day one followed by 250 mg daily on days two through five     Dispense:  6 tablet     Refill:  0    Misc Natural Products (SM SAW PALMETTO COMPLEX) CAPS     Sig: Take 1 capsule by mouth daily OK to substitute     Dispense:  90 capsule     Refill:  1       Orders Placed This Encounter   Procedures    Comprehensive Metabolic Panel     Standing Status:   Future     Number of Occurrences:   1     Standing Expiration Date:   2022    Lipid Panel     Standing Status:   Future     Number of Occurrences:   1     Standing Expiration Date:   2023     Order Specific Question:   Is Patient Fasting?/# of Hours     Answer:   8-10 Hours, water ok to drink    Urinalysis with Reflex to Culture     Standing Status:   Future     Number of Occurrences:   1     Standing Expiration Date:   2023     Order Specific Question:   SPECIFY(EX-CATH,MIDSTREAM,CYSTO,ETC)?      Answer:   MIDSTREAM    Uric Acid     Standing Status:   Future     Number of Occurrences:   1     Standing Expiration Date:   2023    Magnesium     Standing Status:   Future     Number of Occurrences:   1     Standing Expiration Date:   2023    Vitamin B12 & Folate     Standing Status:   Future     Number of Occurrences:   1     Standing Expiration Date:   7/29/2022    PSA Screening     Standing Status:   Future     Number of Occurrences:   1     Standing Expiration Date:   6/2/2023    Hemoglobin A1C     Standing Status:   Future     Number of Occurrences:   1     Standing Expiration Date:   6/1/2023   Clarance Sever, MD, Urology, [place]     Referral Priority:   Routine     Referral Type:   Eval and Treat     Referral Reason:   Specialty Services Required     Referred to Provider:   Dieter Hargrove MD     Requested Specialty:   Urology     Number of Visits Requested:   1       Medications Discontinued During This Encounter   Medication Reason    COVID-19 Test (ID NOW COVID-19) KIT Therapy completed    propranolol (INDERAL) 10 MG tablet Stop Taking at Discharge    nystatin (MYCOSTATIN) 145353 UNIT/ML suspension Therapy completed         On this date 6/1/2022 I have spent 35 minutes reviewing previous notes, test results and face to face with the patient discussing the diagnosis and importance of compliance with the treatment plan as well as documenting on the day of the visit. This note was completed by using the assistance of a speech-recognition program. However, inadvertent computerized transcription errors may be present. Although every effort was made to ensure accuracy, no guarantees can be provided that every mistake has been identified and corrected by editing. An electronic signature was used to authenticate this note.   Electronically signed by Aroldo Murillo MD on 6/6/2022 at 12:59 PM
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
## 2022-06-02 ENCOUNTER — HOSPITAL ENCOUNTER (OUTPATIENT)
Age: 52
Discharge: HOME | End: 2022-06-02
Payer: MEDICARE

## 2022-06-02 DIAGNOSIS — R39.198 DIFFICULTY URINATING: ICD-10-CM

## 2022-06-02 DIAGNOSIS — Z12.5 PROSTATE CANCER SCREENING: ICD-10-CM

## 2022-06-02 DIAGNOSIS — R20.0 NUMBNESS AND TINGLING OF RIGHT LEG: ICD-10-CM

## 2022-06-02 DIAGNOSIS — I10 ESSENTIAL HYPERTENSION: ICD-10-CM

## 2022-06-02 DIAGNOSIS — R35.1 NOCTURIA: ICD-10-CM

## 2022-06-02 DIAGNOSIS — R73.9 HYPERGLYCEMIA: ICD-10-CM

## 2022-06-02 DIAGNOSIS — E78.5 HYPERLIPIDEMIA WITH TARGET LDL LESS THAN 100: ICD-10-CM

## 2022-06-02 DIAGNOSIS — R20.2 NUMBNESS AND TINGLING OF RIGHT LEG: ICD-10-CM

## 2022-06-02 LAB
-: ABNORMAL
ALBUMIN SERPL-MCNC: 4.2 G/DL (ref 3.5–5.2)
ALP BLD-CCNC: 94 U/L (ref 40–129)
ALT SERPL-CCNC: 33 U/L (ref 5–41)
ANION GAP SERPL CALCULATED.3IONS-SCNC: 9 MMOL/L (ref 9–17)
AST SERPL-CCNC: 32 U/L
BACTERIA: ABNORMAL
BILIRUB SERPL-MCNC: 0.46 MG/DL (ref 0.3–1.2)
BILIRUBIN URINE: ABNORMAL
BUN BLDV-MCNC: 13 MG/DL (ref 6–20)
CALCIUM SERPL-MCNC: 9.4 MG/DL (ref 8.6–10.4)
CHLORIDE BLD-SCNC: 101 MMOL/L (ref 98–107)
CHOLESTEROL/HDL RATIO: 4.2
CHOLESTEROL: 150 MG/DL
CO2: 29 MMOL/L (ref 20–31)
COLOR: ABNORMAL
CREAT SERPL-MCNC: 0.82 MG/DL (ref 0.7–1.2)
CRYSTALS, UA: ABNORMAL /HPF
CRYSTALS, UA: ABNORMAL /HPF
EPITHELIAL CELLS UA: ABNORMAL /HPF
ESTIMATED AVERAGE GLUCOSE: 108 MG/DL
FOLATE: 15.4 NG/ML
GFR AFRICAN AMERICAN: >60 ML/MIN
GFR NON-AFRICAN AMERICAN: >60 ML/MIN
GFR SERPL CREATININE-BSD FRML MDRD: ABNORMAL ML/MIN/{1.73_M2}
GLUCOSE BLD-MCNC: 105 MG/DL (ref 70–99)
GLUCOSE URINE: NEGATIVE
HBA1C MFR BLD: 5.4 % (ref 4–6)
HDLC SERPL-MCNC: 36 MG/DL
KETONES, URINE: ABNORMAL
LDL CHOLESTEROL: 89 MG/DL (ref 0–130)
LEUKOCYTE ESTERASE, URINE: NEGATIVE
MAGNESIUM: 2.2 MG/DL (ref 1.6–2.6)
MUCUS: ABNORMAL
NITRITE, URINE: NEGATIVE
PH UA: 6.5 (ref 5–8)
POTASSIUM SERPL-SCNC: 3.8 MMOL/L (ref 3.7–5.3)
PROSTATE SPECIFIC ANTIGEN: 0.9 NG/ML
PROTEIN UA: ABNORMAL
RBC UA: ABNORMAL /HPF
SODIUM BLD-SCNC: 139 MMOL/L (ref 135–144)
SPECIFIC GRAVITY UA: 1.03 (ref 1–1.03)
TOTAL PROTEIN: 7.4 G/DL (ref 6.4–8.3)
TRIGL SERPL-MCNC: 126 MG/DL
TURBIDITY: CLEAR
URIC ACID: 6.5 MG/DL (ref 3.4–7)
URINE HGB: NEGATIVE
UROBILINOGEN, URINE: NORMAL
VITAMIN B-12: 680 PG/ML (ref 232–1245)
WBC UA: ABNORMAL /HPF

## 2022-06-02 PROCEDURE — 36415 COLL VENOUS BLD VENIPUNCTURE: CPT

## 2022-06-02 PROCEDURE — 81001 URINALYSIS AUTO W/SCOPE: CPT

## 2022-06-02 PROCEDURE — 80061 LIPID PANEL: CPT

## 2022-06-02 PROCEDURE — 83735 ASSAY OF MAGNESIUM: CPT

## 2022-06-02 PROCEDURE — 80053 COMPREHEN METABOLIC PANEL: CPT

## 2022-06-02 PROCEDURE — 83036 HEMOGLOBIN GLYCOSYLATED A1C: CPT

## 2022-06-02 PROCEDURE — 84550 ASSAY OF BLOOD/URIC ACID: CPT

## 2022-06-02 PROCEDURE — 82746 ASSAY OF FOLIC ACID SERUM: CPT

## 2022-06-02 PROCEDURE — G0103 PSA SCREENING: HCPCS

## 2022-06-02 PROCEDURE — 82607 VITAMIN B-12: CPT

## 2022-06-02 NOTE — RESULT ENCOUNTER NOTE
Please notify patient: Calcium oxalate in the urine he needs to drink more water 8 x 8 ounce glasses of water every day otherwise he will get kidney stones   Blood glucose mildly high but prediabetes resolved  Greatly improved lipids and triglycerides  Otherwise labs within normal limits  continue current treatment    Future Appointments  6/24/2022  1:20 PM    Konstantin Chandler MD        [facility]  10/4/2022  11:15 AM   Lyly Salazar MD     [facility]

## 2022-06-16 DIAGNOSIS — J01.80 ACUTE NON-RECURRENT SINUSITIS OF OTHER SINUS: ICD-10-CM

## 2022-06-16 DIAGNOSIS — N52.9 ERECTILE DYSFUNCTION, UNSPECIFIED ERECTILE DYSFUNCTION TYPE: ICD-10-CM

## 2022-06-16 RX ORDER — FLUTICASONE PROPIONATE 50 MCG
2 SPRAY, SUSPENSION (ML) NASAL DAILY
Qty: 16 G | Refills: 3 | Status: SHIPPED | OUTPATIENT
Start: 2022-06-16

## 2022-06-16 RX ORDER — SILDENAFIL 100 MG/1
100 TABLET, FILM COATED ORAL PRN
Qty: 30 TABLET | Refills: 3 | Status: SHIPPED | OUTPATIENT
Start: 2022-06-16 | End: 2022-09-01 | Stop reason: SDUPTHER

## 2022-06-24 ENCOUNTER — OFFICE VISIT (OUTPATIENT)
Dept: UROLOGY | Age: 52
End: 2022-06-24
Payer: MEDICARE

## 2022-06-24 VITALS
WEIGHT: 186 LBS | OXYGEN SATURATION: 98 % | HEART RATE: 105 BPM | TEMPERATURE: 97.3 F | BODY MASS INDEX: 26.04 KG/M2 | HEIGHT: 71 IN | DIASTOLIC BLOOD PRESSURE: 78 MMHG | SYSTOLIC BLOOD PRESSURE: 110 MMHG

## 2022-06-24 DIAGNOSIS — R39.12 WEAK URINARY STREAM: Primary | ICD-10-CM

## 2022-06-24 DIAGNOSIS — N52.01 ERECTILE DYSFUNCTION DUE TO ARTERIAL INSUFFICIENCY: ICD-10-CM

## 2022-06-24 PROCEDURE — G8427 DOCREV CUR MEDS BY ELIG CLIN: HCPCS | Performed by: UROLOGY

## 2022-06-24 PROCEDURE — 99204 OFFICE O/P NEW MOD 45 MIN: CPT | Performed by: UROLOGY

## 2022-06-24 PROCEDURE — G8419 CALC BMI OUT NRM PARAM NOF/U: HCPCS | Performed by: UROLOGY

## 2022-06-24 PROCEDURE — 1036F TOBACCO NON-USER: CPT | Performed by: UROLOGY

## 2022-06-24 RX ORDER — DIVALPROEX SODIUM 500 MG/1
TABLET, EXTENDED RELEASE ORAL
COMMUNITY
Start: 2022-06-23

## 2022-06-24 RX ORDER — TAMSULOSIN HYDROCHLORIDE 0.4 MG/1
0.4 CAPSULE ORAL DAILY
Qty: 30 CAPSULE | Refills: 11 | Status: SHIPPED | OUTPATIENT
Start: 2022-06-24

## 2022-06-24 RX ORDER — LURASIDONE HYDROCHLORIDE 80 MG/1
TABLET, FILM COATED ORAL
COMMUNITY
Start: 2022-06-01 | End: 2022-08-31 | Stop reason: SINTOL

## 2022-06-24 RX ORDER — DIVALPROEX SODIUM 125 MG/1
CAPSULE, COATED PELLETS ORAL
COMMUNITY
Start: 2022-06-23

## 2022-06-24 ASSESSMENT — ENCOUNTER SYMPTOMS
CONSTIPATION: 0
BACK PAIN: 0
WHEEZING: 0
EYE PAIN: 0
VOMITING: 0
EYE REDNESS: 0
SHORTNESS OF BREATH: 0
NAUSEA: 0
ABDOMINAL PAIN: 0
COUGH: 0
DIARRHEA: 0

## 2022-06-24 NOTE — LETTER
[address]  [place] [address]  Dept: [phone]  Dept Fax: [phone]        6/24/22    Patient: Richard Martinez  YOB: 1970    Dear Belvin Cabot, MD,    I had the pleasure of seeing one of your patients, MONIQUE Howard today in the office today. Below are the relevant portions of my assessment and plan of care. IMPRESSION:  1. Weak urinary stream    2. Erectile dysfunction due to arterial insufficiency        PLAN:  Will start Flomax. Discussed how to take Sildenafil with Flomax. PSA, U/A normal.     Prescriptions Ordered:  Orders Placed This Encounter   Medications    tamsulosin (FLOMAX) 0.4 MG capsule     Sig: Take 1 capsule by mouth daily     Dispense:  30 capsule     Refill:  11      Orders Placed:  No orders of the defined types were placed in this encounter. Thank you for allowing me to participate in the care of this patient. I will keep you updated on this patient's follow up and I look forward to serving you and your patients again in the future.         Renetta Clemente MD
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
## 2022-06-25 DIAGNOSIS — G89.29 CHRONIC PAIN OF MULTIPLE JOINTS: ICD-10-CM

## 2022-06-25 DIAGNOSIS — M25.50 CHRONIC PAIN OF MULTIPLE JOINTS: ICD-10-CM

## 2022-06-27 RX ORDER — CYCLOBENZAPRINE HCL 10 MG
TABLET ORAL
Qty: 90 TABLET | Refills: 1 | Status: SHIPPED | OUTPATIENT
Start: 2022-06-27 | End: 2022-08-26

## 2022-07-03 DIAGNOSIS — K21.9 GASTROESOPHAGEAL REFLUX DISEASE WITHOUT ESOPHAGITIS: ICD-10-CM

## 2022-07-05 RX ORDER — OMEPRAZOLE 20 MG/1
CAPSULE, DELAYED RELEASE ORAL
Qty: 90 CAPSULE | Refills: 3 | Status: SHIPPED | OUTPATIENT
Start: 2022-07-05

## 2022-07-05 NOTE — TELEPHONE ENCOUNTER
Please Approve or Refuse.   Send to Pharmacy per Pt's Request:      Next Visit Date:  10/4/2022   Last Visit Date: 6/1/2022    Hemoglobin A1C (%)   Date Value   06/02/2022 5.4   01/19/2022 5.8   03/12/2021 5.3             ( goal A1C is < 7)   BP Readings from Last 3 Encounters:   06/24/22 110/78   06/01/22 104/72   05/04/22 (!) 140/96          (goal 120/80)  BUN   Date Value Ref Range Status   06/02/2022 13 6 - 20 mg/dL Final     CREATININE   Date Value Ref Range Status   06/02/2022 0.82 0.70 - 1.20 mg/dL Final     Potassium   Date Value Ref Range Status   06/02/2022 3.8 3.7 - 5.3 mmol/L Final

## 2022-07-09 DIAGNOSIS — J30.89 SEASONAL ALLERGIC RHINITIS DUE TO OTHER ALLERGIC TRIGGER: ICD-10-CM

## 2022-07-11 RX ORDER — FEXOFENADINE HCL 180 MG/1
180 TABLET ORAL DAILY PRN
Qty: 90 TABLET | Refills: 3 | Status: SHIPPED | OUTPATIENT
Start: 2022-07-11 | End: 2022-07-24 | Stop reason: SDUPTHER

## 2022-07-11 RX ORDER — AMOXICILLIN 500 MG
CAPSULE ORAL
Qty: 360 CAPSULE | Refills: 3 | Status: SHIPPED | OUTPATIENT
Start: 2022-07-11

## 2022-07-24 DIAGNOSIS — J30.89 SEASONAL ALLERGIC RHINITIS DUE TO OTHER ALLERGIC TRIGGER: ICD-10-CM

## 2022-07-24 DIAGNOSIS — I10 ESSENTIAL HYPERTENSION: ICD-10-CM

## 2022-07-25 RX ORDER — ASPIRIN 81 MG/1
TABLET, COATED ORAL
Qty: 90 TABLET | Refills: 3 | Status: SHIPPED | OUTPATIENT
Start: 2022-07-25

## 2022-07-25 RX ORDER — FEXOFENADINE HCL 180 MG/1
180 TABLET ORAL DAILY PRN
Qty: 90 TABLET | Refills: 3 | Status: SHIPPED | OUTPATIENT
Start: 2022-07-25

## 2022-08-24 ENCOUNTER — HOSPITAL ENCOUNTER (EMERGENCY)
Age: 52
Discharge: HOME | End: 2022-08-24
Attending: EMERGENCY MEDICINE
Payer: MEDICARE

## 2022-08-24 VITALS
BODY MASS INDEX: 23.8 KG/M2 | WEIGHT: 170 LBS | OXYGEN SATURATION: 100 % | SYSTOLIC BLOOD PRESSURE: 122 MMHG | HEIGHT: 71 IN | RESPIRATION RATE: 18 BRPM | HEART RATE: 99 BPM | TEMPERATURE: 98.1 F | DIASTOLIC BLOOD PRESSURE: 83 MMHG

## 2022-08-24 DIAGNOSIS — S01.01XA LACERATION OF SCALP, INITIAL ENCOUNTER: Primary | ICD-10-CM

## 2022-08-24 PROCEDURE — 12001 RPR S/N/AX/GEN/TRNK 2.5CM/<: CPT

## 2022-08-24 PROCEDURE — 99282 EMERGENCY DEPT VISIT SF MDM: CPT

## 2022-08-24 ASSESSMENT — ENCOUNTER SYMPTOMS
VOMITING: 0
BACK PAIN: 0
NAUSEA: 0
SORE THROAT: 0
COUGH: 0
SHORTNESS OF BREATH: 0
FACIAL SWELLING: 0
COLOR CHANGE: 0
RHINORRHEA: 0
EYE DISCHARGE: 0
BLOOD IN STOOL: 0
TROUBLE SWALLOWING: 0
CONSTIPATION: 0
DIARRHEA: 0
WHEEZING: 0
SINUS PRESSURE: 0
CHEST TIGHTNESS: 0
ABDOMINAL PAIN: 0
EYE REDNESS: 0
EYE PAIN: 0

## 2022-08-24 ASSESSMENT — PAIN SCALES - GENERAL: PAINLEVEL_OUTOF10: 4

## 2022-08-24 ASSESSMENT — PAIN - FUNCTIONAL ASSESSMENT: PAIN_FUNCTIONAL_ASSESSMENT: 0-10
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
## 2022-08-26 DIAGNOSIS — G89.29 CHRONIC PAIN OF MULTIPLE JOINTS: ICD-10-CM

## 2022-08-26 DIAGNOSIS — M25.50 CHRONIC PAIN OF MULTIPLE JOINTS: ICD-10-CM

## 2022-08-26 RX ORDER — CYCLOBENZAPRINE HCL 10 MG
TABLET ORAL
Qty: 90 TABLET | Refills: 1 | Status: SHIPPED | OUTPATIENT
Start: 2022-08-26 | End: 2022-10-24

## 2022-08-30 ENCOUNTER — NURSE TRIAGE (OUTPATIENT)
Dept: OTHER | Facility: CLINIC | Age: 52
End: 2022-08-30

## 2022-08-30 NOTE — TELEPHONE ENCOUNTER
Agree , appointment made  Future Appointments   Date Time Provider Maribell Renee   8/31/2022  8:45 AM [facility] Go [facility]   9/23/2022 10:10 AM Mignon Galloway MD [address] [place] [address]   10/4/2022 11:15 AM Enriqueta Unger MD [facility]

## 2022-08-30 NOTE — TELEPHONE ENCOUNTER
Received call from Ajay at [facility] with [employer]. Subjective: Caller states \"I was calling for a follow up to see if my PCP would remove my staples. \"     Current Symptoms: calling for follow up, also having intermittent tingling in face on left side, which he also had before getting to the ER. A little pain to top of head. Onset: 2 days ago; intermittent    Associated Symptoms: NA    Pain Severity: 4/10; dull; constant    Temperature: no fever     What has been tried: in pain management    LMP: NA Pregnant: NA    Recommended disposition: See PCP within 3 Days    Care advice provided, patient verbalizes understanding; denies any other questions or concerns; instructed to call back for any new or worsening symptoms. Patient/Caller agrees with recommended disposition; writer provided warm transfer to Akiko Contreras at [facility] for appointment scheduling     Attention Provider: Thank you for allowing me to participate in the care of your patient. The patient was connected to triage in response to information provided to the ECC/PSC. Please do not respond through this encounter as the response is not directed to a shared pool.           Reason for Disposition   [1] Numbness or tingling on both sides of body AND [2] is a new symptom present > 24 hours    Protocols used: Neurologic Deficit-ADULT-AH

## 2022-08-31 ENCOUNTER — OFFICE VISIT (OUTPATIENT)
Dept: FAMILY MEDICINE CLINIC | Age: 52
End: 2022-08-31
Payer: MEDICARE

## 2022-08-31 VITALS
DIASTOLIC BLOOD PRESSURE: 78 MMHG | BODY MASS INDEX: 24.95 KG/M2 | HEART RATE: 85 BPM | TEMPERATURE: 97.2 F | WEIGHT: 178.2 LBS | SYSTOLIC BLOOD PRESSURE: 118 MMHG | HEIGHT: 71 IN | OXYGEN SATURATION: 100 %

## 2022-08-31 DIAGNOSIS — H53.8 BLURRING OF VISUAL IMAGE OF LEFT EYE: ICD-10-CM

## 2022-08-31 DIAGNOSIS — G44.301 INTRACTABLE POST-TRAUMATIC HEADACHE, UNSPECIFIED CHRONICITY PATTERN: ICD-10-CM

## 2022-08-31 DIAGNOSIS — S09.90XD TRAUMATIC INJURY OF HEAD, SUBSEQUENT ENCOUNTER: Primary | ICD-10-CM

## 2022-08-31 DIAGNOSIS — Z48.02 ENCOUNTER FOR STAPLE REMOVAL: ICD-10-CM

## 2022-08-31 DIAGNOSIS — R20.2 NUMBNESS AND TINGLING OF LEFT SIDE OF FACE: ICD-10-CM

## 2022-08-31 DIAGNOSIS — S01.01XD LACERATION OF SCALP WITHOUT FOREIGN BODY, SUBSEQUENT ENCOUNTER: ICD-10-CM

## 2022-08-31 DIAGNOSIS — R20.0 NUMBNESS AND TINGLING OF LEFT SIDE OF FACE: ICD-10-CM

## 2022-08-31 PROBLEM — S01.01XA LACERATION OF SCALP WITHOUT FOREIGN BODY: Status: ACTIVE | Noted: 2022-08-31

## 2022-08-31 PROCEDURE — 99214 OFFICE O/P EST MOD 30 MIN: CPT | Performed by: FAMILY MEDICINE

## 2022-08-31 PROCEDURE — G8427 DOCREV CUR MEDS BY ELIG CLIN: HCPCS | Performed by: FAMILY MEDICINE

## 2022-08-31 PROCEDURE — G8420 CALC BMI NORM PARAMETERS: HCPCS | Performed by: FAMILY MEDICINE

## 2022-08-31 PROCEDURE — 1036F TOBACCO NON-USER: CPT | Performed by: FAMILY MEDICINE

## 2022-08-31 RX ORDER — LITHIUM CARBONATE 150 MG/1
CAPSULE ORAL
COMMUNITY
Start: 2022-07-15 | End: 2022-08-31 | Stop reason: SINTOL

## 2022-08-31 RX ORDER — LITHIUM CARBONATE 450 MG
TABLET, EXTENDED RELEASE ORAL
COMMUNITY
Start: 2022-07-21 | End: 2022-08-31 | Stop reason: SINTOL

## 2022-08-31 RX ORDER — HYDROCODONE BITARTRATE AND ACETAMINOPHEN 5; 325 MG/1; MG/1
TABLET ORAL
COMMUNITY
Start: 2022-08-06

## 2022-08-31 RX ORDER — VENLAFAXINE HYDROCHLORIDE 37.5 MG/1
CAPSULE, EXTENDED RELEASE ORAL
COMMUNITY
Start: 2022-06-29

## 2022-08-31 RX ORDER — ROPINIROLE 1 MG/1
TABLET, FILM COATED ORAL
COMMUNITY
Start: 2022-07-27

## 2022-08-31 SDOH — ECONOMIC STABILITY: FOOD INSECURITY: WITHIN THE PAST 12 MONTHS, YOU WORRIED THAT YOUR FOOD WOULD RUN OUT BEFORE YOU GOT MONEY TO BUY MORE.: SOMETIMES TRUE

## 2022-08-31 SDOH — ECONOMIC STABILITY: FOOD INSECURITY: WITHIN THE PAST 12 MONTHS, THE FOOD YOU BOUGHT JUST DIDN'T LAST AND YOU DIDN'T HAVE MONEY TO GET MORE.: SOMETIMES TRUE

## 2022-08-31 ASSESSMENT — ENCOUNTER SYMPTOMS
DIARRHEA: 0
ABDOMINAL PAIN: 0
TROUBLE SWALLOWING: 0
RHINORRHEA: 0
WHEEZING: 0
BACK PAIN: 1
NAUSEA: 0
SHORTNESS OF BREATH: 0
SINUS PRESSURE: 0
EYE REDNESS: 0
VOMITING: 0
SORE THROAT: 0
CONSTIPATION: 0
BLOOD IN STOOL: 0
COLOR CHANGE: 0
RECTAL PAIN: 0
ABDOMINAL DISTENTION: 0
COUGH: 0
STRIDOR: 0
CHEST TIGHTNESS: 0

## 2022-08-31 ASSESSMENT — VISUAL ACUITY: OU: 1

## 2022-08-31 ASSESSMENT — SOCIAL DETERMINANTS OF HEALTH (SDOH): HOW HARD IS IT FOR YOU TO PAY FOR THE VERY BASICS LIKE FOOD, HOUSING, MEDICAL CARE, AND HEATING?: NOT HARD AT ALL
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
## 2022-08-31 NOTE — PROGRESS NOTES
Visit Information    Have you changed or started any medications since your last visit including any over-the-counter medicines, vitamins, or herbal medicines? yes - added capylyta   Have you stopped taking any of your medications? Is so, why? -  yes - lithium, latuda  Are you having any side effects from any of your medications? - no    Have you seen any other physician or provider since your last visit? yes - ED   Have you had any other diagnostic tests since your last visit?  no   Have you been seen in the emergency room and/or had an admission in a hospital since we last saw you?  yes -   Have you had your routine dental cleaning in the past 6 months?  no     Do you have an active MyChart account? If no, what is the barrier?   Yes    Patient Care Team:  Amna Willis MD as PCP - General (Family Medicine)  [facility] Willis MD [facility]  Bebo Webster MD (Dermatology)  Kimmie Hernandez MD as Surgeon (Cardiology)  Angie Dey DPM as Consulting Physician (Podiatry)  Kory Dumont MD as Resident (Family Medicine)  [address] [place] [address],[address], [facility]    Medical History Review  Past Medical, Family, and Social History reviewed and does contribute to the patient presenting condition    Health Maintenance   Topic Date Due    COVID-19 Vaccine (3 - Booster for Berton Gals series) 11/05/2021    Flu vaccine (1) 09/01/2022    Depression Monitoring  06/01/2023    Lipids  06/02/2023    Colorectal Cancer Screen  12/06/2024    DTaP/Tdap/Td vaccine (3 - Td or Tdap) 05/04/2032    Hepatitis C screen  Completed    HIV screen  Completed    Hepatitis A vaccine  Aged Out    Hepatitis B vaccine  Aged Out    Hib vaccine  Aged Out    Meningococcal (ACWY) vaccine  Aged Out    Pneumococcal 0-64 years Vaccine  Aged Out

## 2022-08-31 NOTE — PATIENT INSTRUCTIONS
New Updates for [facility] MyChart/ [facility] ([facility]) JOSE MARTIN    Thank you for choosing [facility] to give you the best care! [facility] ([facility]) is always trying to think of new ways to help their patients. We are asking all patients to try out the new digital registration that is now available through your [facility] account or the new JOSE MARTIN, [facility] ([facility]). Via the jose martin you're now able to update your personal and registration information prior to your upcoming appointment. This will save you time once you arrive at the office to check-in, not to mention your information remains safe!! Many other perks come from signing up for an account, such as:  Requesting refills  Scheduling an appointment  Completing an E-Visit  Sending a message to the office/provider  Having access to your medication list  Paying your bill/copay prior to your appointment  Scheduling your yearly mammogram  Review your test results    If you are not familiar with [facility] or the [facility] ([facility]) JOSE MARTIN, please ask one of us and we will be happy to answer any questions or help you set-up your account.       Your [facility] office,  [place]

## 2022-09-01 ENCOUNTER — HOSPITAL ENCOUNTER (OUTPATIENT)
Dept: CT IMAGING | Age: 52
Discharge: HOME | End: 2022-09-03
Payer: MEDICARE

## 2022-09-01 DIAGNOSIS — H53.8 BLURRING OF VISUAL IMAGE OF LEFT EYE: ICD-10-CM

## 2022-09-01 DIAGNOSIS — N52.9 ERECTILE DYSFUNCTION, UNSPECIFIED ERECTILE DYSFUNCTION TYPE: ICD-10-CM

## 2022-09-01 DIAGNOSIS — G44.301 INTRACTABLE POST-TRAUMATIC HEADACHE, UNSPECIFIED CHRONICITY PATTERN: ICD-10-CM

## 2022-09-01 DIAGNOSIS — S01.01XD LACERATION OF SCALP WITHOUT FOREIGN BODY, SUBSEQUENT ENCOUNTER: ICD-10-CM

## 2022-09-01 DIAGNOSIS — R20.2 NUMBNESS AND TINGLING OF LEFT SIDE OF FACE: ICD-10-CM

## 2022-09-01 DIAGNOSIS — S09.90XD TRAUMATIC INJURY OF HEAD, SUBSEQUENT ENCOUNTER: ICD-10-CM

## 2022-09-01 DIAGNOSIS — R20.0 NUMBNESS AND TINGLING OF LEFT SIDE OF FACE: ICD-10-CM

## 2022-09-01 PROCEDURE — 70450 CT HEAD/BRAIN W/O DYE: CPT

## 2022-09-02 RX ORDER — SILDENAFIL 100 MG/1
100 TABLET, FILM COATED ORAL PRN
Qty: 30 TABLET | Refills: 1 | Status: SHIPPED | OUTPATIENT
Start: 2022-09-02 | End: 2022-11-01 | Stop reason: SDUPTHER

## 2022-09-14 ENCOUNTER — TELEPHONE (OUTPATIENT)
Dept: FAMILY MEDICINE CLINIC | Age: 52
End: 2022-09-14

## 2022-09-14 NOTE — TELEPHONE ENCOUNTER
Pt called in through [facility] ([facility]) stating he has scheduled an appt with an ENT but couldn't remember the name of the doctor or the phone #. When pt was going to be transferred he had disconnected the call.

## 2022-09-23 ENCOUNTER — OFFICE VISIT (OUTPATIENT)
Dept: UROLOGY | Age: 52
End: 2022-09-23
Payer: MEDICARE

## 2022-09-23 VITALS — BODY MASS INDEX: 24.92 KG/M2 | HEIGHT: 71 IN | WEIGHT: 178 LBS

## 2022-09-23 DIAGNOSIS — R39.12 WEAK URINARY STREAM: Primary | ICD-10-CM

## 2022-09-23 DIAGNOSIS — N52.01 ERECTILE DYSFUNCTION DUE TO ARTERIAL INSUFFICIENCY: ICD-10-CM

## 2022-09-23 PROCEDURE — G8428 CUR MEDS NOT DOCUMENT: HCPCS | Performed by: UROLOGY

## 2022-09-23 PROCEDURE — G8420 CALC BMI NORM PARAMETERS: HCPCS | Performed by: UROLOGY

## 2022-09-23 PROCEDURE — 99214 OFFICE O/P EST MOD 30 MIN: CPT | Performed by: UROLOGY

## 2022-09-23 PROCEDURE — 1036F TOBACCO NON-USER: CPT | Performed by: UROLOGY

## 2022-09-23 ASSESSMENT — ENCOUNTER SYMPTOMS
NAUSEA: 0
EYE PAIN: 0
GASTROINTESTINAL NEGATIVE: 1
WHEEZING: 0
COUGH: 0
EYES NEGATIVE: 1
CONSTIPATION: 0
ABDOMINAL PAIN: 0
SHORTNESS OF BREATH: 0
VOMITING: 0
EYE REDNESS: 0
RESPIRATORY NEGATIVE: 1
DIARRHEA: 0
BACK PAIN: 0

## 2022-09-23 NOTE — PROGRESS NOTES
[address] [address]  Dept: [address] [facility] Urology Office Note - Established    Patient:  Neftali Bryson  YOB: 1970  Date: 9/23/2022    The patient is a 54 y.o. male who presents todayfor evaluation of the following problems:   Chief Complaint   Patient presents with    3 Month Follow-Up     3 month med check       HPI  He is here in follow up for a weak stream.  He was started on Flomax. He is doing better. His stream is stronger, he empties better, and it starts more readily. He uses Viagra, which works well and he tolerated them well. Summary of old records: N/A    Additional History: N/A    Procedures Today: N/A    Urinalysis today:  No results found for this visit on 09/23/22. Last several PSA's:  Lab Results   Component Value Date    PSA 0.90 06/02/2022     Last total testosterone:  Lab Results   Component Value Date    TESTOSTERONE 470 07/30/2014       AUA Symptom Score (9/23/2022):                                Last BUN and creatinine:  Lab Results   Component Value Date    BUN 13 06/02/2022     Lab Results   Component Value Date    CREATININE 0.82 06/02/2022       Additional Lab/Culture results: none    Imaging Reviewed during this Office Visit: none  (results were independently reviewed by physician and radiology report verified)    PAST MEDICAL, FAMILY AND SOCIAL HISTORY UPDATE:  Past Medical History:   Diagnosis Date    Adhesive capsulitis of right shoulder 9/3/2019    Allergic rhinitis     Anxiety 2011    Asthma     Bipolar disorder, in partial remission, most recent episode depressed ([facility] [address] 11/8/2016    Cellulitis of right lower extremity 11/22/2020    Cellulitis of right lower leg 11/21/2020    Chronic kidney disease     Constipation due to pain medication 2/12/2017    Depression with anxiety     Essential hypertension 11/13/2017    Fatty liver 11/29/15 10 MG tablet take 1 tablet by mouth three times a day if needed for muscle spasm 90 tablet 1    ASPIRIN LOW DOSE 81 MG EC tablet take 1 tablet by mouth once daily 90 tablet 3    fexofenadine (ALLEGRA ALLERGY) 180 MG tablet Take 1 tablet by mouth daily as needed (as needed for allergies) 90 tablet 3    Omega-3 Fatty Acids (FISH OIL) 1200 MG CAPS take 2 capsules by mouth twice a day **STOP NIACIN** 360 capsule 3    omeprazole (PRILOSEC) 20 MG delayed release capsule take 1 capsule by mouth every morning before breakfast 90 capsule 3    divalproex (DEPAKOTE SPRINKLE) 125 MG capsule       divalproex (DEPAKOTE ER) 500 MG extended release tablet       tamsulosin (FLOMAX) 0.4 MG capsule Take 1 capsule by mouth daily 30 capsule 11    fluticasone (FLONASE) 50 MCG/ACT nasal spray 2 sprays by Nasal route daily 16 g 3    amLODIPine (NORVASC) 10 MG tablet take 1 tablet by mouth once daily      cloNIDine (CATAPRES) 0.2 MG tablet       Lumateperone Tosylate (CAPLYTA) 42 MG CAPS Caplyta 42 mg capsule   take 1 capsule by mouth at bedtime      magnesium oxide (MAG-OX) 400 (240 Mg) MG tablet       OXcarbazepine (TRILEPTAL) 300 MG tablet       APLENZIN 174 MG TB24       Misc Natural Products ( SAW PALMETTO COMPLEX) CAPS Take 1 capsule by mouth daily OK to substitute 90 capsule 1    potassium chloride (KLOR-CON M) 10 MEQ extended release tablet take 2 tablets by mouth once daily with HYDROCHLOROTHIAZIDE 180 tablet 3    busPIRone (BUSPAR) 10 MG tablet take 1 tablet by mouth three times a day if needed for anxiety 90 tablet 3    metoprolol tartrate (LOPRESSOR) 25 MG tablet take 3 tablets by mouth twice a day 540 tablet 3    docusate sodium (COLACE) 100 MG capsule Take 1 capsule by mouth 2 times daily For constipation 180 capsule 3    pravastatin (PRAVACHOL) 80 MG tablet take 1 tablet by mouth every evening 90 tablet 3    hydroCHLOROthiazide (HYDRODIURIL) 25 MG tablet take 1 tablet by mouth every morning 90 tablet 3    clindamycin (CLEOCIN T) 1 % external solution apply topically every morning      doxepin (SINEQUAN) 75 MG capsule take 1 capsule by mouth at bedtime      lidocaine (LIDODERM) 5 % lidocaine 5 % topical patch   apply 1 patch ONTO THE SKIN once daily 12 hours ON 12 hours off      magnesium oxide (MAG-OX) 400 MG tablet       minocycline (MINOCIN;DYNACIN) 100 MG capsule take 1 capsule by mouth once daily with dinner      clonazePAM (KLONOPIN) 0.5 MG tablet take 1/2 tablet by mouth twice a day      Wheat Dextrin (BENEFIBER) POWD Take 4 g by mouth 3 times daily (with meals) 1 Can 0    vitamin D3 (CHOLECALCIFEROL) 25 MCG (1000 UT) TABS tablet take 1 tablet by mouth once daily 90 tablet 3    oxyCODONE-acetaminophen (PERCOCET) 5-325 MG per tablet Take 1 tablet by mouth every 4 hours as needed for Pain. Handicap Placard MISC by Does not apply route Can't walk greater than 200 feet. Expires in 5 years. 1 each 0    vitamin B-12 (CYANOCOBALAMIN) 1000 MCG tablet Take 1,000 mcg by mouth daily      Adapalene 0.3 % GEL   1    erythromycin with ethanol (THERAMYCIN) 2 % external solution apply topically as directed every morning  0       Latuda [lurasidone hcl], Lithium, and Seasonal  Social History     Tobacco Use   Smoking Status Former    Packs/day: 1.50    Years: 12.00    Pack years: 18.00    Types: Cigarettes    Quit date: 2002    Years since quittin.1   Smokeless Tobacco Never     (Ifpatient a smoker, smoking cessation counseling offered)    Social History     Substance and Sexual Activity   Alcohol Use No    Alcohol/week: 0.0 standard drinks       REVIEW OF SYSTEMS:  Review of Systems    Physical Exam:    There were no vitals filed for this visit. Body mass index is 24.83 kg/m². Patient is a 54 y.o. male in no acute distress and alert and oriented to person, place and time. Physical Exam  Constitutional: Patient in no acute distress. Neuro: Alert and oriented to person, place and time.   Psych: Mood normal, affect normal  Lungs: Respiratory effort is normal  Cardiovascular: Warm & Pink  Abdomen: Soft, non-tender, non-distended with no CVA,  No flank tenderness,  Or hepatosplenomegaly   Lymphatics: No palpablelymphadenopathy. Bladder non-tender and not distended. Assessment and Plan      1. Weak urinary stream    2. Erectile dysfunction due to arterial insufficiency           Plan:         He is doing much better with Flomax. He is very happy with his voiding. Continue Sildenafil as well. PSA normal.   Return in about 1 year (around 9/23/2023). Prescriptions Ordered:  No orders of the defined types were placed in this encounter. Orders Placed:  No orders of the defined types were placed in this encounter. Mak Nj MD    Agree with the ROS entered by the MA.

## 2022-09-23 NOTE — LETTER
[address] [facility]  [address] [facility] [place] [address]  Dept: [phone]  Dept Fax: [phone]        9/23/22    Patient: Eligio Leonard  YOB: 1970    Dear Gilberto Guillen MD,    I had the pleasure of seeing one of your patients, Aidan Nino today in the office today. Below are the relevant portions of my assessment and plan of care. IMPRESSION:  1. Weak urinary stream    2. Erectile dysfunction due to arterial insufficiency        PLAN:  He is doing much better with Flomax. He is very happy with his voiding. Continue Sildenafil as well. PSA normal.   Return in about 1 year (around 9/23/2023). Prescriptions Ordered:  No orders of the defined types were placed in this encounter. Orders Placed:  No orders of the defined types were placed in this encounter. Thank you for allowing me to participate in the care of this patient. I will keep you updated on this patient's follow up and I look forward to serving you and your patients again in the future.         Jeovany Grissom MD

## 2022-09-25 DIAGNOSIS — F41.9 ANXIETY: ICD-10-CM

## 2022-09-25 DIAGNOSIS — F31.75 BIPOLAR DISORDER, IN PARTIAL REMISSION, MOST RECENT EPISODE DEPRESSED (HCC): ICD-10-CM

## 2022-09-26 RX ORDER — BUSPIRONE HYDROCHLORIDE 10 MG/1
TABLET ORAL
Qty: 90 TABLET | Refills: 3 | Status: SHIPPED | OUTPATIENT
Start: 2022-09-26

## 2022-09-26 NOTE — TELEPHONE ENCOUNTER
Navid Rice is calling to request a refill on the following medication(s):    Medication Request:  Requested Prescriptions     Pending Prescriptions Disp Refills    busPIRone (BUSPAR) 10 MG tablet [Pharmacy Med Name: BUSPIRONE HCL 10 MG TABLET] 90 tablet 3     Sig: take 1 tablet by mouth three times a day if needed for anxiety       Last Visit Date (If Applicable):  1/0/2813    Next Visit Date:    10/4/2022

## 2022-10-04 ENCOUNTER — OFFICE VISIT (OUTPATIENT)
Dept: FAMILY MEDICINE CLINIC | Age: 52
End: 2022-10-04
Payer: MEDICARE

## 2022-10-04 VITALS
DIASTOLIC BLOOD PRESSURE: 70 MMHG | HEART RATE: 82 BPM | OXYGEN SATURATION: 100 % | WEIGHT: 180 LBS | TEMPERATURE: 98.3 F | SYSTOLIC BLOOD PRESSURE: 110 MMHG | HEIGHT: 71 IN | BODY MASS INDEX: 25.2 KG/M2

## 2022-10-04 DIAGNOSIS — Z23 ENCOUNTER FOR IMMUNIZATION: ICD-10-CM

## 2022-10-04 DIAGNOSIS — M75.21 BICIPITAL TENDINITIS, RIGHT SHOULDER: ICD-10-CM

## 2022-10-04 DIAGNOSIS — E78.5 HYPERLIPIDEMIA WITH TARGET LDL LESS THAN 100: ICD-10-CM

## 2022-10-04 DIAGNOSIS — I10 ESSENTIAL HYPERTENSION: Primary | ICD-10-CM

## 2022-10-04 DIAGNOSIS — K59.01 SLOW TRANSIT CONSTIPATION: ICD-10-CM

## 2022-10-04 DIAGNOSIS — F31.75 BIPOLAR DISORDER, IN PARTIAL REMISSION, MOST RECENT EPISODE DEPRESSED (HCC): ICD-10-CM

## 2022-10-04 DIAGNOSIS — H91.93 BILATERAL HEARING LOSS, UNSPECIFIED HEARING LOSS TYPE: ICD-10-CM

## 2022-10-04 PROCEDURE — 90471 IMMUNIZATION ADMIN: CPT | Performed by: FAMILY MEDICINE

## 2022-10-04 PROCEDURE — 90674 CCIIV4 VAC NO PRSV 0.5 ML IM: CPT | Performed by: FAMILY MEDICINE

## 2022-10-04 PROCEDURE — 3017F COLORECTAL CA SCREEN DOC REV: CPT | Performed by: FAMILY MEDICINE

## 2022-10-04 PROCEDURE — G8427 DOCREV CUR MEDS BY ELIG CLIN: HCPCS | Performed by: FAMILY MEDICINE

## 2022-10-04 PROCEDURE — 1036F TOBACCO NON-USER: CPT | Performed by: FAMILY MEDICINE

## 2022-10-04 PROCEDURE — 99214 OFFICE O/P EST MOD 30 MIN: CPT | Performed by: FAMILY MEDICINE

## 2022-10-04 PROCEDURE — G8419 CALC BMI OUT NRM PARAM NOF/U: HCPCS | Performed by: FAMILY MEDICINE

## 2022-10-04 PROCEDURE — G8482 FLU IMMUNIZE ORDER/ADMIN: HCPCS | Performed by: FAMILY MEDICINE

## 2022-10-04 RX ORDER — DOCUSATE SODIUM 100 MG/1
100 CAPSULE, LIQUID FILLED ORAL 2 TIMES DAILY
Qty: 180 CAPSULE | Refills: 3 | Status: SHIPPED | OUTPATIENT
Start: 2022-10-04

## 2022-10-04 RX ORDER — BACLOFEN 10 MG/1
10 TABLET ORAL 3 TIMES DAILY
COMMUNITY

## 2022-10-04 RX ORDER — POLYETHYLENE GLYCOL 3350 17 G/17G
17 POWDER, FOR SOLUTION ORAL DAILY
Qty: 1530 G | Refills: 1 | Status: SHIPPED | OUTPATIENT
Start: 2022-10-04 | End: 2022-11-03

## 2022-10-04 ASSESSMENT — ENCOUNTER SYMPTOMS
WHEEZING: 0
ABDOMINAL PAIN: 0
COUGH: 0
SHORTNESS OF BREATH: 0
VOMITING: 0
DIARRHEA: 0
CONSTIPATION: 1
CHEST TIGHTNESS: 0
ABDOMINAL DISTENTION: 0
NAUSEA: 0

## 2022-10-04 ASSESSMENT — PATIENT HEALTH QUESTIONNAIRE - PHQ9
SUM OF ALL RESPONSES TO PHQ QUESTIONS 1-9: 0
SUM OF ALL RESPONSES TO PHQ QUESTIONS 1-9: 0
1. LITTLE INTEREST OR PLEASURE IN DOING THINGS: 0
8. MOVING OR SPEAKING SO SLOWLY THAT OTHER PEOPLE COULD HAVE NOTICED. OR THE OPPOSITE, BEING SO FIGETY OR RESTLESS THAT YOU HAVE BEEN MOVING AROUND A LOT MORE THAN USUAL: 0
6. FEELING BAD ABOUT YOURSELF - OR THAT YOU ARE A FAILURE OR HAVE LET YOURSELF OR YOUR FAMILY DOWN: 0
9. THOUGHTS THAT YOU WOULD BE BETTER OFF DEAD, OR OF HURTING YOURSELF: 0
4. FEELING TIRED OR HAVING LITTLE ENERGY: 0
2. FEELING DOWN, DEPRESSED OR HOPELESS: 0
SUM OF ALL RESPONSES TO PHQ QUESTIONS 1-9: 0
3. TROUBLE FALLING OR STAYING ASLEEP: 0
SUM OF ALL RESPONSES TO PHQ9 QUESTIONS 1 & 2: 0
SUM OF ALL RESPONSES TO PHQ QUESTIONS 1-9: 0
10. IF YOU CHECKED OFF ANY PROBLEMS, HOW DIFFICULT HAVE THESE PROBLEMS MADE IT FOR YOU TO DO YOUR WORK, TAKE CARE OF THINGS AT HOME, OR GET ALONG WITH OTHER PEOPLE: 0
5. POOR APPETITE OR OVEREATING: 0
7. TROUBLE CONCENTRATING ON THINGS, SUCH AS READING THE NEWSPAPER OR WATCHING TELEVISION: 0

## 2022-10-04 NOTE — PROGRESS NOTES
Visit Information    Have you changed or started any medications since your last visit including any over-the-counter medicines, vitamins, or herbal medicines? no   Are you having any side effects from any of your medications? -  no  Have you stopped taking any of your medications? Is so, why? -  no    Have you seen any other physician or provider since your last visit? No  Have you had any other diagnostic tests since your last visit? No  Have you been seen in the emergency room and/or had an admission to a hospital since we last saw you? No  Have you had your routine dental cleaning in the past 6 months? yes -     Have you activated your man[facility] account? If not, what are your barriers?  Yes     Patient Care Team:  Verónica Calabrese MD as PCP - General (Family Medicine)  Verónica Calabrese MD as PCP - [facility]  Candis Smith MD (Dermatology)  Eliza Kumari MD as Surgeon (Cardiology)  Amanda Abarca DPM as Consulting Physician (Podiatry)  Graciela Lechuga MD as Resident (Family Medicine)  [address] [place] [address],[address], [facility]    Medical History Review  Past Medical, Family, and Social History reviewed and does contribute to the patient presenting condition    Health Maintenance   Topic Date Due    COVID-19 Vaccine (3 - Booster for Moderna series) 11/05/2021    Flu vaccine (1) 08/01/2022    Shingles vaccine (1 of 2) Never done    Depression Monitoring  06/01/2023    Lipids  06/02/2023    Colorectal Cancer Screen  12/06/2024    DTaP/Tdap/Td vaccine (3 - Td or Tdap) 05/04/2032    Hepatitis C screen  Completed    HIV screen  Completed    Hepatitis A vaccine  Aged Out    Hepatitis B vaccine  Aged Out    Hib vaccine  Aged Out    Meningococcal (ACWY) vaccine  Aged Out    Pneumococcal 0-64 years Vaccine  Aged Out

## 2022-10-04 NOTE — PROGRESS NOTES
LUDWIN Carlson (:  1970) is a 50 y.o. male,Established patient, here for evaluation of the following chief complaint(s): Hypertension, Hyperlipidemia, Shoulder Pain (Right - would like to discuss some PT for it ), and Hearing Problem (Would like a new referral to get hearing test )      ASSESSMENT/PLAN:    1. Essential hypertension  Well controlled. Continue current treatment. Amlodipine 10 Mg, clonidine 0.2 Mg, metoprolol 75 Mg twice a day, hydrochlorothiazide 25 Mg with potassium  Will recheck labs. Discussed low salt diet and BP and pulse monitoring.    -     CBC; Future  -     Comprehensive Metabolic Panel; Future  -     Uric Acid; Future  -     TSH; Future  -     Magnesium; Future  2. Hyperlipidemia with target LDL less than 100  Improved  To continue pravastatin 80 Mg and fish oil and recheck lipid panel  -     Lipid Panel; Future  3. Bicipital tendinitis, right shoulder  Worsening  Continue current pain medications, follow-up with orthopedics for a steroid injection, start physical therapy, try Bengay ultra strength  -     camphor-menthol-methyl salicylate (BENGAY ULTRA STRENGTH) 4-10-30 % CREA cream; Apply topically 3 times daily as needed for Pain, Apply externally, 3 TIMES DAILY PRN Starting Tue 10/4/2022, Disp-113 g, R-0, Normal  -     [facility]  4. Bipolar disorder, in partial remission, most recent episode depressed ([address]  Improved  Continue current treatment and follow up with psychiatrist and psychologist as scheduled. 5. Slow transit constipation  Failing to improve  -   To restart docusate sodium (COLACE) 100 MG capsule; Take 1 capsule by mouth 2 times daily For constipation, Disp-180 capsule, R-3Normal  -To start    polyethylene glycol (GLYCOLAX) 17 GM/SCOOP powder; Take 17 g by mouth daily, Disp-1530 g, R-1Normal  6.  Bilateral hearing loss, unspecified hearing loss type  Failing to improve, will need hearing test  -     External Referral To ENT  7. Encounter for immunization  -     Influenza, FLUCELVAX, (age 10 mo+), IM, PF, 0.5 mL  -     cOVID-19mRNA bival vac moderna (MODERNA COVID-19 BIVAL BOOSTER) 50 MCG/0.5ML SUSP injection; Inject 0.5 mLs into the muscle once for 1 dose, Disp-1 mL, R-0Normal      Michael received counseling on the following healthy behaviors: nutrition, exercise, and medication adherence  Reviewed prior labs and health maintenance  Discussed use, benefit, and side effects of prescribed medications. Barriers to medication compliance addressed. Patient given educational materials - see patient instructions  Was a self-tracking handout given in paper form or via [employer]? Yes  All patient questions answered. Patient voiced understanding. The patient's past medical,surgical, social, and family history as well as his current medications and allergies were reviewed as documented in today's encounter. Medications, labs, diagnostic studies, consultations and follow-up as documented in this encounter. Return in about 3 months (around 12/29/2022) for Visit type PHYSICAL, VISION screen, PHQ9. Sravanthi Fermin    Future Appointments   Date Time Provider Maribell Duran   10/13/2022  8:45 AM Shilo Reyes, PT [facility] [address] [place] [address]   12/5/2022  9:45 AM Lisandro Solano MD [facility]         SUBJECTIVE/OBJECTIVE:    Hypertension: Patient here for follow-up. He is exercising and is adherent to low salt diet. Blood pressure is well controlled at home. Cardiac symptoms fatigue. Patient denies chest pain, chest pressure/discomfort, claudication, dyspnea, exertional chest pressure/discomfort, irregular heart beat, lower extremity edema, near-syncope, orthopnea, palpitations, paroxysmal nocturnal dyspnea, syncope, and tachypnea. Cardiovascular risk factors: dyslipidemia, hypertension, and male gender. Use of agents associated with hypertension: none. History of target organ damage: none. BP controlled. Michael reports compliance with BP medications, and tolerates them well, denies side effects. BP Readings from Last 3 Encounters:   10/04/22 110/70   08/31/22 118/78   08/24/22 122/83          Pulse is Normal.    Pulse Readings from Last 3 Encounters:   10/04/22 82   08/31/22 85   08/24/22 99       Weight has been STABLE   Wt Readings from Last 3 Encounters:   10/04/22 180 lb (81.6 kg)   09/23/22 178 lb (80.7 kg)   08/31/22 178 lb 3.2 oz (80.8 kg)         Hyperlipidemia:  No new myalgias or GI upset on pravastatin (Pravachol) and OTC Fish Oil. Medication compliance: compliant all of the time. Patient is  following a low fat, low cholesterol diet. LDL is NORMAL AND NORMAL triglycerides   Lab Results   Component Value Date    LDLCHOLESTEROL 89 06/02/2022     Lab Results   Component Value Date    TRIG 126 06/02/2022    TRIG 246 (H) 02/07/2022    TRIG 168 (H) 03/17/2021       Michael complains of Right shoulder pain worsening  Intensity of pain is 4-5/10, goes up 8/10 with activities, right upper extremity feels weak  Doing home exercise stretching, heat and ice, but it's not improving   Cannot lift up completely or put on the back  Taking Flexeril qhs and Baclofen qam  Hydrocodone prn every 6 hrs, and still in pain    Had last steroid shot more than 6 months with orthopedics        Wears Right ankle brace, chronic right ankle pain    Bipolar disorder  He follows with psychiatrist Dr Merari Caba, and [place]  Reports compliance with his medications denies side effects, he feels better psychiatrically speaking      PHQ-2 Over the past 2 weeks, how often have you been bothered by any of the following problems? Little interest or pleasure in doing things: Not at all  Feeling down, depressed, or hopeless: Not at all  PHQ-2 Score: 0  PHQ-9 Over the past 2 weeks, how often have you been bothered by any of the following problems?   Trouble falling or staying asleep, or sleeping too much: Not at all  Feeling tired or having little energy: Not at all  Poor appetite or overeating: Not at all  Feeling bad about yourself - or that you are a failure or have let yourself or your family down: Not at all  Trouble concentrating on things, such as reading the newspaper or watching television: Not at all  Moving or speaking so slowly that other people could have noticed. Or the opposite - being so fidgety or restless that you have been moving around a lot more than usual: Not at all  Thoughts that you would be better off dead, or of hurting yourself in some way: Not at all  If you checked off any problems, how difficult have these problems made it for you to do your work, take care of things at home, or get along with other people?: Not difficult at all  PHQ-9 Total Score: 0  PHQ-9 Total Score: 0    PHQ Scores 10/4/2022 6/1/2022 1/19/2022 8/11/2021 7/12/2021 3/12/2021 1/28/2021   PHQ2 Score 0 0 1 2 0 0 0   PHQ9 Score 0 3 1 2 0 0 0       Reports constipation, having a bowel movement every 2 to 3 days, hard to pass  Denies blood in the stool, abdominal pain, nausea or vomiting  He notes this could be related to his pain medications  Negative Cologuard on 12/6/2021    Patient complains of hearing loss, he needs a new referral    He denies ear pain or dizziness        Prior to Visit Medications    Medication Sig Taking?  Authorizing Provider   baclofen (LIORESAL) 10 MG tablet Take 10 mg by mouth 3 times daily Yes Historical Provider, MD   busPIRone (BUSPAR) 10 MG tablet take 1 tablet by mouth three times a day if needed for anxiety Yes Williams Josue MD   sildenafil (VIAGRA) 100 MG tablet Take 1 tablet by mouth as needed for Erectile Dysfunction Yes Suhail Hodges, APRN - CNP   venlafaxine (EFFEXOR XR) 37.5 MG extended release capsule take 1 capsule by mouth every morning Yes Historical Provider, MD   rOPINIRole (REQUIP) 1 MG tablet take 1 AND 1/2 tablets by mouth at bedtime Yes Historical Provider, MD   HYDROcodone-acetaminophen ([address]) 5-325 MG per tablet  Yes Historical Provider, MD   cyclobenzaprine (FLEXERIL) 10 MG tablet take 1 tablet by mouth three times a day if needed for muscle spasm Yes Elizabet Hale MD   ASPIRIN LOW DOSE 81 MG EC tablet take 1 tablet by mouth once daily Yes Elizabet Hale MD   fexofenadine (ALLEGRA ALLERGY) 180 MG tablet Take 1 tablet by mouth daily as needed (as needed for allergies) Yes Elizabet Hale MD   Omega-3 Fatty Acids (FISH OIL) 1200 MG CAPS take 2 capsules by mouth twice a day **STOP NIACIN** Yes Elizabet Hale MD   omeprazole (PRILOSEC) 20 MG delayed release capsule take 1 capsule by mouth every morning before breakfast Yes Elizabet Hale MD   divalproex (DEPAKOTE SPRINKLE) 125 MG capsule  Yes Historical Provider, MD   divalproex (DEPAKOTE ER) 500 MG extended release tablet  Yes Historical Provider, MD   tamsulosin (FLOMAX) 0.4 MG capsule Take 1 capsule by mouth daily Yes Karley Saldana MD   fluticasone (FLONASE) 50 MCG/ACT nasal spray 2 sprays by Nasal route daily Yes Elizabet Hale MD   amLODIPine (NORVASC) 10 MG tablet take 1 tablet by mouth once daily Yes Historical Provider, MD   cloNIDine (CATAPRES) 0.2 MG tablet  Yes Historical Provider, MD   Lumateperone Tosylate (CAPLYTA) 42 MG CAPS Caplyta 42 mg capsule   take 1 capsule by mouth at bedtime Yes Historical Provider, MD   magnesium oxide (MAG-OX) 400 (240 Mg) MG tablet  Yes Historical Provider, MD   OXcarbazepine (TRILEPTAL) 300 MG tablet  Yes Historical Provider, MD   APLENZIN 174 MG TB24  Yes Historical Provider, MD   Misc Natural Products ( SAW PALMETTO COMPLEX) CAPS Take 1 capsule by mouth daily OK to substitute Yes Elizabet Hale MD   potassium chloride (KLOR-CON M) 10 MEQ extended release tablet take 2 tablets by mouth once daily with HYDROCHLOROTHIAZIDE Yes Elizabet Hale MD   metoprolol tartrate (LOPRESSOR) 25 MG tablet take 3 tablets by mouth twice a day Yes Elizabet Hale MD   docusate sodium (COLACE) 100 MG capsule Take 1 capsule by mouth 2 times daily For constipation Yes Karime Parekh MD   pravastatin (PRAVACHOL) 80 MG tablet take 1 tablet by mouth every evening Yes Karime Parekh MD   hydroCHLOROthiazide (HYDRODIURIL) 25 MG tablet take 1 tablet by mouth every morning Yes Karime Parekh MD   clindamycin (CLEOCIN T) 1 % external solution apply topically every morning Yes Historical Provider, MD   doxepin (SINEQUAN) 75 MG capsule take 1 capsule by mouth at bedtime Yes Historical Provider, MD   lidocaine (LIDODERM) 5 % lidocaine 5 % topical patch   apply 1 patch ONTO THE SKIN once daily 12 hours ON 12 hours off Yes Historical Provider, MD   magnesium oxide (MAG-OX) 400 MG tablet  Yes Historical Provider, MD   minocycline (MINOCIN;DYNACIN) 100 MG capsule take 1 capsule by mouth once daily with dinner Yes Historical Provider, MD   clonazePAM (KLONOPIN) 0.5 MG tablet take 1/2 tablet by mouth twice a day Yes Historical Provider, MD   Wheat Dextrin (BENEFIBER) POWD Take 4 g by mouth 3 times daily (with meals) Yes Karime Parekh MD   vitamin D3 (CHOLECALCIFEROL) 25 MCG (1000 UT) TABS tablet take 1 tablet by mouth once daily Yes Karime Parekh MD   oxyCODONE-acetaminophen (PERCOCET) 5-325 MG per tablet Take 1 tablet by mouth every 4 hours as needed for Pain. Yes Historical Provider, MD   Handicap Placard MISC by Does not apply route Can't walk greater than 200 feet. Expires in 5 years.  Yes Karime Parekh MD   vitamin B-12 (CYANOCOBALAMIN) 1000 MCG tablet Take 1,000 mcg by mouth daily Yes Historical Provider, MD   Adapalene 0.3 % GEL  Yes Historical Provider, MD   erythromycin with ethanol (THERAMYCIN) 2 % external solution apply topically as directed every morning Yes Historical Provider, MD   APLENZIN 348 MG TB24 take 1 tablet by mouth every morning  Patient not taking: No sig reported  Historical Provider, MD   aluminum chloride (DRYSOL) 20 % external solution Apply topically nightly. Patient not taking: No sig reported  Carlton Landers MD       Social History     Tobacco Use    Smoking status: Former     Packs/day: 1.50     Years: 12.00     Pack years: 18.00     Types: Cigarettes     Quit date: 2002     Years since quittin.2    Smokeless tobacco: Never   Vaping Use    Vaping Use: Never used   Substance Use Topics    Alcohol use: No     Alcohol/week: 0.0 standard drinks    Drug use: No         Review of Systems   Constitutional:  Positive for fatigue. Negative for activity change, appetite change, chills, diaphoresis and fever. HENT:  Positive for hearing loss. Negative for congestion, ear pain and trouble swallowing. Respiratory:  Negative for cough, chest tightness, shortness of breath and wheezing. Cardiovascular:  Negative for chest pain, palpitations and leg swelling. Gastrointestinal:  Positive for constipation. Negative for abdominal distention, abdominal pain, blood in stool, diarrhea, nausea and vomiting. Endocrine: Negative for cold intolerance, heat intolerance, polydipsia, polyphagia and polyuria. Musculoskeletal:  Positive for arthralgias. Psychiatric/Behavioral:  Negative for dysphoric mood, hallucinations, self-injury, sleep disturbance and suicidal ideas. The patient is nervous/anxious.        -vital signs stable and within normal limits   /70   Pulse 82   Temp 98.3 °F (36.8 °C)   Ht 5' 11\" (1.803 m)   Wt 180 lb (81.6 kg)   SpO2 100%   BMI 25.10 kg/m²        Physical Exam  Vitals and nursing note reviewed. Constitutional:       General: He is not in acute distress. Appearance: Normal appearance. He is well-developed. He is not diaphoretic. HENT:      Head: Normocephalic and atraumatic. Right Ear: Tympanic membrane, ear canal and external ear normal. Decreased hearing noted. Left Ear: Tympanic membrane, ear canal and external ear normal. Decreased hearing noted.       Ears:      Comments: Mild decreased hearing to finger rub bilaterally     Mouth/Throat:      Comments: I did not examine the mouth due to coronavirus pandemic and wearing masks. Eyes:      General: Lids are normal. No scleral icterus. Right eye: No discharge. Left eye: No discharge. Extraocular Movements: Extraocular movements intact. Conjunctiva/sclera: Conjunctivae normal.   Neck:      Thyroid: No thyromegaly. Cardiovascular:      Rate and Rhythm: Normal rate and regular rhythm. Heart sounds: Normal heart sounds. No murmur heard. Pulmonary:      Effort: Pulmonary effort is normal. No respiratory distress. Breath sounds: Normal breath sounds. No wheezing or rales. Chest:      Chest wall: No tenderness. Abdominal:      General: Bowel sounds are normal. There is no distension. Palpations: Abdomen is soft. There is no hepatomegaly or splenomegaly. Tenderness: There is no abdominal tenderness. Musculoskeletal:      Right shoulder: Tenderness present. Decreased range of motion. Decreased strength (mild). Cervical back: Neck supple. Spasms, tenderness and bony tenderness present. Decreased range of motion. Lumbar back: Spasms and tenderness present. No bony tenderness. Normal range of motion. Right lower leg: No edema. Left lower leg: No edema. Right ankle: Tenderness present. Decreased range of motion. Comments: Wearing right leg brace. Antalgic gait noted. Lymphadenopathy:      Cervical: No cervical adenopathy. Skin:     General: Skin is warm and dry. Capillary Refill: Capillary refill takes less than 2 seconds. Findings: No rash. Neurological:      Mental Status: He is alert and oriented to person, place, and time. Gait: Gait abnormal.      Deep Tendon Reflexes: Reflexes are normal and symmetric. Psychiatric:         Mood and Affect: Mood is anxious. Behavior: Behavior normal.         Thought Content:  Thought content normal.         Judgment: Undesirable   40-59    Borderline    >59     Desirable         LDL Cholesterol 06/02/2022 89  0 - 130 mg/dL Final    Comment:    LDL Guidelines:     <100    Desirable   100-129   Near to/above Desirable   130-159   Borderline      >159   Undesirable     Direct (measured) LDL and calculated LDL are not interchangeable tests. Chol/HDL Ratio 06/02/2022 4.2  <5 Final            Triglycerides 06/02/2022 126  <150 mg/dL Final    Comment:    Triglyceride Guidelines:     <150   Desirable   150-199  Borderline   200-499  High     >499   Very high   Based on AHA Guidelines for fasting triglyceride, October 2012. Uric Acid 06/02/2022 6.5  3.4 - 7.0 mg/dL Final    Magnesium 06/02/2022 2.2  1.6 - 2.6 mg/dL Final    Vitamin B-12 06/02/2022 680  232 - 1245 pg/mL Final    Folate 06/02/2022 15.4  >4.8 ng/mL Final    PSA 06/02/2022 0.90  <4.1 ng/mL Final    Comment: The Roche \"ECLIA\" assay is used. Results obtained with different assay methods cannot be   used interchangeably.       Color, UA 06/02/2022 Dark Yellow (A)  Yellow Final    Turbidity UA 06/02/2022 Clear  Clear Final    Glucose, Ur 06/02/2022 NEGATIVE  NEGATIVE Final    Bilirubin Urine 06/02/2022 NEGATIVE  Verified by ictotest. (A)  NEGATIVE Corrected    CORRECTED ON 06/02 AT 1038: PREVIOUSLY REPORTED AS SMALL    Ketones, Urine 06/02/2022 TRACE (A)  NEGATIVE Final    Specific Gravity, UA 06/02/2022 1.026  1.000 - 1.030 Final    Urine Hgb 06/02/2022 NEGATIVE  NEGATIVE Final    pH, UA 06/02/2022 6.5  5.0 - 8.0 Final    Protein, UA 06/02/2022 1+ (A)  NEGATIVE Final    Urobilinogen, Urine 06/02/2022 Normal  Normal Final    Nitrite, Urine 06/02/2022 NEGATIVE  NEGATIVE Final    Leukocyte Esterase, Urine 06/02/2022 NEGATIVE  NEGATIVE Final    Hemoglobin A1C 06/02/2022 5.4  4.0 - 6.0 % Final    Estimated Avg Glucose 06/02/2022 108  mg/dL Final    Comment: The ADA and AACC recommend providing the estimated average glucose result to permit better   patient understanding of their HBA1c result.      - 06/02/2022        Final    WBC, UA 06/02/2022 3 to 5  /HPF Final    RBC, UA 06/02/2022 0 TO 2  /HPF Final    Crystals, UA 06/02/2022 FEW (A)  None /HPF Final    Crystals, UA 06/02/2022 CALCIUM OXALATE (A)  None /HPF Final    Epithelial Cells UA 06/02/2022 3 to 5  /HPF Final    Bacteria, UA 06/02/2022 MODERATE (A)  None Final    Mucus, UA 06/02/2022 1+ (A)  None Final         Lab Results   Component Value Date    WBC 5.5 02/07/2022    HGB 15.5 02/07/2022    HCT 44.2 02/07/2022    MCV 90.7 02/07/2022     02/07/2022                Lab Results   Component Value Date    TSH 2.57 02/07/2022       Lab Results   Component Value Date    CHOL 150 06/02/2022    CHOL 217 (H) 02/07/2022    CHOL 176 03/17/2021     Lab Results   Component Value Date    TRIG 126 06/02/2022    TRIG 246 (H) 02/07/2022    TRIG 168 (H) 03/17/2021     Lab Results   Component Value Date    HDL 36 (L) 06/02/2022    HDL 45 02/07/2022    HDL 48 03/17/2021     Lab Results   Component Value Date    LDLCHOLESTEROL 89 06/02/2022    LDLCHOLESTEROL 123 02/07/2022    LDLCHOLESTEROL 94 03/17/2021     Lab Results   Component Value Date    CHOLHDLRATIO 4.2 06/02/2022    CHOLHDLRATIO 4.8 02/07/2022    CHOLHDLRATIO 3.7 03/17/2021         Orders Placed This Encounter   Medications    cOVID-19mRNA bival vac moderna (MODERNA COVID-19 BIVAL BOOSTER) 50 MCG/0.5ML SUSP injection     Sig: Inject 0.5 mLs into the muscle once for 1 dose     Dispense:  1 mL     Refill:  0    camphor-menthol-methyl salicylate (BENGAY ULTRA STRENGTH) 4-10-30 % CREA cream     Sig: Apply topically 3 times daily as needed for Pain     Dispense:  113 g     Refill:  0    docusate sodium (COLACE) 100 MG capsule     Sig: Take 1 capsule by mouth 2 times daily For constipation     Dispense:  180 capsule     Refill:  3    polyethylene glycol (GLYCOLAX) 17 GM/SCOOP powder     Sig: Take 17 g by mouth daily     Dispense:  1530 g     Refill:  1       Orders Placed This Encounter   Procedures    Influenza, FLUCELVAX, (age 10 mo+), IM, PF, 0.5 mL    CBC     Standing Status:   Future     Standing Expiration Date:   10/4/2023    Comprehensive Metabolic Panel     Standing Status:   Future     Standing Expiration Date:   12/1/2022    Lipid Panel     Standing Status:   Future     Standing Expiration Date:   10/4/2023     Order Specific Question:   Is Patient Fasting?/# of Hours     Answer:   8-10 Hours, water ok to drink    Uric Acid     Standing Status:   Future     Standing Expiration Date:   10/4/2023    TSH     Standing Status:   Future     Standing Expiration Date:   10/4/2023    Magnesium     Standing Status:   Future     Standing Expiration Date:   10/4/2023    [facility] Physical Therapy - [address] [place] [address]     Referral Priority:   Routine     Referral Type:   Eval and Treat     Referral Reason:   Specialty Services Required     Requested Specialty:   Physical Therapist     Number of Visits Requested:   1    External Referral To ENT     Referral Priority:   Routine     Referral Type:   Eval and Treat     Referral Reason:   Specialty Services Required     Referred to Provider:   Mary Day DO     Requested Specialty:   Otolaryngology     Number of Visits Requested:   1       Medications Discontinued During This Encounter   Medication Reason    magnesium oxide (MAG-OX) 894 MG tablet DUPLICATE    APLENZIN 873 MG TB24 Therapy completed    aluminum chloride (DRYSOL) 20 % external solution Therapy completed    docusate sodium (COLACE) 100 MG capsule REORDER         On this date 10/4/2022 I have spent 35 minutes reviewing previous notes, test results and face to face with the patient discussing the diagnosis and importance of compliance with the treatment plan as well as documenting on the day of the visit. This note was completed by using the assistance of a speech-recognition program. However, inadvertent computerized transcription errors may be present.  Although every effort was made to ensure accuracy, no guarantees can be provided that every mistake has been identified and corrected by editing. An electronic signature was used to authenticate this note.   Electronically signed by Bebeto Lewis MD on 10/11/2022 at 7:44 PM

## 2022-10-06 ENCOUNTER — TELEPHONE (OUTPATIENT)
Dept: FAMILY MEDICINE CLINIC | Age: 52
End: 2022-10-06

## 2022-10-06 NOTE — TELEPHONE ENCOUNTER
Noted. Thank you!   New referral placed, printed in med room    Future Appointments   Date Time Provider Maribell Duran   10/13/2022  8:45 AM BIANCA Stone   12/5/2022  9:45 AM [facility]

## 2022-10-06 NOTE — TELEPHONE ENCOUNTER
----- Message from [address] sent at 10/5/2022  1:27 PM EDT -----  Subject: Referral Request    Reason for referral request? Patient was referred to Dr. Johnathon Mejia and   they are not taking any new patients. He would like to go back to the   [facility] with Dr. Go Barrios or anyone in his office. Please   let patient know when referral is ready/done  Provider patient wants to be referred to(if known):     Provider Phone Number(if known):     Additional Information for Provider?   ---------------------------------------------------------------------------  --------------  [id number]    [phone]; OK to leave message on voicemail  ---------------------------------------------------------------------------  --------------

## 2022-10-09 DIAGNOSIS — E55.9 VITAMIN D DEFICIENCY: ICD-10-CM

## 2022-10-10 RX ORDER — MELATONIN
Qty: 90 TABLET | Refills: 3 | Status: SHIPPED | OUTPATIENT
Start: 2022-10-10

## 2022-10-10 NOTE — TELEPHONE ENCOUNTER
Please Approve or Refuse.   Send to Pharmacy per Pt's Request:      Next Visit Date:  12/5/2022   Last Visit Date: 10/4/2022    Hemoglobin A1C (%)   Date Value   06/02/2022 5.4   01/19/2022 5.8   03/12/2021 5.3             ( goal A1C is < 7)   BP Readings from Last 3 Encounters:   10/04/22 110/70   08/31/22 118/78   08/24/22 122/83          (goal 120/80)  BUN   Date Value Ref Range Status   06/02/2022 13 6 - 20 mg/dL Final     Creatinine   Date Value Ref Range Status   06/02/2022 0.82 0.70 - 1.20 mg/dL Final     Potassium   Date Value Ref Range Status   06/02/2022 3.8 3.7 - 5.3 mmol/L Final

## 2022-10-11 PROBLEM — M77.31 CALCANEAL SPUR, RIGHT FOOT: Status: ACTIVE | Noted: 2021-09-13

## 2022-10-11 PROBLEM — M48.02 SPINAL STENOSIS, CERVICAL REGION: Status: ACTIVE | Noted: 2022-02-07

## 2022-10-11 PROBLEM — M25.78 OSTEOPHYTE, VERTEBRAE: Status: ACTIVE | Noted: 2021-10-18

## 2022-10-11 ASSESSMENT — ENCOUNTER SYMPTOMS
TROUBLE SWALLOWING: 0
BLOOD IN STOOL: 0

## 2022-10-13 ENCOUNTER — HOSPITAL ENCOUNTER (OUTPATIENT)
Dept: PHYSICAL THERAPY | Age: 52
Setting detail: THERAPIES SERIES
End: 2022-10-13
Payer: MEDICARE

## 2022-10-17 ENCOUNTER — HOSPITAL ENCOUNTER (OUTPATIENT)
Dept: PHYSICAL THERAPY | Age: 52
Setting detail: THERAPIES SERIES
Discharge: HOME | End: 2022-10-17
Payer: MEDICARE

## 2022-10-17 PROCEDURE — 97110 THERAPEUTIC EXERCISES: CPT

## 2022-10-17 PROCEDURE — 97161 PT EVAL LOW COMPLEX 20 MIN: CPT
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
## 2022-10-23 DIAGNOSIS — M25.50 CHRONIC PAIN OF MULTIPLE JOINTS: ICD-10-CM

## 2022-10-23 DIAGNOSIS — G89.29 CHRONIC PAIN OF MULTIPLE JOINTS: ICD-10-CM

## 2022-10-24 DIAGNOSIS — M79.7 FIBROMYALGIA MUSCLE PAIN: ICD-10-CM

## 2022-10-24 RX ORDER — LIDOCAINE 50 MG/G
PATCH TOPICAL
Qty: 30 PATCH | Refills: 3 | Status: SHIPPED | OUTPATIENT
Start: 2022-10-24

## 2022-10-24 RX ORDER — CYCLOBENZAPRINE HCL 10 MG
TABLET ORAL
Qty: 90 TABLET | Refills: 1 | Status: SHIPPED | OUTPATIENT
Start: 2022-10-24

## 2022-10-24 NOTE — TELEPHONE ENCOUNTER
Please Approve or Refuse.   Send to Pharmacy per Pt's Request: [facility]      Next Visit Date:  12/5/2022   Last Visit Date: 10/4/2022    Hemoglobin A1C (%)   Date Value   06/02/2022 5.4   01/19/2022 5.8   03/12/2021 5.3             ( goal A1C is < 7)   BP Readings from Last 3 Encounters:   10/04/22 110/70   08/31/22 118/78   08/24/22 122/83          (goal 120/80)  BUN   Date Value Ref Range Status   06/02/2022 13 6 - 20 mg/dL Final     Creatinine   Date Value Ref Range Status   06/02/2022 0.82 0.70 - 1.20 mg/dL Final     Potassium   Date Value Ref Range Status   06/02/2022 3.8 3.7 - 5.3 mmol/L Final

## 2022-10-26 ENCOUNTER — HOSPITAL ENCOUNTER (OUTPATIENT)
Dept: PHYSICAL THERAPY | Age: 52
Setting detail: THERAPIES SERIES
Discharge: HOME | End: 2022-10-26
Payer: MEDICARE

## 2022-10-26 PROCEDURE — 97110 THERAPEUTIC EXERCISES: CPT

## 2022-10-26 NOTE — FLOWSHEET NOTE
[facility] Outpatient Physical Therapy   [address]   Phone: [phone]   Fax: [phone]    Physical Therapy Daily Treatment Note      Date:  10/26/2022  Patient Name:  Wesley Borrego    :  1970  MRN: [id number]  Physician: Tabitha Rao MD                 Insurance: [employer]. Auth required after 30 visits (12 VISITS REMAINING AT START OF THIS EPISODE)  Medical Diagnosis: M75.21 (ICD-10-CM) - Bicipital tendinitis, right shoulder          Rehab Codes: M25.511, M25.611, R53.1  Onset Date: Prior R RTC repair 2019. Recent exacerbation this past 2022                 Next 's appt: 22  Visit# / total visits:   Cancels/No Shows: 0/0    Subjective:    10/26: Patient arrived noting pain levels remain the same. Patient states compliance with HEP. Pain:  [x] Yes  [] No Location: R shoulder Pain Rating: (0-10 scale) 5/10  Pain altered Tx:  [] No  [] Yes  Action:  Comments:    Objective:  Modalities:   Precautions:  Exercises:  Exercise Reps/ Time Weight/ Level Completed  Today Comments   Pulleys flex/scap/abd 20x  x    Wall slides flex/scap/abd 20x Towel  x    Scap shapes at wall   Up/down/side-side/circles cw-ccw 20x ea  x    Shoulder iso metric  10x5\"  x Flex/ext/abd/ext standing pushing into towel roll     ER/IR self-resisted   Shoulder extension/rows  10x5\" red x    Shoulder horizontal abd  10x5\" red x    ER/IR walk outs  10x5\" red x    Totally gym I,T,Y, rows,extension  10x2  3\" hold   x           Other:  Specific Instructions for next treatment: Emphasis on de-sensitizing pain response, improving available AROM in R shoulder, and work on progressive strengthening/stabilization of R RTC and scapular stabilizers as appropriate      Assessment: 10/26: Patient able to complete above charted exercises.  Today's treatment focused on eccentric control with holds at end contraction to improve muscular strength and limit compensating mechanisms. Cues required to prevent compensation with UT especially at end range flexion,and abduction. [x] Progressing toward goals. [] No change. [] Other:    [] Patient would continue to benefit from skilled physical therapy services in order to: help modulate pain response, improve available AROM and maximize functional strength/stability in R shoulder in order to maximize functional tolerances through all multiplanar reaching tasks through home/community ADLs. STG: (to be met in 6 treatments)  Pt will self report worst pain no greater than 3/10 in order to better tolerate ADLs/work activities with minimal dysfunction  Pt will improve AROM in R shoulder to 170 deg elevation and 70 deg ER without increased pain in order to demonstrate ability to move/reach in all planes unrestricted at PLOF  Pt will self report ability to perform all upper body dressing tasks without compensation to show improved functional mobility through ADLs without impairment. LTG: (to be met in 12 treatments)  Pt will demonstrate improved R UE strength to 4/5 or greater in order to demonstrate improved stability/strength necessary for unrestricted ADLs/work activities  Pt will improve Apley reach in R shoulder to symmetrical values (cross body to posterior shoulder, ER to T5, IR to T8) to demonstrate improved functional reach in all planes   Pt will decrease SPADI to 20% impaired or less in order to demonstrate improved functional tolerances at PLOF with minimal restriction/dysfunction  Pt will demonstrate independence with a long term HEP for continued progress/maintenance after completion of PT    Pt. Education:  [x] Yes  [] No  [] Reviewed Prior HEP/Ed  Method of Education: [x] Verbal  [] Demo  [] Written  Comprehension of Education:  [x] Verbalizes understanding. [] Demonstrates understanding. [] Needs review. [] Demonstrates/verbalizes HEP/Ed previously given. Plan: [x] Continue per plan of care.    [] Other:      Treatment Charges: Mins Units   []  Modalities     []  Ther Exercise 45 3   []  Manual Therapy     []  Ther Activities     []  Aquatics     []  Neuromuscular     [] Vasocompression     [] Gait Training     [] Dry needling        [] 1 or 2 muscles        [] 3 or more muscles     []  Other     Total Treatment time 45 3     Time In:0801 am            Time Out: 5138 am    Electronically signed by:  Oneyda Hunt PTA

## 2022-11-01 DIAGNOSIS — N52.9 ERECTILE DYSFUNCTION, UNSPECIFIED ERECTILE DYSFUNCTION TYPE: ICD-10-CM

## 2022-11-01 RX ORDER — SILDENAFIL 100 MG/1
100 TABLET, FILM COATED ORAL PRN
Qty: 30 TABLET | Refills: 1 | Status: SHIPPED | OUTPATIENT
Start: 2022-11-01 | End: 2022-11-08 | Stop reason: SDUPTHER

## 2022-11-02 ENCOUNTER — APPOINTMENT (OUTPATIENT)
Dept: PHYSICAL THERAPY | Age: 52
End: 2022-11-02
Payer: MEDICARE

## 2022-11-03 ENCOUNTER — HOSPITAL ENCOUNTER (OUTPATIENT)
Age: 52
Discharge: HOME | End: 2022-11-03
Payer: MEDICARE

## 2022-11-03 DIAGNOSIS — I10 ESSENTIAL HYPERTENSION: Primary | ICD-10-CM

## 2022-11-03 DIAGNOSIS — E78.5 HYPERLIPIDEMIA WITH TARGET LDL LESS THAN 100: ICD-10-CM

## 2022-11-03 DIAGNOSIS — R60.0 BILATERAL LEG EDEMA: ICD-10-CM

## 2022-11-03 DIAGNOSIS — I10 ESSENTIAL HYPERTENSION: ICD-10-CM

## 2022-11-03 LAB
ALBUMIN SERPL-MCNC: 4.3 G/DL (ref 3.5–5.2)
ALP BLD-CCNC: 88 U/L (ref 40–129)
ALT SERPL-CCNC: 15 U/L (ref 5–41)
ANION GAP SERPL CALCULATED.3IONS-SCNC: 9 MMOL/L (ref 9–17)
AST SERPL-CCNC: 24 U/L
BILIRUB SERPL-MCNC: 0.7 MG/DL (ref 0.3–1.2)
BUN BLDV-MCNC: 13 MG/DL (ref 6–20)
CALCIUM SERPL-MCNC: 8.9 MG/DL (ref 8.6–10.4)
CHLORIDE BLD-SCNC: 105 MMOL/L (ref 98–107)
CHOLESTEROL/HDL RATIO: 3.8
CHOLESTEROL: 164 MG/DL
CO2: 28 MMOL/L (ref 20–31)
CREAT SERPL-MCNC: 0.79 MG/DL (ref 0.7–1.2)
GFR SERPL CREATININE-BSD FRML MDRD: >60 ML/MIN/1.73M2
GLUCOSE BLD-MCNC: 94 MG/DL (ref 70–99)
HCT VFR BLD CALC: 41.4 % (ref 41–53)
HDLC SERPL-MCNC: 43 MG/DL
HEMOGLOBIN: 14.3 G/DL (ref 13.5–17.5)
LDL CHOLESTEROL: 89 MG/DL (ref 0–130)
MAGNESIUM: 2.3 MG/DL (ref 1.6–2.6)
MCH RBC QN AUTO: 31.9 PG (ref 26–34)
MCHC RBC AUTO-ENTMCNC: 34.4 G/DL (ref 31–37)
MCV RBC AUTO: 92.7 FL (ref 80–100)
PDW BLD-RTO: 13 % (ref 11.5–14.9)
PLATELET # BLD: 176 K/UL (ref 150–450)
PMV BLD AUTO: 8.6 FL (ref 6–12)
POTASSIUM SERPL-SCNC: 3.3 MMOL/L (ref 3.7–5.3)
RBC # BLD: 4.47 M/UL (ref 4.5–5.9)
SODIUM BLD-SCNC: 142 MMOL/L (ref 135–144)
TOTAL PROTEIN: 6.6 G/DL (ref 6.4–8.3)
TRIGL SERPL-MCNC: 158 MG/DL
TSH SERPL DL<=0.05 MIU/L-ACNC: 1.69 UIU/ML (ref 0.3–5)
URIC ACID: 7.5 MG/DL (ref 3.4–7)
WBC # BLD: 6.4 K/UL (ref 3.5–11)

## 2022-11-03 PROCEDURE — 80061 LIPID PANEL: CPT

## 2022-11-03 PROCEDURE — 84443 ASSAY THYROID STIM HORMONE: CPT

## 2022-11-03 PROCEDURE — 80053 COMPREHEN METABOLIC PANEL: CPT

## 2022-11-03 PROCEDURE — 36415 COLL VENOUS BLD VENIPUNCTURE: CPT

## 2022-11-03 PROCEDURE — 84550 ASSAY OF BLOOD/URIC ACID: CPT

## 2022-11-03 PROCEDURE — 83735 ASSAY OF MAGNESIUM: CPT

## 2022-11-03 PROCEDURE — 85027 COMPLETE CBC AUTOMATED: CPT

## 2022-11-03 RX ORDER — POTASSIUM CHLORIDE 750 MG/1
30 TABLET, EXTENDED RELEASE ORAL DAILY
Qty: 270 TABLET | Refills: 3 | Status: SHIPPED | OUTPATIENT
Start: 2022-11-03

## 2022-11-03 RX ORDER — FUROSEMIDE 20 MG/1
20 TABLET ORAL EVERY MORNING
Qty: 90 TABLET | Refills: 3 | Status: SHIPPED | OUTPATIENT
Start: 2022-11-03

## 2022-11-03 NOTE — RESULT ENCOUNTER NOTE
Please notify patient: Uric acid is mildly high ,this could lead to gout, also hydrochlorothiazide can make him have high uric acid  I would like to change hydrochlorothiazide to furosemide small dosage and I will send a new prescription to the pharmacy  Also to keep a low purine diet please see below  Potassium was mildly low at 3.3, I increased the potassium from 20 to 30 and I sent a new prescription to the pharmacy  Increase triglycerides, to cut down pop, fried food, fatty foods, And not to eat more than 3 eggs per day  Otherwise labs within normal limits  continue current treatment    GOUT  DIET SHORT COUNSELING:  Stop drinking any pop or alcohol if he drinks any. Avoid eating too much high purine foods like:dried beans and dried peas, Asparagus, cauliflower, spinach, mushrooms, and green peas, seafood, Oatmeal, wheat bran, and wheat germ, Organ meats, such as liver, kidneys, sweetbreads, and brains, Meats, including vences, beef, pork, and lamb, Game meats and any other meats in large amounts, Anchovies, sardines, herring, mackerel, and scallops, Gravy, Beer, high fructose syrup and high carbs food.       Future Appointments  11/7/2022  8:00 AM    Susan Manual, PT       [facility]         [facility]  11/8/2022  1:30 PM    Ayde Renner MD     [facility]  11/9/2022  8:00 AM    Jenna Schulz PTA         [facility]         [facility]  11/14/2022 8:00 AM    Susan Manual, PT       [facility]         [facility]  11/16/2022 8:00 AM    Susan Manual, PT       [facility]         [facility]  11/21/2022 8:00 AM    Jenna Schulz PTA         [facility]         [facility]  11/23/2022 8:00 AM    Susan Barron, PT       [facility]         [facility]  12/5/2022  9:45 AM    Edmond Rangel MD          [facility]

## 2022-11-07 ENCOUNTER — HOSPITAL ENCOUNTER (OUTPATIENT)
Dept: PHYSICAL THERAPY | Age: 52
Setting detail: THERAPIES SERIES
Discharge: HOME | End: 2022-11-07
Payer: MEDICARE

## 2022-11-07 RX ORDER — LITHIUM CARBONATE 450 MG
TABLET, EXTENDED RELEASE ORAL
COMMUNITY
Start: 2022-10-23

## 2022-11-07 RX ORDER — LURASIDONE HYDROCHLORIDE 80 MG/1
TABLET, FILM COATED ORAL
COMMUNITY
Start: 2022-10-22

## 2022-11-07 RX ORDER — PRAZOSIN HYDROCHLORIDE 2 MG/1
CAPSULE ORAL
COMMUNITY
Start: 2022-10-23

## 2022-11-07 RX ORDER — LITHIUM CARBONATE 150 MG/1
CAPSULE ORAL
COMMUNITY
Start: 2022-10-22

## 2022-11-07 NOTE — PROGRESS NOTES
Visit Information    Have you changed or started any medications since your last visit including any over-the-counter medicines, vitamins, or herbal medicines? no   Are you having any side effects from any of your medications? -  no  Have you stopped taking any of your medications? Is so, why? -  no    Have you seen any other physician or provider since your last visit? No  Have you had any other diagnostic tests since your last visit? No  Have you been seen in the emergency room and/or had an admission to a hospital since we last saw you? No  Have you had your routine dental cleaning in the past 6 months? no    Have you activated your [email or web address] account? If not, what are your barriers?  Yes     Patient Care Team:  Sumi Garrido MD as PCP - General (Family Medicine)  Sumi Garrido MD as PCP - [facility]  Trey Cormier MD (Dermatology)  Yoandy Cruz MD as Surgeon (Cardiology)  Merline Rivera DPM as Consulting Physician (Podiatry)  Terri Moe MD as Resident (Family Medicine)  DENNIS Neal - KHANG Caruso MD as Consulting Physician (Urology)    Medical History Review  Past Medical, Family, and Social History reviewed and does contribute to the patient presenting condition    Health Maintenance   Topic Date Due    COVID-19 Vaccine (3 - Booster for Moderna series) 07/31/2021    Shingles vaccine (1 of 2) Never done    Depression Monitoring  10/04/2023    Lipids  11/03/2023    Colorectal Cancer Screen  12/06/2024    DTaP/Tdap/Td vaccine (3 - Td or Tdap) 05/04/2032    Flu vaccine  Completed    Hepatitis C screen  Completed    HIV screen  Completed    Hepatitis A vaccine  Aged Out    Hib vaccine  Aged Out    Meningococcal (ACWY) vaccine  Aged Out    Pneumococcal 0-64 years Vaccine  Aged Out

## 2022-11-07 NOTE — FLOWSHEET NOTE
[x] [facility]) [facility] & Therapy  [address]  [place]: [phone]   F: [phone]     Physical Therapy Cancel/No Show note    Date: 2022  Patient: Deng Sanderson  : 1970  MRN: [id number]    Visit Count:   Cancels/No Shows to date:     For today's appointment patient:    [x]  Cancelled    [] Rescheduled appointment    [] No-show     Reason given by patient:    []  Patient ill    [x]  Conflicting appointment    [] No transportation      [] Conflict with work    [] No reason given    [] Weather related    [] COVID-19    [] Other:      Comments:        [x] Next appointment was confirmed    Electronically signed by: Negrito Angel PT

## 2022-11-08 ENCOUNTER — TELEMEDICINE (OUTPATIENT)
Dept: FAMILY MEDICINE CLINIC | Age: 52
End: 2022-11-08
Payer: MEDICARE

## 2022-11-08 DIAGNOSIS — N52.9 ERECTILE DYSFUNCTION, UNSPECIFIED ERECTILE DYSFUNCTION TYPE: ICD-10-CM

## 2022-11-08 DIAGNOSIS — M77.8 TENDINITIS OF LEFT WRIST: Primary | ICD-10-CM

## 2022-11-08 DIAGNOSIS — M10.9 ACUTE GOUT OF LEFT WRIST, UNSPECIFIED CAUSE: ICD-10-CM

## 2022-11-08 PROCEDURE — 99213 OFFICE O/P EST LOW 20 MIN: CPT | Performed by: FAMILY MEDICINE

## 2022-11-08 PROCEDURE — 3017F COLORECTAL CA SCREEN DOC REV: CPT | Performed by: FAMILY MEDICINE

## 2022-11-08 PROCEDURE — G8427 DOCREV CUR MEDS BY ELIG CLIN: HCPCS | Performed by: FAMILY MEDICINE

## 2022-11-08 RX ORDER — COLCHICINE 0.6 MG/1
TABLET ORAL
Qty: 13 TABLET | Refills: 0 | Status: SHIPPED | OUTPATIENT
Start: 2022-11-08

## 2022-11-08 RX ORDER — SILDENAFIL 100 MG/1
100 TABLET, FILM COATED ORAL PRN
Qty: 30 TABLET | Refills: 1 | Status: SHIPPED | OUTPATIENT
Start: 2022-11-08

## 2022-11-08 RX ORDER — LANOLIN ALCOHOL/MO/W.PET/CERES
400 CREAM (GRAM) TOPICAL DAILY
Qty: 30 TABLET | Refills: 4 | Status: SHIPPED | OUTPATIENT
Start: 2022-11-08

## 2022-11-08 RX ORDER — ALLOPURINOL 100 MG/1
100 TABLET ORAL DAILY
Qty: 90 TABLET | Refills: 1 | Status: SHIPPED | OUTPATIENT
Start: 2022-11-08

## 2022-11-08 RX ORDER — SILDENAFIL 100 MG/1
100 TABLET, FILM COATED ORAL PRN
Qty: 30 TABLET | Refills: 1 | Status: SHIPPED | OUTPATIENT
Start: 2022-11-08 | End: 2022-11-08

## 2022-11-08 ASSESSMENT — ENCOUNTER SYMPTOMS
COUGH: 0
SHORTNESS OF BREATH: 0

## 2022-11-08 NOTE — PATIENT INSTRUCTIONS
Patient Education        Gout: Care Instructions  Overview     Gout is a form of arthritis caused by a buildup of uric acid crystals in a joint. It causes sudden attacks of pain, swelling, redness, and stiffness, usually in one joint, especially the big toe. Gout usually comes on without a cause. But it can be brought on by drinking alcohol (especially beer), eating or drinking things made with high-fructose corn syrup, or eating seafood or red meat. Taking certain medicines, such as diuretics, can also trigger an attack of gout. Taking your medicines as prescribed and following up with your doctor regularly can help you avoid gout attacks in the future. Follow-up care is a key part of your treatment and safety. Be sure to make and go to all appointments, and call your doctor if you are having problems. It's also a good idea to know your test results and keep a list of the medicines you take. How can you care for yourself at home? If the joint is swollen, put ice or a cold pack on the area for 10 to 20 minutes at a time. Put a thin cloth between the ice and your skin. Prop up the sore limb on a pillow when you ice it or anytime you sit or lie down during the next 3 days. Try to keep it above the level of your heart. This can help reduce swelling. Rest sore joints. Avoid activities that put weight or strain on the joints for a few days. Take short rest breaks from your regular activities during the day. Take your medicines exactly as prescribed. Call your doctor if you think you are having a problem with your medicine. Take pain medicines exactly as directed. If the doctor gave you a prescription medicine for pain, take it as prescribed. If you are not taking a prescription pain medicine, ask your doctor if you can take an over-the-counter medicine. Eat less seafood and red meat. Avoid foods or drinks that are made with high-fructose corn syrup. Check with your doctor before drinking alcohol.   Losing weight, if you are overweight, may help reduce attacks of gout. But do not go on a diet that causes rapid weight loss. Losing a lot of weight in a short amount of time can cause a gout attack. When should you call for help? Call your doctor now or seek immediate medical care if:    You have a fever. The joint is so painful you cannot use it. You have sudden, unexplained swelling, redness, warmth, or severe pain in one or more joints. Watch closely for changes in your health, and be sure to contact your doctor if:    You have joint pain. Your symptoms get worse or are not improving after 2 or 3 days. Where can you learn more? Go to [email or web address]. org and sign in to your [employer] account. Enter E923 in the eCommHub box to learn more about \"Gout: Care Instructions. \"     If you do not have an account, please click on the \"Sign Up Now\" link. Current as of: December 20, 2021               Content Version: 13.4  © 2006-2022 Healthwise, Incorporated. Care instructions adapted under license by [facility] ([facility]). If you have questions about a medical condition or this instruction, always ask your healthcare professional. [place] [address] any warranty or liability for your use of this information.

## 2022-11-08 NOTE — PROGRESS NOTES
2022    TELEHEALTH EVALUATION -- Audio/Visual (During HXONG-35 public health emergency)      Kevin Cantu (:  1970) is a 50 y.o. male,Established patient, here for evaluation of the following chief complaint(s): Hand Pain (Bilateral left is worse wrist area goes up to his arm x 2 weeks )        ASSESSMENT/PLAN:    1. Tendinitis of left wrist  Failing to resolve. Likely inflammatory arthritis    -     diclofenac sodium (VOLTAREN) 1 % GEL; Apply 2 g topically 4 times daily as needed for Pain, Topical, 4 TIMES DAILY PRN Starting 2022, Disp-100 g, R-0, Normal  -     allopurinol (ZYLOPRIM) 100 MG tablet; Take 1 tablet by mouth daily, Disp-90 tablet, R-1Normal  2. Acute gout of left wrist, unspecified cause  -   start  colchicine (COLCRYS) 0.6 MG tablet; Take 2 tablets now, then 1 tablet one hour later. Wait 12 hours then start 1 tablet bid for 5 days. DO NOT TAKE WITH PRAVASTATIN, Disp-13 tablet, R-0Normal     start allopurinol (ZYLOPRIM) 100 MG tablet; Take 1 tablet by mouth daily, Disp-90 tablet, R-1Normal  Stop pop  3. Erectile dysfunction, unspecified erectile dysfunction type  Improved with meds  -     sildenafil (VIAGRA) 100 MG tablet; Take 1 tablet by mouth as needed for Erectile Dysfunction, Disp-30 tablet, R-1Normal      Michael received counseling on the following healthy behaviors: nutrition, exercise, and medication adherence  Reviewed prior labs and health maintenance  Discussed use, benefit, and side effects of prescribed medications. Barriers to medication compliance addressed. Patient given educational materials - see patient instructions  All patient questions answered. Patient voiced understanding. The patient's past medical,surgical, social, and family history as well as his current medications and allergies were reviewed as documented in today's encounter.     Medications, labs, diagnostic studies, consultations and follow-up as documented in this encounter. Return for KEEP APPT. Data Unavailable    Future Appointments   Date Time Provider Maribell Duran   2022  8:00 AM Shaji Ruben, PTA [facility]   2022  8:00 AM Ronne Ped, PT [facility]   2022  8:00 AM Ronne Ped, PT [facility]   2022  8:00 AM Shaji Ruben, PTA [facility]   2022  8:00 AM Ronne Ped, PT [facility]   2022  9:45 AM [facility]         SUBJECTIVE/OBJECTIVE:  Grace Pandya (:  1970) has requested an audio/video evaluation for the following concern(s):Hand Pain (Bilateral left is worse wrist area goes up to his arm x 2 weeks )    Michael  reports bilateral wrist pain, but left wrist worse  Left wrist pain and he shows me location is on the radial side, has difficulty making a fist, sometimes pain is sharp, it is located over the base of the thumb, and between thumb and index radiating to the inside of the forearm ( on the radial aspect of the forearm)  Denies overdoing stuff  Had swelling but improved now  He does eat shrimp but not every day. He does drink pop, root beer and Sprite  He is not currently on allopurinol  He is Ambidextrus  His prior uric acid was high  Lab Results   Component Value Date    URICACID 7.5 (H) 2022     Pain in his present for 2 weeks, worse in the left wrist  Pain is constant    Lab Results   Component Value Date    [id number] 680 2022     Erectile Dysfunction improved with Viagra, Tolerates medications well, denies side effects. Review of Systems   Constitutional:  Negative for activity change, appetite change, chills, diaphoresis, fatigue and fever. Respiratory:  Negative for cough and shortness of breath. Cardiovascular:  Negative for chest pain. Musculoskeletal:  Positive for arthralgias (WRISTS) and joint swelling (WRISTS). Prior to Visit Medications    Medication Sig Taking? Authorizing Provider   prazosin (MINIPRESS) 2 MG capsule take 1 capsule by mouth at bedtime Yes Historical Provider, MD   LATUDA 80 MG TABS tablet take 1/2 tablet by mouth twice a day Yes Historical Provider, MD   lithium (ESKALITH) 450 MG extended release tablet take 1 capsule by mouth twice a day Yes Historical Provider, MD   lithium 150 MG capsule take 1 capsule by mouth at bedtime Yes Historical Provider, MD   furosemide (LASIX) 20 MG tablet Take 1 tablet by mouth every morning Stop hydrochlorothiazide. Take with potassium Yes Cielo Sethi MD   potassium chloride (KLOR-CON M) 10 MEQ extended release tablet Take 3 tablets by mouth daily Take with furosemide.   Dose increased from 2 tablets to 3 tablets, on 11/3/2022 Yes Cielo Sethi MD   sildenafil (VIAGRA) 100 MG tablet Take 1 tablet by mouth as needed for Erectile Dysfunction Yes Cielo Sethi MD   cyclobenzaprine (FLEXERIL) 10 MG tablet take 1 tablet by mouth three times a day if needed for muscle spasm Yes Cielo Sethi MD   lidocaine (LIDODERM) 5 % apply 1 patch ONTO THE SKIN once daily 12 hours ON 12 hours off Yes Cielo Sethi MD   vitamin D3 (CHOLECALCIFEROL) 25 MCG (1000 UT) TABS tablet take 1 tablet by mouth once daily Yes Cielo Sethi MD   baclofen (LIORESAL) 10 MG tablet Take 10 mg by mouth 3 times daily Yes Historical Provider, MD   camphor-menthol-methyl salicylate (BENGAY ULTRA STRENGTH) 4-10-30 % CREA cream Apply topically 3 times daily as needed for Pain Yes Cielo Sethi MD   docusate sodium (COLACE) 100 MG capsule Take 1 capsule by mouth 2 times daily For constipation Yes Cielo Sethi MD   busPIRone (BUSPAR) 10 MG tablet take 1 tablet by mouth three times a day if needed for anxiety Yes Cielo Sethi MD   rOPINIRole (REQUIP) 1 MG tablet take 1 AND 1/2 tablets by mouth at bedtime Yes Historical Provider, MD   HYDROcodone-acetaminophen ([facility]) 5-325 MG per tablet  Yes Historical Provider, MD   ASPIRIN LOW DOSE 81 MG EC tablet take 1 tablet by mouth once daily Yes Antionette Rhodes MD   fexofenadine (ALLEGRA ALLERGY) 180 MG tablet Take 1 tablet by mouth daily as needed (as needed for allergies) Yes Antionette Rhodes MD   Omega-3 Fatty Acids (FISH OIL) 1200 MG CAPS take 2 capsules by mouth twice a day **STOP NIACIN** Yes Antionette Rhodes MD   omeprazole (PRILOSEC) 20 MG delayed release capsule take 1 capsule by mouth every morning before breakfast Yes Antionette Rhodes MD   divalproex (DEPAKOTE ER) 500 MG extended release tablet  Yes Historical Provider, MD   tamsulosin (FLOMAX) 0.4 MG capsule Take 1 capsule by mouth daily Yes Jaylon Carter MD   fluticasone (FLONASE) 50 MCG/ACT nasal spray 2 sprays by Nasal route daily Yes Antionette Rhodes MD   amLODIPine (NORVASC) 10 MG tablet take 1 tablet by mouth once daily Yes Historical Provider, MD   Lumateperone Tosylate (CAPLYTA) 42 MG CAPS Caplyta 42 mg capsule   take 1 capsule by mouth at bedtime Yes Historical Provider, MD   magnesium oxide (MAG-OX) 400 (240 Mg) MG tablet  Yes Historical Provider, MD   OXcarbazepine (TRILEPTAL) 300 MG tablet  Yes Historical Provider, MD   Misc Natural Products (SM SAW PALMETTO COMPLEX) CAPS Take 1 capsule by mouth daily OK to substitute Yes Antionette Rhodes MD   metoprolol tartrate (LOPRESSOR) 25 MG tablet take 3 tablets by mouth twice a day Yes Antionette Rhodes MD   pravastatin (PRAVACHOL) 80 MG tablet take 1 tablet by mouth every evening Yes Antionette Rhodes MD   clindamycin (CLEOCIN T) 1 % external solution apply topically every morning Yes Historical Provider, MD   doxepin (SINEQUAN) 75 MG capsule take 1 capsule by mouth at bedtime Yes Historical Provider, MD   minocycline (MINOCIN;DYNACIN) 100 MG capsule take 1 capsule by mouth once daily with dinner Yes Historical Provider, MD   clonazePAM (KLONOPIN) 0.5 MG tablet take 1/2 tablet by mouth twice a day Yes Historical Provider, MD   Wheat Dextrin (BENEFIBER) POWD Take 4 g by mouth 3 times daily (with meals) Yes Sumi Garrido MD   Handicap Placard MISC by Does not apply route Can't walk greater than 200 feet. Expires in 5 years. Yes Sumi Garrido MD   Adapalene 0.3 % GEL  Yes Historical Provider, MD   erythromycin with ethanol (THERAMYCIN) 2 % external solution apply topically as directed every morning Yes Historical Provider, MD   venlafaxine (EFFEXOR XR) 37.5 MG extended release capsule take 1 capsule by mouth every morning  Patient not taking: Reported on 2022  Historical Provider, MD   divalproex (DEPAKOTE SPRINKLE) 125 MG capsule   Historical Provider, MD   cloNIDine (CATAPRES) 0.2 MG tablet   Historical Provider, MD   APLENZIN 174 MG TB24   Historical Provider, MD   oxyCODONE-acetaminophen (PERCOCET) 5-325 MG per tablet Take 1 tablet by mouth every 4 hours as needed for Pain.   Patient not taking: Reported on 2022  Historical Provider, MD   vitamin B-12 (CYANOCOBALAMIN) 1000 MCG tablet Take 1,000 mcg by mouth daily  Patient not taking: Reported on 2022  Historical Provider, MD       Social History     Tobacco Use    Smoking status: Former     Packs/day: 1.50     Years: 12.00     Pack years: 18.00     Types: Cigarettes     Quit date: 2002     Years since quittin.2    Smokeless tobacco: Never   Vaping Use    Vaping Use: Never used   Substance Use Topics    Alcohol use: No     Alcohol/week: 0.0 standard drinks    Drug use: No          PHYSICAL EXAMINATION:    Vital Signs: (As obtained by patient/caregiver or practitioner observation)  -vital signs stable and within normal limits  Patient-Reported Vitals 2022   Patient-Reported Weight 180   Patient-Reported Height 511   Patient-Reported Systolic 178   Patient-Reported Diastolic 71   Patient-Reported Pulse 98   Patient-Reported Temperature 97.3           Intensity of pain is:5/10       Constitutional: [x] Appears well-developed and well-nourished [x] No apparent distress      [] Abnormal      Mental status  [x] Alert and awake  [x] Oriented to person/place/time [x]Able to follow commands      Eyes:  EOM    [x]  Normal  [] Abnormal-  Sclera  [x]  Normal  [] Abnormal -         Discharge [x]  None visible  [] Abnormal -    HENT:   [x] Normocephalic, atraumatic. [] Abnormal   [x] Mouth/Throat: Mucous membranes are moist.     External Ears [x] Normal  [] Abnormal-     Neck: [x] No visualized mass     Pulmonary/Chest: [x] Respiratory effort normal.  [x] No visualized signs of difficulty breathing or respiratory distress        [] Abnormal        Musculoskeletal:   [x] Normal gait with no signs of ataxia         [x] Normal range of motion of neck        [x] Abnormal- left wrist swelling mild, mild decreased range of motion , flexion and extension, antalgic       Neurological:        [x] No Facial Asymmetry (Cranial nerve 7 motor function) (limited exam to video visit)          [x] No gaze palsy        [] Abnormal-            Skin:        [x] No significant exanthematous lesions or discoloration noted on facial skin         [] Abnormal-            Psychiatric:      [x] No Hallucinations       [x]Mood is normal      [x]Behavior is normal      [x]Judgment is normal      [x]Thought content is normal       [] Abnormal-     Other pertinent observable physical exam findings- none    Due to this being a TeleHealth encounter, evaluation of the following organ systems is limited: Vitals/Constitutional/EENT/Resp/CV/GI//MS/Neuro/Skin/Heme-Lymph-Imm.     Orders Placed This Encounter   Medications    DISCONTD: sildenafil (VIAGRA) 100 MG tablet     Sig: Take 1 tablet by mouth as needed for Erectile Dysfunction     Dispense:  30 tablet     Refill:  1    magnesium oxide (MAG-OX) 400 (240 Mg) MG tablet     Sig: Take 1 tablet by mouth daily     Dispense:  30 tablet     Refill:  4    sildenafil (VIAGRA) 100 MG tablet     Sig: Take 1 tablet by mouth as needed for Erectile Dysfunction     Dispense: 30 tablet     Refill:  1    diclofenac sodium (VOLTAREN) 1 % GEL     Sig: Apply 2 g topically 4 times daily as needed for Pain     Dispense:  100 g     Refill:  0    allopurinol (ZYLOPRIM) 100 MG tablet     Sig: Take 1 tablet by mouth daily     Dispense:  90 tablet     Refill:  1    colchicine (COLCRYS) 0.6 MG tablet     Sig: Take 2 tablets now, then 1 tablet one hour later. Wait 12 hours then start 1 tablet bid for 5 days. DO NOT TAKE WITH PRAVASTATIN     Dispense:  13 tablet     Refill:  0       No orders of the defined types were placed in this encounter. Medications Discontinued During This Encounter   Medication Reason    magnesium oxide (MAG-OX) 400 (240 Mg) MG tablet REORDER    sildenafil (VIAGRA) 100 MG tablet REORDER    sildenafil (VIAGRA) 100 MG tablet     venlafaxine (EFFEXOR XR) 37.5 MG extended release capsule DISCONTINUED BY ANOTHER CLINICIAN    divalproex (DEPAKOTE SPRINKLE) 125 MG capsule DISCONTINUED BY ANOTHER CLINICIAN    cloNIDine (CATAPRES) 0.2 MG tablet DISCONTINUED BY ANOTHER CLINICIAN    APLENZIN 174 MG TB24 DISCONTINUED BY ANOTHER CLINICIAN    oxyCODONE-acetaminophen (PERCOCET) 5-325 MG per tablet Alternate therapy    vitamin B-12 (CYANOCOBALAMIN) 1000 MCG tablet ERROR              Lamount Noon, was evaluated through a synchronous (real-time) audio-video encounter. The patient (or guardian if applicable) is aware that this is a billable service, which includes applicable co-pays. The virtual visit was conducted with patient's (and/or legal guardian consent). Verbal consent to proceed has been obtained within the past 12 months. The visit was conducted pursuant to the emergency declaration under the [facility], [address] [place] [address] authority and the [employer] and [employer] General Act. Patient identification was verified    Patient was alone   Provider was located at primary practice location.     The patient was located at home, in a state where the provider was licensed to provide care. On this date 11/8/2022 I have spent 25 minutes reviewing previous notes, test results and face to face with the patient discussing the diagnosis and importance of compliance with the treatment plan as well as documenting on the day of the visit. --Lady Sheikh MD on 11/8/2022 at 11:02 PM    An electronic signature was used to authenticate this note.

## 2022-11-14 ENCOUNTER — HOSPITAL ENCOUNTER (OUTPATIENT)
Dept: PHYSICAL THERAPY | Age: 52
Setting detail: THERAPIES SERIES
Discharge: HOME | End: 2022-11-14
Payer: MEDICARE

## 2022-11-14 PROCEDURE — 97110 THERAPEUTIC EXERCISES: CPT

## 2022-11-14 PROCEDURE — 97140 MANUAL THERAPY 1/> REGIONS: CPT

## 2022-11-14 NOTE — FLOWSHEET NOTE
[facility]   [address]   Phone: [phone]   Fax: [phone]    Physical Therapy Daily Treatment Note      Date:  2022  Patient Name:  Karissa Spaulding    :  1970  MRN: [id number]  Physician: Lionel Rodriges MD                  Insurance: [employer]. Auth required after 30 visits (12 VISITS REMAINING AT START OF THIS EPISODE)  Medical Diagnosis: M75.21 (ICD-10-CM) - Bicipital tendinitis, right shoulder          Rehab Codes: M25.511, M25.611, R53.1  Onset Date: Prior R RTC repair 2019. Recent exacerbation this past 2022                 Next 's appt: 22  Visit# / total visits: 3/12    Cancels/No Shows:     Subjective:    : Pt states that he missed his appts last week secondary to scheduling conflicts and having a lot going on. Otherwise, reports good compliance with home program. States that he has good and bad days with his R shoulder with no clear pattern. Reports that some days he has more pain in the morning and some days he has more pain at the end of the day. 4/10 pain currently in R shoulder and upper trap region. Pain:  [x] Yes  [] No Location: R shoulder Pain Rating: (0-10 scale) 4/10  Pain altered Tx:  [] No  [] Yes  Action:  Comments:    Objective:  Modalities:   Precautions: None  Exercises:  Exercise Reps/ Time Weight/ Level Completed  Today Comments   Manual 8'  x MFR to R upper traps. Grade II/III posterior and inferior GHJ glides with short axis traction   Supine alternating isometrics and RS 1' x 2  x 1 set AI at 90 deg, mild perturbations at elbow in all planes.  1 set RS at 0 deg, mild perturbations at wrist challenging ER/IR   Supine shoulder flexion AAROM with dowel 15x  x    Supine shoulder abd AAROM with dowel 15x  x    Supine punches 15 reps x 2  x R only   Sidelying ER 15 reps x 2  x Partial range as tolerated   Pulleys flex/scap/abd 20x  x    Wall slides flex/scap/abd 20x Towel      Scap shapes at wall   Up/down/side-side/circles cw-ccw 20x ea      Shoulder isometric  10x5\"   Flex/ext/abd/ext standing pushing into towel roll     ER/IR self-resisted   Shoulder extension/rows  10x5\" red     Shoulder horizontal abd  10x5\" red     ER/IR walk outs  10x5\" red x    Total gym I,T,Y, rows,extension  10x2  3\" hold              Other:  Specific Instructions for next treatment: Emphasis on de-sensitizing pain response, improving available AROM in R shoulder, and work on progressive strengthening/stabilization of R RTC and scapular stabilizers as appropriate      Assessment:   11/14: Pt continues to report moderate to high pain levels in R shoulder with active reaching, and indicated a significant amount of tightness and discomfort in R upper trap region likely due to compensatory movement strategies. Started with brief manual to address pain and followed up with with gentle AAROM and RTC/scapular stabilization in pain free range. Added SL ER with good effort but notable fatigue and pt having to modify to a reduced range. Will continue to progress as able/tolerated. [x] Progressing toward goals. [] No change. [] Other:    [x] Patient would continue to benefit from skilled physical therapy services in order to: help modulate pain response, improve available AROM and maximize functional strength/stability in R shoulder in order to maximize functional tolerances through all multiplanar reaching tasks through home/community ADLs.     STG: (to be met in 6 treatments)  Pt will self report worst pain no greater than 3/10 in order to better tolerate ADLs/work activities with minimal dysfunction  Pt will improve AROM in R shoulder to 170 deg elevation and 70 deg ER without increased pain in order to demonstrate ability to move/reach in all planes unrestricted at PLOF  Pt will self report ability to perform all upper body dressing tasks without compensation to show improved functional mobility through ADLs without impairment. LTG: (to be met in 12 treatments)  Pt will demonstrate improved R UE strength to 4/5 or greater in order to demonstrate improved stability/strength necessary for unrestricted ADLs/work activities  Pt will improve Apley reach in R shoulder to symmetrical values (cross body to posterior shoulder, ER to T5, IR to T8) to demonstrate improved functional reach in all planes   Pt will decrease SPADI to 20% impaired or less in order to demonstrate improved functional tolerances at PLOF with minimal restriction/dysfunction  Pt will demonstrate independence with a long term HEP for continued progress/maintenance after completion of PT    Pt. Education:  [x] Yes  [] No  [] Reviewed Prior HEP/Ed  Method of Education: [x] Verbal  [x] Demo  [] Written  Comprehension of Education:  [x] Verbalizes understanding. [] Demonstrates understanding. [] Needs review. [] Demonstrates/verbalizes HEP/Ed previously given. Plan: [x] Continue per plan of care.    [] Other:      Treatment Charges: Mins Units   []  Modalities     [x]  Ther Exercise 30 2   [x]  Manual Therapy 8 1   []  Ther Activities     []  Aquatics     []  Neuromuscular     [] Vasocompression     [] Gait Training     [] Dry needling        [] 1 or 2 muscles        [] 3 or more muscles     []  Other     Total Treatment time 38 3     Time In: 8:03am            Time Out: 8:41am    Electronically signed by:  Lina Sarkar PT

## 2022-11-16 ENCOUNTER — HOSPITAL ENCOUNTER (OUTPATIENT)
Dept: PHYSICAL THERAPY | Age: 52
Setting detail: THERAPIES SERIES
Discharge: HOME | End: 2022-11-16
Payer: MEDICARE

## 2022-11-16 PROCEDURE — 97110 THERAPEUTIC EXERCISES: CPT

## 2022-11-16 PROCEDURE — 97140 MANUAL THERAPY 1/> REGIONS: CPT

## 2022-11-16 NOTE — FLOWSHEET NOTE
[facility]   [address]   Phone: [phone]   Fax: [phone]    Physical Therapy Daily Treatment Note      Date:  2022  Patient Name:  Kanwal Ceballos    :  1970  MRN: [id number]  Physician: Jun Guzman MD                  Insurance: [employer]. Auth required after 30 visits (12 VISITS REMAINING AT START OF THIS EPISODE)  Medical Diagnosis: M75.21 (ICD-10-CM) - Bicipital tendinitis, right shoulder          Rehab Codes: M25.511, M25.611, R53.1  Onset Date: Prior R RTC repair 2019. Recent exacerbation this past 2022                 Next 's appt: 22  Visit# / total visits:     Cancels/No Shows:     Subjective:    : Pt states that he was sore at the undersurface of his R arm after his last visit but otherwise didn't feel too bad. Has been trying to be more mindful of his posture throughout the day. Does feel like the manual and exercises helped last visit. Pain:  [x] Yes  [] No Location: R shoulder Pain Rating: (0-10 scale) 3/10  Pain altered Tx:  [] No  [] Yes  Action:  Comments:    Objective:  Modalities:   Precautions: None  Exercises:  Exercise Reps/ Time Weight/ Level Completed  Today Comments   Manual 10'  x MFR to R upper traps. Grade II/III posterior and inferior GHJ glides with short axis traction   Supine alternating isometrics and RS 1' x 2  x 1 set AI at 90 deg, mild perturbations at elbow in all planes.  1 set RS at 0 deg, mild perturbations at wrist challenging ER/IR   Supine shoulder flexion AAROM with weighted stick 15x 2# x    Supine shoulder abd AAROM with weighted stick 15x 2# x    Supine punches 15 reps x 2  x R only   Sidelying ER 15 reps x 2  x Partial range as tolerated   Pulleys flex/scap/abd 20x      Wall slides flex/scap 20x Towel  x    Ball on wall   Up/down/side-side/circles cw-ccw 20x ea 2# x    Shoulder isometric  10x5\"   Flex/ext/abd/ext standing pushing into towel roll     ER/IR self-resisted   Shoulder extension/rows  10x5\" red     Shoulder horizontal abd  10x5\" red     ER/IR walk outs  10x5\" red     Total gym I,T,Y, rows,extension  10x2  3\" hold              Other:  Specific Instructions for next treatment: Emphasis on de-sensitizing pain response, improving available AROM in R shoulder, and work on progressive strengthening/stabilization of R RTC and scapular stabilizers as appropriate      Assessment:   11/16: Pt continues to struggle with pain with active elevation, especially with controlled eccentric descent but overall AROM seems to be gradually improving. Pt expressed that he felt more benefit with standing wall slides versus pulleys and demonstrated ability to active elevate up to approximately 140-150 deg today on wall. Not yet able to tolerate full abduction wall slides but did well with scaption at 45 deg. Quick to fatigue with weighted ball on wall but otherwise good effort present through visit. [x] Progressing toward goals. [] No change. [] Other:    [x] Patient would continue to benefit from skilled physical therapy services in order to: help modulate pain response, improve available AROM and maximize functional strength/stability in R shoulder in order to maximize functional tolerances through all multiplanar reaching tasks through home/community ADLs. STG: (to be met in 6 treatments)  Pt will self report worst pain no greater than 3/10 in order to better tolerate ADLs/work activities with minimal dysfunction  Pt will improve AROM in R shoulder to 170 deg elevation and 70 deg ER without increased pain in order to demonstrate ability to move/reach in all planes unrestricted at PLOF  Pt will self report ability to perform all upper body dressing tasks without compensation to show improved functional mobility through ADLs without impairment.   LTG: (to be met in 12 treatments)  Pt will demonstrate improved R UE strength to 4/5 or greater in order to demonstrate improved stability/strength necessary for unrestricted ADLs/work activities  Pt will improve Apley reach in R shoulder to symmetrical values (cross body to posterior shoulder, ER to T5, IR to T8) to demonstrate improved functional reach in all planes   Pt will decrease SPADI to 20% impaired or less in order to demonstrate improved functional tolerances at PLOF with minimal restriction/dysfunction  Pt will demonstrate independence with a long term HEP for continued progress/maintenance after completion of PT    Pt. Education:  [x] Yes  [] No  [] Reviewed Prior HEP/Ed  Method of Education: [x] Verbal  [x] Demo  [] Written  Comprehension of Education:  [x] Verbalizes understanding. [] Demonstrates understanding. [] Needs review. [] Demonstrates/verbalizes HEP/Ed previously given. Plan: [x] Continue per plan of care.    [] Other:      Treatment Charges: Mins Units   []  Modalities     [x]  Ther Exercise 30 2   [x]  Manual Therapy 10 1   []  Ther Activities     []  Aquatics     []  Neuromuscular     [] Vasocompression     [] Gait Training     [] Dry needling        [] 1 or 2 muscles        [] 3 or more muscles     []  Other     Total Treatment time 40 3     Time In: 8:00am            Time Out: 8:40am    Electronically signed by:  Jojo Villanueva, PT

## 2022-11-21 ENCOUNTER — HOSPITAL ENCOUNTER (OUTPATIENT)
Dept: PHYSICAL THERAPY | Age: 52
Setting detail: THERAPIES SERIES
Discharge: HOME | End: 2022-11-21
Payer: MEDICARE

## 2022-11-21 PROCEDURE — 97110 THERAPEUTIC EXERCISES: CPT

## 2022-11-21 PROCEDURE — 97140 MANUAL THERAPY 1/> REGIONS: CPT

## 2022-11-21 NOTE — FLOWSHEET NOTE
[facility]   [address]   Phone: [phone]   Fax: [phone]    Physical Therapy Daily Treatment Note      Date:  2022  Patient Name:  Christian Bertrand    :  1970  MRN: [id number]  Physician: Marilyn Trinidad MD                  Insurance: [employer]. Auth required after 30 visits (12 VISITS REMAINING AT START OF THIS EPISODE)  Medical Diagnosis: M75.21 (ICD-10-CM) - Bicipital tendinitis, right shoulder          Rehab Codes: M25.511, M25.611, R53.1  Onset Date: Prior R RTC repair 2019. Recent exacerbation this past 2022                 Next 's appt: 22  Visit# / total visits:     Cancels/No Shows:     Subjective:    : Patient reports today that he has noticed some good progress in re-gaining motion in R shoulder. Reported that it still feels very weak and that pain continues to be an issue. Pain:  [x] Yes  [] No Location: R shoulder Pain Rating: (0-10 scale) 3/10  Pain altered Tx:  [] No  [] Yes  Action:  Comments:    Objective:  Modalities:   Precautions: None  Exercises:  Exercise Reps/ Time Weight/ Level Completed  Today Comments   Manual 8'  x MFR to R upper traps. Grade II/III posterior and inferior GHJ glides with short axis traction   Supine alternating isometrics and RS 1' x 2  x 1 set AI at 90 deg, mild perturbations at elbow in all planes.  1 set RS at 0 deg, mild perturbations at wrist challenging ER/IR   Supine shoulder flexion AAROM with weighted stick 15x 2# x    Supine shoulder abd AAROM with weighted stick 15x 2# x    Supine punches 15 reps x 2  x R only   Supine Shoulder Flexion 10x  x    Sidelying ER 15 reps x 2  x Partial range as tolerated   Pulleys flex/scap/abd 20x      Wall slides flex/scap 20x Towel  x    Ball on wall   Up/down/side-side/circles cw-ccw 20x ea 2# x    Shoulder isometric  10x5\"   Flex/ext/abd/ext standing pushing into towel roll     ER/IR self-resisted   Shoulder extension/rows  10x5\" red     Shoulder horizontal abd  10x5\" red     ER/IR walk outs  10x5\" red     Total gym I,T,Y, extension  10x  x    Prone Row on Total Gym 10x2 2# x    Other:  Specific Instructions for next treatment: Emphasis on de-sensitizing pain response, improving available AROM in R shoulder, and work on progressive strengthening/stabilization of R RTC and scapular stabilizers as appropriate      Assessment:   11/21: Continued to work on R shoulder strength an ROM per pt tolerance. Added supine flexion and and re-introduced I,Y,T, extension, rows for additional R shoulder/scapular strengthening. Also added weight to prone rows to increase strengthening potential of exercise. Mild increase pain/soreness following treatment today. [x] Progressing toward goals. [] No change. [] Other:    [x] Patient would continue to benefit from skilled physical therapy services in order to: help modulate pain response, improve available AROM and maximize functional strength/stability in R shoulder in order to maximize functional tolerances through all multiplanar reaching tasks through home/community ADLs. STG: (to be met in 6 treatments)  Pt will self report worst pain no greater than 3/10 in order to better tolerate ADLs/work activities with minimal dysfunction  Pt will improve AROM in R shoulder to 170 deg elevation and 70 deg ER without increased pain in order to demonstrate ability to move/reach in all planes unrestricted at PLOF  Pt will self report ability to perform all upper body dressing tasks without compensation to show improved functional mobility through ADLs without impairment.   LTG: (to be met in 12 treatments)  Pt will demonstrate improved R UE strength to 4/5 or greater in order to demonstrate improved stability/strength necessary for unrestricted ADLs/work activities  Pt will improve Apley reach in R shoulder to symmetrical values (cross body to posterior shoulder, ER to T5, IR to T8) to demonstrate improved functional reach in all planes   Pt will decrease SPADI to 20% impaired or less in order to demonstrate improved functional tolerances at PLOF with minimal restriction/dysfunction  Pt will demonstrate independence with a long term HEP for continued progress/maintenance after completion of PT    Pt. Education:  [x] Yes  [] No  [] Reviewed Prior HEP/Ed  Method of Education: [x] Verbal  [x] Demo  [] Written  Comprehension of Education:  [x] Verbalizes understanding. [] Demonstrates understanding. [] Needs review. [] Demonstrates/verbalizes HEP/Ed previously given. Plan: [x] Continue per plan of care.    [] Other:      Treatment Charges: Mins Units   []  Modalities     [x]  Ther Exercise 30 2   [x]  Manual Therapy 8 1   []  Ther Activities     []  Aquatics     []  Neuromuscular     [] Vasocompression     [] Gait Training     [] Dry needling        [] 1 or 2 muscles        [] 3 or more muscles     []  Other     Total Treatment time 38 3     Time In: 2:53 pm            Time Out: 3:31 pm    Electronically signed by:  Pawan Puente PTA

## 2022-11-28 ENCOUNTER — HOSPITAL ENCOUNTER (OUTPATIENT)
Dept: PHYSICAL THERAPY | Age: 52
Setting detail: THERAPIES SERIES
Discharge: HOME | End: 2022-11-28
Payer: MEDICARE

## 2022-11-28 PROCEDURE — 97110 THERAPEUTIC EXERCISES: CPT

## 2022-11-28 PROCEDURE — 97140 MANUAL THERAPY 1/> REGIONS: CPT

## 2022-11-28 NOTE — PROGRESS NOTES
[address] [place] [address] [facility]   [address]   Phone: [phone]   Fax: [phone]    Physical Therapy Daily Treatment Note      Date:  2022  Patient Name:  Saumya Valadez    :  1970  MRN: [id number]  Physician: Maik Cano MD                  Insurance: [employer] [employer]. Auth required after 30 visits (12 VISITS REMAINING AT START OF THIS EPISODE)  Medical Diagnosis: M75.21 (ICD-10-CM) - Bicipital tendinitis, right shoulder          Rehab Codes: M25.511, M25.611, R53.1  Onset Date: Prior R RTC repair 2019. Recent exacerbation this past 2022                 Next 's appt: 22  Visit# / total visits:     Cancels/No Shows:     Subjective:    : Pt states that he missed his appt last week due to having issues with his phone and calendar. Otherwise feels like he's making good progress in terms of his ROM and strength in his R shoulder but still continues to be quite painful. Progress note performed today for reporting period 10/17- to reassess all STGs and progress to date. Pain:  [x] Yes  [] No Location: R shoulder Pain Rating: (0-10 scale) 3/10  Pain altered Tx:  [] No  [] Yes  Action:  Comments:    Objective:  All below objective measures updated  by Marquez Sanders PT       Range of Motion  Left Range of Motion  Right Strength  Left Strength  Right   Shoulder Flexion 170 170  Pain at end range 5/5 4/5   Shoulder Abduction 180 115 5/5 4-/5   Shoulder Extension 60 60       Shoulder ER 70 50 / 65 5/5 3+/5   Shoulder IR 90 90 5/5 4/5   Elbow Flexion     5/5 4+/5   Elbow Extension     5/5 4/5   Pronation           Supination           Wrist Flexion           Wrist Extension           Radial Deviation           Ulnar Deviation           Apley- Cross Body Reach Posterior shoulder Posterior shoulder       Apley- Gross ER T5 T4       Apley- Gross IR T8 T10        Strength Supraspinatus     4+/5 3/5   Upper Trap           Middle Trap           Lower Trap           Pec Major           Serratus Anterior               Modalities:   Precautions: None  Exercises:  Exercise Reps/ Time Weight/ Level Completed  Today Comments   Manual 8'  x MFR to R upper traps. Grade II/III posterior and inferior GHJ glides with short axis traction   Supine alternating isometrics and RS 1' x 2  x 1 set AI at 90 deg, mild perturbations at elbow in all planes. 1 set RS at 0 deg, mild perturbations at wrist challenging ER/IR   Supine shoulder flexion AAROM with weighted stick 15x 2#     Supine shoulder abd AAROM with weighted stick 15x 2#     Supine punches 15 reps x 2  x R only   Supine Shoulder Flexion 10x      Sidelying ER 15 reps x 2  x Partial range as tolerated   Pulleys flex/scap/abd 20x      Wall slides flex/scap 20x Towel      Ball on wall   Up/down/side-side/circles cw-ccw 20x ea 2# x    Shoulder isometric  10x5\"   Flex/ext/abd/ext standing pushing into towel roll     ER/IR self-resisted   Banded shoulder extension 20x Blue tubing x    Shoulder horizontal abd  10x5\" red     ER/IR walk outs  10x5\" red     Banded ER/IR at 0 deg  15 reps x 2 ea Red tubing x    Prone Ys/Ts/Is  10 reps x 2  x    Prone Row on Total Gym 10x2 2#     Scapular push ups on wall 2'' hold 15 reps x 2  x    Standing shoulder abduction AAROM with weighted stick 10 reps x 2 2# x           Re-evaluation of all subjective & objective measures, all STGs 10'  x 11/28 by primary PT for mid-POC progress note   Other:  Specific Instructions for next treatment: Emphasis on de-sensitizing pain response, improving available AROM in R shoulder, and work on progressive strengthening/stabilization of R RTC and scapular stabilizers as appropriate      Assessment:   11/28: Pt has been seen for 6 PT visits to date for his recent exacerbation of chronic R shoulder pain with hx of prior RTC repair in 2019.  Pt continues to report consistent moderate values (cross body to posterior shoulder, ER to T5, IR to T8) to demonstrate improved functional reach in all planes  GOAL PROGRESSING 11/28  Pt will decrease SPADI to 20% impaired or less in order to demonstrate improved functional tolerances at PLOF with minimal restriction/dysfunction  Pt will demonstrate independence with a long term HEP for continued progress/maintenance after completion of PT    Pt. Education:  [x] Yes  [] No  [] Reviewed Prior HEP/Ed  Method of Education: [x] Verbal  [x] Demo  [] Written  Comprehension of Education:  [x] Verbalizes understanding. [] Demonstrates understanding. [] Needs review. [] Demonstrates/verbalizes HEP/Ed previously given. Plan: [x] Continue per plan of care.    [] Other:      Treatment Charges: Mins Units   []  Modalities     [x]  Ther Exercise 31 2   [x]  Manual Therapy 8 1   []  Ther Activities     []  Aquatics     []  Neuromuscular     [] Vasocompression     [] Gait Training     [] Dry needling        [] 1 or 2 muscles        [] 3 or more muscles     []  Other     Total Treatment time 39 3     Time In: 9:31am            Time Out: 10:10am    Electronically signed by:  Susan Barron, PT

## 2022-12-05 ENCOUNTER — OFFICE VISIT (OUTPATIENT)
Dept: FAMILY MEDICINE CLINIC | Age: 52
End: 2022-12-05
Payer: MEDICARE

## 2022-12-05 VITALS
BODY MASS INDEX: 25.76 KG/M2 | HEIGHT: 71 IN | SYSTOLIC BLOOD PRESSURE: 122 MMHG | WEIGHT: 184 LBS | HEART RATE: 76 BPM | DIASTOLIC BLOOD PRESSURE: 75 MMHG | OXYGEN SATURATION: 99 %

## 2022-12-05 DIAGNOSIS — M25.532 LEFT WRIST PAIN: ICD-10-CM

## 2022-12-05 DIAGNOSIS — Z13.6 SCREENING FOR CARDIOVASCULAR CONDITION: ICD-10-CM

## 2022-12-05 DIAGNOSIS — M48.02 SPINAL STENOSIS, CERVICAL REGION: ICD-10-CM

## 2022-12-05 DIAGNOSIS — M65.4 DE QUERVAIN'S DISEASE (TENOSYNOVITIS): ICD-10-CM

## 2022-12-05 DIAGNOSIS — Z00.01 ENCOUNTER FOR WELL ADULT EXAM WITH ABNORMAL FINDINGS: Primary | ICD-10-CM

## 2022-12-05 DIAGNOSIS — Z71.89 ACP (ADVANCE CARE PLANNING): ICD-10-CM

## 2022-12-05 DIAGNOSIS — I10 ESSENTIAL HYPERTENSION: ICD-10-CM

## 2022-12-05 PROCEDURE — 99497 ADVNCD CARE PLAN 30 MIN: CPT | Performed by: FAMILY MEDICINE

## 2022-12-05 PROCEDURE — G8482 FLU IMMUNIZE ORDER/ADMIN: HCPCS | Performed by: FAMILY MEDICINE

## 2022-12-05 PROCEDURE — 3078F DIAST BP <80 MM HG: CPT | Performed by: FAMILY MEDICINE

## 2022-12-05 PROCEDURE — G0446 INTENS BEHAVE THER CARDIO DX: HCPCS | Performed by: FAMILY MEDICINE

## 2022-12-05 PROCEDURE — 3074F SYST BP LT 130 MM HG: CPT | Performed by: FAMILY MEDICINE

## 2022-12-05 PROCEDURE — 99396 PREV VISIT EST AGE 40-64: CPT | Performed by: FAMILY MEDICINE

## 2022-12-05 RX ORDER — METHYLPREDNISOLONE 4 MG/1
TABLET ORAL
Qty: 1 KIT | Refills: 0 | Status: SHIPPED | OUTPATIENT
Start: 2022-12-05 | End: 2022-12-11

## 2022-12-05 RX ORDER — LIDOCAINE 40 MG/G
CREAM TOPICAL
Qty: 45 G | Refills: 3 | Status: SHIPPED | OUTPATIENT
Start: 2022-12-05

## 2022-12-05 ASSESSMENT — PATIENT HEALTH QUESTIONNAIRE - PHQ9
6. FEELING BAD ABOUT YOURSELF - OR THAT YOU ARE A FAILURE OR HAVE LET YOURSELF OR YOUR FAMILY DOWN: 0
8. MOVING OR SPEAKING SO SLOWLY THAT OTHER PEOPLE COULD HAVE NOTICED. OR THE OPPOSITE, BEING SO FIGETY OR RESTLESS THAT YOU HAVE BEEN MOVING AROUND A LOT MORE THAN USUAL: 0
7. TROUBLE CONCENTRATING ON THINGS, SUCH AS READING THE NEWSPAPER OR WATCHING TELEVISION: 0
9. THOUGHTS THAT YOU WOULD BE BETTER OFF DEAD, OR OF HURTING YOURSELF: 0
SUM OF ALL RESPONSES TO PHQ QUESTIONS 1-9: 0
SUM OF ALL RESPONSES TO PHQ9 QUESTIONS 1 & 2: 0
2. FEELING DOWN, DEPRESSED OR HOPELESS: 0
1. LITTLE INTEREST OR PLEASURE IN DOING THINGS: 0
3. TROUBLE FALLING OR STAYING ASLEEP: 0
5. POOR APPETITE OR OVEREATING: 0
SUM OF ALL RESPONSES TO PHQ QUESTIONS 1-9: 0
10. IF YOU CHECKED OFF ANY PROBLEMS, HOW DIFFICULT HAVE THESE PROBLEMS MADE IT FOR YOU TO DO YOUR WORK, TAKE CARE OF THINGS AT HOME, OR GET ALONG WITH OTHER PEOPLE: 0

## 2022-12-05 ASSESSMENT — ENCOUNTER SYMPTOMS
EYE REDNESS: 0
SINUS PRESSURE: 0
SORE THROAT: 0
ABDOMINAL PAIN: 0
COUGH: 0
RHINORRHEA: 0
VOMITING: 0
NAUSEA: 0
SHORTNESS OF BREATH: 0
BLOOD IN STOOL: 0
BACK PAIN: 0
STRIDOR: 0
TROUBLE SWALLOWING: 0
CONSTIPATION: 0
DIARRHEA: 0
WHEEZING: 0
ABDOMINAL DISTENTION: 0
RECTAL PAIN: 0
COLOR CHANGE: 0
CHEST TIGHTNESS: 0

## 2022-12-05 NOTE — PATIENT INSTRUCTIONS
Advance Directives: Care Instructions  Overview  An advance directive is a legal way to state your wishes at the end of your life. It tells your family and your doctor what to do if you can't say what you want. There are two main types of advance directives. You can change them any time your wishes change. Living will. This form tells your family and your doctor your wishes about life support and other treatment. The form is also called a declaration. Medical power of . This form lets you name a person to make treatment decisions for you when you can't speak for yourself. This person is called a health care agent (health care proxy, health care surrogate). The form is also called a durable power of  for health care. If you do not have an advance directive, decisions about your medical care may be made by a family member, or by a doctor or a  who doesn't know you. It may help to think of an advance directive as a gift to the people who care for you. If you have one, they won't have to make tough decisions by themselves. Follow-up care is a key part of your treatment and safety. Be sure to make and go to all appointments, and call your doctor if you are having problems. It's also a good idea to know your test results and keep a list of the medicines you take. What should you include in an advance directive? Many states have a unique advance directive form. (It may ask you to address specific issues.) Or you might use a universal form that's approved by many states. If your form doesn't tell you what to address, it may be hard to know what to include in your advance directive. Use the questions below to help you get started. Who do you want to make decisions about your medical care if you are not able to? What life-support measures do you want if you have a serious illness that gets worse over time or can't be cured? What are you most afraid of that might happen?  (Maybe you're afraid of having pain, losing your independence, or being kept alive by machines.)  Where would you prefer to die? (Your home? A hospital? A nursing home?)  Do you want to donate your organs when you die? Do you want certain Oriental orthodox practices performed before you die? When should you call for help? Be sure to contact your doctor if you have any questions. Where can you learn more? Go to [email or web address] and sign in to your [email or web address] account. Enter R264 in the Grafoid box to learn more about \"Advance Directives: Care Instructions. \"     If you do not have an account, please click on the \"Sign Up Now\" link. Current as of: June 16, 2022               Content Version: 13.4  © 5963-7711 [employer]. Care instructions adapted under license by [facility] ([facility]). If you have questions about a medical condition or this instruction, always ask your healthcare professional. [place] [address] any warranty or liability for your use of this information. Learning About Medical Power of   What is a medical power of ? A medical power of , also called a durable power of  for health care, is one type of the legal forms called advance directives. It lets you name the person you want to make treatment decisions for you if you can't speak or decide for yourself. The person you choose is called your health care agent. This person is also called a health care proxy or health care surrogate. A medical power of  may be called something else in your state. How do you choose a health care agent? Choose your health care agent carefully. This person may or may not be a family member. Talk to the person before you make your final decision. Make sure he or she is comfortable with this responsibility. It's a good idea to choose someone who:  Is at least 25years old.   Knows you well and understands what makes life meaningful for you. Understands your Presybeterian and moral values. Will do what you want, not what he or she wants. Will be able to make difficult choices at a stressful time. Will be able to refuse or stop treatment, if that is what you would want, even if you could die. Will be firm and confident with health professionals if needed. Will ask questions to get needed information. Lives near you or agrees to travel to you if needed. Your family may help you make medical decisions while you can still be part of that process. But it's important to choose one person to be your health care agent in case you aren't able to make decisions for yourself. If you don't fill out the legal form and name a health care agent, the decisions your family can make may be limited. A health care agent may be called something else in your state. Who will make decisions for you if you don't have a health care agent? If you don't have a health care agent or a living will, you may not get the care you want. Decisions may be made by family members who disagree about your medical care. Or decisions may be made by a medical professional who doesn't know you well. In some cases, a  makes the decisions. When you name a health care agent, it is very clear who has the power to make health decisions for you. How do you name a health care agent? You name your health care agent on a legal form. This form is usually called a medical power of . Ask your hospital, state bar association, or office on aging where to find these forms. You must sign the form to make it legal. Some states require you to get the form notarized. This means that a person called a  watches you sign the form and then he or she signs the form. Some states also require that two or more witnesses sign the form. Be sure to tell your family members and doctors who your health care agent is. Where can you learn more?   Go to [email or web address]://[email or web address] and sign in to your [employer] account. Enter [id number] in the [facility] box to learn more about \"Learning About [address]. \"     If you do not have an account, please click on the \"Sign Up Now\" link. Current as of: October 6, 2021               Content Version: 13.4  © 2006-2022 Healthwise, MyRefers. Care instructions adapted under license by [facility] ([facility]). If you have questions about a medical condition or this instruction, always ask your healthcare professional. [address] any warranty or liability for your use of this information. Learning About Living Perroy  What is a living will? A living will, also called a declaration, is a legal form. It tells your family and your doctor your wishes when you can't speak for yourself. It's used by the health professionals who will treat you as you near the end of your life or if you get seriously hurt or ill. If you put your wishes in writing, your loved ones and others will know what kind of care you want. They won't need to guess. This can ease your mind and be helpful to others. And you can change or cancel your living will at any time. A living will is not the same as an estate or property will. An estate will explains what you want to happen with your money and property after you die. How do you use it? Keep these facts in mind about how a living will is used. Your living will is used only if you can't speak or make decisions for yourself. Most often, one or more doctors must certify that you can't speak or decide for yourself before your living will takes effect. If you get better and can speak for yourself again, you can accept or refuse any treatment. It doesn't matter what you said in your living will. Some states may limit your right to refuse treatment in certain cases.  For example, you may need to clearly state in your living will that you don't want artificial hydration and nutrition, such as being fed through a tube. Is a living will a legal document? A living will is a legal document. Each state has its own laws about living henson. And a living will may be called something else in your state. Here are some things to know about living henson. You don't need an  to complete a living will. But legal advice can be helpful if your state's laws are unclear. It can also help if your health history is complicated or your family can't agree on what should be in your living will. You can change your living will at any time. Some people find that their wishes about end-of-life care change as their health changes. If you make big changes to your living will, complete a new form. If you move to another state, make sure that your living will is legal in the state where you now live. In most cases, doctors will respect your wishes even if you have a form from a different state. You might use a universal form that has been approved by many states. This kind of form can sometimes be filled out and stored online. Your digital copy will then be available wherever you have a connection to the internet. The doctors and nurses who need to treat you can find it right away. Your state may offer an online registry. This is another place where you can store your living will online. It's a good idea to get your living will notarized. This means using a person called a  to watch two people sign, or witness, your living will. What should you know when you create a living will? Here are some questions to ask yourself as you make your living will. Do you know enough about life support methods that might be used? If not, talk to your doctor so you know what might be done if you can't breathe on your own, your heart stops, or you can't swallow. What things would you still want to be able to do after you receive life-support methods?  Would you want to be able to walk? To speak? To eat on your own? To live without the help of machines? Do you want certain Caodaism practices performed if you become very ill? If you have a choice, where do you want to be cared for? In your home? At a hospital or nursing home? If you have a choice at the end of your life, where would you prefer to die? At home? In a hospital or nursing home? Somewhere else? Would you prefer to be buried or cremated? Do you want your organs to be donated after you die? What should you do with your living will? Make sure that your family members and your health care agent have copies of your living will (also called a declaration). Give your doctor a copy of your living will. Ask to have it kept as part of your medical record. If you have more than one doctor, make sure that each one has a copy. Put a copy of your living will where it can be easily found. For example, some people may put a copy on their refrigerator door. If you are using a digital copy, be sure your doctor, family members, and health care agent know how to find and access it. Where can you learn more? Go to [email or web address] org and sign in to your Alive Juices account. Enter W877 in the Joust box to learn more about \"Learning About Living Perroy. \"     If you do not have an account, please click on the \"Sign Up Now\" link. Current as of: June 16, 2022               Content Version: 13.4  © 2006-2022 Healthwise, Incorporated. Care instructions adapted under license by [facility] ([facility]). If you have questions about a medical condition or this instruction, always ask your healthcare professional. [place] [address] any warranty or liability for your use of this information.

## 2022-12-05 NOTE — PROGRESS NOTES
Physical    Visit Information    Have you changed or started any medications since your last visit including any over-the-counter medicines, vitamins, or herbal medicines? no   Have you stopped taking any of your medications? Is so, why? -  no  Are you having any side effects from any of your medications? - no    Have you seen any other physician or provider since your last visit?  no   Have you had any other diagnostic tests since your last visit?  no   Have you been seen in the emergency room and/or had an admission in a hospital since we last saw you?  no   Have you had your routine dental cleaning in the past 6 months?  no     Do you have an active MyChart account? If no, what is the barrier?   Yes    Patient Care Team:  Elizabet Hale MD as PCP - General (Family Medicine)  Elizabet Hale MD as PCP - [facility]  Kendall Patterson MD (Dermatology)  Noemy Liang MD as Surgeon (Cardiology)  Navin Bland DPM as Consulting Physician (Podiatry)  Isaias Varela MD as Resident (Family Medicine)  DENNIS Hoang - CNP  Héctor Ac MD as Consulting Physician (Urology)    Medical History Review  Past Medical, Family, and Social History reviewed and does not contribute to the patient presenting condition    Health Maintenance   Topic Date Due    COVID-19 Vaccine (3 - Booster for Moderna series) 07/31/2021    Shingles vaccine (1 of 2) Never done    Depression Monitoring  10/04/2023    Lipids  11/03/2023    Colorectal Cancer Screen  12/06/2024    DTaP/Tdap/Td vaccine (3 - Td or Tdap) 05/04/2032    Flu vaccine  Completed    Hepatitis C screen  Completed    HIV screen  Completed    Hepatitis A vaccine  Aged Out    Hib vaccine  Aged Out    Meningococcal (ACWY) vaccine  Aged Out    Pneumococcal 0-64 years Vaccine  Aged Out
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
## 2022-12-06 ENCOUNTER — TELEPHONE (OUTPATIENT)
Dept: FAMILY MEDICINE CLINIC | Age: 52
End: 2022-12-06

## 2022-12-06 ENCOUNTER — HOSPITAL ENCOUNTER (OUTPATIENT)
Dept: PHYSICAL THERAPY | Age: 52
Setting detail: THERAPIES SERIES
Discharge: HOME | End: 2022-12-06
Payer: MEDICARE

## 2022-12-06 ENCOUNTER — HOSPITAL ENCOUNTER (OUTPATIENT)
Dept: GENERAL RADIOLOGY | Age: 52
Discharge: HOME | End: 2022-12-08
Payer: MEDICARE

## 2022-12-06 ENCOUNTER — HOSPITAL ENCOUNTER (OUTPATIENT)
Age: 52
Discharge: HOME | End: 2022-12-08
Payer: MEDICARE

## 2022-12-06 DIAGNOSIS — M65.4 DE QUERVAIN'S DISEASE (TENOSYNOVITIS): ICD-10-CM

## 2022-12-06 DIAGNOSIS — M25.532 LEFT WRIST PAIN: ICD-10-CM

## 2022-12-06 PROCEDURE — 97140 MANUAL THERAPY 1/> REGIONS: CPT

## 2022-12-06 PROCEDURE — 73120 X-RAY EXAM OF HAND: CPT

## 2022-12-06 PROCEDURE — 97110 THERAPEUTIC EXERCISES: CPT

## 2022-12-06 NOTE — PROGRESS NOTES
[address] [facility]   [address]   Phone: [phone]   Fax: [phone]    Physical Therapy Daily Treatment Note      Date:  2022  Patient Name:  Ericka Ulloa    :  1970  MRN: [id number]  Physician: Karime Parekh MD                  Insurance: [employer]. Auth required after 30 visits (12 VISITS REMAINING AT START OF THIS EPISODE)  Medical Diagnosis: M75.21 (ICD-10-CM) - Bicipital tendinitis, right shoulder          Rehab Codes: M25.511, M25.611, R53.1  Onset Date: Prior R RTC repair 2019. Recent exacerbation this past 2022                 Next 's appt: 22  Visit# / total visits:     Cancels/No Shows: 1/3    Subjective:    : Patient reports today that R shoulder function has significantly improved since starting therapy. Reported that he still feels a constant soreness in shoulder, but ROM and strength is steadily improving. Pain:  [x] Yes  [] No Location: R shoulder Pain Rating: (0-10 scale) 3/10  Pain altered Tx:  [] No  [] Yes  Action:  Comments:    Objective:  All below objective measures updated  by Veronica Ar, PT       Range of Motion  Left Range of Motion  Right Strength  Left Strength  Right   Shoulder Flexion 170 170  Pain at end range 5/5 4/5   Shoulder Abduction 180 115 5/5 4-/5   Shoulder Extension 60 60       Shoulder ER 70 50 / 65 5/5 3+/5   Shoulder IR 90 90 5/5 4/5   Elbow Flexion     5/5 4+/5   Elbow Extension     5/5 4/5   Pronation           Supination           Wrist Flexion           Wrist Extension           Radial Deviation           Ulnar Deviation           Apley- Cross Body Reach Posterior shoulder Posterior shoulder       Apley- Gross ER T5 T4       Apley- Gross IR T8 T10        Strength                       Supraspinatus     4+/5 3/5   Upper Trap           Middle Trap           Lower Trap           Pec Major           Serratus Anterior Modalities:   Precautions: None  Exercises:  Exercise Reps/ Time Weight/ Level Completed  Today Comments   Manual 8'  x MFR to R upper traps. Grade II/III posterior and inferior GHJ glides with short axis traction   Supine alternating isometrics and RS 1' x 2  x 1 set AI at 90 deg, mild perturbations at elbow in all planes. 1 set RS at 0 deg, mild perturbations at wrist challenging ER/IR   Supine shoulder flexion AAROM with weighted stick 15x 2#     Supine shoulder abd AAROM with weighted stick 15x 2#     Supine punches 15 reps x 2  x R only   Supine Shoulder Flexion 10x      Sidelying ER 15 reps x 2  x Partial range as tolerated   Pulleys flex/scap/abd 20x      Wall slides flex/scap 20x Towel      Ball on wall   Up/down/side-side/circles cw-ccw 20x ea 2# x    Shoulder isometric  10x5\"   Flex/ext/abd/ext standing pushing into towel roll     ER/IR self-resisted   Banded shoulder extension 20x Blue tubing x    Shoulder horizontal abd  10x5\" red     ER/IR walk outs  10x5\" red     Banded ER/IR at 0 deg  15 reps x 2 ea Red tubing x    High Row into ER 10 reps x 2 Red Tubing x 90 degrees abduction   Prone Ys/Ts/Is  10 reps x 2  x    Prone Row on Total Gym 10x2 2#     Scapular push ups on wall 2'' hold 15 reps x 2  x    Standing shoulder abduction AAROM with weighted stick 10 reps x 2 2# x    Wall Clocks 15 reps Yellow x Alternating UE 10 and 2 position          Re-evaluation of all subjective & objective measures, all STGs 10'   11/28 by primary PT for mid-POC progress note   Other:  Specific Instructions for next treatment: Emphasis on de-sensitizing pain response, improving available AROM in R shoulder, and work on progressive strengthening/stabilization of R RTC and scapular stabilizers as appropriate      Assessment:   16/6: Continued to work on R shoulder strength and ROM.  Significant improvement in R shoulder ER demonstrated following manual. Added row into ER and wall clocks for additional strengthening in overhead motion. Increased R shoulder soreness noted following exercises with no significant change in pain. [x] Progressing toward goals. [] No change. [] Other:    [x] Patient would continue to benefit from skilled physical therapy services in order to: help modulate pain response, improve available AROM and maximize functional strength/stability in R shoulder in order to maximize functional tolerances through all multiplanar reaching tasks through home/community ADLs. STG: (to be met in 6 treatments)  Pt will self report worst pain no greater than 3/10 in order to better tolerate ADLs/work activities with minimal dysfunction GOAL PROGRESSING AND EXTENDED 11/28  Pt will improve AROM in R shoulder to 170 deg elevation and 70 deg ER without increased pain in order to demonstrate ability to move/reach in all planes unrestricted at [place] [address] 11/28  Pt will self report ability to perform all upper body dressing tasks without compensation to show improved functional mobility through ADLs without impairment. GOAL MET 11/28  LTG: (to be met in 12 treatments)  Pt will demonstrate improved R UE strength to 4/5 or greater in order to demonstrate improved stability/strength necessary for unrestricted ADLs/work activities GOAL PROGRESSING 11/28  Pt will improve Apley reach in R shoulder to symmetrical values (cross body to posterior shoulder, ER to T5, IR to T8) to demonstrate improved functional reach in all planes  GOAL PROGRESSING 11/28  Pt will decrease SPADI to 20% impaired or less in order to demonstrate improved functional tolerances at PLOF with minimal restriction/dysfunction  Pt will demonstrate independence with a long term HEP for continued progress/maintenance after completion of PT    Pt. Education:  [x] Yes  [] No  [] Reviewed Prior HEP/Ed  Method of Education: [x] Verbal  [x] Demo  [] Written  Comprehension of Education:  [x] Verbalizes understanding.   [] Demonstrates understanding. [] Needs review. [] Demonstrates/verbalizes HEP/Ed previously given. Plan: [x] Continue per plan of care.    [] Other:      Treatment Charges: Mins Units   []  Modalities     [x]  Ther Exercise 31 2   [x]  Manual Therapy 8 1   []  Ther Activities     []  Aquatics     []  Neuromuscular     [] Vasocompression     [] Gait Training     [] Dry needling        [] 1 or 2 muscles        [] 3 or more muscles     []  Other     Total Treatment time 39 3     Time In: 9:31 am            Time Out: 10:10 am    Electronically signed by:  Juan Guerrero PTA

## 2022-12-06 NOTE — TELEPHONE ENCOUNTER
Order done by Dr. Ferny Quick    Outpatient Medication Detail     Disp Refills Start End    Elastic Bandages & Supports (ADJUSTABLE WRIST BRACE) [address] [facility] 1 each 0 12/5/2022     Sig: Use daily for pain

## 2022-12-06 NOTE — TELEPHONE ENCOUNTER
Incoming call came from pt , will like for Elastic Bandages & Supports ([place]) [address] [facility] [[id number]]     Order Details  Dose, Route, Frequency: As Directed   Dispense Quantity: 1 each Refills: 0          Sig: Use daily for pain         To be sent to [facility]. And they will contact him.

## 2022-12-07 DIAGNOSIS — R35.1 NOCTURIA: ICD-10-CM

## 2022-12-07 DIAGNOSIS — R39.198 DIFFICULTY URINATING: ICD-10-CM

## 2022-12-08 ENCOUNTER — HOSPITAL ENCOUNTER (OUTPATIENT)
Dept: PHYSICAL THERAPY | Age: 52
Setting detail: THERAPIES SERIES
Discharge: HOME | End: 2022-12-08
Payer: MEDICARE

## 2022-12-08 RX ORDER — CRANBERRY FRUIT 307 MG
1 TABLET ORAL DAILY
Qty: 90 CAPSULE | Refills: 1 | Status: SHIPPED | OUTPATIENT
Start: 2022-12-08

## 2022-12-08 NOTE — TELEPHONE ENCOUNTER
Wesley Borrego is calling to request a refill on the following medication(s):    Medication Request:  Requested Prescriptions     Pending Prescriptions Disp Refills    Misc Natural Products (SM SAW PALMETTO COMPLEX) CAPS 90 capsule 1     Sig: Take 1 capsule by mouth daily OK to substitute       Last Visit Date (If Applicable):  05/3/3513    Next Visit Date:    4/5/2023

## 2022-12-08 NOTE — FLOWSHEET NOTE
[x] [facility]) - [place] [facility]  [address]  [place]: [phone]   F: [phone]     Physical Therapy Cancel/No Show note    Date: 2022  Patient: Wesley Borrego  : 1970  MRN: [id number]    Visit Count:  (visit count corrected)  Cancels/No Shows to date: 2/3    For today's appointment patient:    [x]  Cancelled    [] Rescheduled appointment    [] No-show     Reason given by patient:    []  Patient ill    []  Conflicting appointment    [] No transportation      [] Conflict with work    [] No reason given    [] Weather related    [] COVID-19    [x] Other:      Comments:  Family emergency      [x] Next appointment was confirmed    Electronically signed by: Sirisha Price, PT

## 2022-12-12 ENCOUNTER — HOSPITAL ENCOUNTER (OUTPATIENT)
Dept: PHYSICAL THERAPY | Age: 52
Setting detail: THERAPIES SERIES
Discharge: HOME | End: 2022-12-12
Payer: MEDICARE

## 2022-12-12 PROCEDURE — 97110 THERAPEUTIC EXERCISES: CPT

## 2022-12-12 PROCEDURE — 97140 MANUAL THERAPY 1/> REGIONS: CPT

## 2022-12-12 NOTE — PROGRESS NOTES
[facility]   [facility] [place]   Phone: [phone]   Fax: [phone]    Physical Therapy Daily Treatment Note      Date:  2022  Patient Name:  Elvira Schaumann    :  1970  MRN: [id number]  Physician: Antionette Rhodes MD                  Insurance: [employer]. Auth required after 30 visits (12 VISITS REMAINING AT START OF THIS EPISODE)  Medical Diagnosis: M75.21 (ICD-10-CM) - Bicipital tendinitis, right shoulder          Rehab Codes: M25.511, M25.611, R53.1  Onset Date: Prior R RTC repair 2019. Recent exacerbation this past 2022                 Next 's appt: 22  Visit# / total visits:     Cancels/No Shows: 2/3    Subjective:    : Patient reports today that R shoulder continues to improve. Reported that soreness frequency is steadily decreasing, although still experiencing with use. Pain:  [x] Yes  [] No Location: R shoulder Pain Rating: (0-10 scale) 3/10  Pain altered Tx:  [] No  [] Yes  Action:  Comments:    Objective:  All below objective measures updated  by Mortimer Craven, PT       Range of Motion  Left Range of Motion  Right Strength  Left Strength  Right   Shoulder Flexion 170 170  Pain at end range 5/5 4/5   Shoulder Abduction 180 115 5/5 4-/5   Shoulder Extension 60 60       Shoulder ER 70 50 / 65 5/5 3+/5   Shoulder IR 90 90 5/5 4/5   Elbow Flexion     5/5 4+/5   Elbow Extension     5/5 4/5   Pronation           Supination           Wrist Flexion           Wrist Extension           Radial Deviation           Ulnar Deviation           Apley- Cross Body Reach Posterior shoulder Posterior shoulder       Apley- Gross ER T5 T4       Apley- Gross IR T8 T10        Strength                       Supraspinatus     4+/5 3/5   Upper Trap           Middle Trap           Lower Trap           Pec Major           Serratus Anterior               Modalities:   Precautions: None  Exercises:  Exercise Reps/ Time Weight/ Level Completed  Today Comments   Manual 8'  x Grade II/III posterior and inferior GHJ glides with short axis traction   Supine alternating isometrics and RS 1' x 4  x 2 set AI at 90 deg, mild perturbations at elbow in all planes. 2 set RS at 0 deg, mild perturbations at wrist challenging ER/IR   Supine shoulder flexion AAROM with weighted stick 15x 2#     Supine shoulder abd AAROM with weighted stick 15x 2#     Supine punches 15 reps x 2  x R only   Supine Shoulder Flexion 10x      Sidelying ER 15 reps x 2  x Partial range as tolerated   Pulleys flex/scap/abd 20x      Wall slides flex/scap 20x Towel      Ball on wall   Up/down/side-side/circles cw-ccw 20x ea 2# x    Shoulder isometric  10x5\"   Flex/ext/abd/ext standing pushing into towel roll     ER/IR self-resisted   Banded shoulder extension 20x Blue tubing     Shoulder horizontal abd  10x5\" red     ER/IR walk outs  10x5\" red     Banded ER/IR at 0 deg  15 reps x 2 ea Red tubing x    High Row into ER 10 reps x 2 Red Tubing x 90 degrees abduction   Prone Ys/Ts/Is  10 reps x 2  x    Prone Row on [employer] 10x2 2#     Scapular push ups on wall 2'' hold 15 reps x 2      Standing shoulder abduction AAROM with weighted stick 10 reps x 2 2#     Wall Clocks 15 reps Yellow x Alternating UE 10 and 2 position          Re-evaluation of all subjective & objective measures, all STGs 10' 11/28 by primary PT for mid-POC progress note   Other:  Specific Instructions for next treatment: Emphasis on de-sensitizing pain response, improving available AROM in R shoulder, and work on progressive strengthening/stabilization of R RTC and scapular stabilizers as appropriate      Assessment:   12/12: Continued to work on R shoulder strength and satiability. Added reps to alternating isometrics/RS for additional challenge to shoulder stability. Improved tolerance to exercises today, reporting less soreness compared to previous sessions. [x] Progressing toward goals. [] No change. [] Other:    [x] Patient would continue to benefit from skilled physical therapy services in order to: help modulate pain response, improve available AROM and maximize functional strength/stability in R shoulder in order to maximize functional tolerances through all multiplanar reaching tasks through home/community ADLs. STG: (to be met in 6 treatments)  Pt will self report worst pain no greater than 3/10 in order to better tolerate ADLs/work activities with minimal dysfunction GOAL PROGRESSING AND EXTENDED 11/28  Pt will improve AROM in R shoulder to 170 deg elevation and 70 deg ER without increased pain in order to demonstrate ability to move/reach in all planes unrestricted at [facility] 11/28  Pt will self report ability to perform all upper body dressing tasks without compensation to show improved functional mobility through ADLs without impairment. GOAL MET 11/28  LTG: (to be met in 12 treatments)  Pt will demonstrate improved R UE strength to 4/5 or greater in order to demonstrate improved stability/strength necessary for unrestricted ADLs/work activities GOAL PROGRESSING 11/28  Pt will improve Apley reach in R shoulder to symmetrical values (cross body to posterior shoulder, ER to T5, IR to T8) to demonstrate improved functional reach in all planes  GOAL PROGRESSING 11/28  Pt will decrease SPADI to 20% impaired or less in order to demonstrate improved functional tolerances at PLOF with minimal restriction/dysfunction  Pt will demonstrate independence with a long term HEP for continued progress/maintenance after completion of PT    Pt. Education:  [x] Yes  [] No  [] Reviewed Prior HEP/Ed  Method of Education: [x] Verbal  [x] Demo  [] Written  Comprehension of Education:  [x] Verbalizes understanding. [] Demonstrates understanding. [] Needs review. [] Demonstrates/verbalizes HEP/Ed previously given.      Plan: [x] Continue per plan of care.    [] Other:      Treatment Charges: Mins Units   []  Modalities     [x]  Ther Exercise 34 2   [x]  Manual Therapy 8 1   []  Ther Activities     []  Aquatics     []  Neuromuscular     [] Vasocompression     [] Gait Training     [] Dry needling        [] 1 or 2 muscles        [] 3 or more muscles     []  Other     Total Treatment time 42 3   ** 2 min unbilled time during session for pt to use restroom    Time In: 9:31 am            Time Out: 10:15 am    Electronically signed by:  Lorin Zhang PTA

## 2022-12-19 ENCOUNTER — HOSPITAL ENCOUNTER (OUTPATIENT)
Dept: PHYSICAL THERAPY | Age: 52
Setting detail: THERAPIES SERIES
Discharge: HOME | End: 2022-12-19
Payer: MEDICARE

## 2022-12-19 PROCEDURE — 97110 THERAPEUTIC EXERCISES: CPT

## 2022-12-19 PROCEDURE — 97140 MANUAL THERAPY 1/> REGIONS: CPT

## 2022-12-19 NOTE — PROGRESS NOTES
[facility]   [address]   Phone: [phone]   Fax: [phone]    Physical Therapy Daily Treatment Note      Date:  2022  Patient Name:  Carol Cuello    :  1970  MRN: [id number]  Physician: Antonette Ferro MD                  Insurance: [employer]. Auth required after 30 visits (12 VISITS REMAINING AT START OF THIS EPISODE)  Medical Diagnosis: M75.21 (ICD-10-CM) - Bicipital tendinitis, right shoulder          Rehab Codes: M25.511, M25.611, R53.1  Onset Date: Prior R RTC repair 2019. Recent exacerbation this past 2022                 Next 's appt: 22  Visit# / total visits:  (updated visit count )    Cancels/No Shows: 2/    Subjective:    : Patient reports today that R shoulder continues to feel sore, especially with increased use. Reported that he continues to use R shoulder more without pain. Pain:  [x] Yes  [] No Location: R shoulder Pain Rating: (0-10 scale) 3/10, soreness  Pain altered Tx:  [] No  [] Yes  Action:  Comments:    Objective:  All below objective measures updated  by Albert Infante, PT       Range of Motion  Left Range of Motion  Right Strength  Left Strength  Right   Shoulder Flexion 170 170  Pain at end range 5/5 4/5   Shoulder Abduction 180 115 5/5 4-/5   Shoulder Extension 60 60       Shoulder ER 70 50 / 65 5/5 3+/5   Shoulder IR 90 90 5/5 4/5   Elbow Flexion     5/5 4+/5   Elbow Extension     5/5 4/5   Pronation           Supination           Wrist Flexion           Wrist Extension           Radial Deviation           Ulnar Deviation           Apley- Cross Body Reach Posterior shoulder Posterior shoulder       Apley- Gross ER T5 T4       Apley- Gross IR T8 T10        Strength                       Supraspinatus     4+/5 3/5   Upper Trap           Middle Trap           Lower Trap           Pec Major           Serratus Anterior Modalities:   Precautions: None  Exercises:  Exercise Reps/ Time Weight/ Level Completed  Today Comments   Manual 8'  x Grade II/III posterior and inferior GHJ glides with short axis traction   Supine alternating isometrics and RS 1' x 4  x 2 set AI at 90 deg, mild perturbations at elbow in all planes. 2 set RS at 0 deg, mild perturbations at wrist challenging ER/IR   Supine shoulder flexion AAROM with weighted stick 15x 2#     Supine shoulder abd AAROM with weighted stick 15x 2#     Supine punches 15 reps x 2   R only   Supine Shoulder Flexion 10x      Sidelying ER 15 reps x 2  x Partial range as tolerated   Pulleys flex/scap/abd 20x      Wall slides flex/scap 20x Towel      Ball on wall   Up/down/side-side/circles cw-ccw 20x ea 2# x    Shoulder isometric  10x5\"   Flex/ext/abd/ext standing pushing into towel roll     ER/IR self-resisted   Banded shoulder extension 20x Blue tubing     Shoulder horizontal abd  10x5\" red     ER/IR walk outs  10x5\" red     Banded ER/IR at 0 deg  15 reps x 2 ea Red tubing     High Row into ER 10 reps x 2 Red Tubing x 90 degrees abduction   Prone Ys/Ts/Is  10 reps x 2  x    Prone Row on Total Gym 10x2 2#     Scapular push ups on wall 2'' hold 15 reps x 2  x    Standing shoulder abduction AAROM with weighted stick 10 reps x 2 2#     Wall Clocks 15 reps Yellow x Alternating UE 10 and 2 position   Banded D2 Flexion 10x2 Red x           Re-evaluation of all subjective & objective measures, all STGs 10'   11/28 by primary PT for mid-POC progress note   Other:  Specific Instructions for next treatment: Emphasis on de-sensitizing pain response, improving available AROM in R shoulder, and work on progressive strengthening/stabilization of R RTC and scapular stabilizers as appropriate      Assessment:   12/19: Able to achieve full passive abduction following manual, along with improvement in ER. Added D2 flexion for additional R shoulder strengthening.  Patient noted exercise was very fatiguing, but denied any increased soreness following. Cues provided with scapular push-ups to reduce UT compensation, but demonstrated improved technique with cues. Mild increased fatigue reported at end of session, with no change in pain/soreness. [x] Progressing toward goals. [] No change. [] Other:    [x] Patient would continue to benefit from skilled physical therapy services in order to: help modulate pain response, improve available AROM and maximize functional strength/stability in R shoulder in order to maximize functional tolerances through all multiplanar reaching tasks through home/community ADLs. STG: (to be met in 6 treatments)  Pt will self report worst pain no greater than 3/10 in order to better tolerate ADLs/work activities with minimal dysfunction GOAL PROGRESSING AND EXTENDED 11/28  Pt will improve AROM in R shoulder to 170 deg elevation and 70 deg ER without increased pain in order to demonstrate ability to move/reach in all planes unrestricted at [facility] 91 11/28  Pt will self report ability to perform all upper body dressing tasks without compensation to show improved functional mobility through ADLs without impairment. GOAL MET 11/28  LTG: (to be met in 12 treatments)  Pt will demonstrate improved R UE strength to 4/5 or greater in order to demonstrate improved stability/strength necessary for unrestricted ADLs/work activities GOAL PROGRESSING 11/28  Pt will improve Apley reach in R shoulder to symmetrical values (cross body to posterior shoulder, ER to T5, IR to T8) to demonstrate improved functional reach in all planes  GOAL PROGRESSING 11/28  Pt will decrease SPADI to 20% impaired or less in order to demonstrate improved functional tolerances at PLOF with minimal restriction/dysfunction  Pt will demonstrate independence with a long term HEP for continued progress/maintenance after completion of PT    Pt.  Education:  [x] Yes  [] No  [] Reviewed Prior HEP/Ed  Method of Education: [x] Verbal  [x] Demo  [] Written  Comprehension of Education:  [x] Verbalizes understanding. [] Demonstrates understanding. [] Needs review. [] Demonstrates/verbalizes HEP/Ed previously given. Plan: [x] Continue per plan of care.    [] Other:      Treatment Charges: Mins Units   []  Modalities     [x]  Ther Exercise 30 2   [x]  Manual Therapy 8 1   []  Ther Activities     []  Aquatics     []  Neuromuscular     [] Vasocompression     [] Gait Training     [] Dry needling        [] 1 or 2 muscles        [] 3 or more muscles     []  Other     Total Treatment time 38 3       Time In: 9:37 am            Time Out: 10:15 am    Electronically signed by:  Krystian Casillas PTA

## 2022-12-21 ENCOUNTER — HOSPITAL ENCOUNTER (OUTPATIENT)
Dept: PHYSICAL THERAPY | Age: 52
Setting detail: THERAPIES SERIES
Discharge: HOME | End: 2022-12-21
Payer: MEDICARE

## 2022-12-21 PROCEDURE — 97140 MANUAL THERAPY 1/> REGIONS: CPT

## 2022-12-21 PROCEDURE — 97110 THERAPEUTIC EXERCISES: CPT

## 2022-12-21 NOTE — FLOWSHEET NOTE
[facility] Outpatient Physical Therapy    [address]   Phone: [phone]   Fax: [phone]    Physical Therapy Daily Treatment Note      Date:  2022  Patient Name:  Wesley Borrego    :  1970  MRN: [id number]  Physician: Tabitha Rao MD                  Insurance: [place] [employer]. Auth required after 30 visits (12 VISITS REMAINING AT START OF THIS EPISODE)  Medical Diagnosis: M75.21 (ICD-10-CM) - Bicipital tendinitis, right shoulder          Rehab Codes: M25.511, M25.611, R53.1  Onset Date: Prior R RTC repair 2019. Recent exacerbation this past 2022                 Next 's appt: 22  Visit# / total visits: 10/12 (updated visit count )    Cancels/No Shows:     Subjective:    : Patient reports today that he was more sore in R shoulder through the day following appt on . Reported that he thinks overhead strengthening exercises caused increased soreness, but soreness had improved by next day (yesterday). Pain:  [x] Yes  [] No Location: R shoulder Pain Rating: (0-10 scale) 3/10, soreness  Pain altered Tx:  [] No  [] Yes  Action:  Comments:    Objective:  All below objective measures updated  by Sirisha Price, PT       Range of Motion  Left Range of Motion  Right Strength  Left Strength  Right   Shoulder Flexion 170 170  Pain at end range 5/5 4/5   Shoulder Abduction 180 115 5/5 4-/5   Shoulder Extension 60 60       Shoulder ER 70 50 / 65 5/5 3+/5   Shoulder IR 90 90 5/5 4/5   Elbow Flexion     5/5 4+/5   Elbow Extension     5/5 4/5   Pronation           Supination           Wrist Flexion           Wrist Extension           Radial Deviation           Ulnar Deviation           Apley- Cross Body Reach Posterior shoulder Posterior shoulder       Apley- Gross ER T5 T4       Apley- Gross IR T8 T10        Strength                       Supraspinatus     4+/5 3/5   Upper Trap           Middle Trap Lower Trap           Pec Major           Serratus Anterior               Modalities:   Precautions: None  Exercises:  Exercise Reps/ Time Weight/ Level Completed  Today Comments   Manual 8'  x Grade II/III posterior and inferior GHJ glides with short axis traction   Supine alternating isometrics and RS 1' x 4  x 2 set AI at 90 deg, mild perturbations at elbow in all planes. 2 set RS at 0 deg, mild perturbations at wrist challenging ER/IR   Supine shoulder flexion AAROM with weighted stick 15x 2#     Supine shoulder abd AAROM with weighted stick 15x 2#     Supine punches 15 reps x 2   R only   Supine Shoulder Flexion 10x      Sidelying ER 15 reps x 2  x Partial range as tolerated   Pulleys flex/scap/abd 20x      Wall slides flex/scap 20x Towel      Ball on wall   Up/down/side-side/circles cw-ccw 20x ea 2# x    Shoulder isometric  10x5\"   Flex/ext/abd/ext standing pushing into towel roll     ER/IR self-resisted   Banded shoulder extension 20x Blue tubing     Shoulder horizontal abd  10x5\" red     ER/IR walk outs  10x5\" red     Banded ER/IR at 0 deg  15 reps x 2 ea Red tubing     High Row into ER 10 reps x 2 Red Tubing x 90 degrees abduction   Prone Ys/Ts/Is  10 reps x 2  x    Prone Row on Total Gym 10x2 2#     Scapular push ups on wall 2'' hold 15 reps x 2  x    Standing shoulder abduction AAROM with weighted stick 10 reps x 2 2#     Wall Clocks 15 reps Red x Alternating UE 10 and 2 position   Banded D2 Flexion 10x2 Red x           Re-evaluation of all subjective & objective measures, all STGs 10'   11/28 by primary PT for mid-POC progress note   Other:  Specific Instructions for next treatment: Emphasis on de-sensitizing pain response, improving available AROM in R shoulder, and work on progressive strengthening/stabilization of R RTC and scapular stabilizers as appropriate      Assessment:   12/21: Less end range stiffness demonstrated today, although still limited with abduction and ER.  Able to improve with manual. Continued with most recent exercise program for shoulder strengthening. Intermittent cues provided throughout exercises to improve technique with improvement demonstrated with cues. Patient continues to be most challenged with overhead motions, especially with resistance as patient is quick to fatigue. [x] Progressing toward goals. [] No change. [] Other:    [x] Patient would continue to benefit from skilled physical therapy services in order to: help modulate pain response, improve available AROM and maximize functional strength/stability in R shoulder in order to maximize functional tolerances through all multiplanar reaching tasks through home/community ADLs. STG: (to be met in 6 treatments)  Pt will self report worst pain no greater than 3/10 in order to better tolerate ADLs/work activities with minimal dysfunction GOAL PROGRESSING AND EXTENDED 11/28  Pt will improve AROM in R shoulder to 170 deg elevation and 70 deg ER without increased pain in order to demonstrate ability to move/reach in all planes unrestricted at [facility] 91 11/28  Pt will self report ability to perform all upper body dressing tasks without compensation to show improved functional mobility through ADLs without impairment.  GOAL MET 11/28  LTG: (to be met in 12 treatments)  Pt will demonstrate improved R UE strength to 4/5 or greater in order to demonstrate improved stability/strength necessary for unrestricted ADLs/work activities GOAL PROGRESSING 11/28  Pt will improve Apley reach in R shoulder to symmetrical values (cross body to posterior shoulder, ER to T5, IR to T8) to demonstrate improved functional reach in all planes  GOAL PROGRESSING 11/28  Pt will decrease SPADI to 20% impaired or less in order to demonstrate improved functional tolerances at PLOF with minimal restriction/dysfunction  Pt will demonstrate independence with a long term HEP for continued progress/maintenance after

## 2022-12-28 ENCOUNTER — HOSPITAL ENCOUNTER (OUTPATIENT)
Dept: PHYSICAL THERAPY | Age: 52
Setting detail: THERAPIES SERIES
Discharge: HOME | End: 2022-12-28
Payer: MEDICARE

## 2022-12-28 PROCEDURE — 97110 THERAPEUTIC EXERCISES: CPT
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
## 2023-01-01 NOTE — FLOWSHEET NOTE
This note was copied from the mother's chart.  Lactation Rounds:     Mother is breastfeeding infant on the left breast in football hold position. Adequate latch with audible swallows noted. Mother denies pain and discomfort. Infant is sleepy at the breast. Mother is massaging and applying compressions on breast and stimulating infant to stay awake during the feeding.     Reviewed expected  behaviors and output for the first 48 hours of life. Reviewed the importance of cue based feedings on demand, unrestricted access to the breast, and frequent uninterrupted skin to skin contact.      Mother denies any lactation needs or concerns at this time. Encouraged mother to call for assistance when desired or when infant is showing signs of hunger. Mother verbalizes understanding of all education and counseling.          [] [facility]) - [facility] &  Therapy  [address]    [phone]  F: [phone]   [] [address]  [facility] [address]   [address]  P: [phone]  F: [phone]  [] [address] &  Therapy  [address] [facility]  P: [phone]  F: [phone] [] [address]  P: [phone]  F: [phone]  [] [address]  [facility]   [address]   [place]: [phone]  F: [phone]   [x] [facility]  [address] [facility] [address]  [place]: [phone]   F: [phone]     Physical Therapy Cancel/No Show note    Date: 2021  Patient: Kourtney Funes  : 1970  MRN: [id number]    Cancels/No Shows to date:     For today's appointment patient:    [x]  Cancelled    [] Rescheduled appointment    [] No-show     Reason given by patient:    []  Patient ill    []  Conflicting appointment    [] No transportation      [] Conflict with work    [x] No reason given    [] Weather related    [] [id number]    [] Other:      Comments:        [] Next appointment was confirmed    Electronically signed by: Swati Blair PTA

## 2023-01-16 ENCOUNTER — HOSPITAL ENCOUNTER (OUTPATIENT)
Dept: PHYSICAL THERAPY | Age: 53
Setting detail: THERAPIES SERIES
Discharge: HOME | End: 2023-01-16
Payer: MEDICARE

## 2023-01-16 PROCEDURE — 97530 THERAPEUTIC ACTIVITIES: CPT

## 2023-01-16 PROCEDURE — 97162 PT EVAL MOD COMPLEX 30 MIN: CPT
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
## 2023-01-19 ENCOUNTER — HOSPITAL ENCOUNTER (OUTPATIENT)
Dept: PHYSICAL THERAPY | Age: 53
Setting detail: THERAPIES SERIES
Discharge: HOME | End: 2023-01-19
Payer: MEDICARE

## 2023-01-19 PROCEDURE — 97110 THERAPEUTIC EXERCISES: CPT

## 2023-01-19 PROCEDURE — 97016 VASOPNEUMATIC DEVICE THERAPY: CPT

## 2023-01-19 NOTE — FLOWSHEET NOTE
[facility]   [address]   Phone: [phone]   Fax: [phone]    Physical Therapy Daily Treatment Note      Date:  2023  Patient Name:  Cuong Dee    :  1970  MRN: [id number]  Physician: Juan Ramon CHOI                                 Insurance: [employer] --  visit remain  Medical Diagnosis: M76.61 (ICD-10-CM) - Achilles tendinitis, right leg  Rehab Codes: R25 pain , M25.60 stiffness, R53.1 weakness , R60.9 edema  Onset Date: chronic; worsening 2022                        Next 's appt: TBD     Visit# / total visits:   Cancels/No Shows: 0/0    Precautions: R Foot drop     Subjective:  Pt notes he was doing desensitization with cotton ball but was not able to feel it much.        Pain:  [x] Yes  [] No Location: R achilles & medial ankle   Pain Rating: (0-10 scale) 3-4/10  Pain altered Tx:  [] No  [] Yes  Action:  Comments:    Objective:  INTERVENTIONS  INTERVENTIONS  Reps/ Time Weight/ Level Completed  Today Comments               MODALITIES            Ionto       Check if eval signed and returned   Vasocompression _ R ankle  10 min  low/38 deg  x  pillowcase over ankle                MANUAL                                    EXERCISES            Biking    ADD                  R ankle PROM  8 min    x     Great toe extensions 20x  x Therapist blocking lesser toes    Lesser toe extensions 20x  x Therapist blocking big toe   All toe curling 20x  x    DF stretch with strap  3x30s    x     Soleus stretch with strap 3x30s  x With knee bent    Desensitization rubbing 2 min towel x To begin session   Resisted PF  20x yellow x Keeping in pain free range     resisted DF   20x  yellow  x  min range   Resisted INv 20x yellow x    Active assisted Ev 20x  x                EDUCATION            desensitization 7 min    Provided with handout (see soft chart) discussed starting with a soft texture and progress to rougher to build up tolerance to touch   Ice cup massage 1 min    Discussed using a frozen stevie cup to ice massage to address the swelling in L ankle                  Other:    Specific Instructions for next treatment check if eval signed and returned to trial ionto, begin with biking for 5-8 min, continue with pain education, continue to progress desensitization, continue to work on ankle ROM        Assessment: [x] Progressing toward goals. Began with densensitization as an overview of how to complete and progressed to a towel. Still very sensitive over the R achilles and medial ankle. during this educated on pain and how the brain can recognize movement as pain and needing to correct with exercise/cardio work. Pt noting enjoyment and less pain with biking so will add next session. Slowly worked through ROM and stretching with respect to pain. Followed rule of no pain increase >3 pts. Most painful with INV/EV. Then worked through intrinsic & ankle strengthening to tolerance. With DF and Ev gave tactile cues to musculature for sensory cue to activate more. Ended with vasocompression to control swelling and for desensitization given by compression. Post session pain improves to 2/10. [] No change. [] Other:    [x] Patient would continue to benefit from skilled physical therapy services in order to: improve pain, decrease swelling, improve ROM, increase foot intrinsics and strength of intact R lowe leg musculature to improve functional tolerances to ambulation and other ADLs. GOALS:   STG: (to be met in 6 treatments)  Pt will self report worst pain no greater than 3/10 with walking into session in order to better tolerate ADLs/work activities with minimal dysfunction  Pt will improve AROM in R ankle DF to 5 deg past neutral for normalized gait mechanics. Pt will improve R ankle PF AROM to 55 deg to be equal to the LLE for full push off.    Pt will improve R ankle INV/EV to +/- 5 deg of L ankle to have better adaptability of the ankle when walking. Pt will be able to tolerate moderate pressure of the R lower leg without > 2pt increase in pain showing better pain control leading to less dysfunction with ADLs  LTG: (to be met in 12 treatments)  Pt will have no observable swelling in the R ankle upon arrival to treatment showing improved swelling control. Pt will demonstrate at least a 3/5 R ankle DF/PF to aid in mobility and walking. Pt will be able to tolerate walking > 10 minutes without significant increase in pain to improve tolerance to community distances. Pt will increase score on FAAM to >40/84 in order to demonstrate improved functional tolerances at PLOF with minimal restriction/dysfunction  Pt will demonstrate independence with a long term HEP for continued progress/maintenance after completion of PT     Pt. Education:  [x] Yes  [] No  [x] Reviewed Prior HEP/Ed  Method of Education: [x] Verbal  [x] Demo  [] Written  Comprehension of Education:  [x] Verbalizes understanding. [x] Demonstrates understanding. [] Needs review. [x] Demonstrates/verbalizes HEP/Ed previously given. Plan: [x] Continue per plan of care.    [] Other:      Treatment Charges: Mins Units   []  Modalities     [x]  Ther Exercise 39 3   []  Manual Therapy     []  Ther Activities     []  Aquatics     []  Neuromuscular     [x] Vasocompression 10 1   [] Gait Training     [] Dry needling        [] 1 or 2 muscles        [] 3 or more muscles     []  Other     Total Treatment time 49 4     Time [id number]            Time Out: 1890    Electronically signed by:  Elizabeth Phipps, PT

## 2023-01-20 DIAGNOSIS — F31.75 BIPOLAR DISORDER, IN PARTIAL REMISSION, MOST RECENT EPISODE DEPRESSED (HCC): ICD-10-CM

## 2023-01-20 DIAGNOSIS — F41.9 ANXIETY: ICD-10-CM

## 2023-01-20 RX ORDER — BUSPIRONE HYDROCHLORIDE 10 MG/1
TABLET ORAL
Qty: 90 TABLET | Refills: 3 | Status: SHIPPED | OUTPATIENT
Start: 2023-01-20

## 2023-01-20 NOTE — TELEPHONE ENCOUNTER
Please Approve or Refuse.   Send to Pharmacy per Pt's Request:      Next Visit Date:  4/5/2023   Last Visit Date: 12/5/2022    Hemoglobin A1C (%)   Date Value   06/02/2022 5.4   01/19/2022 5.8   03/12/2021 5.3             ( goal A1C is < 7)   BP Readings from Last 3 Encounters:   12/05/22 122/75   10/04/22 110/70   08/31/22 118/78          (goal 120/80)  BUN   Date Value Ref Range Status   11/03/2022 13 6 - 20 mg/dL Final     Creatinine   Date Value Ref Range Status   11/03/2022 0.79 0.70 - 1.20 mg/dL Final     Potassium   Date Value Ref Range Status   11/03/2022 3.3 (L) 3.7 - 5.3 mmol/L Final

## 2023-01-23 ENCOUNTER — HOSPITAL ENCOUNTER (OUTPATIENT)
Dept: PHYSICAL THERAPY | Age: 53
Setting detail: THERAPIES SERIES
Discharge: HOME | End: 2023-01-23
Payer: MEDICARE

## 2023-01-23 PROCEDURE — 97016 VASOPNEUMATIC DEVICE THERAPY: CPT

## 2023-01-23 PROCEDURE — 97110 THERAPEUTIC EXERCISES: CPT

## 2023-01-23 NOTE — FLOWSHEET NOTE
[facility]   [address]   Phone: [phone]   Fax: [phone]    Physical Therapy Daily Treatment Note      Date:  2023  Patient Name:  Michael Ty    :  1970  MRN: [id number]  Physician: Aleyda Waddell APRN-CNP                                 Insurance: [employer] --  visit remain  Medical Diagnosis: M76.61 (ICD-10-CM) - Achilles tendinitis, right leg  Rehab Codes: R25 pain , M25.60 stiffness, R53.1 weakness , R60.9 edema  Onset Date: chronic; worsening 2022                        Next 's appt: TBD     Visit# / total visits: 3/12  Cancels/No Shows: 0/0    Precautions: R Foot drop     Subjective:  Pt notes he progressed to towel with desensitization methods with fair tolerance.     Pain:  [x] Yes  [] No Location: R achilles & medial ankle   Pain Rating: (0-10 scale) 3-4/10  Pain altered Tx:  [] No  [] Yes  Action:  Comments:    Objective:  INTERVENTIONS  INTERVENTIONS  Reps/ Time Weight/ Level Completed  Today Comments               MODALITIES            Ionto       Check if eval signed and returned   Vasocompression _ R ankle  10 min  low/38 deg  x  pillowcase over ankle                MANUAL                                    EXERCISES            Biking  5 min   x                  R ankle PROM  8 min   x     Great toe extensions 20x  x Therapist blocking lesser toes    Lesser toe extensions 20x  x Therapist blocking big toe   All toe curling 20x  x    DF stretch with strap  3x30s    x     Soleus stretch with strap 3x30s  x With knee bent    Desensitization rubbing 2 min towel  To begin session   Resisted PF  20x yellow x Keeping in pain free range     resisted DF   20x  yellow x  min range   Resisted INv 20x yellow x    Active assisted Ev 20x  x    BAPS 20x ea  Lvl 2  x PF,DF,IV,EV   Add circles next visit           EDUCATION            desensitization 7 min    Provided with handout (see soft chart)  discussed starting with a soft texture and progress to rougher to build up tolerance to touch   Ice cup massage 1 min    Discussed using a frozen stevie cup to ice massage to address the swelling in L ankle                  Other:    Specific Instructions for next treatment check if eval signed and returned to trial ionto, begin with biking for 5-8 min, continue with pain education, continue to progress desensitization, continue to work on ankle ROM        Assessment: [x] Progressing toward goals. Initial minutes of session spent with mobility warm up on schwinn bike with good tolerance minimal pain or fatigue noted. Able to add BAPS board working in all planes to promote mobility and ROM in R ankle. Patient is sow moving with all exercise secondary to ensure he is completing with proper technique. Patient noted fatigue and minimal soreness at end of session so continued with vaso compression for pain and edema control. [] No change. [] Other:    [x] Patient would continue to benefit from skilled physical therapy services in order to: improve pain, decrease swelling, improve ROM, increase foot intrinsics and strength of intact R lowe leg musculature to improve functional tolerances to ambulation and other ADLs. GOALS:   STG: (to be met in 6 treatments)  Pt will self report worst pain no greater than 3/10 with walking into session in order to better tolerate ADLs/work activities with minimal dysfunction  Pt will improve AROM in R ankle DF to 5 deg past neutral for normalized gait mechanics. Pt will improve R ankle PF AROM to 55 deg to be equal to the LLE for full push off. Pt will improve R ankle INV/EV to +/- 5 deg of L ankle to have better adaptability of the ankle when walking.    Pt will be able to tolerate moderate pressure of the R lower leg without > 2pt increase in pain showing better pain control leading to less dysfunction with ADLs  LTG: (to be met in 12 treatments)  Pt will have no observable swelling in the R ankle upon arrival to treatment showing improved swelling control. Pt will demonstrate at least a 3/5 R ankle DF/PF to aid in mobility and walking. Pt will be able to tolerate walking > 10 minutes without significant increase in pain to improve tolerance to community distances. Pt will increase score on FAAM to >40/84 in order to demonstrate improved functional tolerances at PLOF with minimal restriction/dysfunction  Pt will demonstrate independence with a long term HEP for continued progress/maintenance after completion of PT     Pt. Education:  [x] Yes  [] No  [x] Reviewed Prior HEP/Ed  Method of Education: [x] Verbal  [x] Demo  [] Written  Comprehension of Education:  [x] Verbalizes understanding. [x] Demonstrates understanding. [] Needs review. [x] Demonstrates/verbalizes HEP/Ed previously given. Plan: [x] Continue per plan of care.    [] Other:      Treatment Charges: Mins Units   []  Modalities     [x]  Ther Exercise 43 3   []  Manual Therapy     []  Ther Activities     []  Aquatics     []  Neuromuscular     [x] Vasocompression 10 1   [] Gait Training     [] Dry needling        [] 1 or 2 muscles        [] 3 or more muscles     []  Other     Total Treatment time 53 4     Time In:602 pm             Time Out: 655 pm     Electronically signed by:  Sudheer Nava PTA

## 2023-01-25 ENCOUNTER — HOSPITAL ENCOUNTER (OUTPATIENT)
Dept: PHYSICAL THERAPY | Age: 53
Setting detail: THERAPIES SERIES
Discharge: HOME | End: 2023-01-25
Payer: MEDICARE

## 2023-01-25 PROCEDURE — 97110 THERAPEUTIC EXERCISES: CPT

## 2023-01-25 PROCEDURE — 97016 VASOPNEUMATIC DEVICE THERAPY: CPT

## 2023-01-25 NOTE — FLOWSHEET NOTE
[facility]   [address]   Phone: [phone]   Fax: [phone]    Physical Therapy Daily Treatment Note      Date:  2023  Patient Name:  Lila Wang    :  1970  MRN: [id number]  Physician: Cici COLLINS-KHANG                                 Insurance: [employer] --  visit remain  Medical Diagnosis: M76.61 (ICD-10-CM) - Achilles tendinitis, right leg  Rehab Codes: R25 pain , M25.60 stiffness, R53.1 weakness , R60.9 edema  Onset Date: chronic; worsening 2022                        Next 's appt: TBD     Visit# / total visits:   Cancels/No Shows: 0/0    Precautions: R Foot drop     Subjective:  Patient reporting to therapy with new concerns or complaints and states he has been compliant with his HEP.     Pain:  [x] Yes  [] No Location: R achilles & medial ankle   Pain Rating: (0-10 scale) 3/10  Pain altered Tx:  [] No  [] Yes  Action:  Comments:    Objective:  INTERVENTIONS  INTERVENTIONS  Reps/ Time Weight/ Level Completed  Today Comments               MODALITIES            Ionto       Check if eval signed and returned   Vasocompression _ R ankle  15 min  low/38 deg  x  pillowcase over ankle                MANUAL                                    EXERCISES            Biking  5 min   x                  R ankle PROM  8 min   x     Great toe extensions 20x  x Therapist blocking lesser toes    Lesser toe extensions 20x  x Therapist blocking big toe   All toe curling 20x  x    DF stretch with strap  3x30s   x     Soleus stretch with strap 3x30s  x With knee bent    Desensitization rubbing 2 min towel HEP To begin session   Resisted PF  20x yellow x Keeping in pain free range     resisted DF   20x  yellow x  min range   Resisted INv 20x yellow x    Active assisted Ev 20x  x    BAPS 20x ea  Lvl 2  x PF,DF,IV,EV, CCW/CW   Add circles next visit    [place]  10 marbles x 2  x 1/25 got easier as exercise progressed   EDUCATION            desensitization 7 min    Provided with handout (see soft chart) discussed starting with a soft texture and progress to rougher to build up tolerance to touch   Ice cup massage 1 min    Discussed using a frozen stevie cup to ice massage to address the swelling in L ankle                  Other:    Specific Instructions for next treatment check if eval signed and returned to trial ionto, begin with biking for 5-8 min, continue with pain education, continue to progress desensitization, continue to work on ankle ROM        Assessment: [x] Progressing toward goals. 1/25:  Continued with exercises as patient could tolerate. Patient states he hasn't been on his feet much today which probably resulted in more tolerance to activity and PROM. Added marble  to improve intrinsic foot muscle strength. Patient demonstrating improved tolerance and ROM to resisted strengthening this session. Completed session with vaso for pain and edema management. [] No change. [] Other:    [x] Patient would continue to benefit from skilled physical therapy services in order to: improve pain, decrease swelling, improve ROM, increase foot intrinsics and strength of intact R lowe leg musculature to improve functional tolerances to ambulation and other ADLs. GOALS:   STG: (to be met in 6 treatments)  Pt will self report worst pain no greater than 3/10 with walking into session in order to better tolerate ADLs/work activities with minimal dysfunction  Pt will improve AROM in R ankle DF to 5 deg past neutral for normalized gait mechanics. Pt will improve R ankle PF AROM to 55 deg to be equal to the LLE for full push off. Pt will improve R ankle INV/EV to +/- 5 deg of L ankle to have better adaptability of the ankle when walking.    Pt will be able to tolerate moderate pressure of the R lower leg without > 2pt increase in pain showing better pain control leading to less dysfunction with ADLs  LTG: (to be met in 12 treatments)  Pt will have no observable swelling in the R ankle upon arrival to treatment showing improved swelling control. Pt will demonstrate at least a 3/5 R ankle DF/PF to aid in mobility and walking. Pt will be able to tolerate walking > 10 minutes without significant increase in pain to improve tolerance to community distances. Pt will increase score on FAAM to >40/84 in order to demonstrate improved functional tolerances at PLOF with minimal restriction/dysfunction  Pt will demonstrate independence with a long term HEP for continued progress/maintenance after completion of PT     Pt. Education:  [x] Yes  [] No  [x] Reviewed Prior HEP/Ed  Method of Education: [x] Verbal  [x] Demo  [] Written  Comprehension of Education:  [x] Verbalizes understanding. [x] Demonstrates understanding. [] Needs review. [x] Demonstrates/verbalizes HEP/Ed previously given. Plan: [x] Continue per plan of care.    [] Other:      Treatment Charges: Mins Units   []  Modalities     [x]  Ther Exercise 56 4   []  Manual Therapy     []  Ther Activities     []  Aquatics     []  Neuromuscular     [x] Vasocompression 15 1   [] Gait Training     [] Dry needling        [] 1 or 2 muscles        [] 3 or more muscles     []  Other     Total Treatment time 71 5     Time In:1415           Time Out: 8650    Electronically signed by:  Lucio Rosario PTA

## 2023-01-27 DIAGNOSIS — R39.198 DIFFICULTY URINATING: ICD-10-CM

## 2023-01-27 DIAGNOSIS — M79.7 FIBROMYALGIA MUSCLE PAIN: Primary | ICD-10-CM

## 2023-01-27 DIAGNOSIS — N52.9 ERECTILE DYSFUNCTION, UNSPECIFIED ERECTILE DYSFUNCTION TYPE: ICD-10-CM

## 2023-01-27 DIAGNOSIS — R35.1 NOCTURIA: ICD-10-CM

## 2023-01-27 RX ORDER — CRANBERRY FRUIT 307 MG
1 TABLET ORAL DAILY
Qty: 90 CAPSULE | Refills: 3 | Status: SHIPPED | OUTPATIENT
Start: 2023-01-27

## 2023-01-27 RX ORDER — SILDENAFIL 100 MG/1
100 TABLET, FILM COATED ORAL PRN
Qty: 30 TABLET | Refills: 1 | Status: SHIPPED | OUTPATIENT
Start: 2023-01-27

## 2023-01-27 RX ORDER — LANOLIN ALCOHOL/MO/W.PET/CERES
400 CREAM (GRAM) TOPICAL DAILY
Qty: 90 TABLET | Refills: 3 | Status: SHIPPED | OUTPATIENT
Start: 2023-01-27

## 2023-01-30 ENCOUNTER — HOSPITAL ENCOUNTER (OUTPATIENT)
Dept: PHYSICAL THERAPY | Age: 53
Setting detail: THERAPIES SERIES
Discharge: HOME | End: 2023-01-30
Payer: MEDICARE

## 2023-01-30 PROCEDURE — 97110 THERAPEUTIC EXERCISES: CPT

## 2023-01-30 PROCEDURE — 97016 VASOPNEUMATIC DEVICE THERAPY: CPT

## 2023-01-30 NOTE — FLOWSHEET NOTE
[facility]   [address]   Phone: [phone]   Fax: [phone]    Physical Therapy Daily Treatment Note      Date:  2023  Patient Name:  Kanwal Ceballos    :  1970  MRN: [id number]  Physician: Maurice COLLINS-KHANG                                 Insurance: [employer] --  visit remain  Medical Diagnosis: M76.61 (ICD-10-CM) - Achilles tendinitis, right leg  Rehab Codes: R25 pain , M25.60 stiffness, R53.1 weakness , R60.9 edema  Onset Date: chronic; worsening 2022                        Next 's appt: TBD     Visit# / total visits:   Cancels/No Shows: 0/0    Precautions: R Foot drop     Subjective:  Patient reports compliance with HEP and states he is noticing improvements with pain and tolerance to movement.      Pain:  [x] Yes  [] No Location: R achilles & medial ankle   Pain Rating: (0-10 scale) 3/10  Pain altered Tx:  [x] No  [] Yes  Action:  Comments:    Objective:  INTERVENTIONS  INTERVENTIONS  Reps/ Time Weight/ Level Completed  Today Comments               MODALITIES            Ionto       Check if eval signed and returned- not as of    Vasocompression- R boot  10 min  low/38 deg  x                MANUAL                                    EXERCISES            Biking  5 min   x                  R ankle PROM  8 min   x     R ankle AROM 10x3'' ea  x    R ankle circles 20x ea  x CW/CCW   Great toe extensions 20x  x Therapist blocking lesser toes    Lesser toe extensions 20x  x Therapist blocking big toe   All toe curling 20x  x    DF stretch with strap  3x30s   x     Soleus stretch with strap 3x30s  x With knee bent    Desensitization rubbing 2 min towel  To begin session   Resisted PF  20x yellow x Keeping in pain free range     resisted DF  20x yellow x  min range   Resisted INv 20x yellow     Active assisted Ev 20x      Seated ankle pumps 20x3''  x    BAPS 20x ea  Lvl 2   PF,DF,IV,EV, CCW/CW   Add circles next visit    [place]  10 marbles x 2   1/25 got easier as exercise progressed   EDUCATION            desensitization 7 min    Provided with handout (see soft chart) discussed starting with a soft texture and progress to rougher to build up tolerance to touch   Ice cup massage 1 min    Discussed using a frozen stevie cup to ice massage to address the swelling in L ankle                  Other:    Specific Instructions for next treatment check if eval signed and returned to trial ionto, begin with biking for 5-8 min, continue with pain education, continue to progress desensitization, continue to work on ankle ROM        Assessment: [x] Progressing toward goals. 1/30: Initiated session with bike for warm up and joint mobility while collecting subjective info. Continued at mat with stretching and exercises focused on improving AROM. Patient had difficulty with resistance this date with minimal range noted in all planes. Added seated ankle pumps with 3 sec hold at max range. Checked eval for signature, but this is not back yet so unable to begin trial with ionto. Ended session with vaso compression with boot sleeve for gastroc/soleus complex to be included for pain management as pain was stated to increase to 5/10 at end of session. [] No change. [] Other:    [x] Patient would continue to benefit from skilled physical therapy services in order to: improve pain, decrease swelling, improve ROM, increase foot intrinsics and strength of intact R lowe leg musculature to improve functional tolerances to ambulation and other ADLs. GOALS:   STG: (to be met in 6 treatments)  Pt will self report worst pain no greater than 3/10 with walking into session in order to better tolerate ADLs/work activities with minimal dysfunction  Pt will improve AROM in R ankle DF to 5 deg past neutral for normalized gait mechanics. Pt will improve R ankle PF AROM to 55 deg to be equal to the LLE for full push off.    Pt will improve R ankle INV/EV to +/- 5 deg of L ankle to have better adaptability of the ankle when walking. Pt will be able to tolerate moderate pressure of the R lower leg without > 2pt increase in pain showing better pain control leading to less dysfunction with ADLs  LTG: (to be met in 12 treatments)  Pt will have no observable swelling in the R ankle upon arrival to treatment showing improved swelling control. Pt will demonstrate at least a 3/5 R ankle DF/PF to aid in mobility and walking. Pt will be able to tolerate walking > 10 minutes without significant increase in pain to improve tolerance to community distances. Pt will increase score on FAAM to >40/84 in order to demonstrate improved functional tolerances at PLOF with minimal restriction/dysfunction  Pt will demonstrate independence with a long term HEP for continued progress/maintenance after completion of PT     Pt. Education:  [x] Yes  [] No  [x] Reviewed Prior HEP/Ed  Method of Education: [x] Verbal  [] Demo  [] Written  Comprehension of Education:  [] Verbalizes understanding. [] Demonstrates understanding. [] Needs review. [x] Demonstrates/verbalizes HEP/Ed previously given. Plan: [x] Continue per plan of care.    [] Other:      Treatment Charges: Mins Units   []  Modalities     [x]  Ther Exercise 38 3   []  Manual Therapy     []  Ther Activities     []  Aquatics     []  Neuromuscular     [x] Vasocompression 10 1   [] Gait Training     [] Dry needling        [] 1 or 2 muscles        [] 3 or more muscles     []  Other     Total Treatment time 48 4     Time In: 1802           Time Out: 7916    Electronically signed by:  Cynthia Hong PTA

## 2023-02-01 ENCOUNTER — HOSPITAL ENCOUNTER (OUTPATIENT)
Dept: PHYSICAL THERAPY | Age: 53
Setting detail: THERAPIES SERIES
Discharge: HOME | End: 2023-02-01
Payer: MEDICAID

## 2023-02-01 PROCEDURE — 97016 VASOPNEUMATIC DEVICE THERAPY: CPT

## 2023-02-01 PROCEDURE — 97110 THERAPEUTIC EXERCISES: CPT

## 2023-02-01 NOTE — FLOWSHEET NOTE
[facility]   [address]   Phone: [phone]   Fax: [phone]    Physical Therapy Daily Treatment Note      Date:  2023  Patient Name:  Ericka Ulloa    :  1970  MRN: [id number]  Physician: Mychal COLLINS-KHANG                                 Insurance: [employer] --  visit remain  Medical Diagnosis: M76.61 (ICD-10-CM) - Achilles tendinitis, right leg  Rehab Codes: R25 pain , M25.60 stiffness, R53.1 weakness , R60.9 edema  Onset Date: chronic; worsening 2022                        Next 's appt: TBD     Visit# / total visits:   Cancels/No Shows: 0/0    Precautions: R Foot drop     Subjective:  Patient states he was sore after last session and it has lasted through today.      Pain:  [x] Yes  [] No Location: R achilles & medial ankle   Pain Rating: (0-10 scale) 5/10  Pain altered Tx:  [x] No  [] Yes  Action:  Comments:    Objective:  INTERVENTIONS  INTERVENTIONS  Reps/ Time Weight/ Level Completed  Today Comments               MODALITIES            Ionto       Check if eval signed and returned- not as of    Vasocompression- R boot  10 min  mod/38 deg                  MANUAL                                    EXERCISES            Biking  5 min   x                  R ankle PROM 8 min   x     R ankle AROM 10x3'' ea  x    R ankle circles 20x ea   CW/CCW   Great toe extensions 20x  x Therapist blocking lesser toes    Lesser toe extensions 20x  x Therapist blocking big toe   All toe curling 20x  x    DF stretch with strap  3x30s   x     Soleus stretch with strap 3x30s  x With knee bent    Desensitization rubbing 2 min towel  To begin session   Resisted PF  20x yellow x Keeping in pain free range    Resisted DF  20x yellow x  min range   Resisted INv 20x yellow x    Resisted Ev 20x yellow x    Seated ankle pumps 20x3''      BAPS 20x ea  Lvl 2   PF,DF,IV,EV, CCW/CW   Add circles next visit    [place]  10 marbles x 2  x 1/25 got easier as exercise progressed   EDUCATION            desensitization 7 min    Provided with handout (see soft chart) discussed starting with a soft texture and progress to rougher to build up tolerance to touch   Ice cup massage 1 min    Discussed using a frozen stevie cup to ice massage to address the swelling in L ankle                  Other:    Specific Instructions for next treatment check if eval signed and returned to trial ionto, begin with biking for 5-8 min, continue with pain education, continue to progress desensitization, continue to work on ankle ROM        Assessment: [x] Progressing toward goals. 2/1: Continued with use of bike for warm up, followed by PROM and stretching. Patient demonstrating decreased tolerance to gastroc and soleus stretch this date noted by facial grimacing for first 10-15sec of stretch, but alleviating as time progressed. Continued with marble  this date with patient improving throughout reps. Ended with vaso with moderate pressure this date, with pt noting pain decrease to 4/10 post-vaso. [] No change. [] Other:    [x] Patient would continue to benefit from skilled physical therapy services in order to: improve pain, decrease swelling, improve ROM, increase foot intrinsics and strength of intact R lowe leg musculature to improve functional tolerances to ambulation and other ADLs. GOALS:   STG: (to be met in 6 treatments)  Pt will self report worst pain no greater than 3/10 with walking into session in order to better tolerate ADLs/work activities with minimal dysfunction  Pt will improve AROM in R ankle DF to 5 deg past neutral for normalized gait mechanics. Pt will improve R ankle PF AROM to 55 deg to be equal to the LLE for full push off. Pt will improve R ankle INV/EV to +/- 5 deg of L ankle to have better adaptability of the ankle when walking.    Pt will be able to tolerate moderate pressure of the R lower leg without > 2pt increase in pain showing better pain control leading to less dysfunction with ADLs  LTG: (to be met in 12 treatments)  Pt will have no observable swelling in the R ankle upon arrival to treatment showing improved swelling control. Pt will demonstrate at least a 3/5 R ankle DF/PF to aid in mobility and walking. Pt will be able to tolerate walking > 10 minutes without significant increase in pain to improve tolerance to community distances. Pt will increase score on FAAM to >40/84 in order to demonstrate improved functional tolerances at PLOF with minimal restriction/dysfunction  Pt will demonstrate independence with a long term HEP for continued progress/maintenance after completion of PT     Pt. Education:  [x] Yes  [] No  [x] Reviewed Prior HEP/Ed  Method of Education: [x] Verbal  [] Demo  [] Written  Comprehension of Education:  [] Verbalizes understanding. [] Demonstrates understanding. [] Needs review. [x] Demonstrates/verbalizes HEP/Ed previously given. Plan: [x] Continue per plan of care.    [] Other:      Treatment Charges: Mins Units   []  Modalities     [x]  Ther Exercise 40 3   []  Manual Therapy     []  Ther Activities     []  Aquatics     []  Neuromuscular     [x] Vasocompression 15 1   [] Gait Training     [] Dry needling        [] 1 or 2 muscles        [] 3 or more muscles     []  Other     Total Treatment time 55 4     Time In: 5136           Time Out: 2149    Electronically signed by:  Smita Clinton PTA

## 2023-02-07 ENCOUNTER — E-VISIT (OUTPATIENT)
Dept: FAMILY MEDICINE CLINIC | Age: 53
End: 2023-02-07
Payer: MEDICAID

## 2023-02-07 DIAGNOSIS — J01.80 ACUTE NON-RECURRENT SINUSITIS OF OTHER SINUS: Primary | ICD-10-CM

## 2023-02-07 DIAGNOSIS — J20.9 ACUTE BRONCHITIS DUE TO INFECTION: ICD-10-CM

## 2023-02-07 PROBLEM — R06.83 HABITUAL SNORING: Status: ACTIVE | Noted: 2022-11-23

## 2023-02-07 PROCEDURE — 99423 OL DIG E/M SVC 21+ MIN: CPT | Performed by: FAMILY MEDICINE

## 2023-02-07 RX ORDER — FLUTICASONE PROPIONATE 50 MCG
2 SPRAY, SUSPENSION (ML) NASAL DAILY
Qty: 16 G | Refills: 0 | Status: SHIPPED | OUTPATIENT
Start: 2023-02-07

## 2023-02-07 RX ORDER — CEFUROXIME AXETIL 500 MG/1
500 TABLET ORAL 2 TIMES DAILY
Qty: 14 TABLET | Refills: 0 | Status: SHIPPED | OUTPATIENT
Start: 2023-02-07 | End: 2023-02-14

## 2023-02-07 RX ORDER — BENZONATATE 100 MG/1
100 CAPSULE ORAL 3 TIMES DAILY PRN
Qty: 21 CAPSULE | Refills: 0 | Status: SHIPPED | OUTPATIENT
Start: 2023-02-07 | End: 2023-02-14

## 2023-02-07 RX ORDER — FEXOFENADINE HCL 180 MG/1
180 TABLET ORAL DAILY PRN
Qty: 90 TABLET | Refills: 3 | Status: SHIPPED | OUTPATIENT
Start: 2023-02-07

## 2023-02-07 ASSESSMENT — LIFESTYLE VARIABLES
SMOKING_STATUS: NO, BUT I USED TO SMOKE
PACKS_PER_DAY: 2
SMOKING_YEARS: 15

## 2023-02-07 NOTE — PROGRESS NOTES
David Moncada (1970) initiated an asynchronous digital communication through [employer]. Date of service: 2/7/2023     HPI: per patient's questionnaire  I called the patient at this time 6:35 PM, on 2/7/2023    Patient says he did not test positive for UPMEM-76, he clicked on it by error  Patient reports Snoring for 1 week  Congestion and coughing and sneezing, mucus is yellow and clear, cannot sleep at night, symptoms are worsening. He denies fever, chills, night sweats    EXAM: not applicable    Diagnoses and all orders for this visit:    1. Acute non-recurrent sinusitis of other sinus  Worsening  - benzonatate (TESSALON PERLES) 100 MG capsule; Take 1 capsule by mouth 3 times daily as needed for Cough  Dispense: 21 capsule; Refill: 0  - cefUROXime (CEFTIN) 500 MG tablet; Take 1 tablet by mouth 2 times daily for 7 days  Dispense: 14 tablet; Refill: 0  - fluticasone (FLONASE) 50 MCG/ACT nasal spray; 2 sprays by Each Nostril route daily  Dispense: 16 g; Refill: 0  I also refilled the Allegra  2. Acute bronchitis due to infection  Worsening  - benzonatate (TESSALON PERLES) 100 MG capsule; Take 1 capsule by mouth 3 times daily as needed for Cough  Dispense: 21 capsule; Refill: 0  - cefUROXime (CEFTIN) 500 MG tablet; Take 1 tablet by mouth 2 times daily for 7 days  Dispense: 14 tablet; Refill: 0          Patient was advised to contact PCP if symptoms worsen or failing to change as expected      Time: EV3 - 21 or more minutes were spent on the digital evaluation and management of this patient.     Electronically signed by Nurys Martinez MD on 2/7/23 at 6:39 PM EST

## 2023-02-08 ENCOUNTER — APPOINTMENT (OUTPATIENT)
Dept: PHYSICAL THERAPY | Age: 53
End: 2023-02-08
Payer: MEDICAID

## 2023-02-13 ENCOUNTER — HOSPITAL ENCOUNTER (OUTPATIENT)
Dept: PHYSICAL THERAPY | Age: 53
Setting detail: THERAPIES SERIES
Discharge: HOME | End: 2023-02-13
Payer: MEDICAID

## 2023-02-13 PROCEDURE — 97016 VASOPNEUMATIC DEVICE THERAPY: CPT

## 2023-02-13 PROCEDURE — 97110 THERAPEUTIC EXERCISES: CPT

## 2023-02-13 NOTE — PROGRESS NOTES
[facility] Outpatient Physical Therapy   [address]   Phone: [phone]   Fax: [phone]    Physical Therapy Daily Treatment Note/Progress Note       Date:  2023  Patient Name:  Crystal Mitchell    :  1970  MRN: [id number]  Physician: Jordyn COLLINS-CNP                                 Ins[facility]: [facility] --  visit remain  Medical Diagnosis: M76.61 (ICD-10-CM) - Achilles tendinitis, right leg  Rehab Codes: R25 pain , M25.60 stiffness, R53.1 weakness , R60.9 edema  Onset Date: chronic; worsening 2022                        Next 's appt: TBD   Visit# / total visits:   Cancels/No Shows: 0/0  Date of initial visit: 23        Date of PN: 23 (visit 6)  Formal progress note reporting period:  23 - 23     Precautions: R Foot drop     Subjective:  Patient notes his R ankle is more swollen recently. At end of day it is worst. The swelling restarted about 3 days ago as he got more active with the nicer weather. Noticing pain about the same since eval. Feels that ROM and strength is getting better.  Feeling that his R Ant Tib is     Pain:  [x] Yes  [] No Location: R achilles & medial ankle   Pain Rating: (0-10 scale) 5/10  Pain altered Tx:  [x] No  [] Yes  Action:  Comments:    Objective:  INTERVENTIONS  INTERVENTIONS  Reps/ Time Weight/ Level Completed  Today Comments               MODALITIES            Ionto       Check if eval signed and returned- not as of    Vasocompression- R boot  15 min  mod/38 deg  x                MANUAL                                    EXERCISES  _ R ANkle           Biking  5 min                     R ankle PROM 5 min   x     R ankle AROM 10x3'' ea  x    R ankle circles 20x ea   CW/CCW   Great toe extensions 20x  x Therapist blocking lesser toes    Lesser toe extensions 20x  x Therapist blocking big toe   All toe curling 20x  x    DF stretch with strap  3x30s   x     Soleus stretch with strap 3x30s  x With knee bent    Desensitization rubbing 2 min towel  To begin session   Resisted PF  20x yellow  Keeping in pain free range    Resisted DF  20x yellow   min range   Resisted INv 20x yellow     Resisted Ev 20x yellow     Seated ankle pumps 20x3''      BAPS 20x ea  Lvl 2  x PF/DF,IV/EV, CCW/CW      Seated PF  2x10 5# DB x    Seated foot swipes  2x10  x    Forward mini-lunges 10x  x    Lateral mini lunges 10x   x    [place]  10 marbles x 2  x    EDUCATION            desensitization 7 min    Provided with handout (see soft chart) discussed starting with a soft texture and progress to rougher to build up tolerance to touch   Ice cup massage 1 min    Discussed using a frozen stevie cup to ice massage to address the swelling in L ankle                  Test & measures 2/13/23  R ankle ROM  2/13    DF  12    PF 40    INv 30    EV 25      R ankle strength 2/13   DF  4   PF 3   INv 4   EV 3       Other:    Specific Instructions for next treatment: continue to work on R ankle ROM, gradually progress weight bearing. Assessment: [x] Progressing toward goals. Pt at 6x visits in POC and progressing well. R ankle has significant improvements in ROM and muscle strength. He is not at least able to activate to neutral. Still dealing with moderate swelling as he becomes more active so will need to stick with vasocompression. He is still very sensitive to touch so strongly encourage to work on desensitization techniques at home. Progressed program to include more weight bearing with fair tolerances. Will need to continue to increases to appropriately reload and for pt to tolerate better mobility. Overall feel he progressing and appropriate to continue per POC. [] No change.      [] Other:    [x] Patient would continue to benefit from skilled physical therapy services in order to: improve pain, decrease swelling, improve ROM, increase foot intrinsics and strength of intact R lowe leg musculature to improve functional tolerances to ambulation and other ADLs. GOALS:   STG: (to be met in 6 treatments)  Pt will self report worst pain no greater than 3/10 with walking into session in order to better tolerate ADLs/work activities with minimal dysfunction   -2/13: progressing -- 5/10 this date   Pt will improve AROM in R ankle DF to 5 deg past neutral for normalized gait mechanics.    -2/13: MET - 12 deg this date  Pt will improve R ankle PF AROM to 55 deg to be equal to the LLE for full push off.    -2/13: progressing - 40 deg   Pt will improve R ankle INV/EV to +/- 5 deg of L ankle to have better adaptability of the ankle when walking.    - 2/13 :MET   Pt will be able to tolerate moderate pressure of the R lower leg without > 2pt increase in pain showing better pain control leading to less dysfunction with ADLs    -2/13: Met and progressing with desensitization. LTG: (to be met in 12 treatments)  Pt will have no observable swelling in the R ankle upon arrival to treatment showing improved swelling control. Pt will demonstrate at least a 3/5 R ankle DF/PF to aid in mobility and walking. Pt will be able to tolerate walking > 10 minutes without significant increase in pain to improve tolerance to community distances. Pt will increase score on FAAM to >40/84 in order to demonstrate improved functional tolerances at PLOF with minimal restriction/dysfunction  Pt will demonstrate independence with a long term HEP for continued progress/maintenance after completion of PT     Pt. Education:  [x] Yes  [] No  [x] Reviewed Prior HEP/Ed  Method of Education: [x] Verbal  [] Demo  [] Written  Comprehension of Education:  [] Verbalizes understanding. [] Demonstrates understanding. [] Needs review. [x] Demonstrates/verbalizes HEP/Ed previously given. Plan: [x] Continue per plan of care. -- no changes to POC, 2x/wk for total of 12 visits.     [] Other:    Treatment Plan:  [x] Therapeutic Exercise   44972  [] Iontophoresis: 4 mg/mL Dexamethasone Sodium Phosphate  mAmin  56685   [x] Therapeutic Activity  91282 [x] Vasopneumatic cold with compression  29563    [x] Gait Training   53016 [] Ultrasound   69393   [x] Neuromuscular Re-education  74673 [x] Electrical Stimulation Unattended  20251   [x] Manual Therapy  00920 [] Electrical Stimulation Attended  80436   [x] Instruction in HEP  [] Lumbar/Cervical Traction  67795   [] Aquatic Therapy   56807 [] Cold/hotpack    [] Massage   90954      [] Dry Needling, 1 or 2 muscles  44402   [] Biofeedback, first 15 minutes   06584  [] Biofeedback, additional 15 minutes   56261 [] Dry Needling, 3 or more muscles  37239          Treatment Charges: Mins Units   []  Modalities     [x]  Ther Exercise 39 3   []  Manual Therapy     []  Ther Activities     []  Aquatics     []  Neuromuscular     [x] Vasocompression 15 1   [] Gait Training     [] Dry needling        [] 1 or 2 muscles        [] 3 or more muscles     []  Other     Total Treatment time 54 4     Time In: 4:50p          Time Out: 5:44    Electronically signed by:  Liu Vaca, PT

## 2023-03-01 ENCOUNTER — HOSPITAL ENCOUNTER (OUTPATIENT)
Dept: PHYSICAL THERAPY | Age: 53
Setting detail: THERAPIES SERIES
Discharge: HOME | End: 2023-03-01
Payer: MEDICAID

## 2023-03-01 PROCEDURE — 97110 THERAPEUTIC EXERCISES: CPT

## 2023-03-01 PROCEDURE — 97016 VASOPNEUMATIC DEVICE THERAPY: CPT

## 2023-03-01 NOTE — FLOWSHEET NOTE
[facility] Out[facility]   [address]   Phone: [phone]   Fax: [phone]    Physical Therapy Daily Treatment Note      Date:  3/1/2023  Patient Name:  Angelique Mercado    :  1970  MRN: [id number]  Physician: Yobani COLLINS-CNP                                 Ins[facility]: [facility] [employer]. Medicaid --  visit remain  Medical Diagnosis: M76.61 (ICD-10-CM) - Achilles tendinitis, right leg  Rehab Codes: R25 pain , M25.60 stiffness, R53.1 weakness , R60.9 edema  Onset Date: chronic; worsening 2022                        Next 's appt: TBD   Visit# / total visits:   Cancels/No Shows: 0/0        Precautions: R Foot drop     Subjective:  Patient reports increased pain through lateral ankle to lateral knee, stating he has not done anything different that may have caused this pain. Continues to report compliance with HEP.      Pain:  [x] Yes  [] No Location: R achilles & lateral ankle, R lateral knee  Pain Rating: (0-10 scale) 10  Pain altered Tx:  [x] No  [] Yes  Action:  Comments:    Objective:  INTERVENTIONS  INTERVENTIONS  Reps/ Time Weight/ Level Completed  Today Comments               MODALITIES            Ionto       Check if eval signed and returned- not as of    Vasocompression- R boot  10 min  mod/38 deg  x                MANUAL                                    EXERCISES  _ R ANkle           Biking  5 min                     R ankle PROM 5 min   x     R ankle AROM 10x3'' ea      R ankle circles 20x ea  x CW/CCW   Great toe extensions 20x   Therapist blocking lesser toes    Lesser toe extensions 20x   Therapist blocking big toe   All toe curling 20x      DF stretch with strap  3x30s        Soleus stretch with strap 3x30s   With knee bent    Desensitization rubbing 2 min towel  To begin session   Resisted PF  20x Red x Keeping in pain free range    Resisted DF  20x Red x  min range   Resisted INv 20x Red x    Resisted Ev 20x Red x    Seated ankle pumps 20x3''      BAPS 20x ea  Lvl 2   PF/DF,IV/EV, CCW/CW      Seated PF  2x10 5# DB     Seated foot swipes  2x10      Forward mini-lunges 10x  x    Lateral mini lunges 10x   x    [place]  10 marbles x 2  x    EDUCATION            desensitization 7 min    Provided with handout (see soft chart) discussed starting with a soft texture and progress to rougher to build up tolerance to touch   Ice cup massage 1 min    Discussed using a frozen stevie cup to ice massage to address the swelling in L ankle                      Specific Instructions for next treatment: continue to work on R ankle ROM, gradually progress weight bearing.       Assessment: [x] Progressing toward goals. 3/1: Began session with PROM, followed by progression from yellow to red resisted AROM in all directions. Patient tolerated well but noted a slight pain increase that traveled from the lateral ankle to the lateral knee with eversion. Continued with standing exercises with pt noting increased pain post-lateral lunges. Ended with vaso for pain and edema management.     [] No change.     [] Other:    [x] Patient would continue to benefit from skilled physical therapy services in order to: improve pain, decrease swelling, improve ROM, increase foot intrinsics and strength of intact R lowe leg musculature to improve functional tolerances to ambulation and other ADLs.     GOALS:   STG: (to be met in 6 treatments)  Pt will self report worst pain no greater than 3/10 with walking into session in order to better tolerate ADLs/work activities with minimal dysfunction   -2/13: progressing -- 5/10 this date   Pt will improve AROM in R ankle DF to 5 deg past neutral for normalized gait mechanics.    -2/13: MET - 12 deg this date  Pt will improve R ankle PF AROM to 55 deg to be equal to the LLE for full push off.    -2/13: progressing - 40 deg   Pt will improve R ankle INV/EV to +/- 5 deg of L ankle to have better adaptability of the ankle  when walking.    - 2/13 :MET   Pt will be able to tolerate moderate pressure of the R lower leg without > 2pt increase in pain showing better pain control leading to less dysfunction with ADLs    -2/13: Met and progressing with desensitization.   LTG: (to be met in 12 treatments)  Pt will have no observable swelling in the R ankle upon arrival to treatment showing improved swelling control.   Pt will demonstrate at least a 3/5 R ankle DF/PF to aid in mobility and walking.   Pt will be able to tolerate walking > 10 minutes without significant increase in pain to improve tolerance to community distances.   Pt will increase score on FAAM to >40/84 in order to demonstrate improved functional tolerances at PLOF with minimal restriction/dysfunction  Pt will demonstrate independence with a long term HEP for continued progress/maintenance after completion of PT     Pt. Education:  [x] Yes  [] No  [x] Reviewed Prior HEP/Ed  Method of Education: [x] Verbal  [] Demo  [] Written  Comprehension of Education:  [] Verbalizes understanding.  [] Demonstrates understanding.  [] Needs review.  [x] Demonstrates/verbalizes HEP/Ed previously given.     Plan: [x] Continue per plan of care   [] Other:      Treatment Charges: Mins Units   []  Modalities     [x]  Ther Exercise 35 2   []  Manual Therapy     []  Ther Activities     []  Aquatics     []  Neuromuscular     [x] Vasocompression 10 1   [] Gait Training     [] Dry needling        [] 1 or 2 muscles        [] 3 or more muscles     []  Other     Total Treatment time 45 3     Time In: 6:14p          Time Out: 6:59    Electronically signed by:  Mayelin Cano PTA

## 2023-03-06 ENCOUNTER — HOSPITAL ENCOUNTER (OUTPATIENT)
Dept: PHYSICAL THERAPY | Age: 53
Setting detail: THERAPIES SERIES
Discharge: HOME | End: 2023-03-06
Payer: MEDICAID

## 2023-03-06 PROCEDURE — 97110 THERAPEUTIC EXERCISES: CPT

## 2023-03-06 PROCEDURE — 97016 VASOPNEUMATIC DEVICE THERAPY: CPT

## 2023-03-06 NOTE — FLOWSHEET NOTE
509 [facility] Outpatient Physical Therapy   [address]   Phone: [phone]   Fax: [phone]    Physical Therapy Daily Treatment Note      Date:  3/6/2023  Patient Name:  Lila Wang    :  1970  MRN: [id number]  Physician: Cici COLLINS-KHANG                                 [facility] --  visit remain  Medical Diagnosis: M76.61 (ICD-10-CM) - Achilles tendinitis, right leg  Rehab Codes: R25 pain , M25.60 stiffness, R53.1 weakness , R60.9 edema  Onset Date: chronic; worsening 2022                        Next 's appt: TBD   Visit# / total visits:   Cancels/No Shows: 0/0        Precautions: R Foot drop     Subjective:  Patient reports decreased pain since last visit, noting it is always more painful at end of day. Patient also states he has some plantar surface foot pain that shoots from heel to toes. Also reporting knee pain is not improving and he is noticing more swelling at distal end of patella/proximal shin.      Pain:  [x] Yes  [] No Location: R achilles & lateral ankle, R knee  Pain Rating: (0-10 scale) 4/10  Pain altered Tx:  [x] No  [] Yes  Action:  Comments:    Objective:  INTERVENTIONS  INTERVENTIONS  Reps/ Time Weight/ Level Completed  Today Comments               MODALITIES            I[place]       Check if eval signed and returned- not as of    Vasocompression- R boot  10 min  mod/38 deg  x                MANUAL                                    EXERCISES  _ R ANkle           Biking  5 min   x                  R ankle PROM 5 min        R ankle AROM 10x3'' ea      R ankle circles 20x ea   CW/CCW   Great toe extensions 20x   Therapist blocking lesser toes    Lesser toe extensions 20x   Therapist blocking big toe   All toe curling 20x      DF stretch with strap  3x30s        Soleus stretch with strap 3x30s   With knee bent    Desensitization rubbing 2 min towel  To begin session   Resisted PF  20x Red x Keeping in pain free range    Resisted DF  20x Red x  min range   Resisted INv 20x Red x    Resisted Ev 20x Red x    Seated ankle pumps 20x3''      BAPS 20x ea  Lvl 2   PF/DF,IV/EV, CCW/CW      Seated PF  2x10 5# DB     Seated foot swipes  2x10      Forward mini-lunges 10x      Lateral mini lunges 10x       [place]  10 marbles x 2      Standing gastroc/soleus stretch 3x30'' ea  x    Standing heel raises 2x10  x    Heel Raise from step 10x  x           EDUCATION            desensitization 7 min    Provided with handout (see soft chart) discussed starting with a soft texture and progress to rougher to build up tolerance to touch   Ice cup massage 1 min    Discussed using a frozen stevie cup to ice massage to address the swelling in L ankle                      Specific Instructions for next treatment: continue to work on R ankle ROM, gradually progress weight bearing.       Assessment: [x] Progressing toward goals. 3/6: Initiated session with bike, followed by ankle AROM exercises. Improvement demonstrated with resisted AROM so progression made to more standing exercises. Added heel raises from both neutral and off edge of step. Patient had slight increase in pain with the edge of step heel raises but noted it was tolerable. Ended session with vaso for pain management.    [] No change.     [] Other:    [x] Patient would continue to benefit from skilled physical therapy services in order to: improve pain, decrease swelling, improve ROM, increase foot intrinsics and strength of intact R lowe leg musculature to improve functional tolerances to ambulation and other ADLs.     GOALS:   STG: (to be met in 6 treatments)  Pt will self report worst pain no greater than 3/10 with walking into session in order to better tolerate ADLs/work activities with minimal dysfunction   -2/13: progressing -- 5/10 this date   Pt will improve AROM in R ankle DF to 5 deg past neutral for normalized gait mechanics.    -2/13: MET - 12 deg this  date  Pt will improve R ankle PF AROM to 55 deg to be equal to the LLE for full push off.    -2/13: progressing - 40 deg   Pt will improve R ankle INV/EV to +/- 5 deg of L ankle to have better adaptability of the ankle when walking.    - 2/13 :MET   Pt will be able to tolerate moderate pressure of the R lower leg without > 2pt increase in pain showing better pain control leading to less dysfunction with ADLs    -2/13: Met and progressing with desensitization. LTG: (to be met in 12 treatments)  Pt will have no observable swelling in the R ankle upon arrival to treatment showing improved swelling control. Pt will demonstrate at least a 3/5 R ankle DF/PF to aid in mobility and walking. Pt will be able to tolerate walking > 10 minutes without significant increase in pain to improve tolerance to community distances. Pt will increase score on FAAM to >40/84 in order to demonstrate improved functional tolerances at PLOF with minimal restriction/dysfunction  Pt will demonstrate independence with a long term HEP for continued progress/maintenance after completion of PT     Pt. Education:  [x] Yes  [] No  [x] Reviewed Prior HEP/Ed  Method of Education: [x] Verbal  [] Demo  [] Written  Comprehension of Education:  [] Verbalizes understanding. [] Demonstrates understanding. [] Needs review. [x] Demonstrates/verbalizes HEP/Ed previously given.      Plan: [x] Continue per plan of care   [] Other:      Treatment Charges: Mins Units   []  Modalities     [x]  Ther Exercise 32 2   []  Manual Therapy     []  Ther Activities     []  Aquatics     []  Neuromuscular     [x] Vasocompression 10 1   [] Gait Training     [] Dry needling        [] 1 or 2 muscles        [] 3 or more muscles     []  Other     Total Treatment time 42 3     Time In: 6:18p          Time Out: 7:00pm    Electronically signed by:  Birgit Smiley PTA

## 2023-04-05 PROBLEM — R52 PAINFUL CUTANEOUS SCAR: Status: ACTIVE | Noted: 2023-04-05

## 2023-04-05 PROBLEM — R39.12 WEAK URINARY STREAM: Status: ACTIVE | Noted: 2023-04-05

## 2023-04-11 PROBLEM — R60.0 BILATERAL LEG EDEMA: Status: RESOLVED | Noted: 2020-05-27 | Resolved: 2023-04-11

## 2023-04-11 PROBLEM — S01.01XA LACERATION OF SCALP WITHOUT FOREIGN BODY: Status: RESOLVED | Noted: 2022-08-31 | Resolved: 2023-04-11

## 2023-04-11 PROBLEM — R94.31 ABNORMAL EKG: Status: RESOLVED | Noted: 2018-02-17 | Resolved: 2023-04-11

## 2023-04-11 PROBLEM — G44.301 INTRACTABLE POST-TRAUMATIC HEADACHE: Status: RESOLVED | Noted: 2022-08-31 | Resolved: 2023-04-11

## 2023-04-11 PROBLEM — H53.8 BLURRING OF VISUAL IMAGE OF LEFT EYE: Status: RESOLVED | Noted: 2022-08-31 | Resolved: 2023-04-11

## 2023-04-17 ENCOUNTER — HOSPITAL ENCOUNTER (OUTPATIENT)
Age: 53
Discharge: HOME | End: 2023-04-19
Payer: MEDICAID

## 2023-04-17 ENCOUNTER — HOSPITAL ENCOUNTER (OUTPATIENT)
Dept: GENERAL RADIOLOGY | Age: 53
Discharge: HOME | End: 2023-04-19
Payer: MEDICAID

## 2023-04-17 ENCOUNTER — HOSPITAL ENCOUNTER (OUTPATIENT)
Age: 53
Discharge: HOME | End: 2023-04-17
Payer: MEDICAID

## 2023-04-17 DIAGNOSIS — G89.29 CHRONIC PAIN OF RIGHT KNEE: ICD-10-CM

## 2023-04-17 DIAGNOSIS — I10 ESSENTIAL HYPERTENSION: ICD-10-CM

## 2023-04-17 DIAGNOSIS — M25.561 CHRONIC PAIN OF RIGHT KNEE: ICD-10-CM

## 2023-04-17 LAB
ALBUMIN SERPL-MCNC: 4.1 G/DL (ref 3.5–5.2)
ALP SERPL-CCNC: 100 U/L (ref 40–129)
ALT SERPL-CCNC: 23 U/L (ref 5–41)
ANION GAP SERPL CALCULATED.3IONS-SCNC: 11 MMOL/L (ref 9–17)
AST SERPL-CCNC: 23 U/L
BILIRUB SERPL-MCNC: 0.3 MG/DL (ref 0.3–1.2)
BUN SERPL-MCNC: 13 MG/DL (ref 6–20)
CALCIUM SERPL-MCNC: 9.7 MG/DL (ref 8.6–10.4)
CHLORIDE SERPL-SCNC: 103 MMOL/L (ref 98–107)
CO2 SERPL-SCNC: 27 MMOL/L (ref 20–31)
CREAT SERPL-MCNC: 1.01 MG/DL (ref 0.7–1.2)
GFR SERPL CREATININE-BSD FRML MDRD: >60 ML/MIN/1.73M2
GLUCOSE SERPL-MCNC: 82 MG/DL (ref 70–99)
HCT VFR BLD AUTO: 42.2 % (ref 41–53)
HGB BLD-MCNC: 14.4 G/DL (ref 13.5–17.5)
MAGNESIUM SERPL-MCNC: 2.3 MG/DL (ref 1.6–2.6)
MCH RBC QN AUTO: 32.1 PG (ref 26–34)
MCHC RBC AUTO-ENTMCNC: 34.1 G/DL (ref 31–37)
MCV RBC AUTO: 94.1 FL (ref 80–100)
PDW BLD-RTO: 12.5 % (ref 11.5–14.9)
PLATELET # BLD AUTO: 205 K/UL (ref 150–450)
PMV BLD AUTO: 8.9 FL (ref 6–12)
POTASSIUM SERPL-SCNC: 3.9 MMOL/L (ref 3.7–5.3)
PROT SERPL-MCNC: 6.8 G/DL (ref 6.4–8.3)
RBC # BLD: 4.49 M/UL (ref 4.5–5.9)
SODIUM SERPL-SCNC: 141 MMOL/L (ref 135–144)
TSH SERPL-ACNC: 0.85 UIU/ML (ref 0.3–5)
URATE SERPL-MCNC: 5 MG/DL (ref 3.4–7)
WBC # BLD AUTO: 5.7 K/UL (ref 3.5–11)

## 2023-04-17 PROCEDURE — 73562 X-RAY EXAM OF KNEE 3: CPT

## 2023-04-17 PROCEDURE — 80053 COMPREHEN METABOLIC PANEL: CPT

## 2023-04-17 PROCEDURE — 83735 ASSAY OF MAGNESIUM: CPT

## 2023-04-17 PROCEDURE — 36415 COLL VENOUS BLD VENIPUNCTURE: CPT

## 2023-04-17 PROCEDURE — 85027 COMPLETE CBC AUTOMATED: CPT

## 2023-04-17 PROCEDURE — 84550 ASSAY OF BLOOD/URIC ACID: CPT

## 2023-04-17 PROCEDURE — 84443 ASSAY THYROID STIM HORMONE: CPT

## 2023-04-17 NOTE — RESULT ENCOUNTER NOTE
Please notify patient: Very mild decrease of red blood cells, same as before but improved, nothing to worry about, as the other components of CBC normal      Otherwise labs within normal limits  continue current treatment    Future Appointments  4/24/2023  1:15 PM    [facility]allon [facility]        [facility]  9/5/2023   10:00 AM   Dax[facility] Del Rosario [facility]     [facility]

## 2023-04-17 NOTE — RESULT ENCOUNTER NOTE
Please notify patient: Normal x-ray of the knee      Future Appointments  4/24/2023  1:15 PM    [place] [facility] [facility] [place] [place]  9/5/2023   10:00 AM   Greg[facility] Cyr [facility]     [facility]

## 2023-04-24 ENCOUNTER — OFFICE VISIT (OUTPATIENT)
Dept: ORTHOPEDIC SURGERY | Age: 53
End: 2023-04-24
Payer: MEDICAID

## 2023-04-24 DIAGNOSIS — M25.561 RIGHT KNEE PAIN, UNSPECIFIED CHRONICITY: Primary | ICD-10-CM

## 2023-04-24 PROCEDURE — 99203 OFFICE O/P NEW LOW 30 MIN: CPT | Performed by: ORTHOPAEDIC SURGERY

## 2023-04-24 NOTE — PROGRESS NOTES
Alexandra Fox M.D.            [address] [facility] [address] [facility],[address], [address] [facility]           Dept Phone: [phone]           Dept Fax:  [phone]           [place]          Dept Phone: [phone]           Dept Fax:  [phone]      Chief Compliant:  Chief Complaint   Patient presents with    Pain     Rt knee        History of Present Illness: This is a 50 y.o. male who presents to the clinic today for evaluation / follow up of right knee pain. Patient is a 53-year-old woman who actually saw approximately 6 or 7 years ago for the same right knee. He had MRI done at that time however he states that his leg got better but in the last 2 to 3 months he says it really got significantly worsened. Patient describes sharp stabbing catching pain on the inside of his knee is difficulty getting in and out of cars and out of chairs etc..       Review of Systems   Constitutional: Negative for fever, chills, sweats. Eyes: Negative for changes in vision, or pain. HENT: Negative for ear ache, epistaxis, or sore throat. Respiratory/Cardio: Negative for Chest pain, palpitations, SOB, or cough. Gastrointestinal: Negative for abdominal pain, N/V/D. Genitourinary: Negative for dysuria, frequency, urgency, or hematuria. Neurological: Negative for headache, numbness, or weakness. Integumentary: Negative for rash, itching, laceration, or abrasion. Musculoskeletal: Positive for Pain (Rt knee)       Physical Exam:  Constitutional: Patient is oriented to person, place, and time. Patient appears well-developed and well nourished. HENT: Negative otherwise noted  Head: Normocephalic and Atraumatic  Nose: Normal  Eyes: Conjunctivae and EOM are normal  Neck: Normal range of motion Neck supple.     Respiratory/Cardio: Effort normal. No respiratory

## 2023-04-28 DIAGNOSIS — M10.9 ACUTE GOUT OF LEFT WRIST, UNSPECIFIED CAUSE: ICD-10-CM

## 2023-04-28 DIAGNOSIS — M77.8 TENDINITIS OF LEFT WRIST: ICD-10-CM

## 2023-04-28 RX ORDER — ALLOPURINOL 100 MG/1
TABLET ORAL
Qty: 90 TABLET | Refills: 1 | Status: SHIPPED | OUTPATIENT
Start: 2023-04-28

## 2023-05-05 ENCOUNTER — HOSPITAL ENCOUNTER (OUTPATIENT)
Dept: MRI IMAGING | Age: 53
End: 2023-05-05
Payer: MEDICAID

## 2023-05-05 DIAGNOSIS — M25.561 RIGHT KNEE PAIN, UNSPECIFIED CHRONICITY: ICD-10-CM

## 2023-05-05 PROCEDURE — 73721 MRI JNT OF LWR EXTRE W/O DYE: CPT

## 2023-05-10 RX ORDER — MELOXICAM 15 MG/1
15 TABLET ORAL DAILY
Qty: 30 TABLET | Refills: 3 | Status: SHIPPED | OUTPATIENT
Start: 2023-05-10

## 2023-05-10 NOTE — TELEPHONE ENCOUNTER
Patient called in today and the results of his MRI were given. He stated he was interested in Mobic. A prescription for Mobic was pended.

## 2023-05-10 NOTE — TELEPHONE ENCOUNTER
[facility] to return call to discuss results      ----- Message from Ivonne Lomeli MD sent at 5/8/2023  7:18 AM EDT -----  No evidence for meniscus tear. Patient does have moderate medial compartment arthritis and I would start him on Mobic 15 mg p.o. daily. No office follow-up is necessary  ----- Message -----  From: Roxy Fish Incoming Radiant Results From [facility]  Sent: 5/5/2023   9:34 AM EDT  To:  Ivonne Lomeli MD

## 2023-05-12 DIAGNOSIS — I10 ESSENTIAL HYPERTENSION: ICD-10-CM

## 2023-05-24 DIAGNOSIS — F31.75 BIPOLAR DISORDER, IN PARTIAL REMISSION, MOST RECENT EPISODE DEPRESSED (HCC): ICD-10-CM

## 2023-05-24 DIAGNOSIS — F41.9 ANXIETY: ICD-10-CM

## 2023-05-24 RX ORDER — BUSPIRONE HYDROCHLORIDE 10 MG/1
TABLET ORAL
Qty: 90 TABLET | Refills: 3 | OUTPATIENT
Start: 2023-05-24

## 2023-05-30 DIAGNOSIS — N52.9 ERECTILE DYSFUNCTION, UNSPECIFIED ERECTILE DYSFUNCTION TYPE: ICD-10-CM

## 2023-05-30 RX ORDER — SILDENAFIL 100 MG/1
TABLET, FILM COATED ORAL
Qty: 30 TABLET | Refills: 1 | Status: SHIPPED | OUTPATIENT
Start: 2023-05-30

## 2023-05-30 NOTE — TELEPHONE ENCOUNTER
Please Approve or Refuse.   Send to Pharmacy per Pt's Request:      Next Visit Date:  9/5/2023   Last Visit Date: 4/5/2023    Hemoglobin A1C (%)   Date Value   06/02/2022 5.4   01/19/2022 5.8   03/12/2021 5.3             ( goal A1C is < 7)   BP Readings from Last 3 Encounters:   12/05/22 122/75   10/04/22 110/70   08/31/22 118/78          (goal 120/80)  BUN   Date Value Ref Range Status   04/17/2023 13 6 - 20 mg/dL Final     Creatinine   Date Value Ref Range Status   04/17/2023 1.01 0.70 - 1.20 mg/dL Final     Potassium   Date Value Ref Range Status   04/17/2023 3.9 3.7 - 5.3 mmol/L Final

## 2023-07-01 DIAGNOSIS — F31.75 BIPOLAR DISORDER, IN PARTIAL REMISSION, MOST RECENT EPISODE DEPRESSED (HCC): ICD-10-CM

## 2023-07-03 RX ORDER — DOXEPIN HYDROCHLORIDE 75 MG/1
75 CAPSULE ORAL NIGHTLY
Qty: 90 CAPSULE | Refills: 0 | Status: SHIPPED | OUTPATIENT
Start: 2023-07-03

## 2023-07-03 RX ORDER — PRAZOSIN HYDROCHLORIDE 2 MG/1
CAPSULE ORAL
Qty: 90 CAPSULE | Refills: 0 | Status: SHIPPED | OUTPATIENT
Start: 2023-07-03

## 2023-07-11 DIAGNOSIS — L90.5 PAINFUL CUTANEOUS SCAR: ICD-10-CM

## 2023-07-11 DIAGNOSIS — E55.9 VITAMIN D DEFICIENCY: ICD-10-CM

## 2023-07-11 DIAGNOSIS — F31.75 BIPOLAR DISORDER, IN PARTIAL REMISSION, MOST RECENT EPISODE DEPRESSED (HCC): ICD-10-CM

## 2023-07-11 DIAGNOSIS — K21.9 GASTROESOPHAGEAL REFLUX DISEASE WITHOUT ESOPHAGITIS: ICD-10-CM

## 2023-07-11 DIAGNOSIS — R52 PAINFUL CUTANEOUS SCAR: ICD-10-CM

## 2023-07-11 RX ORDER — MELATONIN
1 DAILY
Qty: 90 TABLET | Refills: 3 | Status: SHIPPED | OUTPATIENT
Start: 2023-07-11

## 2023-07-11 RX ORDER — ALLANTOIN 5 MG/G
GEL TOPICAL
Qty: 50 G | Refills: 1 | Status: SHIPPED | OUTPATIENT
Start: 2023-07-11

## 2023-07-11 RX ORDER — OMEPRAZOLE 20 MG/1
CAPSULE, DELAYED RELEASE ORAL
Qty: 90 CAPSULE | Refills: 3 | Status: SHIPPED | OUTPATIENT
Start: 2023-07-11

## 2023-07-11 NOTE — TELEPHONE ENCOUNTER
Please Approve or Refuse.   Send to Pharmacy per Pt's Request: [facility]      Next Visit Date:  9/5/2023   Last Visit Date: 4/5/2023    Hemoglobin A1C (%)   Date Value   06/02/2022 5.4   01/19/2022 5.8   03/12/2021 5.3             ( goal A1C is < 7)   BP Readings from Last 3 Encounters:   12/05/22 122/75   10/04/22 110/70   08/31/22 118/78          (goal 120/80)  BUN   Date Value Ref Range Status   04/17/2023 13 6 - 20 mg/dL Final     Creatinine   Date Value Ref Range Status   04/17/2023 1.01 0.70 - 1.20 mg/dL Final     Potassium   Date Value Ref Range Status   04/17/2023 3.9 3.7 - 5.3 mmol/L Final

## 2023-07-17 RX ORDER — AMOXICILLIN 500 MG
CAPSULE ORAL
Qty: 360 CAPSULE | Refills: 3 | Status: SHIPPED | OUTPATIENT
Start: 2023-07-17

## 2023-07-25 DIAGNOSIS — N52.9 ERECTILE DYSFUNCTION, UNSPECIFIED ERECTILE DYSFUNCTION TYPE: ICD-10-CM

## 2023-07-25 RX ORDER — SILDENAFIL 100 MG/1
TABLET, FILM COATED ORAL
Qty: 30 TABLET | Refills: 1 | Status: SHIPPED | OUTPATIENT
Start: 2023-07-25
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
## (undated) DEVICE — NEEDLE HYPO 25GA L1.5IN BLU POLYPR HUB S STL REG BVL STR

## (undated) DEVICE — STRIP,CLOSURE,WOUND,MEDI-STRIP,1/2X4: Brand: MEDLINE

## (undated) DEVICE — ADAPTER CIRC OD22XID15MM FOR MON VENT PARAMETER

## (undated) DEVICE — GLOVE ORANGE PI 7   MSG9070

## (undated) DEVICE — PAD,ABDOMINAL,8"X7.5",ST,LF,20/BX: Brand: MEDLINE INDUSTRIES, INC.

## (undated) DEVICE — DRESSING PETRO W3XL8IN OIL EMUL N ADH GZ KNIT IMPREG CELOS

## (undated) DEVICE — OCCLUSIVE GAUZE ROLL,3% BISMUTH TRIBROMOPHENATE IN PETROLATUM BLEND: Brand: XEROFORM

## (undated) DEVICE — COVER LT HNDL BLU PLAS

## (undated) DEVICE — SOLUTION SCRB 4OZ 4% CHG H2O AIDED FOR PREOPERATIVE SKIN

## (undated) DEVICE — INTENDED FOR TISSUE SEPARATION, AND OTHER PROCEDURES THAT REQUIRE A SHARP SURGICAL BLADE TO PUNCTURE OR CUT.: Brand: BARD-PARKER ® CARBON RIB-BACK BLADES

## (undated) DEVICE — MERCY HEALTH ST CHARLES: Brand: MEDLINE INDUSTRIES, INC.

## (undated) DEVICE — DRESSING GZ W3XL16IN CELOS ACETT OIL EMUL N ADH

## (undated) DEVICE — ELECTRODE PT RET AD L9FT HI MOIST COND ADH HYDRGEL CORDED

## (undated) DEVICE — SOLUTION SCRB 4OZ 10% POVIDONE IOD ANTIMIC BTL

## (undated) DEVICE — Device

## (undated) DEVICE — DRAPE,EXTREMITY,89X128,STERILE: Brand: MEDLINE

## (undated) DEVICE — SINGLE PORT MANIFOLD: Brand: NEPTUNE 2

## (undated) DEVICE — GLOVE SURG SZ 65 THK91MIL LTX FREE SYN POLYISOPRENE

## (undated) DEVICE — BNDG,ELSTC,MATRIX,STRL,4"X5YD,LF,HOOK&LP: Brand: MEDLINE

## (undated) DEVICE — Z DISCONTINUED BY MEDLINE USE 2711682 TRAY SKIN PREP DRY W/ PREM GLV

## (undated) DEVICE — BLADE WECK ST

## (undated) DEVICE — BANDAGE,GAUZE,BULKEE II,4.5"X4.1YD,STRL: Brand: MEDLINE

## (undated) DEVICE — GAUZE,SPONGE,FLUFF,6"X6.75",STRL,5/TRAY: Brand: MEDLINE

## (undated) DEVICE — SYRINGE MED 10ML LUERLOCK TIP W/O SFTY DISP

## (undated) DEVICE — PADDING,UNDERCAST,COTTON, 4"X4YD STERILE: Brand: MEDLINE

## (undated) DEVICE — PROTECTOR ULN NRV PUR FOAM HK LOOP STRP ANATOMICALLY

## (undated) DEVICE — GOWN,AURORA,NONREINFORCED,LARGE: Brand: MEDLINE

## (undated) DEVICE — PAD,ABDOMINAL,5"X9",ST,LF,25/BX: Brand: MEDLINE INDUSTRIES, INC.

## (undated) DEVICE — GLOVE SURG 8.5 PF POLYMER WHT STRL SIGN LTX ESSENTIAL LTX

## (undated) DEVICE — SOLUTION PREP POVIDONE IOD FOR SKIN MUCOUS MEM PRIOR TO